# Patient Record
Sex: FEMALE | Race: WHITE | HISPANIC OR LATINO | Employment: FULL TIME | URBAN - METROPOLITAN AREA
[De-identification: names, ages, dates, MRNs, and addresses within clinical notes are randomized per-mention and may not be internally consistent; named-entity substitution may affect disease eponyms.]

---

## 2017-12-19 ENCOUNTER — ALLSCRIPTS OFFICE VISIT (OUTPATIENT)
Dept: OTHER | Facility: OTHER | Age: 42
End: 2017-12-19

## 2017-12-19 DIAGNOSIS — R71.8 OTHER ABNORMALITY OF RED BLOOD CELLS: ICD-10-CM

## 2017-12-19 DIAGNOSIS — H53.2 DIPLOPIA: ICD-10-CM

## 2017-12-19 DIAGNOSIS — E66.9 OBESITY: ICD-10-CM

## 2017-12-19 DIAGNOSIS — Z90.5 ACQUIRED ABSENCE OF KIDNEY: ICD-10-CM

## 2017-12-20 NOTE — PROGRESS NOTES
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) (H53 2)    Plan  Astigmatism of both eyes, Double vision, Myopia of both eyes    · Ophthalmology Referral Other Co-Management  *  Status: Hold For - Scheduling Requested for: [id number]  are Referring to a non- Preferred Provider : Provider not listed in Allscripts  Care Summary provided  : Yes  Double vision, Obesity (BMI 30-39  9)    · (1) TSH WITH FT4 REFLEX; Status:Active; Requested for:11Bzp0952;   Double vision, Single kidney    · (1) CBC/PLT/DIFF; Status:Active; Requested for:[id number]; Health Maintenance    · *1 - [facility] Co-Management  *  Status: Hold For - Scheduling Requested for: [id number]  Care Summary provided  : Yes  Obesity (BMI 30-39  9)    · (1) COMPREHENSIVE METABOLIC PANEL; Status:Active; Requested for:27Min5052;    · (1) LIPID PANEL FASTING W DIRECT LDL REFLEX; Status:Active; Requestedfor:80Jvp4990;   Screening for depression    · *VB - PHQ-9 Tool; Status:Complete;   Done: 88QBF4925 10:05AM    Discussion/Summary  Discussion Summary:   ObesityPatient advised to lose 2-4 lb monthly  To start low intensity exercise  To start drinking water at least 2 glasses a day and increase to 4 glasses a day minimum  Labs for serum glucose ordered via CMP to rule out diabetes given family history of diabetes  Decreased vision: Referred to ophthalmologistHM: She refused tdap and flu vaccine  Referred to gyn for annual Pap smear  Counseling Documentation With Imm: The patient was counseled regarding instructions for management,-- risk factor reductions,-- risks and benefits of treatment options,-- importance of compliance with treatment     Patient Education: Educational resources provided:   Medication SE Review and Pt Understands Tx: Possible side effects of new medications were reviewed with the patient/guardian today  The treatment plan was reviewed with the patient/guardian  The patient/guardian understands and agrees with the treatment plan      Chief Complaint  Chief Complaint Free Text Note Form: Presents to the clinic to establish care      History of Present Illness  HPI: as Flori Perdomo moved here [facility] about a month ago and denies any chronic medical conditions except for migraines  Chronic Hx of migraines, first diagnosed when she was about 24 y/o, after delivery of her first child  Starts on one side of the head and becomes generalized  Often frontal  Sensitivity to loud noise but not light  Often with Nausea and vomiting  Better with being in a dark room, and advil  Reports aura, consisting of discomfort at the sides of the head  Last about 1-3 days  Rates at 10/10 at worse, but 5/10 with advil  Family Hx of DM II with her father and mother  Not taking any meds currently  Has a Hx of poor vision consisting of astigmatism, myopia and double vision which seems to be getting worse  Last saw the ophthalmologist over a year ago  Has not seen the GYN in several years  Today she is referred to GYN and ophthalmologist  She has a history of kidney stones over the past several years  In 2014 was so bad that she lost 1 of her kidneys  Currently has and kidney on the right side  Verbal assessment sure she is not drinking water but lots of juice and some amount of coffee; at least 1 cup a day  Denies intake of soda  She reports she was told to check her kidney function every 6 months  The kidney was last checked more than 6 months ago but less than 1 year  We eventually referred her to the kidney specialist depending on what we find on her lab results  She is currently obese with a BMI of 33  Advised to lose 2-4 lb a month  To avoid losing too much weight too fast as this may affect her only kidney negatively     Hospital Based Practices Required Assessment:  Pain Assessment  the patient states they do not have pain  (on a scale of 0 to 10, the patient rates the pain at 0 )   Prefered Language is  [place]  Primary Language is  Citizen of [place]  Education Completed: disease/condition,-- medications-- and-- treatment/procedure  Teaching Method: verbal  Person Taught: patient  Evaluation Of Learning: verbalized/demonstrated understanding      Review of Systems  Complete-Female:  Constitutional: No fever, no chills, feels well, no tiredness, no recent weight gain or weight loss  Eyes: eyesight problems, but-- as noted in HPI,-- no eye pain,-- eyes not red-- and-- no purulent discharge from the eyes  ENT: no complaints of earache, no loss of hearing, no nose bleeds, no nasal discharge, no sore throat, no hoarseness  Cardiovascular: No complaints of slow heart rate, no fast heart rate, no chest pain, no palpitations, no leg claudication, no lower extremity edema  Respiratory: No complaints of shortness of breath, no wheezing, no cough, no SOB on exertion, no orthopnea, no PND  Gastrointestinal: No complaints of abdominal pain, no constipation, no nausea or vomiting, no diarrhea, no bloody stools  Musculoskeletal: No complaints of arthralgias, no myalgias, no joint swelling or stiffness, no limb pain or swelling  Neurological: No complaints of headache, no confusion, no convulsions, no numbness, no dizziness or fainting, no tingling, no limb weakness, no difficulty walking  Psychiatric: Not suicidal, no sleep disturbance, no anxiety or depression, no change in personality, no emotional problems  ROS Reviewed:   ROS reviewed  PHQ-9 Depression Scale: Over the past 2 weeks, how often have you been bothered by the following problems? 1 ) Little interest or pleasure in doing things? Not at all   2 ) Feeling down, depressed or hopeless? Not at all   3 ) Trouble falling asleep or sleeping too much? Not at all   4 ) Feeling tired or having little energy? Not at all   5 ) Poor appetite or overeating?  Not at all   6 ) Feeling bad about yourself, or that you are a failure, or have let yourself or your family down? Not at all   7 ) Trouble concentrating on things, such as reading a newspaper or watching television? Not at all   8 ) Moving or speaking so slowly that other people could have noticed, or the opposite, moving or speaking faster than usual? Not at all  How difficult have these problems made it for you to do your work, take care of things at home, or get along with people? 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 2) (H53 2)   3  Myopia of both eyes (367 1) (H52 13)   4  Screening for depression (V79 0) (Z13 89)    Past Medical History  1  History of renal calculi (V13 01) (Z87 442)   2  History of Toe fracture, left (826 0) (M56 326X)  Active Problems And Past Medical History Reviewed: The active problems and past medical history were reviewed and updated today  Surgical History  1  History of Kidney Surgery   2  History of Tubal Ligation  Surgical History Reviewed: The surgical history was reviewed and updated today  Family History  Mother    1  Family history of diabetes mellitus (V18 0) (Z83 3)   2  Family history of hyperlipidemia (V18 19) (Z83 49)   3  Family history of hypertension (V17 49) (Z82 49)   4  Family history of low back pain (V17 89) (Z82 69)  Father    5  Family history of diabetes mellitus (V18 0) (Z83 3)  Family History    6  Denied: Family history of drug abuse   7  No family history of mental disorder  Family History Reviewed: The family history was reviewed and updated today  Social History   · Never a smoker  Social History Reviewed: The social history was reviewed and updated today  The social history was reviewed and is unchanged  Allergies  1   No Known Drug Allergies    Vitals  Signs   Recorded: 66YAF5179 09:56AM   Temperature: 99 F  Heart Rate: 60  Systolic: 996  Diastolic: 70  Height: 5 ft 4 in  Weight: 197 lb 1 44 oz  BMI Calculated: 33 83  BSA Calculated: 1 94    Physical Exam   Constitutional  General appearance: No acute distress, well appearing and well nourished  well developed,-- normal body odor,-- does not smell of feces,-- does not smell of urine,-- well nourished,-- obese,-- clothing appropriate-- and-- well groomed  -- BMI 33  Eyes  Conjunctiva and lids: No swelling, erythema or discharge  Pupils and irises: Equal, round and reactive to light  -- wearing glasses  Ears, Nose, Mouth, and Throat  Otoscopic examination: Tympanic membranes translucent with normal light reflex  Canals patent without erythema  Oropharynx: Normal with no erythema, edema, exudate or lesions  Pulmonary  Respiratory effort: No increased work of breathing or signs of respiratory distress  Auscultation of lungs: Clear to auscultation  Cardiovascular  Auscultation of heart: Normal rate and rhythm, normal S1 and S2, without murmurs  Abdomen  Abdomen: Abnormal  -- Obese, round, no mass effect  Liver and spleen: No hepatomegaly or splenomegaly  -- Exam limited by body habitus  Musculoskeletal  Gait and station: Normal    Inspection/palpation of joints, bones, and muscles: Normal    Neurologic  Cranial nerves: Cranial nerves 2-12 intact  -- grossly intact  Psychiatric  Orientation to person, place, and time: Normal    Mood and affect: Normal          Results/Data  *VB - PHQ-9 Tool [id number] 10:05AM Shelley Jeffries     Test Name Result Flag Reference   PHQ-9 Adult Depression Score 0     PHQ-9 Adult Depression Screening Negative         Attending Note  Collaborating Physician Note: Collaborating Physician: I agree with the Advanced Practitioner note        Future Appointments    Date/Time Provider Specialty Site   01/04/2018 09:20 AM Delma Gongora 6 PCP     Signatures   Electronically signed by : PAYAM Carter; Dec 19 2017 10:49AM EST (Author)    Electronically signed by : Ryan Morgan; Dec 19 2017 12:08PM EST                       (Author)    Electronically signed by :  OSCAR Tang ; Dec 19 2017  1:13PM EST                       (Review)

## 2017-12-22 ENCOUNTER — APPOINTMENT (OUTPATIENT)
Dept: LAB | Facility: CLINIC | Age: 42
End: 2017-12-22
Payer: COMMERCIAL

## 2017-12-22 DIAGNOSIS — E66.9 OBESITY: ICD-10-CM

## 2017-12-22 DIAGNOSIS — H53.2 DIPLOPIA: ICD-10-CM

## 2017-12-22 DIAGNOSIS — Z90.5 ACQUIRED ABSENCE OF KIDNEY: ICD-10-CM

## 2017-12-22 LAB
ALBUMIN SERPL BCP-MCNC: 3.5 G/DL (ref 3.5–5)
ALP SERPL-CCNC: 73 U/L (ref 46–116)
ALT SERPL W P-5'-P-CCNC: 29 U/L (ref 12–78)
ANION GAP SERPL CALCULATED.3IONS-SCNC: 7 MMOL/L (ref 4–13)
AST SERPL W P-5'-P-CCNC: 16 U/L (ref 5–45)
BASOPHILS # BLD AUTO: 0.03 THOUSANDS/ΜL (ref 0–0.1)
BASOPHILS NFR BLD AUTO: 0 % (ref 0–1)
BILIRUB SERPL-MCNC: 0.35 MG/DL (ref 0.2–1)
BUN SERPL-MCNC: 11 MG/DL (ref 5–25)
CALCIUM SERPL-MCNC: 9.2 MG/DL (ref 8.3–10.1)
CHLORIDE SERPL-SCNC: 102 MMOL/L (ref 100–108)
CHOLEST SERPL-MCNC: 240 MG/DL (ref 50–200)
CO2 SERPL-SCNC: 26 MMOL/L (ref 21–32)
CREAT SERPL-MCNC: 0.97 MG/DL (ref 0.6–1.3)
EOSINOPHIL # BLD AUTO: 0.09 THOUSAND/ΜL (ref 0–0.61)
EOSINOPHIL NFR BLD AUTO: 1 % (ref 0–6)
ERYTHROCYTE [DISTWIDTH] IN BLOOD BY AUTOMATED COUNT: 16.5 % (ref 11.6–15.1)
GFR SERPL CREATININE-BSD FRML MDRD: 73 ML/MIN/1.73SQ M
GLUCOSE P FAST SERPL-MCNC: 90 MG/DL (ref 65–99)
HCT VFR BLD AUTO: 35.3 % (ref 34.8–46.1)
HDLC SERPL-MCNC: 51 MG/DL (ref 40–60)
HGB BLD-MCNC: 10.9 G/DL (ref 11.5–15.4)
LDLC SERPL CALC-MCNC: 158 MG/DL (ref 0–100)
LYMPHOCYTES # BLD AUTO: 2.03 THOUSANDS/ΜL (ref 0.6–4.47)
LYMPHOCYTES NFR BLD AUTO: 29 % (ref 14–44)
MCH RBC QN AUTO: 22.6 PG (ref 26.8–34.3)
MCHC RBC AUTO-ENTMCNC: 30.9 G/DL (ref 31.4–37.4)
MCV RBC AUTO: 73 FL (ref 82–98)
MONOCYTES # BLD AUTO: 0.57 THOUSAND/ΜL (ref 0.17–1.22)
MONOCYTES NFR BLD AUTO: 8 % (ref 4–12)
NEUTROPHILS # BLD AUTO: 4.37 THOUSANDS/ΜL (ref 1.85–7.62)
NEUTS SEG NFR BLD AUTO: 62 % (ref 43–75)
NRBC BLD AUTO-RTO: 0 /100 WBCS
PLATELET # BLD AUTO: 351 THOUSANDS/UL (ref 149–390)
PMV BLD AUTO: 10.7 FL (ref 8.9–12.7)
POTASSIUM SERPL-SCNC: 3.8 MMOL/L (ref 3.5–5.3)
PROT SERPL-MCNC: 7.9 G/DL (ref 6.4–8.2)
RBC # BLD AUTO: 4.82 MILLION/UL (ref 3.81–5.12)
SODIUM SERPL-SCNC: 135 MMOL/L (ref 136–145)
TRIGL SERPL-MCNC: 153 MG/DL
TSH SERPL DL<=0.05 MIU/L-ACNC: 0.98 UIU/ML (ref 0.36–3.74)
WBC # BLD AUTO: 7.1 THOUSAND/UL (ref 4.31–10.16)

## 2017-12-22 PROCEDURE — 36415 COLL VENOUS BLD VENIPUNCTURE: CPT

## 2017-12-22 PROCEDURE — 84443 ASSAY THYROID STIM HORMONE: CPT

## 2017-12-22 PROCEDURE — 80061 LIPID PANEL: CPT

## 2017-12-22 PROCEDURE — 85025 COMPLETE CBC W/AUTO DIFF WBC: CPT

## 2017-12-22 PROCEDURE — 80053 COMPREHEN METABOLIC PANEL: CPT

## 2017-12-25 ENCOUNTER — HOSPITAL ENCOUNTER (EMERGENCY)
Facility: HOSPITAL | Age: 42
Discharge: HOME | End: 2017-12-25
Attending: EMERGENCY MEDICINE | Admitting: EMERGENCY MEDICINE
Payer: COMMERCIAL

## 2017-12-25 VITALS
RESPIRATION RATE: 18 BRPM | OXYGEN SATURATION: 100 % | TEMPERATURE: 98.9 F | SYSTOLIC BLOOD PRESSURE: 104 MMHG | DIASTOLIC BLOOD PRESSURE: 56 MMHG | HEART RATE: 93 BPM

## 2017-12-25 DIAGNOSIS — K52.9 ACUTE GASTROENTERITIS: Primary | ICD-10-CM

## 2017-12-25 PROCEDURE — 99283 EMERGENCY DEPT VISIT LOW MDM: CPT

## 2017-12-25 RX ORDER — ONDANSETRON 4 MG/1
4 TABLET, ORALLY DISINTEGRATING ORAL EVERY 6 HOURS PRN
Qty: 10 TABLET | Refills: 0 | Status: SHIPPED | OUTPATIENT
Start: 2017-12-25 | End: 2019-08-15 | Stop reason: ALTCHOICE

## 2017-12-25 RX ORDER — ONDANSETRON 4 MG/1
4 TABLET, ORALLY DISINTEGRATING ORAL ONCE
Status: COMPLETED | OUTPATIENT
Start: 2017-12-25 | End: 2017-12-25

## 2017-12-25 RX ORDER — MAGNESIUM HYDROXIDE/ALUMINUM HYDROXICE/SIMETHICONE 120; 1200; 1200 MG/30ML; MG/30ML; MG/30ML
30 SUSPENSION ORAL ONCE
Status: COMPLETED | OUTPATIENT
Start: 2017-12-25 | End: 2017-12-25

## 2017-12-25 RX ADMIN — LIDOCAINE HYDROCHLORIDE 15 ML: 20 SOLUTION ORAL; TOPICAL at 22:10

## 2017-12-25 RX ADMIN — ONDANSETRON 4 MG: 4 TABLET, ORALLY DISINTEGRATING ORAL at 23:16

## 2017-12-25 RX ADMIN — ALUMINUM HYDROXIDE, MAGNESIUM HYDROXIDE, AND SIMETHICONE 30 ML: 200; 200; 20 SUSPENSION ORAL at 22:10

## 2017-12-25 RX ADMIN — ONDANSETRON 4 MG: 4 TABLET, ORALLY DISINTEGRATING ORAL at 22:09

## 2017-12-26 NOTE — ED PROVIDER NOTES
History  Chief Complaint   Patient presents with    Vomiting     Pt ambulatory on unit c/o vomiting, diarrhea, and weakness for 1 day  History provided by:  Patient   used: No     50-year-old female presented for evaluation of 1 day of nausea, vomiting and diarrhea associated with some periumbilical abdominal pain which had begun after the vomiting  Both vomiting and diarrhea have since stopped but she remains nauseous  LMP 3 weeks ago  History of nephrectomy due to renal stone  On exam here she has normal vitals  Oropharynx moist   Abdomen is soft with minimal periumbilical tenderness  No rebound or guarding  Most likely viral illness  Will give Zofran, GI cocktail, oral challenge and re-evaluate  None       Past Medical History:   Diagnosis Date    Renal disorder        Past Surgical History:   Procedure Laterality Date    KIDNEY SURGERY         History reviewed  No pertinent family history  I have reviewed and agree with the history as documented  Social History   Substance Use Topics    Smoking status: Never Smoker    Smokeless tobacco: Never Used    Alcohol use No        Review of Systems   Constitutional: Positive for appetite change  Negative for activity change and fever  Gastrointestinal: Positive for abdominal pain, diarrhea, nausea and vomiting  All other systems reviewed and are negative  Physical Exam  ED Triage Vitals [12/25/17 2130]   Temperature Pulse Respirations Blood Pressure SpO2   98 9 °F (37 2 °C) 104 18 145/67 96 %      Temp Source Heart Rate Source Patient Position - Orthostatic VS BP Location FiO2 (%)   Oral Monitor Sitting Left arm --      Pain Score       8           Orthostatic Vital Signs  Vitals:    12/25/17 2130 12/25/17 2200   BP: 145/67 129/60   Pulse: 104 94   Patient Position - Orthostatic VS: Sitting Lying       Physical Exam   Constitutional: She is oriented to person, place, and time   She appears well-developed and well-nourished  No distress  HENT:   Head: Normocephalic  Mouth/Throat: Oropharynx is clear and moist    Neck: Normal range of motion  Neck supple  Cardiovascular: Normal rate and regular rhythm  Abdominal: Soft  She exhibits no distension  Minimal periumbilical tenderness  No rebound or guarding  Musculoskeletal: Normal range of motion  She exhibits no edema  Neurological: She is alert and oriented to person, place, and time  Psychiatric: She has a normal mood and affect  Her behavior is normal    Nursing note and vitals reviewed  ED Medications  Medications   ondansetron (ZOFRAN-ODT) dispersible tablet 4 mg (not administered)   ondansetron (ZOFRAN-ODT) dispersible tablet 4 mg (4 mg Oral Given 12/25/17 2209)   aluminum-magnesium hydroxide-simethicone (MYLANTA) 200-200-20 mg/5 mL oral suspension 30 mL (30 mL Oral Given 12/25/17 2210)   lidocaine viscous (XYLOCAINE) 2 % mucosal solution 15 mL (15 mL Oral Given 12/25/17 2210)       Diagnostic Studies  Results Reviewed     None                 No orders to display              Procedures  Procedures       Phone Contacts  ED Phone Contact    ED Course  ED Course                                MDM  Number of Diagnoses or Management Options  Acute gastroenteritis:   Diagnosis management comments: 50-year-old female presented with 1 day of nausea, vomiting and diarrhea  Vomiting and diarrhea have ceased  Still somewhat nauseous with some mild periumbilical abdominal pain  Very mild tenderness on exam   Symptoms improved after GI cocktail, Zofran  She was able to tolerate some water in the emergency department  Most likely viral illness  Will plan discharge and symptomatic control for home  To return if symptoms worsen      Patient Progress  Patient progress: improved    CritCare Time    Disposition  Final diagnoses:   Acute gastroenteritis     Time reflects when diagnosis was documented in both MDM as applicable and the Disposition within this note     Time User Action Codes Description Comment    12/25/2017 11:06 PM Kasie LEYVA Add [K52 9] Acute gastroenteritis       ED Disposition     ED Disposition Condition Comment    Discharge  Reginaldo Nicolas discharge to home/self care  Condition at discharge: Stable        Follow-up Information     Follow up With Specialties Details Why Contact Info Additional Vanita [address], DO Internal Medicine   [address] Emergency Department Emergency Medicine  If symptoms worsen [address] [id number]  [phone]  ED,  [address], [place], [address]        Patient's Medications   Discharge Prescriptions    ONDANSETRON (ZOFRAN-ODT) 4 MG DISINTEGRATING TABLET    Take 1 tablet by mouth every 6 (six) hours as needed for nausea or vomiting       Start Date: 12/25/2017End Date: --       Order Dose: 4 mg       Quantity: 10 tablet    Refills: 0     No discharge procedures on file      ED Provider  Electronically Signed by           Ceci Castillo MD  12/25/17 9856

## 2017-12-26 NOTE — ED NOTES
Pt awake and alert, no distress noted, no vomiting since in ED, no other questions upon d/c     April OSCAR Laughlin, GUIDO  12/25/17 7154

## 2017-12-26 NOTE — DISCHARGE INSTRUCTIONS
Gastroenteritis   LO QUE NECESITA SABER:   La gastroenteritis, o gripe estomacal, es sudhir infección del estómago y los intestinos  INSTRUCCIONES SOBRE EL ILANA HOSPITALARIA:   Llame al 911 en puja de presentar lo siguiente:   · Usted tiene dificultad para respirar o pulso acelerado  Regrese a la betito de emergencias si:   · Usted ve paola en ramirez diarrea  · Usted no puede dejar de vomitar  · Usted no ha orinado en 12 horas  · Usted siente que se va a desmayar  Pregúntele a ramirez Ariana Fent vitaminas y minerales son adecuados para usted  · Usted tiene fiebre  · Usted continúa con vómitos o diarrea aún después del tratamiento  · Usted ve lombrices en ramirez diarrea  · Ramirez boca u ojos están secos  Usted no está orinando tanto o con la misma frecuencia  · Usted tiene preguntas o inquietudes acerca de ramirez condición o cuidado  Medicamentos:   · Medicamentos,  se pueden administrar para [employer] vómitos o la diarrea, disminuir los calambres abdominales o tratar sudhir infección  · Verplanck francisco medicamentos shana se le haya indicado  Consulte con ramirez médico si usted samantha que ramirez medicamento no le está ayudando o si presenta efectos secundarios  Infórmele si es alérgico a cualquier medicamento  Mantenga sudhir lista actualizada de los [place], las vitaminas y los productos herbales que reanna  Incluya los siguientes datos de los medicamentos: cantidad, frecuencia y motivo de administración  Traiga con usted la lista o los envases de la píldoras a francisco citas de seguimiento  Lleve la lista de los medicamentos con usted en puja de sudhir emergencia  [place] de ramirez síntomas:   · Verplanck líquidos shana se le haya indicado  Pregunte a ramirez médico qué cantidad de líquido debe beber a diario y qué líquidos le recomienda  Es posible que también necesite derrick sudhir solución de rehidratación oral (SRO)   Sudhir SRO contiene la cantidad [place] de azúcar, sal y minerales en agua para reponer los líquidos corporales  · Consuma alimentos blandos  Cuando usted sienta [place], empiece a comer alimentos suaves y blandos  Ejemplos son los plátanos, sopas claras, josee y puré de Corpus brian  No consuma productos lácteos, alcohol, bebidas azucaradas, o bebidas con cafeína hasta que se sienta mejor  · Descanse el mayor tiempo posible  Cuando se empiece a sentir mejor, comience poco a poco a hacer más cada día  Evite la propagación de la gastroenteritis:  La gastroenteritis se puede propagar fácilmente  Manténgase usted, ramirez dieudonne y francisco alrededores limpios para ayudar a evitar la propagación de la gastroenteritis:  · Lávese las pelon frecuentemente  Utilice agua y Dimitry  [employer] las pelon después de usar el baño, cambiarle el pañal a un regina o estornudar  Lávese las pelon antes de comer o preparar alimentos  · Limpie las superficies y lave la ropa con frecuencia  Lave ramirez ropa y francisco toallas por separado del mikaela de la ropa  Limpie las superficies de ramirez hogar con limpiador antibacterial o con blanqueador  · Lave y cocine juma los alimentos  Lave las verduras crudas antes de cocinar  [address] y [place]  No utilice los mismos platos para las boubacar crudas que para otros alimentos  Ponga en el refrigerador inmediatamente cualquier alimento que haya sobrado  · Esté alerta cuando usted vaya de campamento o cuando viaje  Solamente tome agua limpia  No tome agua de los lenka o mariam a menos que usted purifique o hierva el agua aliza  Cuando viaje, tome agua embotellada y no le ponga hielo  No coma fruta con la cáscara  No coma pescado crudo o boubacar que no están cocinadas completamente  Acuda a francisco consultas de control con ramirez médico según le indicaron  Anote francisco preguntas para que se acuerde de hacerlas kassie francisco visitas  © 2017 [address] Information is for End User's use only and may not be sold, redistributed or otherwise used for commercial purposes   All illustrations and images included in CareNotes® are the copyrighted property of A D A M , Inc  or Rakesh Swann  Esta información es sólo para uso en educación  Ramirez intención no es darle un consejo médico sobre enfermedades o tratamientos  Colsulte con ramirez Shakir Pier farmacéutico antes de seguir cualquier régimen médico para saber si es seguro y efectivo para usted

## 2017-12-28 ENCOUNTER — APPOINTMENT (OUTPATIENT)
Dept: LAB | Facility: CLINIC | Age: 42
End: 2017-12-28
Payer: COMMERCIAL

## 2017-12-28 DIAGNOSIS — R71.8 OTHER ABNORMALITY OF RED BLOOD CELLS: ICD-10-CM

## 2017-12-28 LAB
FERRITIN SERPL-MCNC: 9 NG/ML (ref 8–388)
IRON SATN MFR SERPL: 4 %
IRON SERPL-MCNC: 19 UG/DL (ref 50–170)
TIBC SERPL-MCNC: 430 UG/DL (ref 250–450)

## 2017-12-28 PROCEDURE — 82728 ASSAY OF FERRITIN: CPT

## 2017-12-28 PROCEDURE — 83540 ASSAY OF IRON: CPT

## 2017-12-28 PROCEDURE — 83550 IRON BINDING TEST: CPT

## 2017-12-28 PROCEDURE — 36415 COLL VENOUS BLD VENIPUNCTURE: CPT

## 2018-01-12 ENCOUNTER — GENERIC CONVERSION - ENCOUNTER (OUTPATIENT)
Dept: OTHER | Facility: OTHER | Age: 43
End: 2018-01-12

## 2018-01-23 VITALS
DIASTOLIC BLOOD PRESSURE: 70 MMHG | BODY MASS INDEX: 33.65 KG/M2 | SYSTOLIC BLOOD PRESSURE: 100 MMHG | HEART RATE: 60 BPM | WEIGHT: 197.09 LBS | TEMPERATURE: 99 F | HEIGHT: 64 IN

## 2018-01-24 VITALS
HEART RATE: 66 BPM | DIASTOLIC BLOOD PRESSURE: 70 MMHG | BODY MASS INDEX: 33.27 KG/M2 | TEMPERATURE: 98.4 F | WEIGHT: 194.89 LBS | SYSTOLIC BLOOD PRESSURE: 100 MMHG | HEIGHT: 64 IN

## 2018-04-26 RX ORDER — FERROUS SULFATE TAB EC 324 MG (65 MG FE EQUIVALENT) 324 (65 FE) MG
1 TABLET DELAYED RESPONSE ORAL DAILY
COMMUNITY
Start: 2018-01-12 | End: 2018-05-04 | Stop reason: SDUPTHER

## 2018-04-26 RX ORDER — RANITIDINE 150 MG/1
1 TABLET ORAL DAILY
COMMUNITY
Start: 2018-01-12 | End: 2020-04-23

## 2018-04-27 ENCOUNTER — TELEPHONE (OUTPATIENT)
Dept: INTERNAL MEDICINE CLINIC | Facility: CLINIC | Age: 43
End: 2018-04-27

## 2018-04-27 ENCOUNTER — OFFICE VISIT (OUTPATIENT)
Dept: INTERNAL MEDICINE CLINIC | Facility: CLINIC | Age: 43
End: 2018-04-27
Payer: COMMERCIAL

## 2018-04-27 VITALS
TEMPERATURE: 98.6 F | WEIGHT: 201.5 LBS | BODY MASS INDEX: 34.4 KG/M2 | HEART RATE: 88 BPM | SYSTOLIC BLOOD PRESSURE: 110 MMHG | DIASTOLIC BLOOD PRESSURE: 82 MMHG | HEIGHT: 64 IN

## 2018-04-27 DIAGNOSIS — G43.101 MIGRAINE WITH AURA AND WITH STATUS MIGRAINOSUS, NOT INTRACTABLE: Primary | ICD-10-CM

## 2018-04-27 DIAGNOSIS — R79.0 LOW IRON STORES: ICD-10-CM

## 2018-04-27 DIAGNOSIS — Z02.4 DRIVER'S PERMIT PE (PHYSICAL EXAMINATION): ICD-10-CM

## 2018-04-27 DIAGNOSIS — G43.109 MIGRAINE WITH AURA AND WITHOUT STATUS MIGRAINOSUS, NOT INTRACTABLE: Primary | ICD-10-CM

## 2018-04-27 DIAGNOSIS — R71.8 LOW MEAN CORPUSCULAR VOLUME (MCV): ICD-10-CM

## 2018-04-27 PROBLEM — H52.13 MYOPIA OF BOTH EYES: Status: ACTIVE | Noted: 2017-12-19

## 2018-04-27 PROBLEM — R10.13 DYSPEPSIA: Status: ACTIVE | Noted: 2018-01-12

## 2018-04-27 PROBLEM — H52.203 ASTIGMATISM OF BOTH EYES: Status: ACTIVE | Noted: 2017-12-19

## 2018-04-27 PROBLEM — E66.9 OBESITY (BMI 30-39.9): Status: ACTIVE | Noted: 2017-12-19

## 2018-04-27 PROBLEM — H53.2 DOUBLE VISION: Status: ACTIVE | Noted: 2017-12-19

## 2018-04-27 PROBLEM — E78.2 MIXED HYPERLIPIDEMIA: Status: ACTIVE | Noted: 2018-01-12

## 2018-04-27 PROCEDURE — 99213 OFFICE O/P EST LOW 20 MIN: CPT | Performed by: NURSE PRACTITIONER

## 2018-04-27 PROCEDURE — 3725F SCREEN DEPRESSION PERFORMED: CPT | Performed by: NURSE PRACTITIONER

## 2018-04-27 RX ORDER — BUTALBITAL, ASPIRIN, AND CAFFEINE 50; 325; 40 MG/1; MG/1; MG/1
1 CAPSULE ORAL EVERY 4 HOURS PRN
Qty: 30 CAPSULE | Refills: 1 | Status: SHIPPED | OUTPATIENT
Start: 2018-04-27 | End: 2019-08-24

## 2018-04-27 RX ORDER — ACETAMINOPHEN, ASPIRIN AND CAFFEINE 250; 250; 65 MG/1; MG/1; MG/1
1 TABLET, FILM COATED ORAL EVERY 6 HOURS PRN
Qty: 60 TABLET | Refills: 1 | Status: SHIPPED | OUTPATIENT
Start: 2018-04-27 | End: 2018-04-27

## 2018-04-27 NOTE — TELEPHONE ENCOUNTER
Please advise patient that I have prescribed a different medication for her (fiorinal)  Let her stop by the clinic and pick it up  It is a controlled substance and cannot be e-prescribed  Thanks   Jg Jesus

## 2018-04-27 NOTE — PROGRESS NOTES
Assessment/Plan:     Diagnoses and all orders for this visit:    Migraine with aura and without status migrainosus, not intractable  -     aspirin-acetaminophen-caffeine (EXCEDRIN MIGRAINE) 250-250-65 MG per tablet; Take 1 tablet by mouth every 6 (six) hours as needed for headaches    Low iron stores  -     Iron Panel; Future    Low mean corpuscular volume (MCV)  -     CBC; Future    's permit PE (physical examination)        -      Negative exam        -      Signed 's permit form given to patient        -      Copy to be scanned in her chart  Subjective: Presents to the clinic for 's license physical     Patient ID: Sherif Treviño is a 40 y o  female  HPI  Patient is requesting a 's license physical examination  She has an ongoing diagnosis of astigmatism and uses glasses for it  In care of ophthalmologist  Also has low iron stores with low MCV  Was given iron tabs for 3 months, starting Dec 2017  We will recheck her cbc and iron stores today  She reports chronic diagnosis of migraines, x 10 yrs or more  Says she gets an attack about every two weeks, mild intensity, has been using advil on the first sign of the headache but the efficacy of Advil is dropping  She has an aura of pain on the back of her head, which precedes the migraine  Worse with stress and decreased sleep  The HA is usually achy, mostly located on the top and front of the head  Last from a couple of hours to several hours  Sometimes has sensitivity to light and noise  No N/V  No facial muscle weakness  She has HA currently and is off mild intensity, generalized  Started yesterday evening  Last episode of HA was 2 weeks ago       The following portions of the patient's history were reviewed and updated as appropriate: allergies, current medications, past family history, past medical history, past social history, past surgical history and problem list     Review of Systems   Constitutional: Negative for appetite change, diaphoresis, fatigue and unexpected weight change  HENT: Negative for hearing loss, rhinorrhea, sinus pain, sore throat and trouble swallowing  Respiratory: Negative for cough, chest tightness, shortness of breath and wheezing  Cardiovascular: Negative for chest pain and palpitations  Gastrointestinal: Negative for abdominal pain, diarrhea, nausea and vomiting  Genitourinary: Negative for hematuria and menstrual problem  Skin: Negative for color change, pallor, rash and wound  Neurological: Positive for headaches (as above)  Negative for dizziness, tremors, syncope, facial asymmetry, speech difficulty, light-headedness and numbness  Objective:      /82 (BP Location: Right arm, Patient Position: Sitting, Cuff Size: Adult)   Pulse 88   Temp 98 6 °F (37 °C) (Oral)   Ht 5' 4" (1 626 m)   Wt 91 4 kg (201 lb 8 oz)   BMI 34 59 kg/m²          Physical Exam   Constitutional: She is oriented to person, place, and time  She appears well-developed and well-nourished  No distress  HENT:   Head: Normocephalic and atraumatic  Right Ear: External ear normal    Left Ear: External ear normal    Eyes: Conjunctivae and EOM are normal  Pupils are equal, round, and reactive to light  Right eye exhibits no discharge  Left eye exhibits no discharge  Neck: Normal range of motion  Neck supple  No thyromegaly present  Cardiovascular: Normal rate, regular rhythm and normal heart sounds  No murmur heard  Pulmonary/Chest: Effort normal and breath sounds normal  No respiratory distress  She has no wheezes  Abdominal: Soft  Bowel sounds are normal  She exhibits no distension and no mass  There is no tenderness  Obese abd, soft  Single pannus  Exam limited by body habitus   Musculoskeletal: Normal range of motion  She exhibits no edema or tenderness  Lymphadenopathy:     She has no cervical adenopathy  Neurological: She is alert and oriented to person, place, and time   She has normal reflexes  Skin: Skin is warm and dry  She is not diaphoretic  Psychiatric: She has a normal mood and affect   Her behavior is normal  Judgment and thought content normal

## 2018-04-27 NOTE — PATIENT INSTRUCTIONS
Dieta saludable para el corazón   LO QUE NECESITA SABER:   Delwin Lingo bettie para el corazón es un plan alimenticio bajo en grasas totales, grasas no saludables y sodio (patrick)  Delwin Lingo saludable para el corazón ayuda a disminuir ramirez riesgo de sufrir de enfermedades del Rojo Bering y un derrame cerebral  Limite la cantidad de grasa que consume entre un 25% a 35% del total de francisco calorías diarias  Limite el sodio por debajo de los 2,300 mg al día  [address]:   Grasas saludables:  Las grasas saludables ayudan a mejorar los niveles de [place]  El riesgo de sudhir enfermedad cardíaca se disminuye cuando los niveles de colesterol son normales  Escoja grasas saludables shana las siguientes:  · Las grasas no saturadas  se encuentran en alimentos shana el frijol de soja, aceites de can[place], de [place], de [place] y de [place]  Se encuentra también en la margarina suave hecha con aceite líquido vegetal      · Las grasas Omega 3  se encuentran en ciertos pescados, shana el salmón, el atún y la [place], en las nueces y en la semilla de rebecca  Grasas no saludables:  Las grasas "malas" pueden causar niveles perjudiciales de colesterol en ramirez paola y aumentar el riesgo de sudhir enfermedad cardíaca  Limite el consumo de grasas no saludables, shana los siguientes:  · El colesterol  se encuentra en alimentos de origen animal, shana los huevos y la langosta al igual que en productos lácteos hechos con leche entera  Limite el consumo de colesterol a menos de 300 milligramos (mg) al día  Es posible que necesite limitar el colesterol a 200 mg al día si sufre de sudhir enfermedad cardíaca  · Las grasas saturadas  se encuentran en boubacar shana el tocino y la hamburguesa  Estas se encuentran también en la piel del elias y del [place], [place] entera y [place]  Limite el consumo de grasas saturadas a menos del 7% del total de francisco calorías diarias   Limite las grasas saturadas a menos del 6% si usted tiene enfermedad cardíaca o tiene un mayor riesgo para esto  · La grasa trans  se encuentran en los alimentos envasados, shana las papitas de bolsa y las [place]  También se encuentra en la margarina dura, algunos alimentos fritos y la manteca  Evite lo más que [employer] trans  Alimentos y bebidas saludables para el corazón para incluir:  Solicite que ramirez nutricionista o el médico le indique cuántas porciones necesita consumir de los siguientes alimentos de cada sosa alimenticio:  · Granos:      ¨ Panes, cereales y pastas de harina integral y Alfonse Coffey integral    ¨ Patatas fritas y galletas saladas bajas en grasa, bajas en sodio    · Verduras:      ¨ Brócoli, judías verdes, guisantes y espinacas    ¨ [place], col y habas    ¨ Zanahorias, patatas dulces, tomates y pimientos    ¨ Vegetales enlatados sin patrick añadida    · Frutas:      ¨ Plátanos, melocotones, peras y mccartney    ¨ Uvas, pasas y dátiles    ¨ [place], [place], [facility], [facility] y [facility]    ¨ [place], [place], melón y papaya    ¨ [place] y fresas    ¨ Conservas de frutas sin azúcares añadidos    · Productos lácteos bajos en grasa:      ¨ Leche sin grasa (descremada), leche 1%, leche baja en grasa de [place], anacardo o leche de soja fortificada con calcio    ¨ Queso cottage, queso bajo en grasa y yogur regular o congelado    · Lisa y proteínas , ralph de carne [place] de res o cerdo (chuletas, pierna, estephanie), el elias y el pavo sin piel, legumbres, productos de soya, las claras del huevo y nueces o paddy secos    Alimentos y bebidas que debe limitar o evitar:  Pregúntele a ramirez médico o nutricionista sobre estos y otros alimentos que contienen altos niveles de too Benites y Hilario Jainism que no son saludables:  · [employer] , shana las comidas congeladas, [place], williamón con queso y cereales con más de 300 mg de sodio por porción    · Productos enlatados o mezclas secas  para pasteles, sopas o salsas    · Verduras con sodio agregada , [address] instantáneas, verduras con salsas agregadas o conservas regulares de verduras    · Otros alimentos altos en sodio , shana la salsa de [place], salsa de [facility], aderezo para [place], encurtidos de [place], UPPER STONE, salsa de soya y miso    · [employer] lácteos con alto contenido de grasa  shana leche entera o 2%, queso crema o crema agria y quesos     · Alimentos con proteínas altas en grasa  ralph de carne ([address], las chuletas con hueso), el elias o pavo sin piel y las vísceras de animal shana el hígado    · Lisa curadas o [place] , shana las salchichas, el tocino y salchichones    · Aceites y grasas poco saludables , shana la [place], margarina en Joel Dine y aceites para cocinar shana el aceite de mook o juan    · Alimentos y bebidas altas en azúcar , tales shana refrescos (gaseosas), bebidas deportivas, té azucarado, dulces, pasteles, galletas, tartas y donas  Otras pautas dietéticas que debe seguir:   · Consuma más alimentos que contengan grasas Omega-3  Consuma pescado con altas cantidades de Omega-3 por lo menos 2 veces a la semana  · Limite el consumo de alcohol  Demasiado alcohol puede dañar ramirez corazón y elevar ramirez presión arterial  Las mujeres deberían limitar el consumo de alcohol a 1 bebida por día  Los hombres deberían limitar el consumo de alcohol a 2 tragos al día  Un trago equivale a 12 onzas de cerveza, 5 onzas de vino o 1 onza y ½ de licor  · Escoja alimentos bajos en sodio  Alimentos altos de sodio pueden conducir a la hipertensión  Al preparar la comida añada muy poca sal o no use sal  Use hierbas y especias en vez de la sal     · Consuma más fibra  para ayudar a bajar los niveles de [place]  Consuma al menos 5 porciones de frutas y verduras todos los días  Consuma 3 onzas de alimentos integrales al día  Priscella Nett (fríjoles) son Dannielle Lint buena brianna de [place]  Pautas de estilo de pilar:   · No fume    La nicotina y otros químicos contenidos en los cigarrillos y cigarros pueden causar daño a francisco pulmones y el corazón  Pida información a lovett médico si usted actualmente fuma y necesita ayuda para dejar de fumar  Los cigarrillos electrónicos o tabaco sin humo todavía contienen nicotina  Consulte con lovett médico antes de [employer]  · Pradeep Eliecer regularmente  para que le ayude a mantener un peso saludable y mejorar lovett presión arterial y niveles de colesterol  Pregunte a lovett médico acerca del mejor plan de ejercicio para usted  No empiece un programa de ejercicios sin antes consultar con lovett Cee Muljoshua a francisco consultas de control con lovett médico según le indicaron  Anote francisco preguntas para que se acuerde de hacerlas kassie francisco visitas  © 2017 [address] Information is for End User's use only and may not be sold, redistributed or otherwise used for commercial purposes  All illustrations and images included in CareNotes® are the copyrighted property of A D A M , Inc  or Rakesh Swann  Esta información es sólo para uso en educación  Lovett intención no es darle un consejo médico sobre enfermedades o tratamientos  Colsulte con lovett Ozell Fabry farmacéutico antes de seguir cualquier régimen médico para saber si es seguro y efectivo para usted

## 2018-05-03 ENCOUNTER — TELEPHONE (OUTPATIENT)
Dept: INTERNAL MEDICINE CLINIC | Facility: CLINIC | Age: 43
End: 2018-05-03

## 2018-05-04 ENCOUNTER — TELEPHONE (OUTPATIENT)
Dept: INTERNAL MEDICINE CLINIC | Facility: CLINIC | Age: 43
End: 2018-05-04

## 2018-05-04 ENCOUNTER — APPOINTMENT (OUTPATIENT)
Dept: LAB | Facility: CLINIC | Age: 43
End: 2018-05-04
Payer: COMMERCIAL

## 2018-05-04 DIAGNOSIS — R79.0 LOW IRON STORES: Primary | ICD-10-CM

## 2018-05-04 DIAGNOSIS — R79.0 LOW IRON STORES: ICD-10-CM

## 2018-05-04 DIAGNOSIS — R71.8 LOW MEAN CORPUSCULAR VOLUME (MCV): ICD-10-CM

## 2018-05-04 LAB
ERYTHROCYTE [DISTWIDTH] IN BLOOD BY AUTOMATED COUNT: 17.2 % (ref 11.6–15.1)
FERRITIN SERPL-MCNC: 6 NG/ML (ref 8–388)
HCT VFR BLD AUTO: 36.5 % (ref 34.8–46.1)
HGB BLD-MCNC: 11 G/DL (ref 11.5–15.4)
IRON SATN MFR SERPL: 7 %
IRON SERPL-MCNC: 32 UG/DL (ref 50–170)
MCH RBC QN AUTO: 22.8 PG (ref 26.8–34.3)
MCHC RBC AUTO-ENTMCNC: 30.1 G/DL (ref 31.4–37.4)
MCV RBC AUTO: 76 FL (ref 82–98)
PLATELET # BLD AUTO: 320 THOUSANDS/UL (ref 149–390)
PMV BLD AUTO: 10.8 FL (ref 8.9–12.7)
RBC # BLD AUTO: 4.83 MILLION/UL (ref 3.81–5.12)
TIBC SERPL-MCNC: 439 UG/DL (ref 250–450)
WBC # BLD AUTO: 6.48 THOUSAND/UL (ref 4.31–10.16)

## 2018-05-04 PROCEDURE — 83540 ASSAY OF IRON: CPT

## 2018-05-04 PROCEDURE — 85027 COMPLETE CBC AUTOMATED: CPT

## 2018-05-04 PROCEDURE — 82728 ASSAY OF FERRITIN: CPT

## 2018-05-04 PROCEDURE — 36415 COLL VENOUS BLD VENIPUNCTURE: CPT

## 2018-05-04 PROCEDURE — 83550 IRON BINDING TEST: CPT

## 2018-05-04 RX ORDER — FERROUS SULFATE TAB EC 324 MG (65 MG FE EQUIVALENT) 324 (65 FE) MG
324 TABLET DELAYED RESPONSE ORAL DAILY
Qty: 60 TABLET | Refills: 2 | Status: SHIPPED | OUTPATIENT
Start: 2018-05-04 | End: 2018-12-10

## 2018-05-08 ENCOUNTER — TELEPHONE (OUTPATIENT)
Dept: INTERNAL MEDICINE CLINIC | Facility: CLINIC | Age: 43
End: 2018-05-08

## 2018-05-08 ENCOUNTER — OFFICE VISIT (OUTPATIENT)
Dept: INTERNAL MEDICINE CLINIC | Facility: CLINIC | Age: 43
End: 2018-05-08
Payer: COMMERCIAL

## 2018-05-08 VITALS
HEIGHT: 64 IN | SYSTOLIC BLOOD PRESSURE: 140 MMHG | HEART RATE: 108 BPM | WEIGHT: 200.18 LBS | DIASTOLIC BLOOD PRESSURE: 90 MMHG | TEMPERATURE: 99 F | BODY MASS INDEX: 34.18 KG/M2

## 2018-05-08 DIAGNOSIS — M79.601 PAIN OF RIGHT UPPER EXTREMITY: Primary | ICD-10-CM

## 2018-05-08 DIAGNOSIS — Z11.1 SCREENING EXAMINATION FOR PULMONARY TUBERCULOSIS: ICD-10-CM

## 2018-05-08 DIAGNOSIS — G43.101 MIGRAINE WITH AURA AND WITH STATUS MIGRAINOSUS, NOT INTRACTABLE: ICD-10-CM

## 2018-05-08 DIAGNOSIS — Z02.1 PRE-EMPLOYMENT EXAMINATION: ICD-10-CM

## 2018-05-08 PROCEDURE — 86580 TB INTRADERMAL TEST: CPT | Performed by: INTERNAL MEDICINE

## 2018-05-08 PROCEDURE — 3008F BODY MASS INDEX DOCD: CPT | Performed by: INTERNAL MEDICINE

## 2018-05-08 PROCEDURE — 99214 OFFICE O/P EST MOD 30 MIN: CPT | Performed by: INTERNAL MEDICINE

## 2018-05-08 RX ORDER — CYCLOBENZAPRINE HCL 5 MG
10 TABLET ORAL 2 TIMES DAILY PRN
Qty: 60 TABLET | Refills: 0 | Status: SHIPPED | OUTPATIENT
Start: 2018-05-08 | End: 2018-12-10

## 2018-05-08 RX ORDER — NAPROXEN 500 MG/1
500 TABLET ORAL 2 TIMES DAILY WITH MEALS
Qty: 60 TABLET | Refills: 0 | Status: SHIPPED | OUTPATIENT
Start: 2018-05-08 | End: 2019-08-15 | Stop reason: ALTCHOICE

## 2018-05-08 RX ORDER — AMITRIPTYLINE HYDROCHLORIDE 10 MG/1
10 TABLET, FILM COATED ORAL
Qty: 90 TABLET | Refills: 1 | Status: SHIPPED | OUTPATIENT
Start: 2018-05-08 | End: 2019-08-24

## 2018-05-08 NOTE — PATIENT INSTRUCTIONS
Dolor de brazo   LO QUE NECESITA SABER:   Ramirez dolor de brazo puede ser causado por ciertas condiciones  Unos ejemplos incluyen artritis, problemas del nervio o tomó sudhir posición pop mientras durmió  Sudhir radiografía no mostró un hueso roto en ramirez brazo ni fanny  El dolor de brazo puede ser un signo de sudhir condición grave que necesita atención médica inmediata, shana un ataque al corazón  INSTRUCCIONES SOBRE EL ILANA HOSPITALARIA:   Llame al 911 en puja de presentar lo siguiente:  Tiene alguno de los siguientes signos de un ataque cardíaco:   · Estornudos, presión, o dolor en ramirez pecho que dura mas de 5 minutos o regresa  · Malestar o dolor en ramirez espalda, milo, mandíbula, abdomen, o brazo     · Dificultad para respirar o latidos cardíacos rápidos y palpitantes    · Náuseas o vómito    · Siente un desvanecimiento o tiene sudores fríos especialmente en el pecho o dificultad para respirar  Regrese a la betito de emergencias si:   · Usted tiene un dolor intenso o dolor que se desplaza de ramirez brazo a otras áreas  · Usted tiene inflamación, cosquilleo o entumecimiento en ramirez mano o dedos, o ramirez piel se torna stephani  · Usted no puede  el brazo  Pregúntele a ramirez Ariana Fent vitaminas y minerales son adecuados para usted  · Usted tiene preguntas o inquietudes acerca de ramirez condición o cuidado  Medicamentos:  Es posible que usted necesite alguno de los siguientes:  · Un medicamento con receta para el dolor  podrían ser Lola Lacer  Pregunte cómo derrick estos medicamentos de sudhir forma shelby  · AINEs (Analgésicos antiinflamatorios no esteroides) shana el ibuprofeno, ayudan a disminuir la inflamación, el dolor y la [place]  Piper medicamento esta disponible con o sin sudhir receta médica  Los AINEs pueden causar sangrado estomacal o problemas renales en ciertas personas  Si usted reanna un medicamento anticoagulante, siempre pregúntele a ramirez médico si los ROC son seguros para usted   Siempre rafy la etiqueta de [place] medicamento y [address]  · [place] francisco medicamentos shana se le haya indicado  Consulte con ramirez médico si usted samantha que ramirez medicamento no le está ayudando o si presenta efectos secundarios  Infórmele si es alérgico a cualquier medicamento  Mantenga sudhir lista actualizada de los [employer], las vitaminas y los productos herbales que reanna  Incluya los siguientes datos de los medicamentos: cantidad, frecuencia y motivo de administración  Traiga con usted la lista o los envases de la píldoras a francisco citas de seguimiento  Lleve la lista de los medicamentos con usted en puja de sudhir emergencia  Cuidados personales:   · Repose ramirez brazo según le indicaron  Endy Tiffani o cabestrillo se puede usar para inmovilizar ramirez brazo mientras se recupera  · Aplique hielo según las indicaciones  El hielo ayuda a disminuir el dolor y la inflamación  El hielo también puede contribuir a evitar el daño de los tejidos  Use un paquete de hielo o ponga hielo molido dentro de [employer]  Cúbrala con sudhir toalla  Aplique el hielo por 20 minutos sobre ramirez Nirav Moots, con cierta frecuencia [address] indicaciones  Pregunte cuántas veces necesita aplicar el hielo al día y por cuántos días  · Eleve ramirez brazo por encima del nivel de ramirez corazón con la frecuencia que pueda  [place] va a disminuir inflamación y el dolor  Apoye el brazo sobre almohadas o [place] para mantener el área elevada de sudhir forma cómoda  · Ajuste ramirez posición si usted trabaja frente a sudhir computadora  Es posible que necesite sudhir silla con reposabrazos o apoyo para la fanny al igual que cambiar la altura de la silla  · Mantenga un registro del dolor  Anote cuando tiene dolor y cuál es la intensidad  Incluya cualquier otro síntoma que sienta además del dolor  Un registro sirve para ayudar a mantener un seguimiento de los ciclos del dolor  Priscila Barber registro con usted a francisco citas de seguimiento   También podría ayudarle a ramirez médico a encontrar la causa del dolor  Acuda a francisco consultas de control con lovett médico según le indicaron  Usted podría necesitar fisioterapia  Es posible que necesite shay a un especialista ortopédico  Anote francisco preguntas para que se acuerde de hacerlas kassie francisco visitas  © 2017 [address] Information is for End User's use only and may not be sold, redistributed or otherwise used for commercial purposes  All illustrations and images included in CareNotes® are the copyrighted property of A D A M , Inc  or Rakesh Swann  Esta información es sólo para uso en educación  Lovett intención no es darle un consejo médico sobre enfermedades o tratamientos  Colsulte con lovett Dorna Latrell farmacéutico antes de seguir cualquier régimen médico para saber si es seguro y efectivo para usted

## 2018-05-08 NOTE — PROGRESS NOTES
Assessment/Plan:     Diagnoses and all orders for this visit:    Pain of right upper extremity  -     cyclobenzaprine (FLEXERIL) 5 mg tablet; Take 2 tablets (10 mg total) by mouth 2 (two) times a day as needed for muscle spasms  -     naproxen (NAPROSYN) 500 mg tablet; Take 1 tablet (500 mg total) by mouth 2 (two) times a day with meals  -     Ambulatory referral to Physical Therapy; Future  -     We will consider imaging if P/T is not successful  Pre-employment examination        -      Negative for any impairing medical conditions        -      No communicable illnesses    Screening examination for pulmonary tuberculosis  -     TB Skin Test  -      Patient to RTC in 2 days to get it read    Migraine with aura and with status migrainosus, not intractable  -     amitriptyline (ELAVIL) 10 mg tablet; Take 1 tablet (10 mg total) by mouth daily at bedtime            Subjective: Presents to the clinic for pre-employment physical     Patient ID: Rory Etienne is a 40 y o  female  HPI  She was last seen 2 weeks ago for migraines with aura and was started on meds  Initially she was prescribed Excedrin but the pharmacy called and reported that her insurance did not pay for it and this was changed to fiorinal  She reports she is doing a little better but is still getting the migraines  We will continue to monitor but in the meantime, we are starting her on amitriptyline for prophylaxis of the migraine  If this does not help, we will send her to neurologist  Today she reports ongoing right upper arm pain, which gets worse when she tries to attach her bra on her back  Duration several weeks  Achy twisting pain, better with rest  Denies arm weakness   Requesting PPD screening test      The following portions of the patient's history were reviewed and updated as appropriate: allergies, current medications, past family history, past medical history, past social history, past surgical history and problem list     Review of Systems   Constitutional: Negative for appetite change, diaphoresis, fatigue and unexpected weight change  Respiratory: Negative for cough, chest tightness, shortness of breath and wheezing  Cardiovascular: Negative for chest pain and palpitations  Musculoskeletal: Positive for myalgias (R upper arm) and neck pain (a little)  Negative for joint swelling and neck stiffness  Neurological: Positive for headaches  Negative for dizziness, tremors, syncope, weakness and numbness  Objective:      /90 (BP Location: Right arm, Patient Position: Sitting, Cuff Size: Adult)   Pulse (!) 108   Temp 99 °F (37 2 °C) (Oral)   Ht 5' 4" (1 626 m)   Wt 90 8 kg (200 lb 2 8 oz)   BMI 34 36 kg/m²        Physical Exam   Constitutional: She is oriented to person, place, and time  She appears well-developed and well-nourished  No distress  Cardiovascular: Normal rate, regular rhythm and normal heart sounds  No murmur heard  Pulmonary/Chest: Effort normal and breath sounds normal  No respiratory distress  She has no wheezes  Musculoskeletal: She exhibits tenderness (TTP of the R trap muscles of the neck, and muscles of the R shoulder and R upper arm (deltoid and brachialis muscles)  Full ROM but pain is elicited with internal rotation  No decreased in strength  Pulses 2/2)  She exhibits no edema  Neurological: She is alert and oriented to person, place, and time  Skin: She is not diaphoretic  Psychiatric: She has a normal mood and affect   Her behavior is normal  Judgment and thought content normal

## 2018-05-10 LAB
INDURATION: 0 MM
TB SKIN TEST: NEGATIVE

## 2018-05-10 NOTE — TELEPHONE ENCOUNTER
PPD READ ON 5/10/18 AS NEGATIVE, 0MM - FORM GIVEN TO PT  AND COPY SENT TO [facility]   CONFIRMATION RCV'D

## 2018-12-10 ENCOUNTER — APPOINTMENT (EMERGENCY)
Dept: CT IMAGING | Facility: HOSPITAL | Age: 43
End: 2018-12-10
Payer: COMMERCIAL

## 2018-12-10 ENCOUNTER — ANESTHESIA EVENT (OUTPATIENT)
Dept: PERIOP | Facility: HOSPITAL | Age: 43
End: 2018-12-10
Payer: COMMERCIAL

## 2018-12-10 ENCOUNTER — ANESTHESIA (OUTPATIENT)
Dept: PERIOP | Facility: HOSPITAL | Age: 43
End: 2018-12-10
Payer: COMMERCIAL

## 2018-12-10 ENCOUNTER — APPOINTMENT (EMERGENCY)
Dept: ULTRASOUND IMAGING | Facility: HOSPITAL | Age: 43
End: 2018-12-10
Payer: COMMERCIAL

## 2018-12-10 ENCOUNTER — HOSPITAL ENCOUNTER (OUTPATIENT)
Facility: HOSPITAL | Age: 43
Discharge: HOME | End: 2018-12-11
Attending: EMERGENCY MEDICINE | Admitting: SURGERY
Payer: COMMERCIAL

## 2018-12-10 DIAGNOSIS — N39.0 ACUTE UTI: ICD-10-CM

## 2018-12-10 DIAGNOSIS — K80.20 SYMPTOMATIC CHOLELITHIASIS: Primary | ICD-10-CM

## 2018-12-10 LAB
ALBUMIN SERPL BCP-MCNC: 3.5 G/DL (ref 3.5–5)
ALP SERPL-CCNC: 68 U/L (ref 46–116)
ALT SERPL W P-5'-P-CCNC: 29 U/L (ref 12–78)
ANION GAP SERPL CALCULATED.3IONS-SCNC: 9 MMOL/L (ref 4–13)
AST SERPL W P-5'-P-CCNC: 24 U/L (ref 5–45)
BACTERIA UR QL AUTO: ABNORMAL /HPF
BASOPHILS # BLD AUTO: 0.03 THOUSANDS/ΜL (ref 0–0.1)
BASOPHILS NFR BLD AUTO: 1 % (ref 0–1)
BILIRUB DIRECT SERPL-MCNC: 0.08 MG/DL (ref 0–0.2)
BILIRUB SERPL-MCNC: 0.6 MG/DL (ref 0.2–1)
BILIRUB UR QL STRIP: ABNORMAL
BUN SERPL-MCNC: 14 MG/DL (ref 5–25)
CALCIUM SERPL-MCNC: 8.9 MG/DL (ref 8.3–10.1)
CHLORIDE SERPL-SCNC: 102 MMOL/L (ref 100–108)
CLARITY UR: CLEAR
CO2 SERPL-SCNC: 26 MMOL/L (ref 21–32)
COLOR UR: ABNORMAL
CREAT SERPL-MCNC: 0.97 MG/DL (ref 0.6–1.3)
EOSINOPHIL # BLD AUTO: 0.1 THOUSAND/ΜL (ref 0–0.61)
EOSINOPHIL NFR BLD AUTO: 2 % (ref 0–6)
ERYTHROCYTE [DISTWIDTH] IN BLOOD BY AUTOMATED COUNT: 15.8 % (ref 11.6–15.1)
EXT PREG TEST URINE: NEGATIVE
GFR SERPL CREATININE-BSD FRML MDRD: 72 ML/MIN/1.73SQ M
GLUCOSE SERPL-MCNC: 94 MG/DL (ref 65–140)
GLUCOSE UR STRIP-MCNC: NEGATIVE MG/DL
HCT VFR BLD AUTO: 39.7 % (ref 34.8–46.1)
HGB BLD-MCNC: 12.6 G/DL (ref 11.5–15.4)
HGB UR QL STRIP.AUTO: NEGATIVE
IMM GRANULOCYTES # BLD AUTO: 0.02 THOUSAND/UL (ref 0–0.2)
IMM GRANULOCYTES NFR BLD AUTO: 0 % (ref 0–2)
KETONES UR STRIP-MCNC: ABNORMAL MG/DL
LEUKOCYTE ESTERASE UR QL STRIP: ABNORMAL
LIPASE SERPL-CCNC: 104 U/L (ref 73–393)
LYMPHOCYTES # BLD AUTO: 1.13 THOUSANDS/ΜL (ref 0.6–4.47)
LYMPHOCYTES NFR BLD AUTO: 24 % (ref 14–44)
MCH RBC QN AUTO: 25.9 PG (ref 26.8–34.3)
MCHC RBC AUTO-ENTMCNC: 31.7 G/DL (ref 31.4–37.4)
MCV RBC AUTO: 82 FL (ref 82–98)
MONOCYTES # BLD AUTO: 0.71 THOUSAND/ΜL (ref 0.17–1.22)
MONOCYTES NFR BLD AUTO: 15 % (ref 4–12)
NEUTROPHILS # BLD AUTO: 2.69 THOUSANDS/ΜL (ref 1.85–7.62)
NEUTS SEG NFR BLD AUTO: 58 % (ref 43–75)
NITRITE UR QL STRIP: NEGATIVE
NON-SQ EPI CELLS URNS QL MICRO: ABNORMAL /HPF
NRBC BLD AUTO-RTO: 0 /100 WBCS
PH UR STRIP.AUTO: 5.5 [PH] (ref 4.5–8)
PLATELET # BLD AUTO: 267 THOUSANDS/UL (ref 149–390)
PMV BLD AUTO: 10.8 FL (ref 8.9–12.7)
POTASSIUM SERPL-SCNC: 4.1 MMOL/L (ref 3.5–5.3)
PROT SERPL-MCNC: 7.9 G/DL (ref 6.4–8.2)
PROT UR STRIP-MCNC: ABNORMAL MG/DL
RBC # BLD AUTO: 4.87 MILLION/UL (ref 3.81–5.12)
RBC #/AREA URNS AUTO: ABNORMAL /HPF
SODIUM SERPL-SCNC: 137 MMOL/L (ref 136–145)
SP GR UR STRIP.AUTO: >=1.03 (ref 1–1.03)
UROBILINOGEN UR QL STRIP.AUTO: 1 E.U./DL
WBC # BLD AUTO: 4.68 THOUSAND/UL (ref 4.31–10.16)
WBC #/AREA URNS AUTO: ABNORMAL /HPF

## 2018-12-10 PROCEDURE — 88304 TISSUE EXAM BY PATHOLOGIST: CPT | Performed by: PATHOLOGY

## 2018-12-10 PROCEDURE — 96361 HYDRATE IV INFUSION ADD-ON: CPT

## 2018-12-10 PROCEDURE — 80048 BASIC METABOLIC PNL TOTAL CA: CPT | Performed by: EMERGENCY MEDICINE

## 2018-12-10 PROCEDURE — 76705 ECHO EXAM OF ABDOMEN: CPT

## 2018-12-10 PROCEDURE — 81001 URINALYSIS AUTO W/SCOPE: CPT

## 2018-12-10 PROCEDURE — 80076 HEPATIC FUNCTION PANEL: CPT | Performed by: EMERGENCY MEDICINE

## 2018-12-10 PROCEDURE — 74177 CT ABD & PELVIS W/CONTRAST: CPT

## 2018-12-10 PROCEDURE — 81025 URINE PREGNANCY TEST: CPT | Performed by: EMERGENCY MEDICINE

## 2018-12-10 PROCEDURE — 96365 THER/PROPH/DIAG IV INF INIT: CPT

## 2018-12-10 PROCEDURE — 83690 ASSAY OF LIPASE: CPT | Performed by: EMERGENCY MEDICINE

## 2018-12-10 PROCEDURE — 85025 COMPLETE CBC W/AUTO DIFF WBC: CPT | Performed by: EMERGENCY MEDICINE

## 2018-12-10 PROCEDURE — 96367 TX/PROPH/DG ADDL SEQ IV INF: CPT

## 2018-12-10 PROCEDURE — 99285 EMERGENCY DEPT VISIT HI MDM: CPT

## 2018-12-10 PROCEDURE — 36415 COLL VENOUS BLD VENIPUNCTURE: CPT | Performed by: EMERGENCY MEDICINE

## 2018-12-10 PROCEDURE — 96375 TX/PRO/DX INJ NEW DRUG ADDON: CPT

## 2018-12-10 RX ORDER — ONDANSETRON 2 MG/ML
INJECTION INTRAMUSCULAR; INTRAVENOUS AS NEEDED
Status: DISCONTINUED | OUTPATIENT
Start: 2018-12-10 | End: 2018-12-10 | Stop reason: SURG

## 2018-12-10 RX ORDER — PROPOFOL 10 MG/ML
INJECTION, EMULSION INTRAVENOUS AS NEEDED
Status: DISCONTINUED | OUTPATIENT
Start: 2018-12-10 | End: 2018-12-10 | Stop reason: SURG

## 2018-12-10 RX ORDER — ONDANSETRON 4 MG/1
4 TABLET, ORALLY DISINTEGRATING ORAL EVERY 6 HOURS PRN
Status: DISCONTINUED | OUTPATIENT
Start: 2018-12-10 | End: 2018-12-11 | Stop reason: HOSPADM

## 2018-12-10 RX ORDER — ROCURONIUM BROMIDE 10 MG/ML
INJECTION, SOLUTION INTRAVENOUS AS NEEDED
Status: DISCONTINUED | OUTPATIENT
Start: 2018-12-10 | End: 2018-12-10 | Stop reason: SURG

## 2018-12-10 RX ORDER — ACETAMINOPHEN 325 MG/1
650 TABLET ORAL EVERY 6 HOURS PRN
Status: DISCONTINUED | OUTPATIENT
Start: 2018-12-10 | End: 2018-12-11 | Stop reason: HOSPADM

## 2018-12-10 RX ORDER — CEFAZOLIN SODIUM 1 G/3ML
INJECTION, POWDER, FOR SOLUTION INTRAMUSCULAR; INTRAVENOUS AS NEEDED
Status: DISCONTINUED | OUTPATIENT
Start: 2018-12-10 | End: 2018-12-10 | Stop reason: SURG

## 2018-12-10 RX ORDER — SODIUM CHLORIDE 9 MG/ML
50 INJECTION, SOLUTION INTRAVENOUS CONTINUOUS
Status: DISCONTINUED | OUTPATIENT
Start: 2018-12-10 | End: 2018-12-11 | Stop reason: HOSPADM

## 2018-12-10 RX ORDER — SODIUM CHLORIDE, SODIUM LACTATE, POTASSIUM CHLORIDE, CALCIUM CHLORIDE 600; 310; 30; 20 MG/100ML; MG/100ML; MG/100ML; MG/100ML
125 INJECTION, SOLUTION INTRAVENOUS CONTINUOUS
Status: DISCONTINUED | OUTPATIENT
Start: 2018-12-10 | End: 2018-12-10

## 2018-12-10 RX ORDER — CALCIUM CARBONATE 200(500)MG
500 TABLET,CHEWABLE ORAL DAILY PRN
Status: DISCONTINUED | OUTPATIENT
Start: 2018-12-10 | End: 2018-12-11 | Stop reason: HOSPADM

## 2018-12-10 RX ORDER — FAMOTIDINE 20 MG/1
20 TABLET, FILM COATED ORAL DAILY
Status: DISCONTINUED | OUTPATIENT
Start: 2018-12-11 | End: 2018-12-11 | Stop reason: HOSPADM

## 2018-12-10 RX ORDER — FENTANYL CITRATE/PF 50 MCG/ML
50 SYRINGE (ML) INJECTION
Status: DISCONTINUED | OUTPATIENT
Start: 2018-12-10 | End: 2018-12-10 | Stop reason: HOSPADM

## 2018-12-10 RX ORDER — OXYCODONE HYDROCHLORIDE AND ACETAMINOPHEN 5; 325 MG/1; MG/1
2 TABLET ORAL EVERY 4 HOURS PRN
Status: DISCONTINUED | OUTPATIENT
Start: 2018-12-10 | End: 2018-12-11 | Stop reason: HOSPADM

## 2018-12-10 RX ORDER — FENTANYL CITRATE 50 UG/ML
INJECTION, SOLUTION INTRAMUSCULAR; INTRAVENOUS AS NEEDED
Status: DISCONTINUED | OUTPATIENT
Start: 2018-12-10 | End: 2018-12-10 | Stop reason: SURG

## 2018-12-10 RX ORDER — MIDAZOLAM HYDROCHLORIDE 1 MG/ML
INJECTION INTRAMUSCULAR; INTRAVENOUS AS NEEDED
Status: DISCONTINUED | OUTPATIENT
Start: 2018-12-10 | End: 2018-12-10 | Stop reason: SURG

## 2018-12-10 RX ORDER — MORPHINE SULFATE 4 MG/ML
4 INJECTION, SOLUTION INTRAMUSCULAR; INTRAVENOUS ONCE
Status: DISCONTINUED | OUTPATIENT
Start: 2018-12-10 | End: 2018-12-10

## 2018-12-10 RX ORDER — HYDROMORPHONE HCL/PF 1 MG/ML
SYRINGE (ML) INJECTION AS NEEDED
Status: DISCONTINUED | OUTPATIENT
Start: 2018-12-10 | End: 2018-12-10 | Stop reason: SURG

## 2018-12-10 RX ORDER — ONDANSETRON 2 MG/ML
4 INJECTION INTRAMUSCULAR; INTRAVENOUS ONCE
Status: COMPLETED | OUTPATIENT
Start: 2018-12-10 | End: 2018-12-10

## 2018-12-10 RX ORDER — OXYCODONE HYDROCHLORIDE AND ACETAMINOPHEN 5; 325 MG/1; MG/1
1 TABLET ORAL EVERY 4 HOURS PRN
Status: DISCONTINUED | OUTPATIENT
Start: 2018-12-10 | End: 2018-12-11 | Stop reason: HOSPADM

## 2018-12-10 RX ORDER — ONDANSETRON 2 MG/ML
4 INJECTION INTRAMUSCULAR; INTRAVENOUS EVERY 4 HOURS PRN
Status: DISCONTINUED | OUTPATIENT
Start: 2018-12-10 | End: 2018-12-11 | Stop reason: HOSPADM

## 2018-12-10 RX ORDER — GLYCOPYRROLATE 0.2 MG/ML
INJECTION INTRAMUSCULAR; INTRAVENOUS AS NEEDED
Status: DISCONTINUED | OUTPATIENT
Start: 2018-12-10 | End: 2018-12-10 | Stop reason: SURG

## 2018-12-10 RX ORDER — CEFAZOLIN SODIUM 1 G/50ML
1000 SOLUTION INTRAVENOUS ONCE
Status: COMPLETED | OUTPATIENT
Start: 2018-12-10 | End: 2018-12-10

## 2018-12-10 RX ORDER — NEOSTIGMINE METHYLSULFATE 1 MG/ML
INJECTION INTRAVENOUS AS NEEDED
Status: DISCONTINUED | OUTPATIENT
Start: 2018-12-10 | End: 2018-12-10 | Stop reason: SURG

## 2018-12-10 RX ORDER — LIDOCAINE HYDROCHLORIDE 10 MG/ML
INJECTION, SOLUTION INFILTRATION; PERINEURAL AS NEEDED
Status: DISCONTINUED | OUTPATIENT
Start: 2018-12-10 | End: 2018-12-10 | Stop reason: SURG

## 2018-12-10 RX ORDER — DOCUSATE SODIUM 100 MG/1
100 CAPSULE, LIQUID FILLED ORAL 2 TIMES DAILY PRN
Status: DISCONTINUED | OUTPATIENT
Start: 2018-12-10 | End: 2018-12-11 | Stop reason: HOSPADM

## 2018-12-10 RX ORDER — DIPHENHYDRAMINE HYDROCHLORIDE 50 MG/ML
25 INJECTION INTRAMUSCULAR; INTRAVENOUS EVERY 6 HOURS PRN
Status: DISCONTINUED | OUTPATIENT
Start: 2018-12-10 | End: 2018-12-11 | Stop reason: HOSPADM

## 2018-12-10 RX ORDER — AMITRIPTYLINE HYDROCHLORIDE 10 MG/1
10 TABLET, FILM COATED ORAL
Status: DISCONTINUED | OUTPATIENT
Start: 2018-12-10 | End: 2018-12-11 | Stop reason: HOSPADM

## 2018-12-10 RX ORDER — HYDROMORPHONE HCL/PF 1 MG/ML
0.4 SYRINGE (ML) INJECTION
Status: COMPLETED | OUTPATIENT
Start: 2018-12-10 | End: 2018-12-10

## 2018-12-10 RX ORDER — SODIUM CHLORIDE 9 MG/ML
INJECTION, SOLUTION INTRAVENOUS AS NEEDED
Status: DISCONTINUED | OUTPATIENT
Start: 2018-12-10 | End: 2018-12-10 | Stop reason: HOSPADM

## 2018-12-10 RX ORDER — HYDROMORPHONE HCL/PF 1 MG/ML
0.5 SYRINGE (ML) INJECTION EVERY 4 HOURS PRN
Status: DISCONTINUED | OUTPATIENT
Start: 2018-12-10 | End: 2018-12-11 | Stop reason: HOSPADM

## 2018-12-10 RX ORDER — BUPIVACAINE HYDROCHLORIDE AND EPINEPHRINE 2.5; 5 MG/ML; UG/ML
INJECTION, SOLUTION EPIDURAL; INFILTRATION; INTRACAUDAL; PERINEURAL AS NEEDED
Status: DISCONTINUED | OUTPATIENT
Start: 2018-12-10 | End: 2018-12-10 | Stop reason: HOSPADM

## 2018-12-10 RX ORDER — ONDANSETRON 2 MG/ML
4 INJECTION INTRAMUSCULAR; INTRAVENOUS ONCE AS NEEDED
Status: DISCONTINUED | OUTPATIENT
Start: 2018-12-10 | End: 2018-12-10 | Stop reason: HOSPADM

## 2018-12-10 RX ORDER — METOCLOPRAMIDE HYDROCHLORIDE 5 MG/ML
5 INJECTION INTRAMUSCULAR; INTRAVENOUS ONCE
Status: DISCONTINUED | OUTPATIENT
Start: 2018-12-10 | End: 2018-12-10 | Stop reason: HOSPADM

## 2018-12-10 RX ADMIN — FENTANYL CITRATE 50 MCG: 50 INJECTION, SOLUTION INTRAMUSCULAR; INTRAVENOUS at 19:39

## 2018-12-10 RX ADMIN — MIDAZOLAM HYDROCHLORIDE 2 MG: 1 INJECTION, SOLUTION INTRAMUSCULAR; INTRAVENOUS at 18:01

## 2018-12-10 RX ADMIN — FENTANYL CITRATE 50 MCG: 50 INJECTION, SOLUTION INTRAMUSCULAR; INTRAVENOUS at 19:33

## 2018-12-10 RX ADMIN — ONDANSETRON 4 MG: 2 INJECTION INTRAMUSCULAR; INTRAVENOUS at 09:34

## 2018-12-10 RX ADMIN — CEFAZOLIN SODIUM 1000 MG: 1 SOLUTION INTRAVENOUS at 12:49

## 2018-12-10 RX ADMIN — SODIUM CHLORIDE, SODIUM LACTATE, POTASSIUM CHLORIDE, AND CALCIUM CHLORIDE 125 ML/HR: .6; .31; .03; .02 INJECTION, SOLUTION INTRAVENOUS at 13:03

## 2018-12-10 RX ADMIN — HYDROMORPHONE HYDROCHLORIDE 0.4 MG: 1 INJECTION, SOLUTION INTRAMUSCULAR; INTRAVENOUS; SUBCUTANEOUS at 19:51

## 2018-12-10 RX ADMIN — HYDROMORPHONE HYDROCHLORIDE 0.5 MG: 1 INJECTION, SOLUTION INTRAMUSCULAR; INTRAVENOUS; SUBCUTANEOUS at 18:32

## 2018-12-10 RX ADMIN — PROPOFOL 180 MG: 10 INJECTION, EMULSION INTRAVENOUS at 18:03

## 2018-12-10 RX ADMIN — IOHEXOL 100 ML: 350 INJECTION, SOLUTION INTRAVENOUS at 10:31

## 2018-12-10 RX ADMIN — HYDROMORPHONE HYDROCHLORIDE 0.4 MG: 1 INJECTION, SOLUTION INTRAMUSCULAR; INTRAVENOUS; SUBCUTANEOUS at 20:01

## 2018-12-10 RX ADMIN — SODIUM CHLORIDE 50 ML/HR: 0.9 INJECTION, SOLUTION INTRAVENOUS at 19:53

## 2018-12-10 RX ADMIN — METRONIDAZOLE 500 MG: 500 INJECTION, SOLUTION INTRAVENOUS at 13:51

## 2018-12-10 RX ADMIN — NEOSTIGMINE METHYLSULFATE 3 MG: 1 INJECTION INTRAVENOUS at 19:02

## 2018-12-10 RX ADMIN — GLYCOPYRROLATE 0.4 MG: 0.2 INJECTION, SOLUTION INTRAMUSCULAR; INTRAVENOUS at 19:02

## 2018-12-10 RX ADMIN — ONDANSETRON 4 MG: 2 INJECTION INTRAMUSCULAR; INTRAVENOUS at 18:10

## 2018-12-10 RX ADMIN — LIDOCAINE HYDROCHLORIDE ANHYDROUS 50 MG: 10 INJECTION, SOLUTION INFILTRATION at 18:02

## 2018-12-10 RX ADMIN — FENTANYL CITRATE 25 MCG: 50 INJECTION INTRAMUSCULAR; INTRAVENOUS at 18:12

## 2018-12-10 RX ADMIN — SODIUM CHLORIDE 1000 ML: 0.9 INJECTION, SOLUTION INTRAVENOUS at 09:30

## 2018-12-10 RX ADMIN — CEFAZOLIN SODIUM 2000 MG: 1 INJECTION, POWDER, FOR SOLUTION INTRAMUSCULAR; INTRAVENOUS at 18:01

## 2018-12-10 RX ADMIN — HYDROMORPHONE HYDROCHLORIDE 0.4 MG: 1 INJECTION, SOLUTION INTRAMUSCULAR; INTRAVENOUS; SUBCUTANEOUS at 20:11

## 2018-12-10 RX ADMIN — DEXAMETHASONE SODIUM PHOSPHATE 4 MG: 10 INJECTION INTRAMUSCULAR; INTRAVENOUS at 18:10

## 2018-12-10 RX ADMIN — AMITRIPTYLINE HYDROCHLORIDE 10 MG: 10 TABLET, FILM COATED ORAL at 22:08

## 2018-12-10 RX ADMIN — DIPHENHYDRAMINE HYDROCHLORIDE 25 MG: 50 INJECTION, SOLUTION INTRAMUSCULAR; INTRAVENOUS at 21:09

## 2018-12-10 RX ADMIN — ROCURONIUM BROMIDE 30 MG: 10 INJECTION INTRAVENOUS at 18:04

## 2018-12-10 RX ADMIN — FENTANYL CITRATE 75 MCG: 50 INJECTION INTRAMUSCULAR; INTRAVENOUS at 18:02

## 2018-12-10 NOTE — H&P
History and Physical -General Surgery  Triny Hammond 44 y o  female MRN: [id number]  Unit/Bed#: ED 29 Encounter: [id number]        Reason for Consult / Principal Problem:     HPI: Triny Hammond is a 44y o  year old female who presents with intermittent epigastric abdominal pain, 10/10 since 2 am Associated symptoms are chills, nausea and vomiting since onset of pain  Denies fever, recent travel,  symptoms  LMP 11/26/18  She had an RUQ US 5 years ago which was normal per patient  Since that time she has been having about 1 "attack" a month, mostly after eating red meat  Today's US shows an 11 mm non mobile stone in the cystic duct  Review of Systems   Constitutional: Positive for activity change, appetite change and chills  Negative for diaphoresis, fever and unexpected weight change  Gastrointestinal: Positive for abdominal pain, nausea and vomiting  Negative for abdominal distention, blood in stool, constipation and diarrhea  Skin: Negative for color change  Historical Information   Past Medical History:   Diagnosis Date    Renal calculi     Renal disorder     Toe fracture, left      Past Surgical History:   Procedure Laterality Date    KIDNEY SURGERY  2013    removed L kidney from damage from kidney stone -PERFORMED IN ID    TUBAL LIGATION      AGE 32     Social History   History   Alcohol Use No     History   Drug Use No     History   Smoking Status    Never Smoker   Smokeless Tobacco    Never Used     Family History   Problem Relation Age of Onset    Diabetes Mother     Hyperlipidemia Mother     Hypertension Mother     Other Mother         low back pain    Heart disease Mother     Diabetes Father     No Known Problems Sister     No Known Problems Brother        Meds/Allergies     to Admission Medications   Prescriptions Last Dose Informant Patient Reported?  Taking?   amitriptyline (ELAVIL) 10 mg tablet     No Yes   Sig: Take 1 tablet (10 mg total) by mouth daily at bedtime butalbital-aspirin-caffeine (FIORINAL) -40 mg capsule     No Yes   Sig: Take 1 capsule by mouth every 4 (four) hours as needed for headaches   naproxen (NAPROSYN) 500 mg tablet     No Yes   Sig: Take 1 tablet (500 mg total) by mouth 2 (two) times a day with meals   ondansetron (ZOFRAN-ODT) 4 mg disintegrating tablet     No Yes   Sig: Take 1 tablet by mouth every 6 (six) hours as needed for nausea or vomiting   ranitidine (ZANTAC) 150 mg tablet     Yes Yes   Sig: Take 1 tablet by mouth daily      Facility-Administered Medications: None       Current Facility-Administered Medications   Medication Dose Route Frequency    lactated ringers infusion  125 mL/hr Intravenous Continuous    metroNIDAZOLE (FLAGYL) IVPB (premix) 500 mg  500 mg Intravenous Once       No Known Allergies    Objective     Blood pressure 111/55, pulse 76, temperature 98 1 °F (36 7 °C), temperature source Oral, resp  rate 16, height 5' 4" (1 626 m), weight 98 kg (216 lb 0 8 oz), last menstrual period 12/03/2018, SpO2 99 %  Intake/Output Summary (Last 24 hours) at 12/10/18 1330  Last data filed at 12/10/18 1236   Gross per 24 hour   Intake             1000 ml   Output                0 ml   Net             1000 ml       PHYSICAL EXAM  General appearance: alert and oriented, in no acute distress  Skin: Skin color, texture, turgor normal  No rashes or lesions  Head: Normocephalic, without obvious abnormality  Heart: regular rate and rhythm, S1, S2 normal, no murmur, click, rub or gallop  Lungs: clear to auscultation bilaterally  Abdomen: round and soft, min BS, tender RUQ and epigastric region   + [place] sign  No rebound or guarding     Neurological: normal without focal findings    Lab Results:   Admission on 12/10/2018   Component Date Value    WBC 12/10/2018 4 68     RBC 12/10/2018 4 87     Hemoglobin 12/10/2018 12 6     Hematocrit 12/10/2018 39 7     MCV 12/10/2018 82     MCH 12/10/2018 25 9*    MCHC 12/10/2018 31 7     RDW 12/10/2018 15 8*    MPV 12/10/2018 10 8     Platelets 71/25/1560 267     nRBC 12/10/2018 0     Neutrophils Relative 12/10/2018 58     Immat GRANS % 12/10/2018 0     Lymphocytes Relative 12/10/2018 24     Monocytes Relative 12/10/2018 15*    Eosinophils Relative 12/10/2018 2     Basophils Relative 12/10/2018 1     Neutrophils Absolute 12/10/2018 2 69     Immature Grans Absolute 12/10/2018 0 02     Lymphocytes Absolute 12/10/2018 1 13     Monocytes Absolute 12/10/2018 0 71     Eosinophils Absolute 12/10/2018 0 10     Basophils Absolute 12/10/2018 0 03     Sodium 12/10/2018 137     Potassium 12/10/2018 4 1     Chloride 12/10/2018 102     CO2 12/10/2018 26     ANION GAP 12/10/2018 9     BUN 12/10/2018 14     Creatinine 12/10/2018 0 97     Glucose 12/10/2018 94     Calcium 12/10/2018 8 9     eGFR 12/10/2018 72     Total Bilirubin 12/10/2018 0 60     Bilirubin, Direct 12/10/2018 0 08     Alkaline Phosphatase 12/10/2018 68     AST 12/10/2018 24     ALT 12/10/2018 29     Total Protein 12/10/2018 7 9     Albumin 12/10/2018 3 5     Lipase 12/10/2018 104     EXT PREG TEST UR (Ref: N* 12/10/2018 negative     Color, UA 12/10/2018 Orange     Clarity, UA 12/10/2018 Clear     pH, UA 12/10/2018 5 5     Leukocytes, UA 12/10/2018 Small*    Nitrite, UA 12/10/2018 Negative     Protein, UA 12/10/2018 100 (2+)*    Glucose, UA 12/10/2018 Negative     Ketones, UA 12/10/2018 Trace*    Urobilinogen, UA 12/10/2018 1 0     Bilirubin, UA 12/10/2018 Interference- unable to analyze*    Blood, UA 12/10/2018 Negative     Specific Gravity, UA 12/10/2018 >=1 030     RBC, UA 12/10/2018 0-1*    WBC, UA 12/10/2018 4-10*    Epithelial Cells 12/10/2018 Innumerable*    Bacteria, UA 12/10/2018 Innumerable*     Imaging Studies: I have personally reviewed pertinent reports  ASSESSMENT/PLAN:  Problem List    Symptomatic cholelithiasis  45 yo F with RUQ pain since last night    RUQ US shows "11 mm nonmobile calculus at the gallbladder neck  Mildly distended gallbladder "  Patient to be taken to the OR today  · NPO  · IVF   · IV abx   · Pain control     This patient will require  <2 night hospital stay  Counseling / Coordination of Care  Total time spent today  30 minutes  Greater than 50% of total time was spent with the patient and / or family counseling and / or coordination of care

## 2018-12-10 NOTE — DISCHARGE INSTRUCTIONS
Please call the office when you leave to schedule an appointment to be seen in 2 weeks  Dr Paddy Maya [address], [address] [facility] [place] [address]   [address] between [facility] and [facility]       Activity:    Do not lift more than 10 pounds (a gallon of milk) for 1-2 weeks post-operatively                 Walking is encouraged  Normal daily activities including climbing steps are okay  Do not engage in strenuous activity or contact sports for 4-6 weeks post-operatively    Return to Work:   Okay to return to work in one week    Diet:   You may return to your normal heart healthy diet  Wound Care: Your wound is closed with dissolvable stitches and glue  It is okay to shower  Wash incision gently with soap and water and pat dry  Do not soak incisions in bath water or swim for two weeks  Do not apply any creams or ointments  Pain Medication:   Please take as directed  No driving while taking narcotic pain medications    Other:  If you have questions after discharge please call the office      If you have increased pain, fever >101 5, increased drainage, redness or a bad smell at your surgery site, please call us immediately or come directly to the Emergency Room

## 2018-12-10 NOTE — ANESTHESIA PREPROCEDURE EVALUATION
Review of Systems/Medical History          Cardiovascular  Hyperlipidemia,    Pulmonary  Not a smoker ,        GI/Hepatic      Comment: Vomiting this am , none since     Kidney stones,        Endo/Other    Obesity (BMI-37)    GYN      Comment: Tubal     Hematology   Musculoskeletal       Neurology    Headaches,    Psychology           Physical Exam    Airway    Mallampati score: I  TM Distance: >3 FB  Neck ROM: full     Dental   Comment: Multiple teeth extractions 3 days ago,     Cardiovascular      Pulmonary      Other Findings        Anesthesia Plan  ASA Score- 2     Anesthesia Type- general with ASA Monitors  Additional Monitors:   Airway Plan: ETT  Plan Factors-Patient not instructed to abstain from smoking on day of procedure  Patient did not smoke on day of surgery  Induction- intravenous  Postoperative Plan-     Informed Consent- Anesthetic plan and risks discussed with patient, mother and daughter  I personally reviewed this patient with the CRNA  Discussed and agreed on the Anesthesia Plan with the CRNA               Lab Results   Component Value Date    GLUC 94 12/10/2018    ALT 29 12/10/2018    AST 24 12/10/2018    BUN 14 12/10/2018    CALCIUM 8 9 12/10/2018     12/10/2018    CO2 26 12/10/2018    CREATININE 0 97 12/10/2018    HDL 51 12/22/2017    HCT 39 7 12/10/2018    HGB 12 6 12/10/2018     12/10/2018    K 4 1 12/10/2018    TRIG 153 (H) 12/22/2017    WBC 4 68 12/10/2018

## 2018-12-10 NOTE — ED PROVIDER NOTES
History  Chief Complaint   Patient presents with    Abdominal Pain     with nausea and vomiting onset yesterday       History provided by:  Patient and relative  Abdominal Pain   Pain location:  Epigastric  Pain quality: aching    Pain radiates to:  Does not radiate  Pain severity:  Moderate  Onset quality:  Gradual  Duration:  1 day  Timing:  Constant  Progression:  Waxing and waning  Chronicity:  New  Context: not previous surgeries, not recent illness, not recent travel, not sick contacts, not suspicious food intake and not trauma    Relieved by:  Not moving  Worsened by: Movement, palpation and position changes  Ineffective treatments:  None tried  Associated symptoms: diarrhea and nausea    Associated symptoms: no anorexia, no chest pain, no chills, no constipation, no cough, no dysuria, no fever, no hematemesis, no shortness of breath, no sore throat and no vomiting    Diarrhea:     Quality:  Semi-solid and watery    Number of occurrences:  4    Severity:  Moderate    Duration:  1 day    Timing:  Intermittent      Prior to Admission Medications   Prescriptions Last Dose Informant Patient Reported?  Taking?   amitriptyline (ELAVIL) 10 mg tablet   No Yes   Sig: Take 1 tablet (10 mg total) by mouth daily at bedtime   butalbital-aspirin-caffeine (FIORINAL) -40 mg capsule   No Yes   Sig: Take 1 capsule by mouth every 4 (four) hours as needed for headaches   naproxen (NAPROSYN) 500 mg tablet   No Yes   Sig: Take 1 tablet (500 mg total) by mouth 2 (two) times a day with meals   ondansetron (ZOFRAN-ODT) 4 mg disintegrating tablet   No Yes   Sig: Take 1 tablet by mouth every 6 (six) hours as needed for nausea or vomiting   ranitidine (ZANTAC) 150 mg tablet   Yes Yes   Sig: Take 1 tablet by mouth daily      Facility-Administered Medications: None       Past Medical History:   Diagnosis Date    Renal calculi     Renal disorder     Toe fracture, left        Past Surgical History:   Procedure Laterality Date [address]  2013    removed L kidney from damage from kidney stone -PERFORMED IN ID    TUBAL LIGATION      AGE 33       Family History   Problem Relation Age of Onset    Diabetes Mother     Hyperlipidemia Mother     Hypertension Mother     Other Mother         low back pain    Heart disease Mother     Diabetes Father     No Known Problems Sister     No Known Problems Brother      I have reviewed and agree with the history as documented  Social History   Substance Use Topics    Smoking status: Never Smoker    Smokeless tobacco: Never Used    Alcohol use No        Review of Systems   Constitutional: Negative for activity change, chills, diaphoresis and fever  HENT: Negative for congestion, sinus pressure and sore throat  Eyes: Negative for pain and visual disturbance  Respiratory: Negative for cough, chest tightness, shortness of breath, wheezing and stridor  Cardiovascular: Negative for chest pain and palpitations  Gastrointestinal: Positive for abdominal pain, diarrhea and nausea  Negative for abdominal distention, anorexia, constipation, hematemesis and vomiting  Genitourinary: Negative for dysuria and frequency  Musculoskeletal: Negative for neck pain and neck stiffness  Skin: Negative for rash  Neurological: Negative for dizziness, speech difficulty, light-headedness, numbness and headaches  Physical Exam  Physical Exam   Constitutional: She is oriented to person, place, and time  She appears well-developed  She appears distressed ( Patient appears moderately uncomfortable)  HENT:   Head: Normocephalic and atraumatic  Eyes: Pupils are equal, round, and reactive to light  Neck: Normal range of motion  Neck supple  No tracheal deviation present  Cardiovascular: Normal rate, regular rhythm, normal heart sounds and intact distal pulses  No murmur heard  Pulmonary/Chest: Effort normal and breath sounds normal  No stridor  No respiratory distress  Abdominal: Soft  She exhibits no distension  There is tenderness ( Tenderness in the epigastric region)  There is no rebound and no guarding  Musculoskeletal: Normal range of motion  Neurological: She is alert and oriented to person, place, and time  Skin: Skin is warm and dry  She is not diaphoretic  No erythema  No pallor  Psychiatric: She has a normal mood and affect  Vitals reviewed        Vital Signs  ED Triage Vitals [12/10/18 0909]   Temperature Pulse Respirations Blood Pressure SpO2   98 1 °F (36 7 °C) 92 16 134/74 99 %      Temp Source Heart Rate Source Patient Position - Orthostatic VS BP Location FiO2 (%)   Oral Monitor Sitting Right arm --      Pain Score       8           Vitals:    12/10/18 0909 12/10/18 0915 12/10/18 1200   BP: 134/74 134/74 111/55   Pulse: 92  76   Patient Position - Orthostatic VS: Sitting         Visual Acuity      ED Medications  Medications   ceFAZolin (ANCEF) IVPB (premix) 1,000 mg (1,000 mg Intravenous New Bag 12/10/18 1249)   lactated ringers infusion (not administered)   metroNIDAZOLE (FLAGYL) IVPB (premix) 500 mg (not administered)   sodium chloride 0 9 % bolus 1,000 mL (0 mL Intravenous Stopped 12/10/18 1236)   ondansetron (ZOFRAN) injection 4 mg (4 mg Intravenous Given 12/10/18 0934)   iohexol (OMNIPAQUE) 350 MG/ML injection (SINGLE-DOSE) 100 mL (100 mL Intravenous Given 12/10/18 1031)       Diagnostic Studies  Results Reviewed     Procedure Component Value Units Date/Time    Urine Microscopic [[id number]]  (Abnormal) Collected:  12/10/18 0926    Lab Status:  Final result Specimen:  Urine from Urine, Clean Catch Updated:  12/10/18 0956     RBC, UA 0-1 (A) /hpf      WBC, UA 4-10 (A) /hpf      Epithelial Cells Innumerable (A) /hpf      Bacteria, UA Innumerable (A) /hpf     Basic metabolic panel [[id number]] Collected:  12/10/18 0930    Lab Status:  Final result Specimen:  Blood from Arm, Right Updated:  12/10/18 0953     Sodium 137 mmol/L      Potassium 4 1 mmol/L Chloride 102 mmol/L      CO2 26 mmol/L      ANION GAP 9 mmol/L      BUN 14 mg/dL      Creatinine 0 97 mg/dL      Glucose 94 mg/dL      Calcium 8 9 mg/dL      eGFR 72 ml/min/1 73sq m     Narrative:         National Kidney Disease Education Program recommendations are as follows:  GFR calculation is accurate only with a steady state creatinine  Chronic Kidney disease less than 60 ml/min/1 73 sq  meters  Kidney failure less than 15 ml/min/1 73 sq  meters      Hepatic function panel [[id number]]  (Normal) Collected:  12/10/18 0930    Lab Status:  Final result Specimen:  Blood from Arm, Right Updated:  12/10/18 0953     Total Bilirubin 0 60 mg/dL      Bilirubin, Direct 0 08 mg/dL      Alkaline Phosphatase 68 U/L      AST 24 U/L      ALT 29 U/L      Total Protein 7 9 g/dL      Albumin 3 5 g/dL     Lipase [[id number]]  (Normal) Collected:  12/10/18 0930    Lab Status:  Final result Specimen:  Blood from Arm, Right Updated:  12/10/18 0953     Lipase 104 u/L     CBC and differential [[id number]]  (Abnormal) Collected:  12/10/18 0930    Lab Status:  Final result Specimen:  Blood from Arm, Right Updated:  12/10/18 0940     WBC 4 68 Thousand/uL      RBC 4 87 Million/uL      Hemoglobin 12 6 g/dL      Hematocrit 39 7 %      MCV 82 fL      MCH 25 9 (L) pg      MCHC 31 7 g/dL      RDW 15 8 (H) %      MPV 10 8 fL      Platelets 712 Thousands/uL      nRBC 0 /100 WBCs      Neutrophils Relative 58 %      Immat GRANS % 0 %      Lymphocytes Relative 24 %      Monocytes Relative 15 (H) %      Eosinophils Relative 2 %      Basophils Relative 1 %      Neutrophils Absolute 2 69 Thousands/µL      Immature Grans Absolute 0 02 Thousand/uL      Lymphocytes Absolute 1 13 Thousands/µL      Monocytes Absolute 0 71 Thousand/µL      Eosinophils Absolute 0 10 Thousand/µL      Basophils Absolute 0 03 Thousands/µL     POCT pregnancy, urine [[id number]]  (Normal) Resulted:  12/10/18 0935    Lab Status:  Final result Updated:  12/10/18 0936     EXT PREG TEST UR (Ref: Negative) negative    ED Urine Macroscopic [[id number]]  (Abnormal) Collected:  12/10/18 0926    Lab Status:  Final result Specimen:  Urine Updated:  12/10/18 0929     Color, UA Orange     Clarity, UA Clear     pH, UA 5 5     Leukocytes, UA Small (A)     Nitrite, UA Negative     Protein,  (2+) (A) mg/dl      Glucose, UA Negative mg/dl      Ketones, UA Trace (A) mg/dl      Urobilinogen, UA 1 0 E U /dl      Bilirubin, UA Interference- unable to analyze (A)     Blood, UA Negative     Specific Gravity, UA >=1 030    Narrative:       CLINITEK RESULT                 US gallbladder   Final Result by Olivia Hooper MD (12/10 8661)      11 mm nonmobile calculus at the gallbladder neck  Mildly distended gallbladder  However, no gallbladder wall thickening or pericholecystic fluid  No sonographic Fernandez's sign elicited during the exam   Correlation with laboratory studies is    recommended  If symptoms persist, a hepatobiliary scan could be performed for further evaluation  Workstation performed: [id number]         CT abdomen pelvis with contrast   Final Result by Rachel Graves MD (12/10 7089)      Gallbladder is distended  No pericholecystic inflammatory changes or stones identified  Left adnexal cyst likely ovarian measuring 4 5 x 3 2 cm  Workstation performed: [id number]                    Procedures  Procedures       Phone Contacts  ED Phone Contact    ED Course  ED Course as of Dec 10 1250   Mon Dec 10, 2018   1105 CT scan showing distended gallbladder, will ultrasound to better clarify    Although patient does have a  innumerable bacteria and 4-10 white cells will treat for cystitis    1205 Patient with a large nonmobile gallstone in the gallbladder neck, will consult surgery                                MDM  Number of Diagnoses or Management Options  Acute UTI: new and requires workup  Symptomatic cholelithiasis: new and requires workup  Diagnosis management comments: Initial ED assessment:  52-year-old female presents with epigastric abdominal pain nausea vomiting and diarrhea    Initial DDx includes but is not limited to:   Viral illness, pancreatitis, biliary pathology,  less likely bowel obstruction    Initial ED plan:   Blood work CT imaging, antiemetics IV fluids        Final ED summary/disposition:   After evaluation and workup in the emergency department, found to have an 11 mm nonmobile gallstone in the neck of the gallbladder  , upon further history taking has had intermittent pain on off for months , this is likely the early start of acute cholecystitis  , discussed with surgery patient will be taken to the operating room for cholecystectomy       Amount and/or Complexity of Data Reviewed  Clinical lab tests: ordered and reviewed  Tests in the radiology section of CPT®: ordered and reviewed  Decide to obtain previous medical records or to obtain history from someone other than the patient: yes  Obtain history from someone other than the patient: yes  Review and summarize past medical records: yes  Independent visualization of images, tracings, or specimens: yes      The patient presented with a condition in which there was a high probability of imminent or life-threatening deterioration, and critical care services (excluding separately billable procedures) totalled 30-74 minutes          Disposition  Final diagnoses:   Symptomatic cholelithiasis   Acute UTI     Time reflects when diagnosis was documented in both MDM as applicable and the Disposition within this note     Time User Action Codes Description Comment    12/10/2018 12:05 PM Saundra Pointer Add [K80 20] Symptomatic cholelithiasis     12/10/2018 12:05 PM Saundra Pointer Add [N39 0] Acute UTI       ED Disposition     ED Disposition Condition Comment    Admit  Case was discussed with surgical SHARYN Cheng and the patient's admission status was agreed to be Admission Status:  Outpatient no charge status to the service of Dr David Love    None         Patient's Medications   Discharge Prescriptions    No medications on file     No discharge procedures on file      ED Provider  Electronically Signed by           Rolo Busby DO  12/10/18 3250

## 2018-12-11 VITALS
DIASTOLIC BLOOD PRESSURE: 56 MMHG | SYSTOLIC BLOOD PRESSURE: 119 MMHG | TEMPERATURE: 98.3 F | WEIGHT: 216 LBS | HEART RATE: 69 BPM | HEIGHT: 64 IN | OXYGEN SATURATION: 99 % | BODY MASS INDEX: 36.88 KG/M2 | RESPIRATION RATE: 18 BRPM

## 2018-12-11 PROBLEM — K80.20 SYMPTOMATIC CHOLELITHIASIS: Status: RESOLVED | Noted: 2018-12-10 | Resolved: 2018-12-11

## 2018-12-11 RX ORDER — OXYCODONE HYDROCHLORIDE AND ACETAMINOPHEN 5; 325 MG/1; MG/1
1 TABLET ORAL EVERY 4 HOURS PRN
Qty: 20 TABLET | Refills: 0 | Status: SHIPPED | OUTPATIENT
Start: 2018-12-11 | End: 2018-12-21

## 2018-12-11 RX ADMIN — OXYCODONE HYDROCHLORIDE AND ACETAMINOPHEN 1 TABLET: 5; 325 TABLET ORAL at 08:37

## 2018-12-11 RX ADMIN — OXYCODONE HYDROCHLORIDE AND ACETAMINOPHEN 2 TABLET: 5; 325 TABLET ORAL at 03:36

## 2018-12-11 RX ADMIN — ACETAMINOPHEN 650 MG: 325 TABLET, FILM COATED ORAL at 00:03

## 2018-12-11 RX ADMIN — FAMOTIDINE 20 MG: 20 TABLET ORAL at 08:33

## 2018-12-11 RX ADMIN — ENOXAPARIN SODIUM 40 MG: 40 INJECTION SUBCUTANEOUS at 08:32

## 2018-12-11 NOTE — DISCHARGE SUMMARY
Discharge Summary - Chele Villegas 44 y o  female MRN: [id number]    Unit/Bed#: -01 Encounter: [id number]    Admission Date: 12/10/2018   Discharge Date: 12/11/18    Admitting Diagnosis:   Abdominal pain [R10 9]  Acute UTI [N39 0]  Symptomatic cholelithiasis [K80 20]    Discharge Diagnoses: Principal Problem:    Symptomatic cholelithiasis      Consultations: None    Procedures Performed:   SURGERY DATE: 12/10/2018     Surgeon(s) and Role:     Anita Knight MD - Primary    Preop Diagnosis:  Symptomatic cholelithiasis [K80 20]     Post-Op Diagnosis Codes: * Symptomatic cholelithiasis [K80 20]     Procedure(s) (LRB):  CHOLECYSTECTOMY LAPAROSCOPIC; INCISIONAL HERNIA REPAIR (N/A) without mesh    Hospital Course: Chele Villegas is a 44 y o  female admitted for acute intermittent epigastric abdominal pain associated with nausea and vomiting  Ultrasound showed an 11 mm non mobile stone in the cystic duct  She was taken to the operating room and a laparoscopic cholecystectomy was performed  An incidental incisional hernia was also found and repaired  Today she is doing well and is tolerating a diet  She is cleared for discharge with outpatient follow up  Condition at Discharge: good     Discharge instructions/Information to patient and family:   See after visit summary for information provided to patient and family  Provisions for Follow-Up Care:  See after visit summary for information related to follow-up care and any pertinent home health orders  Disposition: Home    Planned Readmission: No    Discharge Statement   I spent 20 minutes discharging the patient  This time was spent on the day of discharge  I had direct contact with the patient on the day of discharge  Additional documentation is required if more than 30 minutes were spent on discharge  Discharge Medications:  See after visit summary for reconciled discharge medications provided to patient and family

## 2018-12-11 NOTE — ANESTHESIA POSTPROCEDURE EVALUATION
Post-Op Assessment Note      CV Status:  Stable    Mental Status:  Alert and awake    Hydration Status:  Stable    PONV Controlled:  None    Airway Patency:  Patent and adequate  Airway: intubated    Post Op Vitals Reviewed: Yes          Staff: Anesthesiologist           /67 (12/10/18 1924)    Temp 99 °F (37 2 °C) (12/10/18 1924)    Pulse 77 (12/10/18 1924)   Resp 16 (12/10/18 1924)    SpO2 97 % (12/10/18 1924)

## 2018-12-11 NOTE — UTILIZATION REVIEW
Initial Clinical Review    Admission: Date/Time/Statement: 12/10/2018  1249 Outpatient no charge bed     Start   Ordered   12/10/18 1249  Outpatient No Charge Bed (ED Bridging Orders Panel) Once     Transfer Service: Surgery-General       Question: Admitting Physician Answer: Shirleyperfecto Ivy    12/10/18 1249           ED: Date/Time/Mode of Arrival:   ED Arrival Information     Expected Arrival Acuity Means of Arrival Escorted By Service Admission Type    - 12/10/2018 09:04 Urgent Walk-In Self General Medicine Urgent    Arrival Complaint    Abd Pain/Vomiting          Chief Complaint:   Chief Complaint   Patient presents with    Abdominal Pain     with nausea and vomiting onset yesterday       History of Illness: 44y o  year old female who presents with intermittent epigastric abdominal pain, 10/10 since 2 am Associated symptoms are chills, nausea and vomiting since onset of pain  LMP 11/26/18  She had an RUQ US 5 years ago which was normal per patient  Since that time she has been having about 1 "attack" a month, mostly after eating red meat    Today's US shows an 11 mm non mobile stone in the cystic duct         ED Vital Signs:   ED Triage Vitals [12/10/18 0909]   Temperature Pulse Respirations Blood Pressure SpO2   98 1 °F (36 7 °C) 92 16 134/74 99 %      Temp Source Heart Rate Source Patient Position - Orthostatic VS BP Location FiO2 (%)   Oral Monitor Sitting Right arm --      Pain Score       8        Wt Readings from Last 1 Encounters:   12/10/18 98 kg (216 lb)       Vital Signs (abnormal): none    Abnormal Labs/Diagnostic Test Results:   US gall badder - 11 mm nonmobile calculus at the gallbladder neck   Mildly distended gallbladder   However, no gallbladder wall thickening or pericholecystic fluid   No sonographic Fernandez's sign elicited during the exam   Correlation with laboratory studies is recommended   If symptoms persist, a hepatobiliary scan could be performed for further evaluation    Procedure - CHOLECYSTECTOMY LAPAROSCOPIC; INCISIONAL HERNIA REPAIR (N/A) without mesh  Finding - Acute calculous cholecystitis was noted    ED Treatment:   Medication Administration from 12/10/2018 0904 to 12/10/2018 1540       Date/Time Order Dose Route Action Comments     12/10/2018 1236 sodium chloride 0 9 % bolus 1,000 mL 0 mL Intravenous Stopped      12/10/2018 0930 sodium chloride 0 9 % bolus 1,000 mL 1,000 mL Intravenous New Bag      12/10/2018 0934 ondansetron (ZOFRAN) injection 4 mg 4 mg Intravenous Given      12/10/2018 1031 iohexol (OMNIPAQUE) 350 MG/ML injection (SINGLE-DOSE) 100 mL 100 mL Intravenous Given      12/10/2018 1147 morphine (PF) 4 mg/mL injection 4 mg 4 mg Intravenous Not Given      12/10/2018 1349 ceFAZolin (ANCEF) IVPB (premix) 1,000 mg 0 mg Intravenous Stopped      12/10/2018 1249 ceFAZolin (ANCEF) IVPB (premix) 1,000 mg 1,000 mg Intravenous New Bag      12/10/2018 1303 lactated ringers infusion 125 mL/hr Intravenous New Bag      12/10/2018 1435 metroNIDAZOLE (FLAGYL) IVPB (premix) 500 mg 0 mg Intravenous Stopped      12/10/2018 1351 metroNIDAZOLE (FLAGYL) IVPB (premix) 500 mg 500 mg Intravenous New Bag           Past Medical/Surgical History:   Past Medical History:   Diagnosis Date    Renal calculi     Renal disorder     Toe fracture, left        Admitting Diagnosis: Abdominal pain [R10 9]  Acute UTI [N39 0]  Symptomatic cholelithiasis [K80 20]    Age/Sex: 44 y o  female    Assessment/Plan: This is a 44year old female  From home with past medical history of renal calculi who presented to ED with abdominal pain with nausea, vomiting and chills  US showed 11 mm non mobile calculus at the gallbladder neck and mildly distended gallbladder  Patient taked not OR and had lap kathia which showed acute cholecystitis  Patient admitted to outpatient no charge bed, continue IVF and pain control  Admission Orders:   12/10/2018  1249 Outpatient no charge bed     Scheduled Meds:   Current Facility-Administered Medications:  acetaminophen 650 mg Oral Q6H PRN    amitriptyline 10 mg Oral HS    calcium carbonate 500 mg Oral Daily PRN    diphenhydrAMINE 25 mg Intravenous Q6H PRN    docusate sodium 100 mg Oral BID PRN    enoxaparin 40 mg Subcutaneous Daily    famotidine 20 mg Oral Daily    HYDROmorphone 0 5 mg Intravenous Q4H PRN    ondansetron 4 mg Intravenous Q4H PRN    ondansetron 4 mg Oral Q6H PRN    oxyCODONE-acetaminophen 1 tablet Oral Q4H PRN    oxyCODONE-acetaminophen 2 tablet Oral Q4H PRN    sodium chloride 50 mL/hr Intravenous Continuous Last Rate: 50 mL/hr (12/10/18 1953)     Continuous Infusions:   sodium chloride 50 mL/hr Last Rate: 50 mL/hr (12/10/18 1953)     PRN Meds:   Acetaminophen - used x 1      diphenhydrAMINE  15 mg iv - used x 1      oxyCODONE-acetaminophen 2 tabs - used x 1       OTHER ORDERS: up as tolerated  Diet surgical       [address] Utilization Review Department  Phone: [phone]; Fax [phone]  [email or web address]  ATTENTION: Please call with any questions or concerns to [phone]  and carefully listen to the prompts so that you are directed to the right person  Send all requests for admission clinical reviews, approved or denied determinations and any other requests to fax [phone]   All voicemails are confidential

## 2018-12-11 NOTE — OP NOTE
OPERATIVE REPORT  PATIENT NAME: Jericho Dorado    :  1975  MRN: [id number]  Pt Location: AN OR ROOM 03    SURGERY DATE: 12/10/2018    Surgeon(s) and Role:     * Elisa Gannon MD - Primary     Sridevi Rios - Assisting    Preop Diagnosis:  Symptomatic cholelithiasis [K80 20]    Post-Op Diagnosis Codes: * Symptomatic cholelithiasis [K80 20]    Procedure(s) (LRB):  CHOLECYSTECTOMY LAPAROSCOPIC; INCISIONAL HERNIA REPAIR (N/A) without mesh    Specimen(s):  ID Type Source Tests Collected by Time Destination   1 : PAYAM Polk- CC PCP Tissue Gallbladder TISSUE Karen Sawyer MD 12/10/2018 1834        Estimated Blood Loss:   Minimal    Drains:       Anesthesia Type:   General    Operative Indications:  Symptomatic cholelithiasis [K80 20]      Operative Findings:  Independent, nonsmoker, ASA 2 emergency           wound class 2, BMI 37, weight 216, height 64    []Hover for attribution information     Review of Systems/Medical History      Cardiovascular  Hyperlipidemia,  Pulmonary  Not a smoker ,       GI/Hepatic     Comment: Vomiting this am , none since      Kidney stones,       Endo/Other  Obesity (BMI-37)     GYN     Comment: Tubal      Hematology Musculoskeletal      Neurology  Headaches,  Psychology         Complications:   None    Procedure and Technique:  Patient was identified visually and via armband  Placed in the supine position  After general anesthesia the abdomen was prepped and draped in a sterile fashion  Time-out was completed  Incision was made at the umbilicus  This carried down through skin subcutaneous tissue  Incisional hernia was located at this region  The hernia sac was dissected free  This was then introduced into the abdomen  A 10 mm trocar was then inserted under direct vision  This followed by CO2 insufflation  Three additional trocars were then placed in the upper aspect of the abdomen under direct vision  Acute calculous cholecystitis was noted    The gallbladder was decompressed noticing hydrops gallbladder  A dome down technique using electrocautery was utilized  This carried down level of the cystic duct  The duct was normal in caliber  Was clipped x3 and divided  Cystic artery was clipped x2 and divided  Gallbladder was then removed from liver bed using electrocautery  It was placed in Endo-Catch bag removed through the umbilical hernia defect  Hernia was closed interrupted figure-of-eight 0 Vicryl suture followed by 4 Monocryl suture and Dermabond  The patient tolerated this procedure well  Sponge instrument count correct     I was present for the entire procedure    Patient Disposition:  PACU     SIGNATURE: Marjan Benites MD  DATE: December 10, 2018  TIME: 7:28 PM

## 2018-12-11 NOTE — PROGRESS NOTES
Progress Note -Surgery PA  Jewels Navarro 44 y o  female MRN: [id number]  Unit/Bed#: -01 Encounter: [id number]    ASSESSMENT/PLAN:  Problem List     * (Principal)Symptomatic cholelithiasis    Mixed hyperlipidemia    Obesity (BMI 30-39 9)   43 yo F s/p lap kathia, POD#1  · PO pain meds  · DC home today     VTE Pharmacologic Prophylaxis: Sequential compression device (Venodyne)  and Enoxaparin (Lovenox)    Subjective/Objective     Subjective:   Doing well   Pain controlle    Objective/Physical Exam: Blood pressure 110/68, pulse 68, temperature 98 °F (36 7 °C), temperature source Oral, resp  rate 18, height 5' 4" (1 626 m), weight 98 kg (216 lb), last menstrual period 12/03/2018, SpO2 96 %  ,Body mass index is 37 08 kg/m²      General appearance: alert and oriented, in no acute distress  Heart: regular rate and rhythm, S1, S2 normal, no murmur, click, rub or gallop  Lungs: clear to auscultation bilaterally  Abdomen: round and soft, +BS, all incisions C/D/I    Current Facility-Administered Medications:     acetaminophen (TYLENOL) tablet 650 mg, 650 mg, Oral, Q6H PRN, Kayla Canseco, 650 mg at 12/11/18 0003    amitriptyline (ELAVIL) tablet 10 mg, 10 mg, Oral, HS, Sherir Tatum, 10 mg at 12/10/18 2208    calcium carbonate (TUMS) chewable tablet 500 mg, 500 mg, Oral, Daily PRN, Kayla Canseco    diphenhydrAMINE (BENADRYL) injection 25 mg, 25 mg, Intravenous, Q6H PRN, Kayla Canseco, 25 mg at 12/10/18 2109    docusate sodium (COLACE) capsule 100 mg, 100 mg, Oral, BID PRN, Sherri Tatum    enoxaparin (LOVENOX) subcutaneous injection 40 mg, 40 mg, Subcutaneous, Daily, Sherri Tatum    famotidine (PEPCID) tablet 20 mg, 20 mg, Oral, Daily, Sherri Tatum    HYDROmorphone (DILAUDID) injection 0 5 mg, 0 5 mg, Intravenous, Q4H PRN, Sherri Tatum    ondansetron (ZOFRAN) injection 4 mg, 4 mg, Intravenous, Q4H PRN, Sherri Tatum    ondansetron (ZOFRAN-ODT) dispersible tablet 4 mg, 4 mg, Oral, Q6H PRN, Kayla Canseco    oxyCODONE-acetaminophen (PERCOCET) 5-325 mg per tablet 1 tablet, 1 tablet, Oral, Q4H PRN, Sam Prost    oxyCODONE-acetaminophen (PERCOCET) 5-325 mg per tablet 2 tablet, 2 tablet, Oral, Q4H PRN, Sam Prost, 2 tablet at 12/11/18 0336    sodium chloride 0 9 % infusion, 50 mL/hr, Intravenous, Continuous, Isi Sen CRNA, Last Rate: 50 mL/hr at 12/10/18 1953, 50 mL/hr at 12/10/18 1953

## 2018-12-11 NOTE — ANESTHESIA POSTPROCEDURE EVALUATION
Post-Op Assessment Note      CV Status:  Stable    Mental Status:  Alert and awake    Hydration Status:  Euvolemic    PONV Controlled:  Controlled    Airway Patency:  Patent    Post Op Vitals Reviewed: Yes          Staff: AnesthesiologistTRINH           /58 (12/10/18 1930)    Temp 99 °F (37 2 °C) (12/10/18 1924)    Pulse 78 (12/10/18 1930)   Resp 16 (12/10/18 1930)    SpO2 98 % (12/10/18 1930)

## 2019-01-01 ENCOUNTER — HOSPITAL ENCOUNTER (EMERGENCY)
Facility: HOSPITAL | Age: 44
Discharge: HOME | End: 2019-01-02
Attending: EMERGENCY MEDICINE | Admitting: EMERGENCY MEDICINE
Payer: COMMERCIAL

## 2019-01-01 VITALS
OXYGEN SATURATION: 100 % | BODY MASS INDEX: 33.61 KG/M2 | SYSTOLIC BLOOD PRESSURE: 159 MMHG | DIASTOLIC BLOOD PRESSURE: 79 MMHG | HEART RATE: 87 BPM | TEMPERATURE: 98.4 F | HEIGHT: 64 IN | WEIGHT: 196.87 LBS | RESPIRATION RATE: 20 BRPM

## 2019-01-01 DIAGNOSIS — T81.89XA DELAYED SURGICAL WOUND HEALING, INITIAL ENCOUNTER: Primary | ICD-10-CM

## 2019-01-01 PROCEDURE — 99283 EMERGENCY DEPT VISIT LOW MDM: CPT

## 2019-01-02 RX ORDER — IBUPROFEN 400 MG/1
400 TABLET ORAL ONCE
Status: COMPLETED | OUTPATIENT
Start: 2019-01-02 | End: 2019-01-02

## 2019-01-02 RX ADMIN — IBUPROFEN 400 MG: 400 TABLET ORAL at 00:42

## 2019-01-02 NOTE — ED NOTES
Discharge instruction given to patient  No questions or concerns at this time  Patient able to ambulate out without difficulty        Nain Nagy RN  01/02/19 4151

## 2019-01-02 NOTE — DISCHARGE INSTRUCTIONS
Cicatrización de la herida y ramirez dieta   LO QUE NECESITA SABER:   Ramirez cuerpo [place] nutrientes de los alimentos saludables para sanar las heridas causadas por sudhir lesión, [place] o [place] de presión  No existe sudhir dieta especial que sanará sudhir herida, sin embargo, un plan alimenticio saludable puede ayudar a que ramirez herida sane más rápido  Los nutrientes que facilitan el proceso de sanación son la proteína, el zinc, y la vitamina C  Los líquidos también son importantes para facilitar la sanación de heridas  INSTRUCCIONES SOBRE EL ILANA HOSPITALARIA:   Siga un plan alimenticio saludable:   · Consuma sudhir variedad de alimentos de cada sosa alimenticio a diario  Coma comidas y bocados a intervalos regulares para ayudarle a obtener suficientes calorías y nutrientes  Si se le dificulta comer 3 comidas cada día, coma 5 a 6 comidas pequeñas  Incluya nazario de proteína, zinc y vitamina C en ramirez dieta cada día  · [address] se le haya indicado  Ирина suficiente líquido kassie y [place] comidas, a menos que ramirez médico le sugiera que limite el consumo de líquidos  Pregúntele a ramirez médico o especialista en nutrición cuánto líquido debe beber a diario y qué líquidos son los adecuados para usted  · Limite los alimentos poco saludables,  shana los que son altos en grasas, azúcar y sal  Por ejemplo, rosquillas, galletas, alimentos fritos, dulces y soda regular  Estos alimentos tienen bajo contenido de nutrientes que son importantes para la sanación  Buenas nazario de proteína:  Ramirez dietista le indicará qué cantidad de proteínas y de calorías necesita consumir a diario  La cantidad promedia de proteína en los alimentos aparece a continuación en gramos (g)  Shruthi las etiquetas de los envases de los productos para averiguar la cantidad exacta de proteína que contiene un determinado alimento    · Productos lácteos:      ¨ 1 taza de cualquier tipo de [place] (8g)    ¨ ½ taza de leche evaporada en daria (9g)    ¨ ¼ de taza de leche en polvo con bajo contenido de grasa (57L)    ¨ 1 onza de queso addis o semiduro (7 g)    ¨ ¼ de taza de queso parmesano (8 g)    ¨ ½ taza de requesón (14g)    ¨ ½ taza de budín (4g)    ¨ 1 taza de yogur natural o con fruta (8g)    · Lisa y sustitutos de la carne:      ¨ 3 onzas de pescado de agua wan cocido (21 g)     ¨ 3 onzas de mariscos cocidos (19g)    ¨ ½ taza de atún enlatado (14g)    ¨ 3 onzas de carne cocida de elias, pavo u otras aves de smith (24 g)    ¨ 3 onzas de carne cocida de res, puerco, yañez u otras lisa steven (21 g)    ¨ 1 huevo hayde (6g)    ¨ ¼ de taza de sustituto de huevo sin grasa (5 g)    ¨ ½ taza de tofu o tempeh (10 g)    ¨ 1 taza de frijoles secos cocidos, shana frijoles pintos, rojos o blancos (15 g)    · [place] y semillas:      ¨ 2 cucharadas de almendras, anacardos, nueces o semillas de girasol (5g)    ¨ 2 cucharadas de maní (7g)    ¨ 2 cucharadas de mantequilla de maní (8g)  Cómo se agrega proteína adicional:   · Agregue [employer] a la [place], cereales, huevos revueltos, sopas, y guisos  · Agregue queso a las salsas, sopas o verduras  · Agregue huevos al atún, las [place], las salsas o los guisos  · Agregue suplementos nutritivos y bebidas en polvo para el desayuno a la [employer] batidos  · Agregue nueces a los alimentos o cómalas shana refrigerio  · Agregue carne (de res, elias o puerco) a las sopas, guisados, pastas o verduras  · Agregue frijoles, guisantes u otros legumbres a las ensaladas  · Consuma requesón o yogur con Iver Gentle  Buenas nazario de vitamina C:  La vitamina C se encuentra en frutas y verduras  Las frutas shana [place], fresas, Evangelina Real, melón y [place] son Danny Sicks nazario de vitamina C  Los pimientos rojos y verdes, bróculi, josee, tomates y repollo también tienen alto contenido de vitamina C    Buenas nazario de zinc:  La carne de res, el hígado y cangrejo son buenas nazario de zinc  Menores cantidades de zinc se encuentran en las semillas de [place], [facility], [facility], [facility] y [place]  Otros alimentos que contienen zinc incluyen el grano de christal, frijoles Bobbio, y productos de grano entero  Cómo agregar calorías adicionales a los alimentos:  Lovett dietista podría recomendarle añadir calorías adicionales a francisco comidas si no está comiendo lo suficiente  Para ello, agregue mantequilla, margarina, azúcar o mermelada a los alimentos  Si usted tiene [employer] y necesita seguir sudhir dieta especial, elabore sudhir en colaboración con lovett dietista  Qué más debe saber acerca de los nutrientes y la sanación de heridas:  Lovett médico o dietista podría recomendarle vitaminas y suplementos nutricionales si el nivel de ciertos nutrientes en lovett cuerpo es bajo  Si lovett dietista le recomienda un suplemento nutricional líquido, tómelo [employer]  Consulte con lovett médico cuáles suplementos son adecuados para usted  © 2017 [address] Information is for End User's use only and may not be sold, redistributed or otherwise used for commercial purposes  All illustrations and images included in CareNotes® are the copyrighted property of [employer]  or Rakesh Swann  Esta información es sólo para uso en educación  Lovett intención no es darle un consejo médico sobre enfermedades o tratamientos  Colsulte con lovett Christopher Patch farmacéutico antes de seguir cualquier régimen médico para saber si es seguro y efectivo para usted  Cuidado de los puntos de sutura absorbibles   LO QUE NECESITA SABER:   Los puntos de sutura o suturas absorbibles se usan para cerrar incisiones o heridas  Estos puntos de sutura son absorbidos por el cuerpo, o se caen por sí solos en varios días o semanas  No necesitan ser removidos  INSTRUCCIONES SOBRE EL ILANA HOSPITALARIA:   Regrese a la betito de emergencias si:   · Lovett herida se abre  · Usted tiene líneas steven alrededor de lovett herida      · Usted tiene inflamación o dolor en los ganglios linfáticos  · La paola empapa el vendaje  Pregúntele a lovett Marlene Pel vitaminas y minerales son adecuados para usted  · Usted tiene signos de Birda He infección, shana más enrojecimiento, dolor, inflamación o pus  · Usted tiene fiebre  · Francisco puntos de sutura no se absorben cuando lovett médico le indicó que deberían [place]  · Usted tiene preguntas o inquietudes acerca de lovett condición o cuidado  Cuide de lovett herida y de los puntos de sutura absorbibles shana se le indique:   · Proteja lovett herida contra sudhir lesión adicional   Use ropa protectora sobre la herida y protéjala amanda solar  No ponga presión sobre lovett herida  Pisinemo podría [place] y aumentar el riesgo de sudhir infección  · Eleve el área de la herida  Mantenga la herida por encima del nivel del corazón tan a menudo shana pueda  Pisinemo va a disminuir inflamación y el dolor  Si es posible, apoye el área de lovett herida sobre almohadas o mantas para mantenerla elevada cómodamente  · Zakiya Askew y [place] lovett herida con un vendaje  Lave cuidadosamente la herida con agua y [place]  Seque con cuidado el área y coloque vendas nuevas y limpias shana se le indique  Cambie francisco vendajes cuando se mojen o ensucien  · Halls sudhir ducha en vez de bañarse  Espere de 12 a 24 horas después de recibir francisco puntos de sutura para bañarse  No tome west de rickey ni nade  Acuda a francisco consultas de control con lovett médico según le indicaron  Anote francisco preguntas para que se acuerde de hacerlas kassie francisco visitas  © 2017 [address] Information is for End User's use only and may not be sold, redistributed or otherwise used for commercial purposes  All illustrations and images included in CareNotes® are the copyrighted property of A D A M , Inc  or Rakesh Swann  Esta información es sólo para uso en educación  Lovett intención no es darle un consejo médico sobre enfermedades o tratamientos   Colsulte con lovett Verlon Ej farmacéutico antes de seguir cualquier régimen médico para saber si es seguro y efectivo para usted

## 2019-01-02 NOTE — ED PROVIDER NOTES
History  Chief Complaint   Patient presents with    Wound Check     Reports drainage and pain at a surgical site from the 8th of December  43 y/o F presents due to Wound Check since emergent Laparoscopic Cholecystectomy on 12/10/2018 due to Cholecystitis performed by Dr Janelle Acevedo Surgery here at [place] [address]  Pt reports concern due to clear drainage and pain at a surgical site near umbilicus  Pt admits to picking at site, states the area annoys her  Denies f/c/s, n/v/d, bloating, constipation, and no other complaints at this time  History provided by:  Patient   used: Yes    Wound Check    Treated in ED: 12/10/2018 with OR Lap Jessa  Prior ED Treatment: Referral to Gen Surgery  Treatments since wound repair include antibiotic ointment use and regular soap and water washings  There has been clear discharge from the wound  The redness has improved  The swelling has improved  The pain has improved  Prior to Admission Medications   Prescriptions Last Dose Informant Patient Reported?  Taking?   amitriptyline (ELAVIL) 10 mg tablet   No No   Sig: Take 1 tablet (10 mg total) by mouth daily at bedtime   butalbital-aspirin-caffeine (FIORINAL) -40 mg capsule   No No   Sig: Take 1 capsule by mouth every 4 (four) hours as needed for headaches   naproxen (NAPROSYN) 500 mg tablet   No No   Sig: Take 1 tablet (500 mg total) by mouth 2 (two) times a day with meals   Patient not taking: Reported on 12/10/2018    ondansetron (ZOFRAN-ODT) 4 mg disintegrating tablet   No No   Sig: Take 1 tablet by mouth every 6 (six) hours as needed for nausea or vomiting   Patient not taking: Reported on 12/10/2018    ranitidine (ZANTAC) 150 mg tablet   Yes No   Sig: Take 1 tablet by mouth daily      Facility-Administered Medications: None       Past Medical History:   Diagnosis Date    Renal calculi     Renal disorder     Toe fracture, left        Past Surgical History:   Procedure Laterality Date  CHOLECYSTECTOMY LAPAROSCOPIC N/A 12/10/2018    Procedure: CHOLECYSTECTOMY LAPAROSCOPIC; INCISIONAL HERNIA REPAIR;  Surgeon: Shell Teran MD;  [facility];  Service: General    KIDNEY SURGERY  2013    removed L kidney from damage from kidney stone -PERFORMED IN [place]    TUBAL LIGATION      AGE 33       Family History   Problem Relation Age of Onset    Diabetes Mother     Hyperlipidemia Mother     Hypertension Mother     Other Mother         low back pain    Heart disease Mother     Diabetes Father     No Known Problems Sister     No Known Problems Brother      I have reviewed and agree with the history as documented  Social History   Substance Use Topics    Smoking status: Never Smoker    Smokeless tobacco: Never Used    Alcohol use No        Review of Systems   Constitutional: Negative for activity change, appetite change, chills, diaphoresis, fatigue and fever  Respiratory: Negative for cough and shortness of breath  Cardiovascular: Negative for chest pain  Gastrointestinal: Negative for abdominal pain, diarrhea, nausea and vomiting  Genitourinary: Negative for dysuria, menstrual problem and pelvic pain  Musculoskeletal: Negative for arthralgias, joint swelling and myalgias  Skin: Positive for wound  Negative for rash  Neurological: Negative for dizziness, light-headedness, numbness and headaches  Physical Exam  Physical Exam   Constitutional: She is oriented to person, place, and time  She appears well-developed and well-nourished  No distress  HENT:   Head: Normocephalic and atraumatic  Eyes: Pupils are equal, round, and reactive to light  Neck: Normal range of motion  Neck supple  Cardiovascular: Normal rate and regular rhythm  Pulmonary/Chest: Effort normal and breath sounds normal  No respiratory distress  Abdominal: Soft  Bowel sounds are normal  She exhibits no distension and no mass  There is tenderness (over wound edge)   There is no rebound and no guarding  No hernia  Neurological: She is alert and oriented to person, place, and time  Skin: Skin is warm  Capillary refill takes less than 2 seconds  Rash (localized erythema with flaking skin over wound of umbilicus,, no fluctuance or induration, no drainage  Other wounds well healing ) noted  Nursing note and vitals reviewed  Vital Signs  ED Triage Vitals   Temperature Pulse Respirations Blood Pressure SpO2   01/01/19 2358 01/01/19 2352 01/01/19 2352 01/01/19 2352 01/01/19 2352   98 4 °F (36 9 °C) 87 20 159/79 100 %      Temp Source Heart Rate Source Patient Position - Orthostatic VS BP Location FiO2 (%)   01/01/19 2358 01/01/19 2352 01/01/19 2352 01/01/19 2352 --   Oral Monitor Sitting Right arm       Pain Score       01/01/19 2352       3           Vitals:    01/01/19 2352   BP: 159/79   Pulse: 87   Patient Position - Orthostatic VS: Sitting       Visual Acuity      ED Medications  Medications   ibuprofen (MOTRIN) tablet 400 mg (400 mg Oral Given 1/2/19 0042)       Diagnostic Studies  Results Reviewed     None                 No orders to display              Procedures  Procedures       Phone Contacts  ED Phone Contact    ED Course                               MDM  Number of Diagnoses or Management Options  Delayed surgical wound healing, initial encounter: new and does not require workup  Diagnosis management comments: 45 y/o F presents due to Wound Check since emergent Laparoscopic Cholecystectomy on 12/10/2018 due to Cholecystitis performed by Dr Gilberto Isaac Surgery here at [facility]  Pt reports concern due to clear drainage and pain at a surgical site near umbilicus  Pt admits to picking at site, states the area annoys her  Denies f/c/s, n/v/d, bloating, constipation, and no other complaints at this time  VS reviewed and pt stable, afebrile  Exam reveals localized erythema with flaking skin over wound of umbilicus,, no fluctuance or induration, no drainage  Other wounds well healing  Pictures uploaded into pt chart  Reassurance given regarding healing wound, recommend f/u with General Surgery for wound healing, post-op reevaluation  Amount and/or Complexity of Data Reviewed  Review and summarize past medical records: yes    Risk of Complications, Morbidity, and/or Mortality  Presenting problems: low  Diagnostic procedures: low  Management options: low    Patient Progress  Patient progress: stable    CritCare Time    Disposition  Final diagnoses:   Delayed surgical wound healing, initial encounter     Time reflects when diagnosis was documented in both MDM as applicable and the Disposition within this note     Time User Action Codes Description Comment    1/2/2019 12:35 AM Gali Crabtree Add [[id number]] Delayed surgical wound healing, initial encounter       ED Disposition     ED Disposition Condition Comment    Discharge  WESLEYchristine Sondra discharge to home/self care  Condition at discharge: Good        Follow-up Information     Follow up With Specialties Details Why Contact Info Additional [address] [facility], 10 [facility] Internal Medicine, Nurse Practitioner  As needed  E   [address] Emergency Department Emergency Medicine  If symptoms worsen [address] [place] [address]  [phone] [facility] ED,  [address] [place] [address], [address],  General Surgery Call for appointment [address] [facility]  [phone]             Discharge Medication List as of 1/2/2019 12:40 AM      CONTINUE these medications which have NOT CHANGED    Details   amitriptyline (ELAVIL) 10 mg tablet Take 1 tablet (10 mg total) by mouth daily at bedtime, Starting Tue 5/8/2018, Normal      butalbital-aspirin-caffeine (FIORINAL) -40 mg capsule Take 1 capsule by mouth every 4 (four) hours as needed for headaches, Starting Fri 4/27/2018, Print naproxen (NAPROSYN) 500 mg tablet Take 1 tablet (500 mg total) by mouth 2 (two) times a day with meals, Starting Tue 5/8/2018, Normal      ondansetron (ZOFRAN-ODT) 4 mg disintegrating tablet Take 1 tablet by mouth every 6 (six) hours as needed for nausea or vomiting, Starting Mon 12/25/2017, Print      ranitidine (ZANTAC) 150 mg tablet Take 1 tablet by mouth daily, Starting Fri 1/12/2018, Historical Med           No discharge procedures on file      ED Provider  Electronically Signed by           Humza Paez PA-C  01/03/19 3673

## 2019-01-25 RX ORDER — AMOXICILLIN 500 MG/1
TABLET, FILM COATED ORAL
Refills: 0 | COMMUNITY
Start: 2018-11-17 | End: 2019-08-15 | Stop reason: ALTCHOICE

## 2019-01-28 ENCOUNTER — APPOINTMENT (OUTPATIENT)
Dept: LAB | Facility: CLINIC | Age: 44
End: 2019-01-28
Payer: COMMERCIAL

## 2019-01-28 ENCOUNTER — HOSPITAL ENCOUNTER (OUTPATIENT)
Dept: RADIOLOGY | Facility: HOSPITAL | Age: 44
Discharge: HOME | End: 2019-01-28
Payer: COMMERCIAL

## 2019-01-28 ENCOUNTER — OFFICE VISIT (OUTPATIENT)
Dept: INTERNAL MEDICINE CLINIC | Facility: CLINIC | Age: 44
End: 2019-01-28

## 2019-01-28 VITALS
HEART RATE: 84 BPM | DIASTOLIC BLOOD PRESSURE: 76 MMHG | BODY MASS INDEX: 33.2 KG/M2 | TEMPERATURE: 97.9 F | SYSTOLIC BLOOD PRESSURE: 130 MMHG | HEIGHT: 64 IN | WEIGHT: 194.45 LBS

## 2019-01-28 DIAGNOSIS — Z90.5 STATUS POST NEPHRECTOMY: ICD-10-CM

## 2019-01-28 DIAGNOSIS — R10.9 RIGHT FLANK PAIN: ICD-10-CM

## 2019-01-28 DIAGNOSIS — R10.9 RIGHT FLANK PAIN: Primary | ICD-10-CM

## 2019-01-28 DIAGNOSIS — G43.101 MIGRAINE WITH AURA AND WITH STATUS MIGRAINOSUS, NOT INTRACTABLE: ICD-10-CM

## 2019-01-28 LAB
BACTERIA UR QL AUTO: ABNORMAL /HPF
BILIRUB UR QL STRIP: NEGATIVE
CLARITY UR: ABNORMAL
COLOR UR: YELLOW
GLUCOSE UR STRIP-MCNC: NEGATIVE MG/DL
HGB UR QL STRIP.AUTO: NEGATIVE
HYALINE CASTS #/AREA URNS LPF: ABNORMAL /LPF
KETONES UR STRIP-MCNC: NEGATIVE MG/DL
LEUKOCYTE ESTERASE UR QL STRIP: ABNORMAL
NITRITE UR QL STRIP: NEGATIVE
NON-SQ EPI CELLS URNS QL MICRO: ABNORMAL /HPF
PH UR STRIP.AUTO: 6 [PH] (ref 4.5–8)
PROT UR STRIP-MCNC: NEGATIVE MG/DL
RBC #/AREA URNS AUTO: ABNORMAL /HPF
SP GR UR STRIP.AUTO: 1.03 (ref 1–1.03)
UROBILINOGEN UR QL STRIP.AUTO: 0.2 E.U./DL
WBC #/AREA URNS AUTO: ABNORMAL /HPF

## 2019-01-28 PROCEDURE — 99213 OFFICE O/P EST LOW 20 MIN: CPT | Performed by: INTERNAL MEDICINE

## 2019-01-28 PROCEDURE — 76770 US EXAM ABDO BACK WALL COMP: CPT

## 2019-01-28 PROCEDURE — 81001 URINALYSIS AUTO W/SCOPE: CPT | Performed by: STUDENT IN AN ORGANIZED HEALTH CARE EDUCATION/TRAINING PROGRAM

## 2019-01-28 NOTE — PATIENT INSTRUCTIONS
Dolor de costado   CUIDADO AMBULATORIO:   El dolor de costado  es un dolor que se siente en el área debajo de ramirez caja torácica y por encima de los huesos de la cadera, nando siempre se sitúa en la parte inferior de la espalda  El dolor podría ser suave o tan severo que usted no puede sentirse cómodo  El dolor podría permanecer en un área o propagarse a [place]  Shai Charlie y mejorarse en momentos  El dolor de costado nando siempre es sudhir señal de problemas con ramirez tracto urinario, shana de cálculos en el riñón o infección  Busque atención médica de inmediato si:   · Usted tiene fiebre  · Ramirez corazón está revoloteando o saltando  · Usted orina con paola  · Ramirez dolor se propaga a la parte inferior de ramirez abdomen y del área genital      · Usted tiene dolor intenso en la parte inferior de ramirez espalda junto a ramirez columna vertebral      · Usted está mucho más cansado de lo normal y no tiene deseos de comer  · Usted tiene dolor de [place] y distensión muscular  Pregúntele a ramirez Rogue Beady vitaminas y minerales son adecuados para usted  · Usted tiene [employer] y está vomitando  · Usted tiene que orinar con más frecuencia y con [place]  · Ramirez dolor empeora o no mejora y usted no puede sentirse cómodo  · Usted pasa un cálculo cuando orina  · Usted tiene preguntas o inquietudes acerca de ramirez condición o cuidado  El tratamiento para el dolor de costado  podría incluir medicamento para disminuir el dolor o tratar sudhir infección bacteriana  Acuda a francisco consultas de control con ramirez médico según le indicaron  Anote francisco preguntas para que se acuerde de hacerlas kassie francisco visitas  © 2017 [address] [employer] Information is for End User's use only and may not be sold, redistributed or otherwise used for commercial purposes  All illustrations and images included in CareNotes® are the copyrighted property of A D A M , Inc  or Rakesh Swann    Esta información es sólo para uso en educación  Ramirez intención no es darle un consejo médico sobre enfermedades o tratamientos  Colsulte con ramirez Valentine Seals farmacéutico antes de seguir cualquier régimen médico para saber si es seguro y efectivo para usted

## 2019-01-28 NOTE — PROGRESS NOTES
INTERNAL MEDICINE RESIDENCY  CLINIC VISIT NOTE     PATIENT INFORMATION     Lesley Bullard   44 y o  female   MRN: [id number]    ASSESSMENT/PLAN     Diagnoses and all orders for this visit:    1  Right flank pain  · Unclear etiology but does not appear infectious given lack of urinary complaints  · Concerning for recurrent stones given her history  · Will check urinalysis and ultrasound  · Will start prophylactically on calcium supplementation  · Further workup to follow  · Advised increasing water intake  · UA (URINE) with reflex to Microscopic  · US retroperitoneal complete; Future  · Calcium-vitamin D (OSCAL) 250-125 MG-UNIT per tablet; Take 1 tablet by mouth 2 (two) times a day    2  Status post left nephrectomy  · Secondary to complications from stones  · Occurred 5 years ago in [facility]    3  Migraine with aura and with status migrainosus, not intractable  · Stable  · Continue Elavil    Schedule a follow-up appointment in 4 weeks or sooner if needed  HEALTH MAINTENANCE     Immunization History   Administered Date(s) Administered    Influenza 01/12/2018    Influenza Quadrivalent, 6-35 Months IM 01/12/2018    Tdap 01/12/2018    Tuberculin Skin Test-PPD Intradermal 05/08/2018     Immunizations:  · Up to date  Screening:  · Up to date    HISTORY OF PRESENT ILLNESS     Chief complaint: Right flank pain    44year old female presents to office for follow-up  She has been doing well with her migraines  She does have an acute complaint of right flank pain  She reports that the pain is constant and is primarily localized to the right flank  She denies any dysuria or change in urinary appearance  However, she does have a history of left nephrectomy as a result of complications from kidney stones  She states this occurred in [facility] 5 years ago and no records are available  She states that this pain is similar in quality to the pain from previous stones   She has been taking some Tylenol and ibuprofen with some relief  She does admit to not drinking much water  She recently had a cholecystectomy  REVIEW OF SYSTEMS     Review of Systems   Genitourinary:        Right flank pain   All other systems reviewed and are negative  OBJECTIVE     Vitals:    01/28/19 0806   BP: 130/76   Pulse: 84   Temp: 97 9 °F (36 6 °C)   Weight: 88 2 kg (194 lb 7 1 oz)   Height: 5' 4" (1 626 m)     Physical Exam   Constitutional: She is oriented to person, place, and time  She appears well-developed and well-nourished  No distress  HENT:   Head: Normocephalic and atraumatic  Nose: Nose normal    Mouth/Throat: Oropharynx is clear and moist  No oropharyngeal exudate  Eyes: Conjunctivae and EOM are normal  No scleral icterus  Neck: Neck supple  No JVD present  No tracheal deviation present  Cardiovascular: Normal rate, regular rhythm, normal heart sounds and intact distal pulses  Exam reveals no gallop and no friction rub  No murmur heard  Pulmonary/Chest: Effort normal and breath sounds normal  No respiratory distress  She has no wheezes  She has no rales  She exhibits no tenderness  Abdominal: Soft  Bowel sounds are normal  She exhibits no distension  There is no tenderness  There is no rebound and no guarding  Musculoskeletal: She exhibits no edema or deformity  Neurological: She is alert and oriented to person, place, and time  No cranial nerve deficit  Skin: Skin is warm and dry  No rash noted  She is not diaphoretic  No erythema  No pallor  Psychiatric: She has a normal mood and affect  Her behavior is normal  Judgment and thought content normal    Nursing note and vitals reviewed      CURRENT MEDICATIONS     Current Outpatient Prescriptions:     naproxen (NAPROSYN) 500 mg tablet, Take 1 tablet (500 mg total) by mouth 2 (two) times a day with meals, Disp: 60 tablet, Rfl: 0    ondansetron (ZOFRAN-ODT) 4 mg disintegrating tablet, Take 1 tablet by mouth every 6 (six) hours as needed for nausea or vomiting, Disp: 10 tablet, Rfl: 0    ranitidine (ZANTAC) 150 mg tablet, Take 1 tablet by mouth daily, Disp: , Rfl:     amitriptyline (ELAVIL) 10 mg tablet, Take 1 tablet (10 mg total) by mouth daily at bedtime, Disp: 90 tablet, Rfl: 1    amoxicillin (AMOXIL) 500 MG tablet, take 1 tablet by mouth three times a day for 1 week, Disp: , Rfl: 0    butalbital-aspirin-caffeine (FIORINAL) -40 mg capsule, Take 1 capsule by mouth every 4 (four) hours as needed for headaches (Patient not taking: Reported on 1/28/2019 ), Disp: 30 capsule, Rfl: 1    calcium-vitamin D (OSCAL) 250-125 MG-UNIT per tablet, Take 1 tablet by mouth 2 (two) times a day, Disp: 60 tablet, Rfl: 2    HISTORICAL INFORMATION     Past Medical History:   Diagnosis Date    Renal calculi     Renal disorder     Toe fracture, left      Past Surgical History:   Procedure Laterality Date    CHOLECYSTECTOMY LAPAROSCOPIC N/A 12/10/2018    Procedure: CHOLECYSTECTOMY LAPAROSCOPIC; [address];  Surgeon: Rickie Paige MD;  [facility];  Service: General    KIDNEY SURGERY  2013    removed L kidney from damage from kidney stone [place]PERFORMED [place]    TUBAL LIGATION      AGE 32     Social History     Social History    Marital status: Single     Spouse name: N/A    Number of children: 2    Years of education: N/A     Occupational History          unemployed, not looking for work     Social History Main Topics    Smoking status: Never Smoker    Smokeless tobacco: Never Used    Alcohol use No    Drug use: No    Sexual activity: No     Other Topics Concern    Not on file     Social History Narrative    Caffeine use-1 cup of coffee daily    Does not exercise         Family History   Problem Relation Age of Onset    Diabetes Mother     Hyperlipidemia Mother     Hypertension Mother     Other Mother         low back pain    Heart disease Mother     Diabetes Father     No Known Problems Sister     No Known Problems Brother      ==  Nikos Christiansonirenes Grady Jacobson [facility] Internal Medicine Residency, PGY-2    [address] [facility] , [address] [address] [facility] [address] [facility]  Office: [phone]  Fax: [phone]

## 2019-01-31 ENCOUNTER — TELEPHONE (OUTPATIENT)
Dept: INTERNAL MEDICINE CLINIC | Facility: CLINIC | Age: 44
End: 2019-01-31

## 2019-01-31 NOTE — TELEPHONE ENCOUNTER
----- Message from Perla Cordova DO sent at 1/31/2019 12:33 PM EST -----  Regarding: Listed phone number not working  I'm trying to reach patient to discuss her recent urine test and ultrasound results but the number does not seem to be working  Could you try reaching out to patient or possibly finding another working number for her and let me know? Thank you

## 2019-01-31 NOTE — TELEPHONE ENCOUNTER
Phone numbers that have a [phone] area code are [place] phone numbers which means you would have to press [phone] for  and ask them to dial the phone number for you , with that being said what would you like for me to tell patient ? I believe she is Croatian speaking

## 2019-02-01 DIAGNOSIS — R10.9 FLANK PAIN: Primary | ICD-10-CM

## 2019-02-01 NOTE — TELEPHONE ENCOUNTER
I will place order for repeat urine study  She can get this done on Monday  Depending on what it shows, we will consider further workup  Thank you

## 2019-02-01 NOTE — TELEPHONE ENCOUNTER
Nurys gutierrez  Please inform patient that her ultrasound was unremarkable  Her urine shows a small amount of blood but it appeared to be a contaminated sample  Ask her if she is still having the pain  If she is, we may repeat the urine study  Thank you!

## 2019-02-01 NOTE — TELEPHONE ENCOUNTER
Attempted to contact patient but there was no response , left voicemail  Will attempt again at a later time

## 2019-02-13 NOTE — TELEPHONE ENCOUNTER
PATIENT CALL BACK AND HAS BEEN INFORMED  PATIENT SAID SHE'S COMING BETWEEN TODAY AND TOMORROW TO  SCRIPT  PATIENT SAID THAT SHE WAS THINKING ON GOING TO ED BECAUSE SHE'S STILL HAVING PAIN AND SHE SAID THAT IF SHE DON'T GO TO ED SHE WOULD BE HERE TO  SCRIPT FOR URINE TEST

## 2019-02-16 ENCOUNTER — APPOINTMENT (OUTPATIENT)
Dept: LAB | Facility: HOSPITAL | Age: 44
End: 2019-02-16
Payer: COMMERCIAL

## 2019-02-16 DIAGNOSIS — R10.9 FLANK PAIN: ICD-10-CM

## 2019-02-16 LAB
BACTERIA UR QL AUTO: ABNORMAL /HPF
BILIRUB UR QL STRIP: NEGATIVE
CLARITY UR: CLEAR
COLOR UR: YELLOW
GLUCOSE UR STRIP-MCNC: NEGATIVE MG/DL
HGB UR QL STRIP.AUTO: NEGATIVE
HYALINE CASTS #/AREA URNS LPF: ABNORMAL /LPF
KETONES UR STRIP-MCNC: NEGATIVE MG/DL
LEUKOCYTE ESTERASE UR QL STRIP: ABNORMAL
NITRITE UR QL STRIP: NEGATIVE
NON-SQ EPI CELLS URNS QL MICRO: ABNORMAL /HPF
PH UR STRIP.AUTO: 6.5 [PH] (ref 4.5–8)
PROT UR STRIP-MCNC: NEGATIVE MG/DL
RBC #/AREA URNS AUTO: ABNORMAL /HPF
SP GR UR STRIP.AUTO: 1.02 (ref 1–1.03)
UROBILINOGEN UR QL STRIP.AUTO: 1 E.U./DL
WBC #/AREA URNS AUTO: ABNORMAL /HPF

## 2019-02-16 PROCEDURE — 81001 URINALYSIS AUTO W/SCOPE: CPT

## 2019-02-22 ENCOUNTER — TELEPHONE (OUTPATIENT)
Dept: INTERNAL MEDICINE CLINIC | Facility: CLINIC | Age: 44
End: 2019-02-22

## 2019-02-22 NOTE — TELEPHONE ENCOUNTER
----- Message from Mikhail Remy LPN sent at 3/47/2563  4:08 PM EST -----  CAN SOMEONE SCHEDULE SAME DAY

## 2019-03-07 ENCOUNTER — TELEPHONE (OUTPATIENT)
Dept: MULTI SPECIALTY CLINIC | Facility: CLINIC | Age: 44
End: 2019-03-07

## 2019-05-05 ENCOUNTER — HOSPITAL ENCOUNTER (EMERGENCY)
Facility: HOSPITAL | Age: 44
Discharge: HOME | End: 2019-05-05
Attending: EMERGENCY MEDICINE | Admitting: EMERGENCY MEDICINE
Payer: COMMERCIAL

## 2019-05-05 VITALS
RESPIRATION RATE: 18 BRPM | SYSTOLIC BLOOD PRESSURE: 117 MMHG | BODY MASS INDEX: 32.01 KG/M2 | DIASTOLIC BLOOD PRESSURE: 58 MMHG | OXYGEN SATURATION: 99 % | TEMPERATURE: 98.6 F | WEIGHT: 186.51 LBS | HEART RATE: 94 BPM

## 2019-05-05 DIAGNOSIS — R11.2 NON-INTRACTABLE VOMITING WITH NAUSEA, UNSPECIFIED VOMITING TYPE: ICD-10-CM

## 2019-05-05 DIAGNOSIS — J02.0 STREP PHARYNGITIS: Primary | ICD-10-CM

## 2019-05-05 DIAGNOSIS — D72.829 LEUKOCYTOSIS, UNSPECIFIED TYPE: ICD-10-CM

## 2019-05-05 LAB
ALBUMIN SERPL BCP-MCNC: 3.6 G/DL (ref 3.5–5)
ALP SERPL-CCNC: 78 U/L (ref 46–116)
ALT SERPL W P-5'-P-CCNC: 34 U/L (ref 12–78)
ANION GAP SERPL CALCULATED.3IONS-SCNC: 9 MMOL/L (ref 4–13)
AST SERPL W P-5'-P-CCNC: 25 U/L (ref 5–45)
BACTERIA UR QL AUTO: ABNORMAL /HPF
BASOPHILS # BLD AUTO: 0.08 THOUSANDS/ΜL (ref 0–0.1)
BASOPHILS NFR BLD AUTO: 0 % (ref 0–1)
BILIRUB SERPL-MCNC: 0.5 MG/DL (ref 0.2–1)
BILIRUB UR QL STRIP: NEGATIVE
BUN SERPL-MCNC: 10 MG/DL (ref 5–25)
CALCIUM SERPL-MCNC: 9.2 MG/DL (ref 8.3–10.1)
CHLORIDE SERPL-SCNC: 100 MMOL/L (ref 100–108)
CLARITY UR: ABNORMAL
CO2 SERPL-SCNC: 27 MMOL/L (ref 21–32)
COLOR UR: ABNORMAL
CREAT SERPL-MCNC: 1.08 MG/DL (ref 0.6–1.3)
EOSINOPHIL # BLD AUTO: 0 THOUSAND/ΜL (ref 0–0.61)
EOSINOPHIL NFR BLD AUTO: 0 % (ref 0–6)
ERYTHROCYTE [DISTWIDTH] IN BLOOD BY AUTOMATED COUNT: 15.2 % (ref 11.6–15.1)
EXT PREG TEST URINE: NEGATIVE
GFR SERPL CREATININE-BSD FRML MDRD: 63 ML/MIN/1.73SQ M
GLUCOSE SERPL-MCNC: 112 MG/DL (ref 65–140)
GLUCOSE UR STRIP-MCNC: NEGATIVE MG/DL
HCT VFR BLD AUTO: 37 % (ref 34.8–46.1)
HGB BLD-MCNC: 11.7 G/DL (ref 11.5–15.4)
HGB UR QL STRIP.AUTO: ABNORMAL
IMM GRANULOCYTES # BLD AUTO: 0.16 THOUSAND/UL (ref 0–0.2)
IMM GRANULOCYTES NFR BLD AUTO: 1 % (ref 0–2)
KETONES UR STRIP-MCNC: NEGATIVE MG/DL
LEUKOCYTE ESTERASE UR QL STRIP: ABNORMAL
LYMPHOCYTES # BLD AUTO: 1.14 THOUSANDS/ΜL (ref 0.6–4.47)
LYMPHOCYTES NFR BLD AUTO: 5 % (ref 14–44)
MCH RBC QN AUTO: 25.3 PG (ref 26.8–34.3)
MCHC RBC AUTO-ENTMCNC: 31.6 G/DL (ref 31.4–37.4)
MCV RBC AUTO: 80 FL (ref 82–98)
MONOCYTES # BLD AUTO: 1.33 THOUSAND/ΜL (ref 0.17–1.22)
MONOCYTES NFR BLD AUTO: 6 % (ref 4–12)
MUCOUS THREADS UR QL AUTO: ABNORMAL
NEUTROPHILS # BLD AUTO: 19.42 THOUSANDS/ΜL (ref 1.85–7.62)
NEUTS SEG NFR BLD AUTO: 88 % (ref 43–75)
NITRITE UR QL STRIP: NEGATIVE
NON-SQ EPI CELLS URNS QL MICRO: ABNORMAL /HPF
NRBC BLD AUTO-RTO: 0 /100 WBCS
PH UR STRIP.AUTO: 7.5 [PH]
PLATELET # BLD AUTO: 324 THOUSANDS/UL (ref 149–390)
PMV BLD AUTO: 10.6 FL (ref 8.9–12.7)
POTASSIUM SERPL-SCNC: 3.8 MMOL/L (ref 3.5–5.3)
PROT SERPL-MCNC: 7.9 G/DL (ref 6.4–8.2)
PROT UR STRIP-MCNC: ABNORMAL MG/DL
RBC # BLD AUTO: 4.63 MILLION/UL (ref 3.81–5.12)
RBC #/AREA URNS AUTO: ABNORMAL /HPF
S PYO AG THROAT QL: POSITIVE
SODIUM SERPL-SCNC: 136 MMOL/L (ref 136–145)
SP GR UR STRIP.AUTO: 1.01 (ref 1–1.03)
UROBILINOGEN UR QL STRIP.AUTO: 4 E.U./DL
WBC # BLD AUTO: 22.13 THOUSAND/UL (ref 4.31–10.16)
WBC #/AREA URNS AUTO: ABNORMAL /HPF

## 2019-05-05 PROCEDURE — 99283 EMERGENCY DEPT VISIT LOW MDM: CPT

## 2019-05-05 PROCEDURE — 96374 THER/PROPH/DIAG INJ IV PUSH: CPT

## 2019-05-05 PROCEDURE — 81001 URINALYSIS AUTO W/SCOPE: CPT | Performed by: EMERGENCY MEDICINE

## 2019-05-05 PROCEDURE — 99283 EMERGENCY DEPT VISIT LOW MDM: CPT | Performed by: EMERGENCY MEDICINE

## 2019-05-05 PROCEDURE — 36415 COLL VENOUS BLD VENIPUNCTURE: CPT | Performed by: EMERGENCY MEDICINE

## 2019-05-05 PROCEDURE — 96361 HYDRATE IV INFUSION ADD-ON: CPT

## 2019-05-05 PROCEDURE — 85025 COMPLETE CBC W/AUTO DIFF WBC: CPT | Performed by: EMERGENCY MEDICINE

## 2019-05-05 PROCEDURE — 81025 URINE PREGNANCY TEST: CPT | Performed by: EMERGENCY MEDICINE

## 2019-05-05 PROCEDURE — 80053 COMPREHEN METABOLIC PANEL: CPT | Performed by: EMERGENCY MEDICINE

## 2019-05-05 PROCEDURE — 87430 STREP A AG IA: CPT | Performed by: EMERGENCY MEDICINE

## 2019-05-05 PROCEDURE — 96375 TX/PRO/DX INJ NEW DRUG ADDON: CPT

## 2019-05-05 PROCEDURE — 96372 THER/PROPH/DIAG INJ SC/IM: CPT

## 2019-05-05 RX ORDER — ONDANSETRON 2 MG/ML
4 INJECTION INTRAMUSCULAR; INTRAVENOUS ONCE
Status: COMPLETED | OUTPATIENT
Start: 2019-05-05 | End: 2019-05-05

## 2019-05-05 RX ORDER — ONDANSETRON 4 MG/1
4 TABLET, ORALLY DISINTEGRATING ORAL EVERY 8 HOURS PRN
Qty: 20 TABLET | Refills: 0 | Status: SHIPPED | OUTPATIENT
Start: 2019-05-05 | End: 2019-08-15 | Stop reason: ALTCHOICE

## 2019-05-05 RX ORDER — KETOROLAC TROMETHAMINE 30 MG/ML
15 INJECTION, SOLUTION INTRAMUSCULAR; INTRAVENOUS ONCE
Status: COMPLETED | OUTPATIENT
Start: 2019-05-05 | End: 2019-05-05

## 2019-05-05 RX ADMIN — KETOROLAC TROMETHAMINE 15 MG: 30 INJECTION, SOLUTION INTRAMUSCULAR; INTRAVENOUS at 17:24

## 2019-05-05 RX ADMIN — ONDANSETRON 4 MG: 2 INJECTION INTRAMUSCULAR; INTRAVENOUS at 17:24

## 2019-05-05 RX ADMIN — LIDOCAINE HYDROCHLORIDE 15 ML: 20 SOLUTION ORAL; TOPICAL at 17:41

## 2019-05-05 RX ADMIN — PENICILLIN G BENZATHINE 1.2 MILLION UNITS: 1200000 INJECTION, SUSPENSION INTRAMUSCULAR at 19:28

## 2019-05-05 RX ADMIN — SODIUM CHLORIDE 1000 ML: 0.9 INJECTION, SOLUTION INTRAVENOUS at 17:22

## 2019-07-29 ENCOUNTER — APPOINTMENT (EMERGENCY)
Dept: CT IMAGING | Facility: HOSPITAL | Age: 44
End: 2019-07-29
Payer: COMMERCIAL

## 2019-07-29 ENCOUNTER — HOSPITAL ENCOUNTER (EMERGENCY)
Facility: HOSPITAL | Age: 44
Discharge: HOME | End: 2019-07-30
Attending: EMERGENCY MEDICINE
Payer: COMMERCIAL

## 2019-07-29 DIAGNOSIS — N83.202 LEFT OVARIAN CYST: ICD-10-CM

## 2019-07-29 DIAGNOSIS — R10.9 RIGHT FLANK PAIN: Primary | ICD-10-CM

## 2019-07-29 LAB
ALBUMIN SERPL BCP-MCNC: 3.3 G/DL (ref 3.5–5)
ALP SERPL-CCNC: 71 U/L (ref 46–116)
ALT SERPL W P-5'-P-CCNC: 28 U/L (ref 12–78)
ANION GAP SERPL CALCULATED.3IONS-SCNC: 9 MMOL/L (ref 4–13)
AST SERPL W P-5'-P-CCNC: 17 U/L (ref 5–45)
BASOPHILS # BLD AUTO: 0.05 THOUSANDS/ΜL (ref 0–0.1)
BASOPHILS NFR BLD AUTO: 1 % (ref 0–1)
BILIRUB SERPL-MCNC: 0.2 MG/DL (ref 0.2–1)
BILIRUB UR QL STRIP: NEGATIVE
BUN SERPL-MCNC: 15 MG/DL (ref 5–25)
CALCIUM SERPL-MCNC: 9.3 MG/DL (ref 8.3–10.1)
CHLORIDE SERPL-SCNC: 105 MMOL/L (ref 100–108)
CK SERPL-CCNC: 112 U/L (ref 26–192)
CLARITY UR: CLEAR
CO2 SERPL-SCNC: 27 MMOL/L (ref 21–32)
COLOR UR: YELLOW
CREAT SERPL-MCNC: 0.97 MG/DL (ref 0.6–1.3)
EOSINOPHIL # BLD AUTO: 0.19 THOUSAND/ΜL (ref 0–0.61)
EOSINOPHIL NFR BLD AUTO: 2 % (ref 0–6)
ERYTHROCYTE [DISTWIDTH] IN BLOOD BY AUTOMATED COUNT: 15.6 % (ref 11.6–15.1)
EXT PREG TEST URINE: NEGATIVE
EXT. CONTROL ED NAV: NORMAL
GFR SERPL CREATININE-BSD FRML MDRD: 72 ML/MIN/1.73SQ M
GLUCOSE SERPL-MCNC: 100 MG/DL (ref 65–140)
GLUCOSE UR STRIP-MCNC: NEGATIVE MG/DL
HCT VFR BLD AUTO: 34.2 % (ref 34.8–46.1)
HGB BLD-MCNC: 10.6 G/DL (ref 11.5–15.4)
HGB UR QL STRIP.AUTO: NEGATIVE
IMM GRANULOCYTES # BLD AUTO: 0.02 THOUSAND/UL (ref 0–0.2)
IMM GRANULOCYTES NFR BLD AUTO: 0 % (ref 0–2)
KETONES UR STRIP-MCNC: NEGATIVE MG/DL
LEUKOCYTE ESTERASE UR QL STRIP: NEGATIVE
LIPASE SERPL-CCNC: 194 U/L (ref 73–393)
LYMPHOCYTES # BLD AUTO: 2.53 THOUSANDS/ΜL (ref 0.6–4.47)
LYMPHOCYTES NFR BLD AUTO: 30 % (ref 14–44)
MCH RBC QN AUTO: 24.5 PG (ref 26.8–34.3)
MCHC RBC AUTO-ENTMCNC: 31 G/DL (ref 31.4–37.4)
MCV RBC AUTO: 79 FL (ref 82–98)
MONOCYTES # BLD AUTO: 0.93 THOUSAND/ΜL (ref 0.17–1.22)
MONOCYTES NFR BLD AUTO: 11 % (ref 4–12)
NEUTROPHILS # BLD AUTO: 4.64 THOUSANDS/ΜL (ref 1.85–7.62)
NEUTS SEG NFR BLD AUTO: 56 % (ref 43–75)
NITRITE UR QL STRIP: NEGATIVE
NRBC BLD AUTO-RTO: 0 /100 WBCS
PH UR STRIP.AUTO: 6.5 [PH] (ref 4.5–8)
PLATELET # BLD AUTO: 269 THOUSANDS/UL (ref 149–390)
PMV BLD AUTO: 10.6 FL (ref 8.9–12.7)
POTASSIUM SERPL-SCNC: 3.8 MMOL/L (ref 3.5–5.3)
PROT SERPL-MCNC: 7.4 G/DL (ref 6.4–8.2)
PROT UR STRIP-MCNC: NEGATIVE MG/DL
RBC # BLD AUTO: 4.33 MILLION/UL (ref 3.81–5.12)
SODIUM SERPL-SCNC: 141 MMOL/L (ref 136–145)
SP GR UR STRIP.AUTO: 1.02 (ref 1–1.03)
UROBILINOGEN UR QL STRIP.AUTO: 0.2 E.U./DL
WBC # BLD AUTO: 8.36 THOUSAND/UL (ref 4.31–10.16)

## 2019-07-29 PROCEDURE — 80053 COMPREHEN METABOLIC PANEL: CPT | Performed by: EMERGENCY MEDICINE

## 2019-07-29 PROCEDURE — 81003 URINALYSIS AUTO W/O SCOPE: CPT

## 2019-07-29 PROCEDURE — 99284 EMERGENCY DEPT VISIT MOD MDM: CPT

## 2019-07-29 PROCEDURE — 96360 HYDRATION IV INFUSION INIT: CPT

## 2019-07-29 PROCEDURE — 83690 ASSAY OF LIPASE: CPT | Performed by: EMERGENCY MEDICINE

## 2019-07-29 PROCEDURE — 96361 HYDRATE IV INFUSION ADD-ON: CPT

## 2019-07-29 PROCEDURE — 74176 CT ABD & PELVIS W/O CONTRAST: CPT

## 2019-07-29 PROCEDURE — 85025 COMPLETE CBC W/AUTO DIFF WBC: CPT | Performed by: EMERGENCY MEDICINE

## 2019-07-29 PROCEDURE — 36415 COLL VENOUS BLD VENIPUNCTURE: CPT | Performed by: EMERGENCY MEDICINE

## 2019-07-29 PROCEDURE — 99284 EMERGENCY DEPT VISIT MOD MDM: CPT | Performed by: EMERGENCY MEDICINE

## 2019-07-29 PROCEDURE — 81025 URINE PREGNANCY TEST: CPT | Performed by: EMERGENCY MEDICINE

## 2019-07-29 PROCEDURE — 82550 ASSAY OF CK (CPK): CPT | Performed by: EMERGENCY MEDICINE

## 2019-07-29 RX ADMIN — SODIUM CHLORIDE 1000 ML: 0.9 INJECTION, SOLUTION INTRAVENOUS at 22:20

## 2019-07-30 VITALS
RESPIRATION RATE: 20 BRPM | DIASTOLIC BLOOD PRESSURE: 73 MMHG | WEIGHT: 199.8 LBS | BODY MASS INDEX: 34.3 KG/M2 | HEART RATE: 83 BPM | OXYGEN SATURATION: 94 % | TEMPERATURE: 98.6 F | SYSTOLIC BLOOD PRESSURE: 130 MMHG

## 2019-07-30 NOTE — ED PROVIDER NOTES
History  Chief Complaint   Patient presents with    Back Pain     per pt " she has been having some right lower back pain which ahs been going on for a while now but has gotten progressively worse, pt states her back pain is worse when she breathes in "     Patient is a 44year old female with intermittent worsening right flank/back pain without radiation today  No N/v/D  No urinary sx  No fever  Has had prior TL and left nephrectomy  Has a h/o kidney stones and this feels similar  LMP - 2 weeks ago  States she did not drive here  Declines pain medication at this time  Was last seen in this ED on 5/5/19 for strep pharyngitis  [facility] [place] [place] [facility] website checked on this patient and last Rx filled was on 12/11/18 for percocet for 4 day supply  History provided by:  Patient   used: No    Back Pain   Associated symptoms: no fever        Prior to Admission Medications   Prescriptions Last Dose Informant Patient Reported?  Taking?   amitriptyline (ELAVIL) 10 mg tablet Not Taking at Unknown time  No No   Sig: Take 1 tablet (10 mg total) by mouth daily at bedtime   Patient not taking: Reported on 5/5/2019   amoxicillin (AMOXIL) 500 MG tablet Not Taking at Unknown time  Yes No   Sig: take 1 tablet by mouth three times a day for 1 week   butalbital-aspirin-caffeine (FIORINAL) -40 mg capsule Not Taking at Unknown time  No No   Sig: Take 1 capsule by mouth every 4 (four) hours as needed for headaches   Patient not taking: Reported on 1/28/2019    calcium-vitamin D (OSCAL) 250-125 MG-UNIT per tablet Not Taking at Unknown time  No No   Sig: Take 1 tablet by mouth 2 (two) times a day   Patient not taking: Reported on 7/29/2019   naproxen (NAPROSYN) 500 mg tablet   No No   Sig: Take 1 tablet (500 mg total) by mouth 2 (two) times a day with meals   ondansetron (ZOFRAN-ODT) 4 mg disintegrating tablet Not Taking at Unknown time  No No   Sig: Take 1 tablet by mouth every 6 (six) hours as needed for nausea or vomiting   Patient not taking: Reported on 5/5/2019   ondansetron (ZOFRAN-ODT) 4 mg disintegrating tablet Not Taking at Unknown time  No No   Sig: Take 1 tablet (4 mg total) by mouth every 8 (eight) hours as needed for nausea or vomiting   Patient not taking: Reported on 7/29/2019   ranitidine (ZANTAC) 150 mg tablet 7/29/2019 at Unknown time  Yes Yes   Sig: Take 1 tablet by mouth daily      Facility-Administered Medications: None       Past Medical History:   Diagnosis Date    Renal calculi     Renal disorder     Toe fracture, left        Past Surgical History:   Procedure Laterality Date    CHOLECYSTECTOMY LAPAROSCOPIC N/A 12/10/2018    Procedure: CHOLECYSTECTOMY LAPAROSCOPIC; INCISIONAL HERNIA REPAIR;  Surgeon: Beth Boas, MD;  [facility];  Service: General    KIDNEY SURGERY  2013    removed L kidney from damage from kidney stone -PERFORMED IN [place]    TUBAL LIGATION      AGE 33       Family History   Problem Relation Age of Onset    Diabetes Mother     Hyperlipidemia Mother     Hypertension Mother     Other Mother         low back pain    Heart disease Mother     Diabetes Father     No Known Problems Sister     No Known Problems Brother      I have reviewed and agree with the history as documented  Social History     Tobacco Use    Smoking status: Never Smoker    Smokeless tobacco: Never Used   Substance Use Topics    Alcohol use: No    Drug use: No        Review of Systems   Constitutional: Negative for fever  Gastrointestinal: Negative for diarrhea, nausea and vomiting  Genitourinary: Positive for flank pain  Negative for difficulty urinating  Musculoskeletal: Positive for back pain  All other systems reviewed and are negative  Physical Exam  Physical Exam   Constitutional: She is oriented to person, place, and time  She appears well-developed and well-nourished  She appears distressed (moderate)  HENT:   Head: Normocephalic and atraumatic     Mucous membranes moist  Eyes: No scleral icterus  Neck: No JVD present  No tracheal deviation present  Cardiovascular: Normal rate, regular rhythm and normal heart sounds  No murmur heard  Pulmonary/Chest: Effort normal and breath sounds normal  No respiratory distress  Abdominal: Soft  Bowel sounds are normal  She exhibits no distension  There is no tenderness  R CVAT  Musculoskeletal: She exhibits no edema, tenderness (No midline back tenderness) or deformity  Neurological: She is alert and oriented to person, place, and time  Skin: Skin is warm and dry  No rash noted  Psychiatric: She has a normal mood and affect  Nursing note and vitals reviewed        Vital Signs  ED Triage Vitals [07/29/19 2023]   Temperature Pulse Respirations Blood Pressure SpO2   98 6 °F (37 °C) 86 20 141/59 100 %      Temp Source Heart Rate Source Patient Position - Orthostatic VS BP Location FiO2 (%)   Oral Monitor Sitting Left arm --      Pain Score       9           Vitals:    07/29/19 2023 07/29/19 2153   BP: 141/59 131/58   Pulse: 86 73   Patient Position - Orthostatic VS: Sitting Lying         Visual Acuity  Visual Acuity      Most Recent Value   L Pupil Size (mm)  4   R Pupil Size (mm)  4          ED Medications  Medications   sodium chloride 0 9 % bolus 1,000 mL (1,000 mL Intravenous New Bag 7/29/19 2220)       Diagnostic Studies  Results Reviewed     Procedure Component Value Units Date/Time    Comprehensive metabolic panel [[id number]]  (Abnormal) Collected:  07/29/19 2220    Lab Status:  Final result Specimen:  Blood from Arm, Right Updated:  07/29/19 2250     Sodium 141 mmol/L      Potassium 3 8 mmol/L      Chloride 105 mmol/L      CO2 27 mmol/L      ANION GAP 9 mmol/L      BUN 15 mg/dL      Creatinine 0 97 mg/dL      Glucose 100 mg/dL      Calcium 9 3 mg/dL      AST 17 U/L      ALT 28 U/L      Alkaline Phosphatase 71 U/L      Total Protein 7 4 g/dL      Albumin 3 3 g/dL      Total Bilirubin 0 20 mg/dL      eGFR 72 ml/min/1 73sq m     Narrative:       National Kidney Disease Foundation guidelines for Chronic Kidney Disease (CKD):     Stage 1 with normal or high GFR (GFR > 90 mL/min/1 73 square meters)    Stage 2 Mild CKD (GFR = 60-89 mL/min/1 73 square meters)    Stage 3A Moderate CKD (GFR = 45-59 mL/min/1 73 square meters)    Stage 3B Moderate CKD (GFR = 30-44 mL/min/1 73 square meters)    Stage 4 Severe CKD (GFR = 15-29 mL/min/1 73 square meters)    Stage 5 End Stage CKD (GFR <15 mL/min/1 73 square meters)  Note: GFR calculation is accurate only with a steady state creatinine    Lipase [[id number]]  (Normal) Collected:  07/29/19 2220    Lab Status:  Final result Specimen:  Blood from Arm, Right Updated:  07/29/19 2250     Lipase 194 u/L     CK Total with Reflex CKMB [[id number]]  (Normal) Collected:  07/29/19 2220    Lab Status:  Final result Specimen:  Blood from Arm, Right Updated:  07/29/19 2250     Total  U/L     POCT pregnancy, urine [[id number]]  (Normal) Resulted:  07/29/19 2230    Lab Status:  Final result Updated:  07/29/19 2230     EXT PREG TEST UR (Ref: Negative) negative     Control valid    ED Urine Macroscopic [[id number]] Collected:  07/29/19 2240    Lab Status:  Final result Specimen:  Urine Updated:  07/29/19 2230     Color, UA Yellow     Clarity, UA Clear     pH, UA 6 5     Leukocytes, UA Negative     Nitrite, UA Negative     Protein, UA Negative mg/dl      Glucose, UA Negative mg/dl      Ketones, UA Negative mg/dl      Urobilinogen, UA 0 2 E U /dl      Bilirubin, UA Negative     Blood, UA Negative     Specific Gravity, UA 1 025    Narrative:       CLINITEK RESULT    CBC and differential [[id number]]  (Abnormal) Collected:  07/29/19 2220    Lab Status:  Final result Specimen:  Blood from Arm, Right Updated:  07/29/19 2226     WBC 8 36 Thousand/uL      RBC 4 33 Million/uL      Hemoglobin 10 6 g/dL      Hematocrit 34 2 %      MCV 79 fL      MCH 24 5 pg      MCHC 31 0 g/dL      RDW 15 6 %      MPV 10 6 fL Platelets 240 Thousands/uL      nRBC 0 /100 WBCs      Neutrophils Relative 56 %      Immat GRANS % 0 %      Lymphocytes Relative 30 %      Monocytes Relative 11 %      Eosinophils Relative 2 %      Basophils Relative 1 %      Neutrophils Absolute 4 64 Thousands/µL      Immature Grans Absolute 0 02 Thousand/uL      Lymphocytes Absolute 2 53 Thousands/µL      Monocytes Absolute 0 93 Thousand/µL      Eosinophils Absolute 0 19 Thousand/µL      Basophils Absolute 0 05 Thousands/µL                  CT renal stone study abdomen pelvis without contrast   ED Interpretation by Nirali Howell MD (07/30 0022)   FINDINGS:      RIGHT KIDNEY AND URETER:   Tiny nonobstructing stone in the midportion of the right kidney   Otherwise unremarkable; no hydronephrosis  LEFT KIDNEY AND URETER:   Status post left nephrectomy   No discrete abnormality in the left nephrectomy bed is seen  URINARY BLADDER:    Unremarkable  No significant abnormality in the visualized lung bases  Limited low radiation dose noncontrast CT evaluation demonstrates no clinically significant abnormality of the visualized liver, spleen, pancreas, or adrenal glands  The gallbladder is surgically absent  No ascites or bulky lymphadenopathy on this limited noncontrast study   No intraperitoneal free air  Bowel loops appear grossly unremarkable  Limited evaluation demonstrates no evidence to suggest acute appendicitis   Mild atherosclerosis; no aortic aneurysm   3 2 cm left adnexal cyst is suspected  No acute fracture or destructive osseous lesion is identified   Midline incisional scarring with several small ventral fat-containing hernias  Impression:        Right-sided nephrolithiasis but no hydronephrosis is seen   Status post left nephrectomy   No discrete abnormality in the left nephrectomy bed  3 2 cm left adnexal cyst is suspected  Other findings as above              Workstation performed: [id number]         Final Result by Marie Choe DO (07/30 0019)      Right-sided nephrolithiasis but no hydronephrosis is seen  Status post left nephrectomy  No discrete abnormality in the left nephrectomy bed  3 2 cm left adnexal cyst is suspected  Other findings as above  Workstation performed: [id number]                    Procedures  Procedures       ED Course  ED Course as of Jul 30 0026   Mon Jul 29, 2019   2317 Labs d/w patient and father with patient's permission  Tue Jul 30, 2019   0024 CT d/w patient and father  Nontender abdomen prior to discharge and patient resting comfortably on stretcher  MDM  Number of Diagnoses or Management Options  Diagnosis management comments: DDx including but not limited to: renal colic, pyelonephritis, UTI, GI etiology, appendicitis, diverticulitis, pancreatitis, cholecystitis, biliary colic, doubt AAA, rhabdomyolysis, tumor, zoster  Amount and/or Complexity of Data Reviewed  Clinical lab tests: ordered and reviewed  Tests in the radiology section of CPT®: reviewed and ordered  Decide to obtain previous medical records or to obtain history from someone other than the patient: yes  Review and summarize past medical records: yes  Independent visualization of images, tracings, or specimens: yes        Disposition  Final diagnoses:   Right flank pain   Left ovarian cyst     Time reflects when diagnosis was documented in both MDM as applicable and the Disposition within this note     Time User Action Codes Description Comment    7/30/2019 12:24 AM Shaila Ortega Add [R10 9] Right flank pain     7/30/2019 12:25 AM Shaila Radish Add [L90 465] Left ovarian cyst       ED Disposition     ED Disposition Condition Date/Time Comment    Discharge Stable Tue Jul 30, 2019 12:24 AM Marily De Jesus discharge to home/self care              Follow-up Information     Follow up With Specialties Details Why Contact Info    own doctor  Call in 1 day motrin/tylenol for pain  Return sooner if increased pain, fever, vomiting, rash, difficulty breathing or urinating  Patient's Medications   Discharge Prescriptions    No medications on file     No discharge procedures on file      ED Provider  Electronically Signed by           Joanne Gooden MD  07/30/19 0445

## 2019-07-30 NOTE — ED NOTES
Pt in negative distress at this time  Ambulated off unit with steady gait  No other questions upon discharge       Stiven Patel RN  07/30/19 1550

## 2019-08-15 ENCOUNTER — OFFICE VISIT (OUTPATIENT)
Dept: INTERNAL MEDICINE CLINIC | Facility: CLINIC | Age: 44
End: 2019-08-15

## 2019-08-15 VITALS
HEIGHT: 64 IN | DIASTOLIC BLOOD PRESSURE: 70 MMHG | HEART RATE: 82 BPM | TEMPERATURE: 98.1 F | WEIGHT: 197.09 LBS | SYSTOLIC BLOOD PRESSURE: 118 MMHG | BODY MASS INDEX: 33.65 KG/M2

## 2019-08-15 DIAGNOSIS — R10.9 RIGHT FLANK PAIN: ICD-10-CM

## 2019-08-15 DIAGNOSIS — E66.9 OBESITY (BMI 30-39.9): ICD-10-CM

## 2019-08-15 DIAGNOSIS — Z12.4 CERVICAL CANCER SCREENING: ICD-10-CM

## 2019-08-15 DIAGNOSIS — R10.13 EPIGASTRIC PAIN: ICD-10-CM

## 2019-08-15 DIAGNOSIS — Z12.39 SCREENING FOR BREAST CANCER: Primary | ICD-10-CM

## 2019-08-15 PROCEDURE — 99214 OFFICE O/P EST MOD 30 MIN: CPT | Performed by: INTERNAL MEDICINE

## 2019-08-15 RX ORDER — PANTOPRAZOLE SODIUM 20 MG/1
20 TABLET, DELAYED RELEASE ORAL
Qty: 30 TABLET | Refills: 0 | Status: SHIPPED | OUTPATIENT
Start: 2019-08-15 | End: 2019-12-13 | Stop reason: SDUPTHER

## 2019-08-15 NOTE — PROGRESS NOTES
INTERNAL MEDICINE FOLLOW-UP OFFICE VISIT  [facility]  [address] [place] [address]    NAME: Claudia Dakins  AGE: 44 y o  SEX: female    DATE OF ENCOUNTER: 8/15/2019    Assessment and Plan     1  Right flank pain  The patient complains of intermittent right flank pain, was seen in the ED 07/29/2019  She has a history of nephrolithiasis status post left nephrectomy  CT showed right-sided nephrolithiasis but no hydronephrosis  Today she denies any pain, she is concerned she might lose her right kidney as well  Plan:  - counseling about the increased water intake, avoidance of oxalate containing foods, and increase calcium intake  2  Epigastric pain   The patient complained of pressure-like epigastric pain, following meals  No nausea vomiting or change in bowel movements  Patient tried Zantac without improvement  Plan:  - H pylori antigen  - trial of PPI for 1 month    3  Screening for breast cancer   The patient appreciated unilateral green nipple discharge 1 month ago  She is average risk for breast cancer  Breast exam did elicit any mass or axillary lymphadenopathy  Plan:  Screening mammogram    4  Obesity BMI 33 8   - patient had a lipid panel 12/2017, cholesterol 240,  and triglyceride of 53  She is reluctant to statins  She was counseled about healthy eating and exercise    5  Cervical cancer screening  Patient had a Pap smear [facility], no records available  Referral for Pap smear  Orders Placed This Encounter   Procedures    H  pylori antigen, stool    Mammo screening bilateral w cad    HEMOGLOBIN A1C W/ EAG ESTIMATION    Ambulatory referral to Obstetrics / Gynecology       - Counseling Documentation: patient was counseled regarding: instructions for management    Chief Complaint     Chief Complaint   Patient presents with    Follow-up     lower back pain          History of Present Illness     This is a 44years old female with past medical history of migraines, kidney stones status post left nephrectomy who is here today for follow-up after an ED visit for right flank pain  The patient stated that she she has intermittent right flank pain that radiates to the right lower limb, similar to the pain she used to have when she had her kidney stones  No hematuria, dysuria or suprapubic pain and the patient did not appreciate stone passage in the urine  The patient is concerned she might lose her right kidney as well  She also complains of epigastric pain, following meals, no heartburn, nausea or vomiting  The patient also mentioned she had a unilateral green nipple discharge from the right breast 1 month ago  Happened once  She did not appreciate any breast mass or pain  The following portions of the patient's history were reviewed and updated as appropriate: allergies, current medications, past family history, past medical history, past social history, past surgical history and problem list     Review of Systems     Review of Systems   Constitutional: Negative for appetite change, fatigue and fever  Respiratory: Negative for chest tightness, shortness of breath and wheezing  Cardiovascular: Negative for chest pain, palpitations and leg swelling  Gastrointestinal: Positive for abdominal pain  Negative for constipation, diarrhea and nausea  Genitourinary: Positive for flank pain  Negative for frequency and hematuria  Musculoskeletal: Negative for back pain and joint swelling  Neurological: Negative for dizziness, tremors, weakness, numbness and headaches  Active Problem List     Patient Active Problem List   Diagnosis    Astigmatism of both eyes    Double vision    Dyspepsia    Low iron stores    Low mean corpuscular volume (MCV)    Mixed hyperlipidemia    Myopia of both eyes    Obesity (BMI 30-39  9)    Migraine with aura and with status migrainosus, not intractable    Pain of right upper extremity    Right flank pain    Status post nephrectomy       Objective     /70   Pulse 82   Temp 98 1 °F (36 7 °C)   Ht 5' 4" (1 626 m)   Wt 89 4 kg (197 lb 1 5 oz)   BMI 33 83 kg/m²     Physical Exam   Constitutional: She is oriented to person, place, and time  She appears well-developed and well-nourished  Obese   Eyes: Pupils are equal, round, and reactive to light  EOM are normal    Cardiovascular: Normal rate, regular rhythm and normal heart sounds  Exam reveals no gallop and no friction rub  No murmur heard  Pulmonary/Chest: Effort normal and breath sounds normal  No respiratory distress  She has no wheezes  Abdominal: Soft  She exhibits distension  There is tenderness  Mild epigastric tenderness   Musculoskeletal: She exhibits no edema  Lymphadenopathy:     She has no cervical adenopathy  Neurological: She is alert and oriented to person, place, and time      Breast exam:  No mass or axillary lymphadenopathy bilaterally    Pertinent Laboratory/Diagnostic Studies:  CBC:   Lab Results   Component Value Date/Time    WBC 8 36 07/29/2019 10:20 PM    RBC 4 33 07/29/2019 10:20 PM    HGB 10 6 (L) 07/29/2019 10:20 PM    HCT 34 2 (L) 07/29/2019 10:20 PM    MCV 79 (L) 07/29/2019 10:20 PM    MCH 24 5 (L) 07/29/2019 10:20 PM    MCHC 31 0 (L) 07/29/2019 10:20 PM    RDW 15 6 (H) 07/29/2019 10:20 PM    MPV 10 6 07/29/2019 10:20 PM     07/29/2019 10:20 PM    NRBC 0 07/29/2019 10:20 PM    NEUTOPHILPCT 56 07/29/2019 10:20 PM    LYMPHOPCT 30 07/29/2019 10:20 PM    MONOPCT 11 07/29/2019 10:20 PM    EOSPCT 2 07/29/2019 10:20 PM    BASOPCT 1 07/29/2019 10:20 PM    NEUTROABS 4 64 07/29/2019 10:20 PM    LYMPHSABS 2 53 07/29/2019 10:20 PM    MONOSABS 0 93 07/29/2019 10:20 PM    EOSABS 0 19 07/29/2019 10:20 PM     Chemistry Profile:   Lab Results   Component Value Date/Time    K 3 8 07/29/2019 10:20 PM     07/29/2019 10:20 PM    CO2 27 07/29/2019 10:20 PM    BUN 15 07/29/2019 10:20 PM    CREATININE 0 97 07/29/2019 10:20 PM    GLUC 100 07/29/2019 10:20 PM    GLUF 90 12/22/2017 09:27 AM    CALCIUM 9 3 07/29/2019 10:20 PM    AST 17 07/29/2019 10:20 PM    ALT 28 07/29/2019 10:20 PM    ALKPHOS 71 07/29/2019 10:20 PM    EGFR 72 07/29/2019 10:20 PM     CBC:   Results from last 6 Months   Lab Units 07/29/19  2220   WBC Thousand/uL 8 36   RBC Million/uL 4 33   HEMOGLOBIN g/dL 10 6*   HEMATOCRIT % 34 2*   MCV fL 79*   MCH pg 24 5*   MCHC g/dL 31 0*   RDW % 15 6*   MPV fL 10 6   PLATELETS Thousands/uL 269   NRBC AUTO /100 WBCs 0   NEUTROS PCT % 56   LYMPHS PCT % 30   MONOS PCT % 11   EOS PCT % 2   BASOS PCT % 1   NEUTROS ABS Thousands/µL 4 64   LYMPHS ABS Thousands/µL 2 53   MONOS ABS Thousand/µL 0 93   EOS ABS Thousand/µL 0 19     Chemistry Profile:   Results from last 6 Months   Lab Units 07/29/19  2220   POTASSIUM mmol/L 3 8   CHLORIDE mmol/L 105   CO2 mmol/L 27   BUN mg/dL 15   CREATININE mg/dL 0 97   GLUCOSE RANDOM mg/dL 100   CALCIUM mg/dL 9 3   AST U/L 17   ALT U/L 28   ALK PHOS U/L 71   EGFR ml/min/1 73sq m 72       Current Medications     Current Outpatient Medications:     amitriptyline (ELAVIL) 10 mg tablet, Take 1 tablet (10 mg total) by mouth daily at bedtime, Disp: 90 tablet, Rfl: 1    calcium-vitamin D (OSCAL) 250-125 MG-UNIT per tablet, Take 1 tablet by mouth 2 (two) times a day, Disp: 60 tablet, Rfl: 2    ranitidine (ZANTAC) 150 mg tablet, Take 1 tablet by mouth daily, Disp: , Rfl:     butalbital-aspirin-caffeine (FIORINAL) -40 mg capsule, Take 1 capsule by mouth every 4 (four) hours as needed for headaches (Patient not taking: Reported on 1/28/2019 ), Disp: 30 capsule, Rfl: 1    pantoprazole (PROTONIX) 20 mg tablet, Take 1 tablet (20 mg total) by mouth daily before breakfast for 30 days, Disp: 30 tablet, Rfl: 0    Health Maintenance     Health Maintenance   Topic Date Due    MAMMOGRAM  1976    BMI: Followup Plan  10/03/1994    Depression Screening PHQ  04/27/2019    INFLUENZA VACCINE  10/15/2019 (Originally 7/1/2019)    BMI: Adult  08/15/2020    DTaP,Tdap,and Td Vaccines (2 - Td) 01/12/2028    Pneumococcal Vaccine: 65+ Years (1 of 2 - PCV13) 10/03/2041    Pneumococcal Vaccine: Pediatrics (0 to 5 Years) and At-Risk Patients (6 to 59 Years)  Aged Out    HEPATITIS B VACCINES  Aged [employer] History   Administered Date(s) Administered    INFLUENZA 01/12/2018    Influenza Quadrivalent, 6-35 Months IM 01/12/2018    Tdap 01/12/2018    Tuberculin Skin Test-PPD Intradermal 05/08/2018       Izabella Valiente MD  PGY I  8/15/2019 11:36 AM  Portions of the record may have been created with voice recognition software  Occasional wrong word or "sound a like" substitutions may have occurred due to the inherent limitations of voice recognition software  Read the chart carefully and recognize, using context, where substitutions have occurred

## 2019-08-24 ENCOUNTER — HOSPITAL ENCOUNTER (EMERGENCY)
Facility: HOSPITAL | Age: 44
Discharge: HOME | End: 2019-08-24
Attending: EMERGENCY MEDICINE | Admitting: EMERGENCY MEDICINE
Payer: COMMERCIAL

## 2019-08-24 ENCOUNTER — APPOINTMENT (OUTPATIENT)
Dept: LAB | Facility: HOSPITAL | Age: 44
End: 2019-08-24
Payer: COMMERCIAL

## 2019-08-24 ENCOUNTER — APPOINTMENT (EMERGENCY)
Dept: RADIOLOGY | Facility: HOSPITAL | Age: 44
End: 2019-08-24
Payer: COMMERCIAL

## 2019-08-24 VITALS
HEART RATE: 79 BPM | RESPIRATION RATE: 16 BRPM | BODY MASS INDEX: 34.36 KG/M2 | TEMPERATURE: 98.3 F | WEIGHT: 200.18 LBS | DIASTOLIC BLOOD PRESSURE: 57 MMHG | OXYGEN SATURATION: 99 % | SYSTOLIC BLOOD PRESSURE: 129 MMHG

## 2019-08-24 DIAGNOSIS — E66.9 OBESITY (BMI 30-39.9): ICD-10-CM

## 2019-08-24 DIAGNOSIS — R07.9 CHEST PAIN, UNSPECIFIED TYPE: Primary | ICD-10-CM

## 2019-08-24 LAB
BACTERIA UR QL AUTO: ABNORMAL /HPF
BILIRUB UR QL STRIP: NEGATIVE
BILIRUB UR QL STRIP: NEGATIVE
CLARITY UR: ABNORMAL
CLARITY UR: ABNORMAL
COLOR UR: YELLOW
COLOR UR: YELLOW
EST. AVERAGE GLUCOSE BLD GHB EST-MCNC: 117 MG/DL
GLUCOSE UR STRIP-MCNC: NEGATIVE MG/DL
GLUCOSE UR STRIP-MCNC: NEGATIVE MG/DL
HBA1C MFR BLD: 5.7 % (ref 4.2–6.3)
HGB UR QL STRIP.AUTO: NEGATIVE
HGB UR QL STRIP.AUTO: NEGATIVE
KETONES UR STRIP-MCNC: NEGATIVE MG/DL
KETONES UR STRIP-MCNC: NEGATIVE MG/DL
LEUKOCYTE ESTERASE UR QL STRIP: ABNORMAL
LEUKOCYTE ESTERASE UR QL STRIP: ABNORMAL
NITRITE UR QL STRIP: NEGATIVE
NITRITE UR QL STRIP: NEGATIVE
NON-SQ EPI CELLS URNS QL MICRO: ABNORMAL /HPF
OTHER STN SPEC: ABNORMAL
PH UR STRIP.AUTO: 6 [PH] (ref 4.5–8)
PH UR STRIP.AUTO: 6 [PH] (ref 4.5–8)
PROT UR STRIP-MCNC: NEGATIVE MG/DL
PROT UR STRIP-MCNC: NEGATIVE MG/DL
RBC #/AREA URNS AUTO: ABNORMAL /HPF
SP GR UR STRIP.AUTO: >=1.03 (ref 1–1.03)
SP GR UR STRIP.AUTO: >=1.03 (ref 1–1.03)
TROPONIN I SERPL-MCNC: <0.02 NG/ML
UROBILINOGEN UR QL STRIP.AUTO: 0.2 E.U./DL
UROBILINOGEN UR QL STRIP.AUTO: 0.2 E.U./DL
WBC #/AREA URNS AUTO: ABNORMAL /HPF

## 2019-08-24 PROCEDURE — 36415 COLL VENOUS BLD VENIPUNCTURE: CPT

## 2019-08-24 PROCEDURE — 83036 HEMOGLOBIN GLYCOSYLATED A1C: CPT

## 2019-08-24 PROCEDURE — 99285 EMERGENCY DEPT VISIT HI MDM: CPT

## 2019-08-24 PROCEDURE — 84484 ASSAY OF TROPONIN QUANT: CPT | Performed by: EMERGENCY MEDICINE

## 2019-08-24 PROCEDURE — 71045 X-RAY EXAM CHEST 1 VIEW: CPT

## 2019-08-24 PROCEDURE — 87086 URINE CULTURE/COLONY COUNT: CPT | Performed by: EMERGENCY MEDICINE

## 2019-08-24 PROCEDURE — 99284 EMERGENCY DEPT VISIT MOD MDM: CPT | Performed by: EMERGENCY MEDICINE

## 2019-08-24 PROCEDURE — 93005 ELECTROCARDIOGRAM TRACING: CPT

## 2019-08-24 PROCEDURE — 81001 URINALYSIS AUTO W/SCOPE: CPT

## 2019-08-24 NOTE — ED PROCEDURE NOTE
PROCEDURE  ECG 12 Lead Documentation Only  Date/Time: 8/24/2019 6:17 PM  Performed by: Vignesh Cruz MD  Authorized by: Vignesh Cruz MD     Indications / Diagnosis:  CP - WITH ACTIVE CP  ECG reviewed by me, the ED Provider: yes    Patient location:  ED and bedside  Previous ECG:     Previous ECG:  Unavailable    Comparison to cardiac monitor: Yes    Interpretation:     Interpretation: non-specific    Rate:     ECG rate:  86    ECG rate assessment: normal    Rhythm:     Rhythm: sinus rhythm    Ectopy:     Ectopy: none    QRS:     QRS axis:  Normal    QRS intervals:  Normal  Conduction:     Conduction: abnormal      Abnormal conduction: incomplete RBBB    ST segments:     ST segments:  Normal  T waves:     T waves: flattening      Flattening:  II, III, aVF, V1, V2, V3, V4, V5 and V6  Q waves:     Q waves:  III and V1  Other findings:     Other findings: U wave    Comments:      NO ECG SIGNS OF ISCHEMIA/ INJURY / Jagdish Ricci MD  08/24/19 3551

## 2019-08-24 NOTE — ED PROVIDER NOTES
History  Chief Complaint   Patient presents with    Chest Pain     Patient c/o sternal chest pain that started Monday  Pain does not radiate  Denies n/v/d       43 YR FEMALE- FOR LAST 5 DAYS WITH DAILY  ANTERIOR CHEST PAIN- FEELS LIKE SOMETHING PULLS APART FROM INSIDE- LASTING SECONDS TO MINUTES  HAPPENING APPROX 5 TIMeS DAILY ALWAys at rest -- no radiation-- at times worse with deep breath- but no sob- no n/v- no diaphoresis - no hx of vte- no abrupt onset of ripping/tearing /migratory type pain - no abd pain /gerd/dyspepsia/ melena-  No abd/ flank pain- c/o midl dysuria today - but no gyn comps- no change in stools - no recent illness - no other comps      History provided by:  Patient and relative   used: No    Chest Pain   Pain location:  Substernal area  Associated symptoms: no palpitations        Prior to Admission Medications   Prescriptions Last Dose Informant Patient Reported?  Taking?   calcium-vitamin D (OSCAL) 250-125 MG-UNIT per tablet   No Yes   Sig: Take 1 tablet by mouth 2 (two) times a day   pantoprazole (PROTONIX) 20 mg tablet   No Yes   Sig: Take 1 tablet (20 mg total) by mouth daily before breakfast for 30 days   ranitidine (ZANTAC) 150 mg tablet   Yes Yes   Sig: Take 1 tablet by mouth daily      Facility-Administered Medications: None       Past Medical History:   Diagnosis Date    Renal calculi     Renal disorder     Toe fracture, left        Past Surgical History:   Procedure Laterality Date    CHOLECYSTECTOMY LAPAROSCOPIC N/A 12/10/2018    Procedure: CHOLECYSTECTOMY LAPAROSCOPIC; INCISIONAL HERNIA REPAIR;  Surgeon: Madeline Leblanc MD;  [facility]: [facility];  Service: General    KIDNEY SURGERY  2013    removed L kidney from damage from kidney stone -PERFORMED IN [facility]    TUBAL LIGATION      AGE 33       Family History   Problem Relation Age of Onset    Diabetes Mother     Hyperlipidemia Mother     Hypertension Mother    Joanne Sicks Other Mother         low back pain    Heart disease Mother     Diabetes Father     No Known Problems Sister     No Known Problems Brother      I have reviewed and agree with the history as documented  Social History     Tobacco Use    Smoking status: Never Smoker    Smokeless tobacco: Never Used   Substance Use Topics    Alcohol use: No    Drug use: No        Review of Systems   Constitutional: Negative  HENT: Negative  Eyes: Negative  Respiratory: Negative  Cardiovascular: Positive for chest pain  Negative for palpitations and leg swelling  Gastrointestinal: Negative  Endocrine: Negative  Genitourinary: Positive for dysuria  Negative for decreased urine volume, difficulty urinating, dyspareunia, enuresis, flank pain, frequency, genital sores, hematuria, menstrual problem, pelvic pain, urgency, vaginal bleeding, vaginal discharge and vaginal pain  Musculoskeletal: Negative  Skin: Negative  Allergic/Immunologic: Negative  Neurological: Negative  Hematological: Negative  Psychiatric/Behavioral: Negative  Physical Exam  Physical Exam   Constitutional: She is oriented to person, place, and time  She appears well-developed and well-nourished  Non-toxic appearance  She does not appear ill  No distress  avss- very well appearing- in nad -- pulse ox 100 % on ra- interpretation is normal- no intervention     - non marfanoid body habitus   HENT:   Head: Normocephalic and atraumatic  Eyes: Pupils are equal, round, and reactive to light  EOM are normal    Mm pink   Neck: Normal range of motion  Neck supple  No hepatojugular reflux and no JVD present  No tracheal deviation present  No thyromegaly present  Cardiovascular: Normal rate and regular rhythm  No extrasystoles are present  PMI is not displaced  Exam reveals no gallop, no S3, no S4, no distant heart sounds and no friction rub  No murmur heard  No systolic murmur is present  No diastolic murmur is present    Pulses:       Radial pulses are 3+ on the right side, and 3+ on the left side  Dorsalis pedis pulses are 3+ on the right side, and 3+ on the left side  Pulmonary/Chest: Effort normal and breath sounds normal  No accessory muscle usage or stridor  No tachypnea  No respiratory distress  She has no decreased breath sounds  She has no wheezes  She has no rhonchi  She has no rales  Abdominal: Soft  Bowel sounds are normal  She exhibits no distension, no ascites and no mass  There is no splenomegaly or hepatomegaly  There is no tenderness  There is no rebound and no guarding  Soft nt/nd- no peritoneal signs- no cva tendenress   Musculoskeletal: Normal range of motion  Right lower leg: Normal  She exhibits no tenderness and no edema  Left lower leg: Normal  She exhibits no tenderness and no edema  Equal bilateral radial/dp pulses- no ble edema/calf tenderness/asym/ erythema   Lymphadenopathy:     She has no cervical adenopathy  Neurological: She is alert and oriented to person, place, and time  She is not disoriented  No cranial nerve deficit  Skin: Skin is warm  Capillary refill takes less than 2 seconds  No abrasion, no ecchymosis and no rash noted  She is not diaphoretic  No cyanosis or erythema  No pallor  Nails show no clubbing  Psychiatric: She has a normal mood and affect  Her behavior is normal  Her mood appears not anxious  Nursing note and vitals reviewed        Vital Signs  ED Triage Vitals   Temperature Pulse Respirations Blood Pressure SpO2   08/24/19 1741 08/24/19 1740 08/24/19 1740 08/24/19 1740 08/24/19 1740   98 3 °F (36 8 °C) 95 20 139/65 100 %      Temp Source Heart Rate Source Patient Position - Orthostatic VS BP Location FiO2 (%)   08/24/19 1741 08/24/19 1740 08/24/19 1740 08/24/19 1740 --   Oral Monitor Lying Right arm       Pain Score       --                  Vitals:    08/24/19 1740 08/24/19 1800   BP: 139/65 132/60   Pulse: 95 96   Patient Position - Orthostatic VS: Lying          Visual Acuity      ED Medications  Medications - No data to display    Diagnostic Studies  Results Reviewed     Procedure Component Value Units Date/Time    Troponin I [[id number]] Collected:  08/24/19 1810    Lab Status: In process Specimen:  Blood from Arm, Right Updated:  08/24/19 1813                 XR chest 1 view portable    (Results Pending)              Procedures  Procedures       ED Course  ED Course as of Aug 24 1934   Sat Aug 24, 2019   1808 - ER MD NOTE- 5/ AND 7/19 ER CHARTS / URINE/ LABS/ IMAGING REVIEWED BY ER MD      1809 ER MD MEDICAL DECISION MAKING NOTE-  PT IS LOW RISK FOR PE BY WELL'S SCORE- SCORE OF - 0- PERC-NEG      1815 CXR PORTABLE- NO OLD CXR FOR COMPARISON -- NORMAL MEDIASTINUM/CARDIAC SILHOUETTE- NO FREE/SQ AIR- NO INFILTRATE/ PTX/ PULM EDEMA/ PLEURAL EFFUSIONS      1928 Er md note- urine dip- will not cross over- trace lueks only- no microsocpe- will send cult                                  MDM    Disposition  Final diagnoses:   None     ED Disposition     None      Follow-up Information    None         Patient's Medications   Discharge Prescriptions    No medications on file     No discharge procedures on file      ED Provider  Electronically Signed by           Stalin Rodriguez MD  08/24/19 6699

## 2019-08-24 NOTE — DISCHARGE INSTRUCTIONS
Diagnosis; chest pain     - activity as tolerated     - please call your primary doctor on Monday to schedule an appointment for a recheck within 1 week     - if your urine culture grows out any infection we will contact you     - based on our negative er chest pain workup  you are a low risk chest pain patient- approximately 1 out of 100 er chest pain patients like you will go on to have a heart attack in 1 month     - there is no such thing as a no risk chest pain patient- please return to  the er for any new/ worsening/concerning symptoms to you

## 2019-08-24 NOTE — ED PROCEDURE NOTE
PROCEDURE  ECG 12 Lead Documentation Only  Date/Time: 8/24/2019 7:24 PM  Performed by: Vignesh Cruz MD  Authorized by: Vingesh Cruz MD     Indications / Diagnosis:  Er  repeat ecg - # 2   ECG reviewed by me, the ED Provider: yes    Patient location:  ED  Previous ECG:     Previous ECG:  Compared to current    Comparison ECG info:  Compred to initial er ecg-     Similarity:  No change    Comparison to cardiac monitor: Yes    Interpretation:     Interpretation: non-specific    Rate:     ECG rate:  77    ECG rate assessment: normal    Rhythm:     Rhythm: sinus rhythm    Ectopy:     Ectopy: none    QRS:     QRS axis:  Normal    QRS intervals:  Normal  Conduction:     Conduction: abnormal      Abnormal conduction: incomplete RBBB    ST segments:     ST segments:  Normal  T waves:     T waves: flattening      Flattening:  II, III, aVF, V1, V2, V3, V4, V5 and V6  Q waves:     Q waves:  V1  Comments:      Low voLtage- - no ecg signs of ischemia/ injury         Vignesh Cruz MD  08/24/19 Trevor Campbell

## 2019-08-25 LAB
ATRIAL RATE: 86 BPM
P AXIS: 33 DEGREES
PR INTERVAL: 154 MS
QRS AXIS: 32 DEGREES
QRSD INTERVAL: 82 MS
QT INTERVAL: 334 MS
QTC INTERVAL: 399 MS
T WAVE AXIS: -9 DEGREES
VENTRICULAR RATE: 86 BPM

## 2019-08-25 PROCEDURE — 93010 ELECTROCARDIOGRAM REPORT: CPT | Performed by: INTERNAL MEDICINE

## 2019-08-26 ENCOUNTER — TELEPHONE (OUTPATIENT)
Dept: MULTI SPECIALTY CLINIC | Facility: CLINIC | Age: 44
End: 2019-08-26

## 2019-08-26 ENCOUNTER — APPOINTMENT (OUTPATIENT)
Dept: LAB | Facility: HOSPITAL | Age: 44
End: 2019-08-26
Payer: COMMERCIAL

## 2019-08-26 DIAGNOSIS — R10.13 EPIGASTRIC PAIN: ICD-10-CM

## 2019-08-26 PROCEDURE — 87338 HPYLORI STOOL AG IA: CPT

## 2019-08-26 NOTE — TELEPHONE ENCOUNTER
Patient called in stating that she wanted a follow up appt for chest pain, pt was in the ED on 8/24  I offered for her to come in tomorrow 8/27/19 at 9am  Patient refused this appt  She only wanted Friday  No appts available for Friday  I advised that I can make her a follow up appt for the next available on 9/11/19 at 130pm and advised that if her chest pain gets worse she must go back to the ED for evaluation  Patient agreed, scheduled 9/11 at 130pm with a resident, patient is aware that she must go to the ED if symptoms persist or worsen

## 2019-08-27 LAB
ATRIAL RATE: 77 BPM
BACTERIA UR CULT: NORMAL
H PYLORI AG STL QL IA: NEGATIVE
P AXIS: 44 DEGREES
PR INTERVAL: 164 MS
QRS AXIS: 43 DEGREES
QRSD INTERVAL: 82 MS
QT INTERVAL: 354 MS
QTC INTERVAL: 400 MS
T WAVE AXIS: -3 DEGREES
VENTRICULAR RATE: 77 BPM

## 2019-08-27 PROCEDURE — 93010 ELECTROCARDIOGRAM REPORT: CPT | Performed by: INTERNAL MEDICINE

## 2019-10-10 ENCOUNTER — OFFICE VISIT (OUTPATIENT)
Dept: OBGYN CLINIC | Facility: CLINIC | Age: 44
End: 2019-10-10

## 2019-10-10 VITALS
SYSTOLIC BLOOD PRESSURE: 140 MMHG | HEART RATE: 89 BPM | HEIGHT: 64 IN | BODY MASS INDEX: 33.9 KG/M2 | WEIGHT: 198.6 LBS | DIASTOLIC BLOOD PRESSURE: 84 MMHG

## 2019-10-10 DIAGNOSIS — Z12.4 CERVICAL CANCER SCREENING: ICD-10-CM

## 2019-10-10 DIAGNOSIS — Z01.419 WOMEN'S ANNUAL ROUTINE GYNECOLOGICAL EXAMINATION: Primary | ICD-10-CM

## 2019-10-10 PROCEDURE — 87624 HPV HI-RISK TYP POOLED RSLT: CPT | Performed by: NURSE PRACTITIONER

## 2019-10-10 PROCEDURE — 99386 PREV VISIT NEW AGE 40-64: CPT | Performed by: NURSE PRACTITIONER

## 2019-10-10 PROCEDURE — G0124 SCREEN C/V THIN LAYER BY MD: HCPCS | Performed by: PATHOLOGY

## 2019-10-10 PROCEDURE — G0145 SCR C/V CYTO,THINLAYER,RESCR: HCPCS | Performed by: PATHOLOGY

## 2019-10-10 PROCEDURE — 3725F SCREEN DEPRESSION PERFORMED: CPT | Performed by: NURSE PRACTITIONER

## 2019-10-10 NOTE — PATIENT INSTRUCTIONS
Mamografía   LO QUE NECESITA SABER:   ¿Qué necesito saber sobre Tyler Pee? Sudhir mamografía es sudhir radiografía de francisco senos que revisa para cáncer de seno  Los expertos recomiendan [address] cada 2 años empezando a los 48 años de Jose  Es probable que usted necesite Tyler Pee a los 52 años o antes si tiene riesgo alto de cáncer de seno  Hable con ramirez médico sobre cuándo debe empezar con francisco mamografías y con cuánta frecuencia las necesita  ¿Cómo me preparo para Tyler Pee? · No se aplique desodorante, talco, crema o perfume  Estos productos podrían provocar que aparezcan partículas en la mamografía  · Vístase con ropa de 2 piezas  · Si francisco senos se encuentran sensibles antes de ramirez período mensual, no realice sudhir [employer]  Programe ramirez shakeel para la mamografía para 1 semana después de terminar ramirez período mensual     · Es necesario que se exprima la leche materna antes de realizar la mamografía si usted está amamantando  · Llevar sudhir lista de las fechas y los lugares donde se hizo las mamografías anteriores y otros exámenes de mama o tratamientos  ¿Qué pasará Ivanhoe Graves? Usualmente se reanna sudhir radiografía de vista superior y Evern Naas de vista lateral para cada seno  Infórmele a los médicos si usted tiene implantes de senos o problemas en francisco senos antes de realizar la mamografía  Es posible que necesite radiografías adicionales de cada seno  · Se le dará Oris Bullion de hospital  Remueva ramirez ropa de la cintura para [facility]  Usar la bata de hospital que se abre en la parte delantera  · Estará sentada o gonzalez junto a un equipo de david X pequeño  El médico le ayudará a colocar marco a de francisco senos en la placa de david X  Poornima Lang y el seno hasta que holly esté en la posición correcta  · Ramirez seno se presiona suavemente entre [employer] [place] de plástico por unos segundos mientras se reanna la radiografía  [place] podría sentir incómodo      · Se le pedirá que contenga ramirez respiración mientras se reanan la radiografía  Se efectuará otra radiografía en el mismo seno, luego de modificar la posición de la máquina de david X     · Examinarán el otro seno de la [place]  ¿Qué sucederá después de la mamografía? Usted podría presentar sensibilidad en francisco senos por sudhir duración corta de tiempo después de la mamografía  Usted puede retomar francisco actividades habituales  Pregúntele al médico cuándo se supone que usted recibirá francisco Pickett  ¿Cuáles son los riesgos de la mamografía? Se le expondrá a sudhir pequeña cantidad de radiación  Es posible que no se detecte algunos cánceres de seno con la mamografía  ¿Cuándo sol comunicarme con mi médico?   · Usted no puede llegar a tiempo a rodriguez shakeel  · Usted no recibe los resultados cuando esperaba  · Usted tiene preguntas o inquietudes acerca de la mamografía  ACUERDOS SOBRE RODRIGUEZ CUIDADO:   Usted tiene el derecho de ayudar a planear rodriguez cuidado  Aprenda todo lo que pueda sobre rodriguez condición y shana darle tratamiento  Discuta francisco opciones de tratamiento con francisco médicos para decidir el cuidado que usted desea recibir  Usted siempre tiene el derecho de rechazar el tratamiento  Esta información es sólo para uso en educación  Rodriguez intención no es darle un consejo médico sobre enfermedades o tratamientos  Colsulte con rodriguez Alice Midget farmacéutico antes de seguir cualquier régimen médico para saber si es seguro y efectivo para usted  © 2017 [address] Information is for End User's use only and may not be sold, redistributed or otherwise used for commercial purposes  All illustrations and images included in CareNotes® are the copyrighted property of A D A M  Inc  or Rakesh Swann  Autoexamen del seno para las mujeres   LO QUE NECESITA SABER:   ¿Qué es el autoexamen de seno? Un autoexamen de seno es Carolyn de revisar si francisco senos tienen protuberancias u otros cambios   Los autoexámenes de seno regulares pueden ayudarla a conocer cómo se doug y se sienten francisco senos normalmente  La mayoría de las protuberancias o cambios en el seno  no son cáncer, jarred usted siempre debería ir con ramirez médico para que la revise  Ramirez médico también puede observarla e indicarle si usted está realizando ramirez autoexamen correctamente  ¿Por qué debería realizarme un autoexamen de seno? El cáncer de seno es el tipo de cáncer más común en las mujeres  Aún si a usted le [employer], todavía puede seguir revisándose regularmente  Si usted sabe cómo se sienten y se doug francisco senos normalmente, eso podría ayudarle a determinar cuándo comunicarse con ramirez médico  Es posible que sudhir mamografía no detecte algún cáncer  Usted podría encontrar sudhir protuberancia kassie un autoexamen de seno que no se detectó con ramirez mamografía  ¿Cuándo debería realizarme un autoexamen de seno? Darinel ramirez calendario para que recuerde hacerse un autoexamen del seno en sudhir fecha regular  Sudhir forma fácil de recordar es realizar el autoexamen de seno en el mismo día cada mes  Si usted tiene [place], es posible que lo mejor sea que se melanie un autoexamen 1 semana después de que terminó ramirez periodo  [place] es cuando francisco senos podrían estar menos inflamados, abultados o sensibles  Usted puede realizarse autoexámenes del seno regulares incluso si está dando de lactar o si tiene implantes en el seno  ¿Cómo debería realizarme un autoexamen de seno? · Observe francisco senos en un roselia  Observe el tamaño y la forma de cada seno y del pezón  Revise si hay inflamación, protuberancias, hoyuelos, piel descamada u otros cambios en la piel  Vigile los cambios en el pezón, shana cuando tiene dolor o que comienza a hundirse  Apriete cuidadosamente ambos pezones y revise si sale líquido (que no sea [employer]) de ellos   Si usted detecta cualquiera de estos u otros cambios en francisco senos, comuníquese con ramirez médico  Revise francisco senos mientras está sentada o gonzalez en las 3 siguientes posiciones:    ¨ Cuelgue francisco brazos en francisco costados  ¨ Levante francisco pelon y [place] detrás de ramirez alexander  ¨ Ejerza presión firme con francisco pelon sobre francisco caderas  Inclínese un poco hacia adelante mientras observa francisco senos en el roselia  · Acuéstese y palpe francisco senos  Cuando usted se [place], el tejido de francisco senos se extiende uniformemente sobre ramirez pecho  [place] facilita que usted sienta protuberancias o cualquier cosa que podría no ser normal para francisco senos  Realice un autoexamen con un seno a la vez  ¨ Coloque sudhir almohada pequeña o sudhir toalla debajo de ramirez hombro dinesh  Coloque ramirez brazo dinesh detrás de ramirez alexander  ¨ Use los 3 dedos medios de ramirez mano derecha  Use las yemas de los dedos, en la parte superior  La yema del dedo es la parte más sensible de ramirez dedo  ¨ Rossy círculos pequeños para sentir el tejido del seno  Use las yemas de francisco dedos para hacer círculos pequeños empalmados sobre francisco senos y [place]  Use presión ligera, media y firme  Mandy, presione ligeramente  Después, presione con sudhir presión media para sentir un poco más profundo en ramirez seno  Por último, use presión firme para sentir ramirez seno en lo más profundo  ¨ Examine el área completa de ramirez seno  Examine el área del seno desde arriba hasta abajo donde usted siente las [place]  Ane Light pequeños con las yemas de los dedos, comenzando en la parte media de ramirez axila  Ane Light subiendo y [place] el área del seno  Continúe hacia ramirez seno, hasta llegar al [employer]  Examine toda el área desde la axila hasta el centro de ramirez pecho (Ryne Octave)  Deténgase en el centro de ramirez pecho  ¨ Mueva la almohada o toalla hacia ramirez hombro derecho y coloque ramirez brazo derecho detrás de ramirez alexander  Use las yemas de los 3 dedos medios de ramirez mano izquierda y repita los pasos anteriores para realizar un autoexamen en ramirez seno derecho         ¿Qué más puedo hacer para la revisión de problemas de seno o del cáncer de seno?  Algunos expertos sugieren que las mujeres de 36 años de edad y mayores deberían realizarse sudhir mamografía cada año  Otros sugieren [employer] 49 y 77 años de edad se venus sudhir mamografía cada 2 años  Consulte con rodriguez médico sobre cuándo usted debería [employer]  ¿Cuándo sol comunicarme con mi médico?   · Usted encuentra algún tipo de bulto o cambio en francisco senos  · Usted tiene [facility], o le sale líquido de francisco pezones  · Usted tiene preguntas o inquietudes acerca de rodriguez condición o cuidado  ACUERDOS SOBRE RODRIGUEZ CUIDADO:   Usted tiene el derecho de ayudar a planear rodriguez cuidado  Aprenda todo lo que pueda sobre rodriguez condición y shana darle tratamiento  Discuta francisco opciones de tratamiento con francisco médicos para decidir el cuidado que usted desea recibir  Usted siempre tiene el derecho de rechazar el tratamiento  Esta información es sólo para uso en educación  Rodriguez intención no es darle un consejo médico sobre enfermedades o tratamientos  Colsulte con rodriguez Jin Severance farmacéutico antes de seguir cualquier régimen médico para saber si es seguro y efectivo para usted  © 2017 [facility] Information is for End User's use only and may not be sold, redistributed or otherwise used for commercial purposes  All illustrations and images included in CareNotes® are the copyrighted property of A D A M , Inc  or Rakesh Swann  Prueba de extensión de Pap   INFORMACIÓN GENERAL:   ¿Qué es sudhri extensión de Pap? The[facility] de Pap, o prueba de Pap, es un procedimiento para buscar células anormales en rodriguez milo uterino o cérvix  El milo uterino es la abertura estrecha en la parte inferior de [place]  El milo uterino se une a la parte superior de la vagina  ¿Cómo me preparo para sudhir extensión de Pap? El mejor momento para programar sudhir prueba es inmediatamente después que termine rodriguez menstruación  No se realice sudhir extensión de Pap kassie rodriguez menstruación   No tenga relaciones sexuales o inserte nada dentro de rodriguez vagina kassie 24 horas antes de rodriguez examen de extensión de Pap  ¿Qué ocurrirá kassie sudhir extensión de Pap? · Usted se acostará sobre rodriguez espalda y colocará francisco pies en unos posapiés conocidos shana estribos  Rodriguez médico cuidadosamente insertará un aparato conocido shana espéculo dentro de rodriguez vagina  El espéculo se [place] para expandir las wise de rodriguez vagina para que él pueda shay rodriguez cérvix  Él utilizará sudhir brocha delgada o un hisopo de algodón para obtener células de la parte interior de rodriguez cérvix  · Rodriguez médico también obtendrá células de la superficie de rodriguez cérvix con sudhir herramienta de plástico o man conocida shana espátula  Es posible que él también raspe cuidadosamente la parte superior de rodriguez vagina para obtener [place]  Lexii Portertt son colocadas en un envase con líquido o en sudhir lámina de divina  Estos se envían a un laboratorio para ser analizados en busca de células anormales  ¿Con qué frecuencia necesito sudhir extensión de Pap? Las extensiones de Pap generalmente son realizadas cada 1 a 3 años  Usted puede que necesite sudhir extensión de Pap más frecuentemente si usted tiene cualquiera de lo siguiente:  · Un resultado positivo de la prueba del virus del papiloma humano (VPH)    · Intraepitelial neoplasma cervical o cáncer cervical    · VIH    · Un sistema inmune débil    · Exposición al [place] dietilstilbestrol (THOMAS) cuando rodriguez madre estuvo embarazada de usted  ACUERDOS SOBRE RODRIGUEZ CUIDADO:   Usted tiene el derecho de participar en la planificación de rodriguez cuidado  Aprenda todo lo que pueda sobre rodriguez condición y shana darle tratamiento  Discuta con francisco médicos francisco opciones de tratamiento para juntos decidir el cuidado que usted quiere recibir  Usted siempre tiene el derecho a rechazar rodriguez tratamiento  Esta información es sólo para uso en educación  Rodriguez intención no es darle un consejo médico sobre enfermedades o tratamientos   Colsulte con Madonna Hsieh (R), enfermera o farmacéutico antes de seguir cualquier régimen médico para saber si es seguro y efectivo para usted  © 2014 [address]to is for End User's use only and may not be sold, redistributed or otherwise used for commercial purposes  All illustrations and images included in CareNotes® are the copyrighted property of A JAMIE A M , Inc  or Rakesh Swann

## 2019-10-10 NOTE — PROGRESS NOTES
Lauryn Capone is a 38 y o  female who presents for annual well woman exam   Last Pap smear approximately 11 years ago  Patient denies history of abnormal Pap smears  Will collect Pap smear today  Has never had mammogram   Mammogram is ordered by PCP  Patient encouraged to have mammogram performed  Periods are regular every 28-30 days, lasting 5-6 days  No intermenstrual bleeding, spotting, or discharge  Current contraception: tubal ligation  History of abnormal Pap smear: no  Family history of uterine or ovarian cancer: no  Regular self breast exam: yes  History of abnormal mammogram:  Has not had mammogram  Family history of breast cancer: no  History of abnormal lipids: no  Gardasil:  No    Menstrual History:  OB History        2    Para   2    Term   2            AB        Living   2       SAB        TAB        Ectopic        Multiple        Live Births                    Menarche age: 9  Patient's last menstrual period was 09/15/2019 (exact date)  Period Cycle (Days): (monthly)  Period Duration (Days): 5-6  Period Pattern: Regular  Menstrual Flow: Light, Moderate, Heavy  Dysmenorrhea: (!) Mild(IBU or Tylenol with relief )  Dysmenorrhea Symptoms: Headache    The following portions of the patient's history were reviewed and updated as appropriate: allergies, current medications, past family history, past medical history, past social history, past surgical history and problem list     Review of Systems  Pertinent items are noted in HPI  Objective      /84 (BP Location: Right arm, Patient Position: Sitting, Cuff Size: Standard)   Pulse 89   Ht 5' 4" (1 626 m)   Wt 90 1 kg (198 lb 9 6 oz)   LMP 09/15/2019 (Exact Date)   Breastfeeding?  No   BMI 34 09 kg/m²     General:   alert and oriented, in no acute distress, alert, appears stated age and cooperative   Heart: regular rate and rhythm, S1, S2 normal, no murmur, click, rub or gallop   Lungs: clear to auscultation bilaterally   Abdomen: soft, non-tender, without masses or organomegaly, nondistended and normal bowel sounds   Vulva: normal, Bartholin's, Urethra, [place]'s normal   Vagina: normal mucosa, normal discharge, no palpable nodules   Cervix: no bleeding following Pap, no cervical motion tenderness and no lesions   Uterus: normal size, non-tender, normal shape and consistency   Adnexa: normal adnexa and no mass, fullness, tenderness   Breast:  Nontender, no palpable masses, no nipple discharge, no skin changes bilaterally          Assessment      @well woman@   Plan      All questions answered  Await pap smear results  Breast self exam technique reviewed and patient encouraged to perform self-exam monthly  Contraception: tubal ligation  Diagnosis explained in detail, including differential   Dietary diary  Discussed healthy lifestyle modifications  Educational material distributed  Follow up in 1 Year for annual exam   Follow up as needed  Mammogram   Thin prep Pap smear  Breast awareness reviewed    Gardasil vaccine recommendations up to age 39 reviewed  Written information provided  Patient declines at this time    Will call with results

## 2019-10-11 LAB
HPV HR 12 DNA CVX QL NAA+PROBE: POSITIVE
HPV16 DNA CVX QL NAA+PROBE: NEGATIVE
HPV18 DNA CVX QL NAA+PROBE: NEGATIVE

## 2019-10-17 LAB
LAB AP GYN PRIMARY INTERPRETATION: ABNORMAL
Lab: ABNORMAL
PATH INTERP SPEC-IMP: ABNORMAL

## 2019-10-22 ENCOUNTER — TELEPHONE (OUTPATIENT)
Dept: OBGYN CLINIC | Facility: CLINIC | Age: 44
End: 2019-10-22

## 2019-10-22 NOTE — TELEPHONE ENCOUNTER
----- Message from Janessa Dupree RN sent at 10/18/2019 10:49 AM EDT -----  Regarding: NEEDS TO DISCUSS  VM 10/17/19LETTER 10/17/19

## 2019-10-23 NOTE — TELEPHONE ENCOUNTER
Pt returned our phone call  Discussed pap smear results  Explained a colposcopy is normally done in your healthcare's office  It should be performed, when you are not having your period  You will be asked to sign a consent form before the procedure  A day or two before the scheduled procedure, your provider asks you refrain from sexual intercourse, stop using tampons, avoid creams or other vaginal medications, and avoid douching  A colposcopy gives your healthcare provider a magnified view of the cervix  It is done using a lighted microscope  A sample of cervical cells may be taken during your procedure, called biopsy  The procedure usually takes about 30 minutes, you can often go back to your normal routine right away  You make take tylenol and/or motrin an hour before your scheduled appointment, for relief of slight pinching or cramping  If samples are obtained, they will be studied in a lab setting, results may take up to 2-3 weeks to be finalized  The reason for this procedure is usually done to follow up on an abnormal pap smear  Pap smears are usually due to a number of things  Letter was mailed in [facility] [place] explaining colposcopy  Pt scheduled for Nov 5th at 4 pm with Dr Bryan Olsen due to work schedule

## 2019-11-05 ENCOUNTER — PROCEDURE VISIT (OUTPATIENT)
Dept: OBGYN CLINIC | Facility: CLINIC | Age: 44
End: 2019-11-05

## 2019-11-05 VITALS
HEART RATE: 92 BPM | HEIGHT: 64 IN | DIASTOLIC BLOOD PRESSURE: 82 MMHG | SYSTOLIC BLOOD PRESSURE: 124 MMHG | WEIGHT: 199 LBS | BODY MASS INDEX: 33.97 KG/M2

## 2019-11-05 DIAGNOSIS — R87.610 ASCUS WITH POSITIVE HIGH RISK HPV CERVICAL: ICD-10-CM

## 2019-11-05 DIAGNOSIS — R87.810 ASCUS WITH POSITIVE HIGH RISK HPV CERVICAL: ICD-10-CM

## 2019-11-05 DIAGNOSIS — R87.619 ABNORMAL CERVICAL PAPANICOLAOU SMEAR, UNSPECIFIED ABNORMAL PAP FINDING: Primary | ICD-10-CM

## 2019-11-05 DIAGNOSIS — Z78.9 HEALTH MAINTENANCE ALTERATION: ICD-10-CM

## 2019-11-05 LAB — SL AMB POCT URINE HCG: NORMAL

## 2019-11-05 PROCEDURE — 88305 TISSUE EXAM BY PATHOLOGIST: CPT | Performed by: PATHOLOGY

## 2019-11-05 PROCEDURE — 57454 BX/CURETT OF CERVIX W/SCOPE: CPT | Performed by: OBSTETRICS & GYNECOLOGY

## 2019-11-05 NOTE — PATIENT INSTRUCTIONS
Colposcopia   LO QUE NECESITA SABER:   Sudhir colposcopia es un procedimiento para buscar células anormales en ramirez cerviz y vagina  Ramirez médico utilizará un colposcopio, que es un pequeño endoscopio con sudhir kimo en ramirez extremo  INSTRUCCIONES SOBRE EL ILANA HOSPITALARIA:   Medicamentos:   · Analgésicos:  Usted podría recibir medicamento para quitarle o reducir el dolor  No espere a que el dolor sea muy intenso para derrick el medicamento  · [place] francisco medicamentos shana se le haya indicado  Llame a ramirez médico si usted piensa que el medicamento no está ayudando o si tiene efectos secundarios  Infórmele si es alérgico a cualquier medicamento  Mantenga sudhir lista actualizada de los [place], las vitaminas y los productos herbales que reanna  Incluya los siguientes datos de los medicamentos: cantidad, frecuencia y motivo de administración  Traiga con usted la lista o los envases de la píldoras a francisco citas de seguimiento  Lleve la lista de los medicamentos con usted en puja de sudhir emergencia  Programe sudhir shakeel con ramirez médico o ginecólogo shana se le indique:  Es posible que usted necesite regresar para obtener más pruebas o para que se le extraigan células anormales  Anote francisco preguntas para que se acuerde de hacerlas kassie francisco visitas  Cuidados personales:  Use sudhir toalla sanitaria para cualquier sangrado leve  Pregunte si puede usar tampones, duchas vaginales o tener [employer]  Si usted [employer], no coloque nada en ramirez vagina hasta que ramirez médico se lo autorice  Evite levantar objetos pesados en las próximas 24 horas después de ramirez procedimiento, ya que esto disminuirá ramirez riesgo de hemorragia  Consulte con ramirez médico o ginecólogo sí:   · Usted tiene dolor que no se le baron, aun después de derrick medicamentos para el dolor  · Usted tiene fiebre  · Usted tiene preguntas o inquietudes acerca de ramirez condición o cuidado    Busque atención médica de inmediato o llame al 911 si:   · Usted presenta sangrado vaginal y [address] es más abundante que lovett menstruación  © 2016 [address]rna [address]to is for End User's use only and may not be sold, redistributed or otherwise used for commercial purposes  All illustrations and images included in CareNotes® are the copyrighted property of A D A M , Inc  or Rakesh Swann  Esta información es sólo para uso en educación  Lovett intención no es darle un consejo médico sobre enfermedades o tratamientos  Colsulte con lovett Rickford Oceana farmacéutico antes de seguir cualquier régimen médico para saber si es seguro y efectivo para usted

## 2019-11-05 NOTE — PROGRESS NOTES
Assessment/Plan:     ASCUS with positive high risk HPV cervical  ASCUS + HR HPV  Colposcopy performed today, impression low grade, status post biopsy at 3, 10, 12 & ECC  Discharge precautions reviewed  Follow up in 2 weeks for results, sooner for any questions or concerns    Dr Krystal Wild present        Subjective:     Patient ID: Tomasa Calderon is a 38 y o  female  HPI   Tomasa Calderon is a 38year old female who presents for colposcopy today  Patient's last menstrual period was 10/10/2019 (exact date)  UPT negative today  Pap 10/10/2019 ASCUS, HR HPV positive (16/18 neg)    Review of Systems   Genitourinary: Negative for pelvic pain, vaginal bleeding, vaginal discharge and vaginal pain  Objective:     Physical Exam   Constitutional: She appears well-developed and well-nourished     Genitourinary: Vagina normal                Colposcopy  Date/Time: 11/5/2019 5:02 PM  Performed by: Eleuterio Macias MD  Authorized by: Eleuterio Macias MD     Consent:     Consent obtained:  Verbal and written    Consent given by:  Patient    Procedural risks discussed:  Bleeding, failure rate, infection and repeat procedure    Patient questions answered: yes      Patient agrees, verbalizes understanding, and wants to proceed: yes      Educational handouts given: yes      Instructions and paperwork completed: yes    Pre-procedure:     Pre-procedure timeout performed: yes      Prepped with: acetic acid    Indication:     Indication:  ASC-US  Procedure:     Procedure: Colposcopy w/ cervical biopsy and ECC      Under satisfactory analgesia the patient was prepped and draped in the dorsal lithotomy position: yes      [place] speculum was placed in the vagina: yes      Under colposcopic examination the transition zone was seen in entirety: yes      Endocervix was curetted using a Kevorkian curette: yes      Cervical biopsy performed with a cervical biopsy punch: yes      Monsel's solution was applied: yes      Biopsy(s): yes Location:  3, 10, 12, ECC  Post-procedure:     Findings: White epithelium      Impression: Low grade cervical dysplasia      Patient tolerance of procedure:   Tolerated well, no immediate complications

## 2019-11-05 NOTE — ASSESSMENT & PLAN NOTE
ASCUS + HR HPV  Colposcopy performed today, impression low grade, status post biopsy at 3, 10, 12 & ECC  Discharge precautions reviewed  Follow up in 2 weeks for results, sooner for any questions or concerns    Dr Kehinde Pabon present

## 2019-12-04 ENCOUNTER — TELEPHONE (OUTPATIENT)
Dept: OBGYN CLINIC | Facility: CLINIC | Age: 44
End: 2019-12-04

## 2019-12-04 NOTE — TELEPHONE ENCOUNTER
Left voicemail in German with tissue exam results per Dr Perez Cousin note  Pt no showed to results appt on 11/26/19

## 2019-12-13 DIAGNOSIS — R10.13 EPIGASTRIC PAIN: ICD-10-CM

## 2019-12-13 NOTE — TELEPHONE ENCOUNTER
Patient called in requesting a refill for this medication  It looks like it was ordered by Dr Clement Barfield for a 30 day supply  Please Advise

## 2019-12-16 RX ORDER — PANTOPRAZOLE SODIUM 20 MG/1
20 TABLET, DELAYED RELEASE ORAL
Qty: 30 TABLET | Refills: 0 | Status: SHIPPED | OUTPATIENT
Start: 2019-12-16 | End: 2020-04-23 | Stop reason: SDUPTHER

## 2019-12-16 NOTE — TELEPHONE ENCOUNTER
Refill for protonix sent to pharmacy  Discuss needing to continue PPI or other acid suppressant at next appt

## 2020-04-23 ENCOUNTER — TELEMEDICINE (OUTPATIENT)
Dept: INTERNAL MEDICINE CLINIC | Facility: CLINIC | Age: 45
End: 2020-04-23

## 2020-04-23 DIAGNOSIS — K21.9 GASTROESOPHAGEAL REFLUX DISEASE WITHOUT ESOPHAGITIS: Primary | ICD-10-CM

## 2020-04-23 DIAGNOSIS — R10.13 EPIGASTRIC PAIN: ICD-10-CM

## 2020-04-23 PROCEDURE — 99213 OFFICE O/P EST LOW 20 MIN: CPT | Performed by: INTERNAL MEDICINE

## 2020-04-23 RX ORDER — PANTOPRAZOLE SODIUM 20 MG/1
20 TABLET, DELAYED RELEASE ORAL
Qty: 30 TABLET | Refills: 0 | Status: SHIPPED | OUTPATIENT
Start: 2020-04-23 | End: 2020-09-17 | Stop reason: ALTCHOICE

## 2020-07-27 ENCOUNTER — TELEMEDICINE (OUTPATIENT)
Dept: INTERNAL MEDICINE CLINIC | Facility: CLINIC | Age: 45
End: 2020-07-27

## 2020-07-27 DIAGNOSIS — R05.9 COUGH: Primary | ICD-10-CM

## 2020-07-27 DIAGNOSIS — Z20.828 EXPOSURE TO SARS-ASSOCIATED CORONAVIRUS: ICD-10-CM

## 2020-07-27 PROCEDURE — 99214 OFFICE O/P EST MOD 30 MIN: CPT | Performed by: PHYSICIAN ASSISTANT

## 2020-07-27 PROCEDURE — U0003 INFECTIOUS AGENT DETECTION BY NUCLEIC ACID (DNA OR RNA); SEVERE ACUTE RESPIRATORY SYNDROME CORONAVIRUS 2 (SARS-COV-2) (CORONAVIRUS DISEASE [COVID-19]), AMPLIFIED PROBE TECHNIQUE, MAKING USE OF HIGH THROUGHPUT TECHNOLOGIES AS DESCRIBED BY CMS-2020-01-R: HCPCS

## 2020-07-27 NOTE — LETTER
To Whom It May Concern:    Lisseth Ras was seen today for a virtual visit at our office  We advise she remain out of work during this time, starting Monday 7/27/2020, until further notice  Thank you for your attention with this matter        Sincerely,      Parker Rubin PA-C

## 2020-07-27 NOTE — PROGRESS NOTES
COVID-19 Virtual Visit     Assessment/Plan:    Problem List Items Addressed This Visit     None      Visit Diagnoses     Cough    -  Primary    Exposure to SARS-associated coronavirus        Relevant Orders    MISC COVID-19 TEST- Collected at [facility] or [facility]        This virtual check-in was done via [employer] and patient was informed that this is a secure, HIPAA-compliant platform  She agrees to proceed         Disposition:      Patient is a 52-year-old female presenting for virtual visit today for concern of 1 day of cough, described is dry without any other concerning symptoms at this time and reports feeling normal otherwise  She has no other symptoms consistent with COVID and has no other symptoms concerning for a severe virus or bacterial infection  She has no other symptoms consistent with allergies  She does not smoke tobacco and has no known history of asthma  I explained to patient that unfortunately I cannot confirm today exactly what is causing her 1 day of cough symptoms, certainly could be inflammatory from allergies versus infectious such as coronavirus or other viral cause  Being that she works in a  I would not feel comfortable clearing patient at this time to return back to work safely without testing 1st   Patient expresses understanding  Instructions given with explanation of the process discussed verbally over the phone with D[place]   help  Patient aware she needs to go to the white tent in the parking lot of [employer] before 5:00 p m  today to have testing  She is aware we will call her with results which may take at least 3-5 days to get back  She understands the need to remain home in quarantine and we consider her positive until proven otherwise negative by COVID-19 test results  Therefore she is to remain home from work as well and we will e-mail her a letter informing her job of this      I did explain to patient it is still important to treat symptoms, cough medicine OTC for cough suppression, rest, fluids for hydration, saltwater gargles and warm tea with honey to help soothe throat and help the cough  We will contact patient with test results and determine next step at that time, she certainly should call sooner with any new or worsening symptoms that may require more urgent assessment/treatment  I referred Geno to one of our centralized sites for a COVID-19 swab      I spent 15 minutes directly with the patient during this visit    Encounter provider Leonel Gaines PA-C    Provider located at [facility] [address]  [address] [facility] [address] [phone]    Recent Visits  No visits were found meeting these conditions  Showing recent visits within past 7 days and meeting all other requirements     Today's Visits  Date Type Provider Dept   07/27/20 207 [facility], [address] [place] [address] today's visits and meeting all other requirements     Future Appointments  No visits were found meeting these conditions  Showing future appointments within next 150 days and meeting all other requirements        Patient agrees to participate in a virtual check in via telephone or video visit instead of presenting to the office to address urgent/immediate medical needs  Patient is aware this is a billable service  After connecting through [employer], the patient was identified by name and date of birth  Marvin Adhikari was informed that this was a telemedicine visit and that the exam was being conducted confidentially over secure lines  My office door was closed  No one else was in the room, except for use of [employer] telephone [address] system  Marvin Adhikari acknowledged consent and understanding of privacy and security of the telemedicine visit    I informed the patient that I have reviewed her record in Epic and presented the opportunity for her to ask any questions regarding the visit today  The patient agreed to participate  Chief Complaint   Patient presents with    COVID-19       23 Doris Roberts is a 38 y o  female who is concerned about COVID-19  She reports cough x 1 day, dry  She has not experienced fever, chills, repeated shaking with chills, shortness of breath/chest pain, myalgias/arthralgias, fatigue, loss of taste or smell, sore throat, abdominal pain, nausea, vomiting or diarrhea  She has not had contact with a person who is under investigation for or who is positive for COVID-19 within the last 14 days  She has not been hospitalized recently for fever and/or lower respiratory symptoms  She lives at home with her parents and daughter whom she reports are all asymptomatic  She denies any known cases of covid 23 in the  where she works  She denies being anywhere over past 2 weeks that would put her at risk for covid 19 and reports use of PPE where applicable  Past Medical History:   Diagnosis Date    Renal calculi     Renal disorder     Toe fracture, left        Past Surgical History:   Procedure Laterality Date    CHOLECYSTECTOMY LAPAROSCOPIC N/A 12/10/2018    Procedure: CHOLECYSTECTOMY LAPAROSCOPIC; INCISIONAL HERNIA REPAIR;  Surgeon: Derick Barbosa MD;  [facility];  Service: General    KIDNEY SURGERY  2013    [facility] L [facility] [place]    TUBAL LIGATION      AGE 32       Current Outpatient Medications   Medication Sig Dispense Refill    calcium-vitamin D (OSCAL) 250-125 MG-UNIT per tablet Take 1 tablet by mouth 2 (two) times a day 60 tablet 2    pantoprazole (PROTONIX) 20 mg tablet Take 1 tablet (20 mg total) by mouth daily before breakfast 30 tablet 0     No current facility-administered medications for this visit  No Known Allergies    Review of Systems   Constitutional: Negative  HENT: Negative  Eyes: Negative      Respiratory: Positive for cough  Negative for chest tightness, shortness of breath and wheezing  Cardiovascular: Negative  Gastrointestinal: Negative  Genitourinary: Negative  Musculoskeletal: Negative  Skin: Negative for rash  Neurological: Negative  Video Exam    There were no vitals filed for this visit  Geno appears healthy, alert, no distress, cooperative  Physical Exam   Constitutional: She is oriented to person, place, and time  She appears well-developed  Pt talking comfortably without obvious pain or limitation  Rest of exam limited secondary to being virtual visit   HENT:   Head: Normocephalic and atraumatic  Right Ear: External ear normal    Left Ear: External ear normal    Neck: Normal range of motion  No neck rigidity  Pulmonary/Chest: Effort normal  No accessory muscle usage  No tachypnea and no bradypnea  No respiratory distress  Intermittent dry cough appreciated throughout encounter   Neurological: She is alert and oriented to person, place, and time  Psychiatric: She has a normal mood and affect  VIRTUAL VISIT DISCLAIMER    Geno Trejo acknowledges that she has consented to an online visit or consultation  She understands that the online visit is based solely on information provided by her, and that, in the absence of a face-to-face physical evaluation by the physician, the diagnosis she receives is both limited and provisional in terms of accuracy and completeness  This is not intended to replace a full medical face-to-face evaluation by the physician  Jericho Dorado understands and accepts these terms

## 2020-07-27 NOTE — LETTER
August 1, 2020     Patient: Yari Garcia   YOB: 1975   Date of Visit: 7/27/2020       To Whom it May Concern:    Kaycee Triana is under my professional care  She was seen in our office on 7/27/2020  She tested negative for COVID-19  She may return to work without limitations  If you have any questions or concerns, please don't hesitate to call           Sincerely,          Kaushik Wheeler MD      CC: No Recipients

## 2020-07-28 LAB — SARS-COV-2 RNA SPEC QL NAA+PROBE: NOT DETECTED

## 2020-07-31 ENCOUNTER — TELEPHONE (OUTPATIENT)
Dept: INTERNAL MEDICINE CLINIC | Facility: CLINIC | Age: 45
End: 2020-07-31

## 2020-07-31 NOTE — TELEPHONE ENCOUNTER
The patient needs a letter stating that she may return to work  Since she tested negative for the covid-19  Patient is asking for us to email her the letter once it is done  email address on file  Thank you

## 2020-08-24 ENCOUNTER — APPOINTMENT (EMERGENCY)
Dept: CT IMAGING | Facility: HOSPITAL | Age: 45
End: 2020-08-24
Payer: COMMERCIAL

## 2020-08-24 ENCOUNTER — OFFICE VISIT (OUTPATIENT)
Dept: INTERNAL MEDICINE CLINIC | Facility: CLINIC | Age: 45
End: 2020-08-24

## 2020-08-24 ENCOUNTER — HOSPITAL ENCOUNTER (EMERGENCY)
Facility: HOSPITAL | Age: 45
Discharge: HOME | End: 2020-08-24
Attending: EMERGENCY MEDICINE | Admitting: EMERGENCY MEDICINE
Payer: COMMERCIAL

## 2020-08-24 VITALS
SYSTOLIC BLOOD PRESSURE: 118 MMHG | WEIGHT: 211.2 LBS | TEMPERATURE: 98.3 F | DIASTOLIC BLOOD PRESSURE: 78 MMHG | BODY MASS INDEX: 36.25 KG/M2 | HEART RATE: 92 BPM | OXYGEN SATURATION: 98 %

## 2020-08-24 VITALS
DIASTOLIC BLOOD PRESSURE: 69 MMHG | SYSTOLIC BLOOD PRESSURE: 125 MMHG | TEMPERATURE: 98.5 F | WEIGHT: 211.21 LBS | RESPIRATION RATE: 20 BRPM | OXYGEN SATURATION: 100 % | HEART RATE: 86 BPM | BODY MASS INDEX: 36.25 KG/M2

## 2020-08-24 DIAGNOSIS — R10.9 RIGHT FLANK PAIN: Primary | ICD-10-CM

## 2020-08-24 DIAGNOSIS — R10.9 FLANK PAIN: ICD-10-CM

## 2020-08-24 LAB
ALBUMIN SERPL BCP-MCNC: 3.9 G/DL (ref 3.5–5)
ALP SERPL-CCNC: 67 U/L (ref 46–116)
ALT SERPL W P-5'-P-CCNC: 33 U/L (ref 12–78)
ANION GAP SERPL CALCULATED.3IONS-SCNC: 7 MMOL/L (ref 4–13)
AST SERPL W P-5'-P-CCNC: 19 U/L (ref 5–45)
BACTERIA UR QL AUTO: ABNORMAL /HPF
BASOPHILS # BLD AUTO: 0.08 THOUSANDS/ΜL (ref 0–0.1)
BASOPHILS NFR BLD AUTO: 1 % (ref 0–1)
BILIRUB SERPL-MCNC: 0.16 MG/DL (ref 0.2–1)
BILIRUB UR QL STRIP: NEGATIVE
BUN SERPL-MCNC: 12 MG/DL (ref 5–25)
CALCIUM SERPL-MCNC: 9.4 MG/DL (ref 8.3–10.1)
CHLORIDE SERPL-SCNC: 101 MMOL/L (ref 100–108)
CLARITY UR: ABNORMAL
CO2 SERPL-SCNC: 28 MMOL/L (ref 21–32)
COLOR UR: YELLOW
CREAT SERPL-MCNC: 1.16 MG/DL (ref 0.6–1.3)
EOSINOPHIL # BLD AUTO: 0.14 THOUSAND/ΜL (ref 0–0.61)
EOSINOPHIL NFR BLD AUTO: 2 % (ref 0–6)
ERYTHROCYTE [DISTWIDTH] IN BLOOD BY AUTOMATED COUNT: 16.2 % (ref 11.6–15.1)
GFR SERPL CREATININE-BSD FRML MDRD: 58 ML/MIN/1.73SQ M
GLUCOSE SERPL-MCNC: 102 MG/DL (ref 65–140)
GLUCOSE UR STRIP-MCNC: NEGATIVE MG/DL
HCT VFR BLD AUTO: 38 % (ref 34.8–46.1)
HGB BLD-MCNC: 11.6 G/DL (ref 11.5–15.4)
HGB UR QL STRIP.AUTO: NEGATIVE
IMM GRANULOCYTES # BLD AUTO: 0.03 THOUSAND/UL (ref 0–0.2)
IMM GRANULOCYTES NFR BLD AUTO: 0 % (ref 0–2)
KETONES UR STRIP-MCNC: NEGATIVE MG/DL
LEUKOCYTE ESTERASE UR QL STRIP: ABNORMAL
LYMPHOCYTES # BLD AUTO: 2.62 THOUSANDS/ΜL (ref 0.6–4.47)
LYMPHOCYTES NFR BLD AUTO: 30 % (ref 14–44)
MCH RBC QN AUTO: 23.6 PG (ref 26.8–34.3)
MCHC RBC AUTO-ENTMCNC: 30.5 G/DL (ref 31.4–37.4)
MCV RBC AUTO: 77 FL (ref 82–98)
MONOCYTES # BLD AUTO: 1 THOUSAND/ΜL (ref 0.17–1.22)
MONOCYTES NFR BLD AUTO: 11 % (ref 4–12)
NEUTROPHILS # BLD AUTO: 5.02 THOUSANDS/ΜL (ref 1.85–7.62)
NEUTS SEG NFR BLD AUTO: 56 % (ref 43–75)
NITRITE UR QL STRIP: NEGATIVE
NON-SQ EPI CELLS URNS QL MICRO: ABNORMAL /HPF
NRBC BLD AUTO-RTO: 0 /100 WBCS
PH UR STRIP.AUTO: 7 [PH] (ref 4.5–8)
PLATELET # BLD AUTO: 314 THOUSANDS/UL (ref 149–390)
PMV BLD AUTO: 10.4 FL (ref 8.9–12.7)
POTASSIUM SERPL-SCNC: 3.8 MMOL/L (ref 3.5–5.3)
PROT SERPL-MCNC: 8.3 G/DL (ref 6.4–8.2)
PROT UR STRIP-MCNC: NEGATIVE MG/DL
RBC # BLD AUTO: 4.92 MILLION/UL (ref 3.81–5.12)
RBC #/AREA URNS AUTO: ABNORMAL /HPF
SL AMB  POCT GLUCOSE, UA: NORMAL
SL AMB LEUKOCYTE ESTERASE,UA: NORMAL
SL AMB POCT BILIRUBIN,UA: NORMAL
SL AMB POCT BLOOD,UA: NORMAL
SL AMB POCT CLARITY,UA: NORMAL
SL AMB POCT COLOR,UA: YELLOW
SL AMB POCT KETONES,UA: NORMAL
SL AMB POCT NITRITE,UA: NORMAL
SL AMB POCT PH,UA: 6
SL AMB POCT SPECIFIC GRAVITY,UA: 1
SL AMB POCT URINE PROTEIN: NORMAL
SL AMB POCT UROBILINOGEN: NORMAL
SODIUM SERPL-SCNC: 136 MMOL/L (ref 136–145)
SP GR UR STRIP.AUTO: 1.01 (ref 1–1.03)
UROBILINOGEN UR QL STRIP.AUTO: 0.2 E.U./DL
WBC # BLD AUTO: 8.89 THOUSAND/UL (ref 4.31–10.16)
WBC #/AREA URNS AUTO: ABNORMAL /HPF

## 2020-08-24 PROCEDURE — 74176 CT ABD & PELVIS W/O CONTRAST: CPT

## 2020-08-24 PROCEDURE — 87086 URINE CULTURE/COLONY COUNT: CPT | Performed by: PHYSICIAN ASSISTANT

## 2020-08-24 PROCEDURE — 99284 EMERGENCY DEPT VISIT MOD MDM: CPT

## 2020-08-24 PROCEDURE — 85025 COMPLETE CBC W/AUTO DIFF WBC: CPT | Performed by: PHYSICIAN ASSISTANT

## 2020-08-24 PROCEDURE — 81002 URINALYSIS NONAUTO W/O SCOPE: CPT | Performed by: INTERNAL MEDICINE

## 2020-08-24 PROCEDURE — 80053 COMPREHEN METABOLIC PANEL: CPT | Performed by: PHYSICIAN ASSISTANT

## 2020-08-24 PROCEDURE — 96374 THER/PROPH/DIAG INJ IV PUSH: CPT

## 2020-08-24 PROCEDURE — 1036F TOBACCO NON-USER: CPT | Performed by: INTERNAL MEDICINE

## 2020-08-24 PROCEDURE — 81001 URINALYSIS AUTO W/SCOPE: CPT

## 2020-08-24 PROCEDURE — 96361 HYDRATE IV INFUSION ADD-ON: CPT

## 2020-08-24 PROCEDURE — 99285 EMERGENCY DEPT VISIT HI MDM: CPT | Performed by: PHYSICIAN ASSISTANT

## 2020-08-24 PROCEDURE — 99213 OFFICE O/P EST LOW 20 MIN: CPT | Performed by: INTERNAL MEDICINE

## 2020-08-24 PROCEDURE — G1004 CDSM NDSC: HCPCS

## 2020-08-24 PROCEDURE — 36415 COLL VENOUS BLD VENIPUNCTURE: CPT | Performed by: PHYSICIAN ASSISTANT

## 2020-08-24 RX ORDER — CYCLOBENZAPRINE HCL 5 MG
TABLET ORAL
Qty: 12 TABLET | Refills: 0 | Status: SHIPPED | OUTPATIENT
Start: 2020-08-24 | End: 2021-03-16 | Stop reason: ALTCHOICE

## 2020-08-24 RX ORDER — FENTANYL CITRATE 50 UG/ML
50 INJECTION, SOLUTION INTRAMUSCULAR; INTRAVENOUS ONCE
Status: COMPLETED | OUTPATIENT
Start: 2020-08-24 | End: 2020-08-24

## 2020-08-24 RX ORDER — OMEPRAZOLE 20 MG/1
20 CAPSULE, DELAYED RELEASE ORAL DAILY
COMMUNITY
End: 2020-09-17 | Stop reason: ALTCHOICE

## 2020-08-24 RX ADMIN — SODIUM CHLORIDE 1000 ML: 0.9 INJECTION, SOLUTION INTRAVENOUS at 18:30

## 2020-08-24 RX ADMIN — FENTANYL CITRATE 50 MCG: 50 INJECTION, SOLUTION INTRAMUSCULAR; INTRAVENOUS at 18:31

## 2020-08-24 NOTE — PROGRESS NOTES
INTERNAL MEDICINE FOLLOW-UP OFFICE VISIT  [facility]  [address] [place] [address]    NAME: Triny Hammond  AGE: 38 y o  SEX: female    DATE OF ENCOUNTER: 8/24/2020    Assessment and Plan     1  Right flank pain  Patient source 2 week history of right-sided flank pain, denies any dysuria, hematuria, fever, chills  Point of care urine dipstick showed RBCs  Patient will need to be evaluated in the ED with CT scan the abdomen given her history of left-sided nephrectomy due to kidney stones  Can evaluate renal function, if stable, recommend anti-inflammatory medication including Toradol  She will also need urology evaluation follow-up  Follow-up 2 weeks after being evaluated  - POCT urine dip  - Transfer to other facility    Orders Placed This Encounter   Procedures    POCT urine dip    Transfer to other facility       Chief Complaint     Chief Complaint   Patient presents with    Back Pain     right side kidney pain       History of Present Illness     71-year-old female with past medical history of migraines, hyperlipidemia, nephrolithiasis status post nephrectomy on the left who is presenting to clinic with right-sided flank pain  Stated that pain started approximately 2 weeks ago, as sharp radiating to her lower abdomen  She denies any gross hematuria, dysuria, fever, chills  She states that the pains feel similar to what she felt prior to her nephrectomy  She denies taking any pain medication as she states that in the past it has not helped  Pain is currently 8/10  Denies any chest pain, shortness of breath  The following portions of the patient's history were reviewed and updated as appropriate: allergies, current medications, past family history, past medical history, past social history, past surgical history and problem list     Review of Systems       ROS  CONSTITUTIONAL: Denies any fever, chills, rigors, and weight loss    HEENT: No earache or tinnitus  Denies hearing loss  CARDIOVASCULAR: No chest pain or palpitations  RESPIRATORY: Denies any cough, hemoptysis, shortness of breath or dyspnea on exertion  GASTROINTESTINAL: Denies abdominal pain, nausea, vomitng   NEUROLOGIC: No dizziness or vertigo, denies headaches  MUSCULOSKELETAL: Denies any muscle or joint pain  Genitourinary:   Positive for flank pain  SKIN: Denies skin rashes or itching  All other systems reviewed and were negative      Medical History     Past Medical History:   Diagnosis Date    Renal calculi     Renal disorder     Toe fracture, left      Past Surgical History:   Procedure Laterality Date    CHOLECYSTECTOMY LAPAROSCOPIC N/A 12/10/2018    Procedure: CHOLECYSTECTOMY LAPAROSCOPIC; INCISIONAL HERNIA REPAIR;  Surgeon: Kady Sheridan MD;  [facility];  Service: General    KIDNEY SURGERY  2013    removed L kidney from damage from kidney stone [place]FOR[place]    TUBAL LIGATION      AGE 32       Current Outpatient Medications:     calcium-vitamin D (OSCAL) 250-125 MG-UNIT per tablet, Take 1 tablet by mouth 2 (two) times a day, Disp: 60 tablet, Rfl: 2    omeprazole (PriLOSEC) 20 mg delayed release capsule, Take 20 mg by mouth daily, Disp: , Rfl:     pantoprazole (PROTONIX) 20 mg tablet, Take 1 tablet (20 mg total) by mouth daily before breakfast, Disp: 30 tablet, Rfl: 0   Family History   Problem Relation Age of Onset    Diabetes Mother     Hyperlipidemia Mother     Hypertension Mother     Other Mother         low back pain    Heart disease Mother     Diabetes Father     No Known Problems Sister     No Known Problems Brother     No Known Problems Daughter     No Known Problems Son     Kidney failure Maternal Grandmother         on dialysis      Social History     Socioeconomic History    Marital status: Single     Spouse name: None    Number of children: 2    Years of education: None    Highest education level: None   Occupational History Comment: unemployed, not looking for work   Social Needs    Financial resource strain: None    Food insecurity     Worry: None     Inability: None    Transportation needs     Medical: None     Non-medical: None   Tobacco Use    Smoking status: Never Smoker    Smokeless tobacco: Never Used   Substance and Sexual Activity    Alcohol use: No    Drug use: No    Sexual activity: Yes     Partners: Male     Birth control/protection: Female Sterilization   Lifestyle    Physical activity     Days per week: None     Minutes per session: None    Stress: None   Relationships    Social connections     Talks on phone: None     Gets together: None     Attends Orthodoxy service: None     Active member of club or organization: None     Attends meetings of clubs or organizations: None     Relationship status: None    Intimate partner violence     Fear of current or ex partner: None     Emotionally abused: None     Physically abused: None     Forced sexual activity: None   Other Topics Concern    None   Social History Narrative    Caffeine use-1 cup of coffee daily    Does not exercise      No Known Allergies     Active Problem List     Patient Active Problem List   Diagnosis    Astigmatism of both eyes    Double vision    Dyspepsia    Low iron stores    Low mean corpuscular volume (MCV)    Mixed hyperlipidemia    Myopia of both eyes    Obesity (BMI 30-39  9)    Migraine with aura and with status migrainosus, not intractable    Pain of right upper extremity    Right flank pain    Status post nephrectomy    ASCUS with positive high risk HPV cervical       Objective     /78 (BP Location: Right arm, Patient Position: Sitting, Cuff Size: Large)   Pulse 92   Temp 98 3 °F (36 8 °C) (Temporal)   Wt 95 8 kg (211 lb 3 2 oz)   SpO2 98%   BMI 36 25 kg/m²     Physical Exam  Constitutional:       General: She is not in acute distress  Appearance: Normal appearance  She is not ill-appearing     HENT: Head: Normocephalic and atraumatic  Mouth/Throat:      Mouth: Mucous membranes are moist    Eyes:      Extraocular Movements: Extraocular movements intact  Conjunctiva/sclera: Conjunctivae normal       Pupils: Pupils are equal, round, and reactive to light  Cardiovascular:      Rate and Rhythm: Normal rate and regular rhythm  Pulses: Normal pulses  Heart sounds: Normal heart sounds  No murmur  No friction rub  No gallop  Pulmonary:      Effort: Pulmonary effort is normal  No respiratory distress  Breath sounds: Normal breath sounds  No wheezing or rales  Abdominal:      General: Abdomen is flat  Bowel sounds are normal  There is no distension  Palpations: Abdomen is soft  Tenderness: There is no abdominal tenderness  There is no guarding  Musculoskeletal:      Right lower leg: No edema  Left lower leg: No edema  Comments: Positive CVA tenderness on the right  Skin:     General: Skin is warm and dry  Neurological:      General: No focal deficit present  Mental Status: She is alert and oriented to person, place, and time     Psychiatric:         Mood and Affect: Mood normal          Behavior: Behavior normal           Pertinent Laboratory/Diagnostic Studies:  CBC:   Lab Results   Component Value Date/Time    WBC 8 36 07/29/2019 10:20 PM    RBC 4 33 07/29/2019 10:20 PM    HGB 10 6 (L) 07/29/2019 10:20 PM    HCT 34 2 (L) 07/29/2019 10:20 PM    MCV 79 (L) 07/29/2019 10:20 PM    MCH 24 5 (L) 07/29/2019 10:20 PM    MCHC 31 0 (L) 07/29/2019 10:20 PM    RDW 15 6 (H) 07/29/2019 10:20 PM    MPV 10 6 07/29/2019 10:20 PM     07/29/2019 10:20 PM    NRBC 0 07/29/2019 10:20 PM    NEUTOPHILPCT 56 07/29/2019 10:20 PM    LYMPHOPCT 30 07/29/2019 10:20 PM    MONOPCT 11 07/29/2019 10:20 PM    EOSPCT 2 07/29/2019 10:20 PM    BASOPCT 1 07/29/2019 10:20 PM    NEUTROABS 4 64 07/29/2019 10:20 PM    LYMPHSABS 2 53 07/29/2019 10:20 PM    MONOSABS 0 93 07/29/2019 10:20 PM EOSABS 0 19 07/29/2019 10:20 PM     Chemistry Profile:   Lab Results   Component Value Date/Time    K 3 8 07/29/2019 10:20 PM     07/29/2019 10:20 PM    CO2 27 07/29/2019 10:20 PM    BUN 15 07/29/2019 10:20 PM    CREATININE 0 97 07/29/2019 10:20 PM    GLUC 100 07/29/2019 10:20 PM    GLUF 90 12/22/2017 09:27 AM    CALCIUM 9 3 07/29/2019 10:20 PM    AST 17 07/29/2019 10:20 PM    ALT 28 07/29/2019 10:20 PM    ALKPHOS 71 07/29/2019 10:20 PM    EGFR 72 07/29/2019 10:20 PM     Endocrine Studies:   Lab Results   Component Value Date/Time    HGBA1C 5 7 08/24/2019 08:44 AM    CGE4FRXENLAH 0 977 12/22/2017 09:27 AM    TRIG 153 (H) 12/22/2017 09:27 AM    CHOLESTEROL 240 (H) 12/22/2017 09:27 AM    HDL 51 12/22/2017 09:27 AM    LDLCALC 158 (H) 12/22/2017 09:27 AM       Current Medications     Current Outpatient Medications:     calcium-vitamin D (OSCAL) 250-125 MG-UNIT per tablet, Take 1 tablet by mouth 2 (two) times a day, Disp: 60 tablet, Rfl: 2    omeprazole (PriLOSEC) 20 mg delayed release capsule, Take 20 mg by mouth daily, Disp: , Rfl:     pantoprazole (PROTONIX) 20 mg tablet, Take 1 tablet (20 mg total) by mouth daily before breakfast, Disp: 30 tablet, Rfl: 0    Health Maintenance     Health Maintenance   Topic Date Due    MAMMOGRAM  1975    HIV Screening  10/03/1991    Influenza Vaccine  07/01/2020    Depression Screening PHQ  10/10/2020    Annual Physical  10/10/2020    BMI: Followup Plan  04/23/2021    BMI: Adult  08/24/2021    Cervical Cancer Screening  10/10/2024    DTaP,Tdap,and Td Vaccines (2 - Td) 01/12/2028    Pneumococcal Vaccine: Pediatrics (0 to 5 Years) and At-Risk Patients (6 to 59 Years)  Aged Out    HIB Vaccine  Aged Out    Hepatitis B Vaccine  Aged Out    IPV Vaccine  Aged Out    Hepatitis A Vaccine  Aged Out    Meningococcal ACWY Vaccine  Aged Out    HPV Vaccine  Aged [employer] History   Administered Date(s) Administered    INFLUENZA 01/12/2018    Influenza Quadrivalent, 6-35 Months IM 01/12/2018    Tdap 01/12/2018    Tuberculin Skin Test-PPD Intradermal 05/08/2018       Is this patient a diabetic? - No  If not contraindicated, are they prescribed metformin?-  No    Efe Nilda DIGGS    Internal Medicine PGY-1  8/24/2020 4:56 PM

## 2020-08-24 NOTE — PATIENT INSTRUCTIONS
Please be evaluated in ED for nephrolithiasis  Flank Pain   WHAT YOU NEED TO KNOW:   Flank pain is felt in the area below your ribcage and above your hip bones, often in the lower back  Your pain may be dull or so severe that you cannot get comfortable  The pain may stay in one area or radiate to another area  It may worsen and lighten in waves  Flank pain is often a sign of problems with your urinary tract, such as a kidney stone or infection  DISCHARGE INSTRUCTIONS:   Return to the emergency department if:   · You have a fever  · Your heart is fluttering or jumping  · You see blood in your urine  · Your pain radiates into your lower abdomen and genital area  · You have intense pain in your low back next to your spine  · You are much more tired than usual and have no desire to eat  · You have a headache and your muscles jerk  Contact your healthcare provider if:   · You have an upset stomach and are vomiting  · You have to urinate more often, and with urgency  · Your pain worsens or does not improve, and you cannot get comfortable  · You pass a stone when you urinate  · You have questions or concerns about your condition or care  Medicines: The following medicines may be ordered for you:  · Pain medicine  may help decrease or relieve your pain  Do not wait until the pain is severe before you take your medicine  · Antibiotics  may help treat a urinary tract infection caused by bacteria  · Take your medicine as directed  Contact your healthcare provider if you think your medicine is not helping or if you have side effects  Tell him of her if you are allergic to any medicine  Keep a list of the medicines, vitamins, and herbs you take  Include the amounts, and when and why you take them  Bring the list or the pill bottles to follow-up visits  Carry your medicine list with you in case of an emergency    Follow up with your healthcare provider in 1 to 2 days or as directed:  Write down your questions so you remember to ask them during your visits  © 2017 [address] [employer] Information is for End User's use only and may not be sold, redistributed or otherwise used for commercial purposes  All illustrations and images included in CareNotes® are the copyrighted property of A D [employer] M [employer] Inc  or Rakesh Swann  The above information is an  only  It is not intended as medical advice for individual conditions or treatments  Talk to your doctor, nurse or pharmacist before following any medical regimen to see if it is safe and effective for you

## 2020-08-25 NOTE — ED NOTES
PT awake and alert, no distress noted  No other questions upon d/c       April Karli Gold RN  08/24/20 2022

## 2020-08-26 LAB — BACTERIA UR CULT: ABNORMAL

## 2020-08-26 NOTE — ED PROVIDER NOTES
History  Chief Complaint   Patient presents with    Flank Pain     Patient states "[facility] and told have stone in right kidney" presents today for increased pain  Patient reports haveing left kidney removed 6 years ago  56-year-old male presents the emergency department with complaints of right-sided flank pain  States she has been having intermittent strong an aching pain in the right flank  States that she has history of a left-sided staghorn calculi and [place]  Reports that the pain she is experiencing on the right side is similar to the pain she experienced prior to having her left kidney removed  She denies any urinary symptoms in his time  Has not noticed any blood in her urine  States that symptoms are not exacerbated with movement  History provided by:  Patient   used: No    Flank Pain   Pain location:  R flank  Pain quality: aching    Pain radiates to:  Does not radiate  Pain severity:  Moderate  Onset quality:  Gradual  Timing:  Intermittent  Progression:  Waxing and waning  Chronicity:  New  Context: not alcohol use, not awakening from sleep, not diet changes, not eating, not laxative use, not medication withdrawal, not previous surgeries, not recent illness, not recent sexual activity, not recent travel, not retching, not sick contacts, not suspicious food intake and not trauma    Relieved by:  Nothing  Ineffective treatments:  None tried  Associated symptoms: no anorexia, no belching, no chest pain, no chills, no constipation, no cough, no diarrhea, no dysuria, no fatigue, no fever, no flatus, no hematemesis, no hematochezia, no hematuria, no melena, no nausea, no shortness of breath, no sore throat, no vaginal bleeding, no vaginal discharge and no vomiting        Prior to Admission Medications   Prescriptions Last Dose Informant Patient Reported?  Taking?   calcium-vitamin D (OSCAL) 250-125 MG-UNIT per tablet   No No   Sig: Take 1 tablet by mouth 2 (two) times a day   omeprazole (PriLOSEC) 20 mg delayed release capsule  Self Yes No   Sig: Take 20 mg by mouth daily   pantoprazole (PROTONIX) 20 mg tablet   No No   Sig: Take 1 tablet (20 mg total) by mouth daily before breakfast      Facility-Administered Medications: None       Past Medical History:   Diagnosis Date    Renal calculi     Renal disorder     Toe fracture, left        Past Surgical History:   Procedure Laterality Date    CHOLECYSTECTOMY LAPAROSCOPIC N/A 12/10/2018    Procedure: CHOLECYSTECTOMY LAPAROSCOPIC; [address];  Surgeon: Napoleon Mas MD;  [facility];  Service: General    KIDNEY SURGERY  2013    [place] L kidney [place]    TUBAL LIGATION      AGE 33       Family History   Problem Relation Age of Onset    Diabetes Mother     Hyperlipidemia Mother     Hypertension Mother     Other Mother         low back pain    Heart disease Mother     Diabetes Father     No Known Problems Sister     No Known Problems Brother     No Known Problems Daughter     No Known Problems Son     Kidney failure Maternal Grandmother         on dialysis     I have reviewed and agree with the history as documented  E-Cigarette/Vaping    E-Cigarette Use Never User      E-Cigarette/Vaping Substances     Social History     Tobacco Use    Smoking status: Never Smoker    Smokeless tobacco: Never Used   Substance Use Topics    Alcohol use: No    Drug use: No       Review of Systems   Constitutional: Negative for activity change, appetite change, chills, fatigue and fever  HENT: Negative for congestion, dental problem, drooling, ear discharge, ear pain, mouth sores, nosebleeds, rhinorrhea, sore throat and trouble swallowing  Eyes: Negative for pain, discharge and itching  Respiratory: Negative for cough, chest tightness, shortness of breath and wheezing      Cardiovascular: Negative for chest pain and palpitations  Gastrointestinal: Negative for abdominal pain, anorexia, blood in stool, constipation, diarrhea, flatus, hematemesis, hematochezia, melena, nausea and vomiting  Endocrine: Negative for cold intolerance and heat intolerance  Genitourinary: Positive for flank pain  Negative for difficulty urinating, dysuria, frequency, hematuria, urgency, vaginal bleeding and vaginal discharge  Skin: Negative for rash and wound  Allergic/Immunologic: Negative for food allergies and immunocompromised state  Neurological: Negative for dizziness, seizures, syncope, weakness, numbness and headaches  Psychiatric/Behavioral: Negative for agitation, behavioral problems and confusion  Physical Exam  Physical Exam  Vitals signs and nursing note reviewed  Constitutional:       General: She is not in acute distress  Appearance: She is not diaphoretic  HENT:      Head: Normocephalic and atraumatic  Right Ear: External ear normal       Left Ear: External ear normal       Mouth/Throat:      Pharynx: No oropharyngeal exudate  Eyes:      Conjunctiva/sclera: Conjunctivae normal    Neck:      Vascular: No JVD  Trachea: No tracheal deviation  Cardiovascular:      Rate and Rhythm: Normal rate and regular rhythm  Heart sounds: Normal heart sounds  No murmur  No friction rub  No gallop  Pulmonary:      Effort: Pulmonary effort is normal  No respiratory distress  Breath sounds: Normal breath sounds  No wheezing or rales  Chest:      Chest wall: No tenderness  Abdominal:      General: Bowel sounds are normal  There is no distension  Palpations: Abdomen is soft  Tenderness: There is no abdominal tenderness  There is right CVA tenderness  There is no guarding  Musculoskeletal: Normal range of motion  General: No tenderness or deformity  Lymphadenopathy:      Cervical: No cervical adenopathy  Skin:     General: Skin is warm and dry  Findings: No erythema or rash  Neurological:      Mental Status: She is alert and oriented to person, place, and time     Psychiatric:         Mood and Affect: Mood normal          Behavior: Behavior normal          Vital Signs  ED Triage Vitals   Temperature Pulse Respirations Blood Pressure SpO2   08/24/20 1746 08/24/20 1746 08/24/20 1746 08/24/20 1746 08/24/20 1746   98 5 °F (36 9 °C) 100 20 135/79 99 %      Temp Source Heart Rate Source Patient Position - Orthostatic VS BP Location FiO2 (%)   08/24/20 1746 08/24/20 1746 08/24/20 1746 08/24/20 1746 --   Oral Monitor Sitting Right arm       Pain Score       08/24/20 1831       7           Vitals:    08/24/20 1746 08/24/20 2016   BP: 135/79 125/69   Pulse: 100 86   Patient Position - Orthostatic VS: Sitting Lying         Visual Acuity      ED Medications  Medications   sodium chloride 0 9 % bolus 1,000 mL (0 mL Intravenous Stopped 8/24/20 2014)   fentanyl citrate (PF) 100 MCG/2ML 50 mcg (50 mcg Intravenous Given 8/24/20 1831)       Diagnostic Studies  Results Reviewed     Procedure Component Value Units Date/Time    Urine culture [[id number]]  (Abnormal) Collected:  08/24/20 2014    Lab Status:  Final result Specimen:  Urine, Clean Catch Updated:  08/26/20 0824     Urine Culture 50,000-59,000 cfu/ml Lactobacillus species    Urine Microscopic [[id number]]  (Abnormal) Collected:  08/24/20 1838    Lab Status:  Final result Specimen:  Urine, Clean Catch Updated:  08/24/20 1941     RBC, UA 0-1 /hpf      WBC, UA 0-1 /hpf      Epithelial Cells Occasional /hpf      Bacteria, UA Occasional /hpf     Comprehensive metabolic panel [[id number]]  (Abnormal) Collected:  08/24/20 1824    Lab Status:  Final result Specimen:  Blood from Arm, Right Updated:  08/24/20 1900     Sodium 136 mmol/L      Potassium 3 8 mmol/L      Chloride 101 mmol/L      CO2 28 mmol/L      ANION GAP 7 mmol/L      BUN 12 mg/dL      Creatinine 1 16 mg/dL      Glucose 102 mg/dL      Calcium 9 4 mg/dL      AST 19 U/L      ALT 33 U/L      Alkaline Phosphatase 67 U/L      Total Protein 8 3 g/dL      Albumin 3 9 g/dL      Total Bilirubin 0 16 mg/dL      eGFR 58 ml/min/1 73sq m     Narrative:       [place] guidelines for Chronic Kidney Disease (CKD):     Stage 1 with normal or high GFR (GFR > 90 mL/min/1 73 square meters)    Stage 2 Mild CKD (GFR = 60-89 mL/min/1 73 square meters)    Stage 3A Moderate CKD (GFR = 45-59 mL/min/1 73 square meters)    Stage 3B Moderate CKD (GFR = 30-44 mL/min/1 73 square meters)    Stage 4 Severe CKD (GFR = 15-29 mL/min/1 73 square meters)    Stage 5 End Stage CKD (GFR <15 mL/min/1 73 square meters)  Note: GFR calculation is accurate only with a steady state creatinine    Urine Macroscopic, POC [[id number]]  (Abnormal) Collected:  08/24/20 1838    Lab Status:  Final result Specimen:  Urine Updated:  08/24/20 1840     Color, UA Yellow     Clarity, UA Slightly Cloudy     pH, UA 7 0     Leukocytes, UA Small     Nitrite, UA Negative     Protein, UA Negative mg/dl      Glucose, UA Negative mg/dl      Ketones, UA Negative mg/dl      Urobilinogen, UA 0 2 E U /dl      Bilirubin, UA Negative     Blood, UA Negative     Specific Gravity, UA 1 015    Narrative:       CLINITEK RESULT    CBC and differential [[id number]]  (Abnormal) Collected:  08/24/20 1824    Lab Status:  Final result Specimen:  Blood from Arm, Right Updated:  08/24/20 1834     WBC 8 89 Thousand/uL      RBC 4 92 Million/uL      Hemoglobin 11 6 g/dL      Hematocrit 38 0 %      MCV 77 fL      MCH 23 6 pg      MCHC 30 5 g/dL      RDW 16 2 %      MPV 10 4 fL      Platelets 936 Thousands/uL      nRBC 0 /100 WBCs      Neutrophils Relative 56 %      Immat GRANS % 0 %      Lymphocytes Relative 30 %      Monocytes Relative 11 %      Eosinophils Relative 2 %      Basophils Relative 1 %      Neutrophils Absolute 5 02 Thousands/µL      Immature Grans Absolute 0 03 Thousand/uL      Lymphocytes Absolute 2 62 Thousands/µL Monocytes Absolute 1 00 Thousand/µL      Eosinophils Absolute 0 14 Thousand/µL      Basophils Absolute 0 08 Thousands/µL                  CT renal stone study abdomen pelvis without contrast   Final Result by Ez Khalil MD (08/24 2001)      1  No evidence of hydronephrosis or obstructing calculus  2   Post left nephrectomy with stable postoperative appearance  3   Cholecystectomy  4   Left adnexal cystic lesion, inadequately evaluated  Workstation performed: [id number]                    Procedures  Procedures         ED Course       US AUDIT      Most Recent Value   Initial Alcohol Screen: US AUDIT-C    1  How often do you have a drink containing alcohol?  0 Filed at: 08/24/2020 1759   2  How many drinks containing alcohol do you have on a typical day you are drinking? 0 Filed at: 08/24/2020 1759   3b  FEMALE Any Age, or MALE 65+: How often do you have 4 or more drinks on one occassion? 0 Filed at: 08/24/2020 1759   Audit-C Score  0 Filed at: 08/24/2020 1759                  TEE/DAST-10      Most Recent Value   How many times in the past year have you    Used an illegal drug or used a prescription medication for non-medical reasons?   Never Filed at: 08/24/2020 1759                                MDM  Number of Diagnoses or Management Options  Flank pain:   Right flank pain:   Diagnosis management comments: Differential diagnosis includes but not limited to:  Kidney stone, pyelonephritis, hydronephrosis, musculoskeletal pain, renal cyst          Amount and/or Complexity of Data Reviewed  Clinical lab tests: ordered and reviewed  Tests in the radiology section of CPT®: ordered and reviewed  Independent visualization of images, tracings, or specimens: yes          Disposition  Final diagnoses:   Right flank pain   Flank pain     Time reflects when diagnosis was documented in both MDM as applicable and the Disposition within this note     Time User Action Codes Description Comment    8/24/2020 8:05 PM Nehemiah Ashby Add [R10 9] Right flank pain     8/24/2020  8:07 PM Nehemiah Ashby Add [R10 9] Flank pain       ED Disposition     ED Disposition Condition Date/Time Comment    Discharge Stable Mon Aug 24, 2020  8:05 PM Rashid Sood discharge to home/self care  Follow-up Information     Follow up With Specialties Details Why Contact Info Additional [address] [facility] Schedule an appointment as soon as possible for a visit   [address] [phone]  [phone]  [facility] [place], [address], [place], [address] [facility]          Discharge Medication List as of 8/24/2020  8:13 PM      START taking these medications    Details   cyclobenzaprine (FLEXERIL) 5 mg tablet 1-2 PO TID PRN, Normal         CONTINUE these medications which have NOT CHANGED    Details   calcium-vitamin D (OSCAL) 250-125 MG-UNIT per tablet Take 1 tablet by mouth 2 (two) times a day, Starting Mon 1/28/2019, Normal      omeprazole (PriLOSEC) 20 mg delayed release capsule Take 20 mg by mouth daily, Historical Med      pantoprazole (PROTONIX) 20 mg tablet Take 1 tablet (20 mg total) by mouth daily before breakfast, Starting Thu 4/23/2020, Until Sat 5/23/2020, Normal           No discharge procedures on file      PDMP Review     None          ED Provider  Electronically Signed by           Aruna Mcfarland PA-C  08/26/20 3269

## 2020-09-17 ENCOUNTER — OFFICE VISIT (OUTPATIENT)
Dept: INTERNAL MEDICINE CLINIC | Facility: CLINIC | Age: 45
End: 2020-09-17

## 2020-09-17 ENCOUNTER — TELEPHONE (OUTPATIENT)
Dept: INTERNAL MEDICINE CLINIC | Facility: CLINIC | Age: 45
End: 2020-09-17

## 2020-09-17 VITALS
OXYGEN SATURATION: 97 % | WEIGHT: 212.08 LBS | TEMPERATURE: 97.5 F | DIASTOLIC BLOOD PRESSURE: 82 MMHG | SYSTOLIC BLOOD PRESSURE: 140 MMHG | BODY MASS INDEX: 36.4 KG/M2 | HEART RATE: 90 BPM

## 2020-09-17 DIAGNOSIS — R10.13 DYSPEPSIA: Primary | ICD-10-CM

## 2020-09-17 DIAGNOSIS — R10.9 RIGHT FLANK PAIN: ICD-10-CM

## 2020-09-17 DIAGNOSIS — Z90.5 STATUS POST NEPHRECTOMY: ICD-10-CM

## 2020-09-17 PROCEDURE — 99213 OFFICE O/P EST LOW 20 MIN: CPT | Performed by: INTERNAL MEDICINE

## 2020-09-17 RX ORDER — FAMOTIDINE 20 MG/1
20 TABLET, FILM COATED ORAL DAILY PRN
Qty: 90 TABLET | Refills: 2 | Status: SHIPPED | OUTPATIENT
Start: 2020-09-17 | End: 2022-04-01 | Stop reason: SDUPTHER

## 2020-09-17 NOTE — PROGRESS NOTES
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  Form completed, signed and given to patient  She has no contraindications to continuing to perform her current job  Dyspepsia  -     famotidine (PEPCID) 20 mg tablet; Take 1 tablet (20 mg total) by mouth daily as needed for heartburn  Patient had been intermittently been taking PPI for heartburn symptoms  She states that her symptoms have overall improved  Discussed with patient that PPI is most effective when taken daily for 4 weeks, and that it is not meant to be a long term medication  Since she has been on it for a few months already, and also has recently implemented diet changes in a weight loss effort, will deprescribe PPI and start PRN H2 blocker  Status post nephrectomy  -     calcium-vitamin D (OSCAL) 250-125 MG-UNIT per tablet; Take 1 tablet by mouth 2 (two) times a day - refilled    Initial BP elevated on vital signs, but during encounter BP was rechecked with pt's sweater removed and was normal (124/76)  Will need close BP monitoring since patient has only one kidney  BMI Counseling: Body mass index is 36 4 kg/m²  The BMI is above normal  Nutrition recommendations include 3-5 servings of fruits/vegetables daily, moderation in carbohydrate intake and increasing intake of lean protein  Exercise recommendations include moderate aerobic physical activity for 150 minutes/week  Follow up in about 6 months with PCP for recheck dyspepsia/heartburn    Subjective:      Patient ID: Jericho Dorado is a 38 y o  female  Patient is a 38year old female with PMH of left nephrectomy, done in [place] due to staghorn calculus   Had recently had some issues with flank pain, lower abdominal pain later on, evaluated in ED and no stone was identified, symptoms have now resolved and she is very relieved  Here todayfor employment physical  Works as a  and has done this for 22 years  School has resumed, going well so far, smaller classes and kids are good about wearing masks  Reports no issues with physical activity or physical demands of her job  States she is trying to lose weight  For the past 3 weeks, she has been eating healthy, only salad and water  Has cut out bread and rice, eats oatmeal with fruits  Walking more, waking up earlier walks 30 minutes to work and then back  Never smoker, no alcohol, no other drugs  Lives with parents and daughter  States she sleeps well at night  Takes prilosec, has been taking for a few months for heartburn  Triggers include tomatoes and garlic  Recheck BP = 124/76    Declines flu vaccine today      The following portions of the patient's history were reviewed and updated as appropriate: allergies, current medications, past family history, past medical history, past social history, past surgical history and problem list     Review of Systems   Constitutional: Negative for chills and fever  Respiratory: Negative for shortness of breath  Cardiovascular: Negative for chest pain, palpitations and leg swelling  Gastrointestinal: Negative for abdominal pain, constipation, diarrhea, nausea and vomiting  Musculoskeletal: Negative for back pain and gait problem  Psychiatric/Behavioral: Negative for dysphoric mood and sleep disturbance  The patient is not nervous/anxious  All other systems reviewed and are negative  Objective:      /82 (BP Location: Left arm, Patient Position: Sitting, Cuff Size: Standard)   Pulse 90   Temp 97 5 °F (36 4 °C) (Temporal)   Wt 96 2 kg (212 lb 1 3 oz)   SpO2 97%   BMI 36 40 kg/m²          Physical Exam  Vitals signs reviewed  Constitutional:       General: She is not in acute distress  Appearance: She is well-developed     HENT:      Head: Normocephalic and atraumatic  Mouth/Throat:      Mouth: Mucous membranes are moist       Pharynx: Oropharynx is clear  No oropharyngeal exudate or posterior oropharyngeal erythema  Eyes:      General: No scleral icterus  Extraocular Movements: Extraocular movements intact  Conjunctiva/sclera: Conjunctivae normal    Neck:      Musculoskeletal: Neck supple  Thyroid: No thyroid mass or thyromegaly  Cardiovascular:      Rate and Rhythm: Normal rate and regular rhythm  Heart sounds: No murmur  Pulmonary:      Effort: Pulmonary effort is normal       Breath sounds: Normal breath sounds  No wheezing or rales  Abdominal:      General: Bowel sounds are normal  There is no distension  Palpations: Abdomen is soft  Tenderness: There is no abdominal tenderness  Musculoskeletal: Normal range of motion  General: No swelling or tenderness  Right lower leg: No edema  Left lower leg: No edema  Lymphadenopathy:      Cervical: No cervical adenopathy  Skin:     General: Skin is warm and dry  Coloration: Skin is not pale  Findings: No rash  Neurological:      General: No focal deficit present  Mental Status: She is alert and oriented to person, place, and time  Motor: No weakness     Psychiatric:         Mood and Affect: Mood normal          Behavior: Behavior normal

## 2020-09-17 NOTE — TELEPHONE ENCOUNTER
Folder Color- BLUE    Name of Chadd Kenny ASSESSMENT FORM    Form to be filled out by- DR Ladon Duverney    Form to be Faxed to [phone]    Patient made aware of 10 business day policy

## 2020-11-20 ENCOUNTER — OFFICE VISIT (OUTPATIENT)
Dept: INTERNAL MEDICINE CLINIC | Facility: CLINIC | Age: 45
End: 2020-11-20

## 2020-11-20 VITALS
HEART RATE: 87 BPM | SYSTOLIC BLOOD PRESSURE: 122 MMHG | WEIGHT: 216.4 LBS | DIASTOLIC BLOOD PRESSURE: 80 MMHG | TEMPERATURE: 97.6 F | BODY MASS INDEX: 37.14 KG/M2 | OXYGEN SATURATION: 97 %

## 2020-11-20 DIAGNOSIS — H00.011 HORDEOLUM EXTERNUM OF RIGHT UPPER EYELID: Primary | ICD-10-CM

## 2020-11-20 DIAGNOSIS — Z23 NEED FOR INFLUENZA VACCINATION: ICD-10-CM

## 2020-11-20 PROCEDURE — 90471 IMMUNIZATION ADMIN: CPT | Performed by: HOSPITALIST

## 2020-11-20 PROCEDURE — 90686 IIV4 VACC NO PRSV 0.5 ML IM: CPT | Performed by: HOSPITALIST

## 2020-11-20 PROCEDURE — 99213 OFFICE O/P EST LOW 20 MIN: CPT | Performed by: HOSPITALIST

## 2020-12-07 ENCOUNTER — TELEMEDICINE (OUTPATIENT)
Dept: INTERNAL MEDICINE CLINIC | Facility: CLINIC | Age: 45
End: 2020-12-07

## 2020-12-07 DIAGNOSIS — U07.1 COVID-19: Primary | ICD-10-CM

## 2020-12-07 PROCEDURE — 99214 OFFICE O/P EST MOD 30 MIN: CPT | Performed by: INTERNAL MEDICINE

## 2020-12-09 DIAGNOSIS — U07.1 COVID-19: ICD-10-CM

## 2020-12-09 PROCEDURE — U0003 INFECTIOUS AGENT DETECTION BY NUCLEIC ACID (DNA OR RNA); SEVERE ACUTE RESPIRATORY SYNDROME CORONAVIRUS 2 (SARS-COV-2) (CORONAVIRUS DISEASE [COVID-19]), AMPLIFIED PROBE TECHNIQUE, MAKING USE OF HIGH THROUGHPUT TECHNOLOGIES AS DESCRIBED BY CMS-2020-01-R: HCPCS | Performed by: STUDENT IN AN ORGANIZED HEALTH CARE EDUCATION/TRAINING PROGRAM

## 2020-12-10 LAB — SARS-COV-2 RNA SPEC QL NAA+PROBE: DETECTED

## 2020-12-14 ENCOUNTER — TELEMEDICINE (OUTPATIENT)
Dept: INTERNAL MEDICINE CLINIC | Facility: CLINIC | Age: 45
End: 2020-12-14

## 2020-12-14 DIAGNOSIS — U07.1 COVID-19: Primary | ICD-10-CM

## 2020-12-14 PROCEDURE — 99213 OFFICE O/P EST LOW 20 MIN: CPT | Performed by: INTERNAL MEDICINE

## 2020-12-14 RX ORDER — BENZONATATE 200 MG/1
200 CAPSULE ORAL 3 TIMES DAILY PRN
Qty: 30 CAPSULE | Refills: 0 | Status: SHIPPED | OUTPATIENT
Start: 2020-12-14 | End: 2021-03-16 | Stop reason: ALTCHOICE

## 2021-03-16 ENCOUNTER — OFFICE VISIT (OUTPATIENT)
Dept: INTERNAL MEDICINE CLINIC | Facility: CLINIC | Age: 46
End: 2021-03-16

## 2021-03-16 VITALS
BODY MASS INDEX: 36.12 KG/M2 | WEIGHT: 210.4 LBS | SYSTOLIC BLOOD PRESSURE: 123 MMHG | TEMPERATURE: 97.2 F | HEART RATE: 88 BPM | DIASTOLIC BLOOD PRESSURE: 83 MMHG

## 2021-03-16 DIAGNOSIS — Z00.00 ANNUAL PHYSICAL EXAM: Primary | ICD-10-CM

## 2021-03-16 DIAGNOSIS — Z11.4 ENCOUNTER FOR SCREENING FOR HIV: ICD-10-CM

## 2021-03-16 DIAGNOSIS — Z90.5 STATUS POST NEPHRECTOMY: ICD-10-CM

## 2021-03-16 DIAGNOSIS — M79.601 RIGHT ARM PAIN: ICD-10-CM

## 2021-03-16 DIAGNOSIS — E78.5 HYPERLIPIDEMIA, UNSPECIFIED HYPERLIPIDEMIA TYPE: ICD-10-CM

## 2021-03-16 PROCEDURE — 1036F TOBACCO NON-USER: CPT | Performed by: INTERNAL MEDICINE

## 2021-03-16 PROCEDURE — 99396 PREV VISIT EST AGE 40-64: CPT | Performed by: INTERNAL MEDICINE

## 2021-03-16 RX ORDER — ARM BRACE
EACH MISCELLANEOUS DAILY
Qty: 1 EACH | Refills: 0 | Status: SHIPPED | OUTPATIENT
Start: 2021-03-16 | End: 2021-03-16

## 2021-03-16 RX ORDER — ARM BRACE
EACH MISCELLANEOUS DAILY
Qty: 1 EACH | Refills: 0 | Status: SHIPPED | OUTPATIENT
Start: 2021-03-16 | End: 2022-05-03

## 2021-03-16 NOTE — PROGRESS NOTES
[facility] Visit Note  Oswald Michaels 41 y o  female   MRN: [id number]    Assessment and Plan      Diagnoses and all orders for this visit:    Annual physical exam  -Patient felling well today, other than her R arm pain  -HIV ordered for HM  -Follows with OBGYN for cervical and breast cancer screening  -FHx of HTN, DM- patient with A1c of 5/7% in 2019 and BP well-controlled    Encounter for screening for HIV  -HIV 1/2 Antigen/Antibody (4th Generation) w Reflex [facility]; Future    Status post nephrectomy  -Basic metabolic panel; Future  -Most recent creatinine 1 16 and EGFR 59 in August 2020  -Patient requests check of her renal function    Hyperlipidemia, unspecified hyperlipidemia type  -Lipid Panel with Direct LDL reflex; Future  -Most recent lipid panel in 2017 revealed total cholesterol 240/triglycerides 153/HDL 51/  -ASCVD less than 2%, no statin advised at this time    Right arm pain  -Diclofenac Sodium (VOLTAREN) 1 %; Apply 2 g topically 4 (four) times a day  -Ambulatory referral to Physical Therapy; Future  -Neer's, Gonzales, drop can test all negative  -Exquisite tenderness at the lateral epicondyle and with extension  -R elbow brace  -Rest and ice also advised  -Likely lateral epicondylitis  -OMT performed with muscle energy and soft tissue technique          Health Maintenance:  PHQ-9 Depression Screening    PHQ-9:   Frequency of the following problems over the past two weeks:           The ASCVD Risk score (Christopher Zaman et al , 2013) failed to calculate for the following reasons:    Cannot find a previous HDL lab    Cannot find a previous total cholesterol lab  Lab Results   Component Value Date    HGBA1C 5 7 08/24/2019    No results found for: HEPCAB   Most Recent Immunizations   Administered Date(s) Administered    INFLUENZA 01/12/2018    Influenza Quadrivalent, 6-35 Months IM 01/12/2018    Influenza, injectable, quadrivalent, preservative free 0 5 mL 11/20/2020    Tdap 01/12/2018    Tuberculin Skin Test-PPD Intradermal 05/08/2018    Not on file 12/22/2017 No components found for: HIV1X2   HIV ordered    Schedule a follow-up appointment in 10 weeks  Chief Complaint: Annual Physical and R arm pain  Subjective     History of Present Illness:  Patient is a 43-year-old female with past medical history of GERD, hyperlipidemia, and left nephrectomy in 2012 secondary to staghorn calculus that presents today for annual physical and right arm pain  Of note patient recently recovered from [place] 19 infection in December of 2020  Family, social, and surgical history were reviewed  Patient reports right arm pain that started about a month ago  She states that it can range from her clavicle to rest but most intensely at her elbow joint  She describes as throbbing  She states that Tylenol helps  She also states that 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  On examination, Neer's, Gonzales, and drop can test were all negative  She had exquisite tenderness at the lateral epicondyle  She also had decreased range of motion with extension of the wrist   This is likely lateral epicondylitis  She was advised to try rest, ice, physical therapy, wear brace, and use diclofenac gel  She will be seen back in 10 weeks for re-evaluation  Review of Systems   Constitutional: Negative for chills, fatigue and fever  HENT: Negative for postnasal drip, sinus pain, sore throat and tinnitus  Eyes: Negative for discharge and itching  Respiratory: Negative for cough and shortness of breath  Cardiovascular: Negative for chest pain and palpitations  Gastrointestinal: Negative for abdominal distention, abdominal pain, constipation, diarrhea, nausea and vomiting  Genitourinary: Negative for difficulty urinating and dysuria  Musculoskeletal: Positive for myalgias (R elbow)  Skin: Negative for rash     Neurological: Negative for dizziness and headaches  Psychiatric/Behavioral: Negative for agitation and confusion           Current Outpatient Medications:     calcium-vitamin D (OSCAL) 250-125 MG-UNIT per tablet, Take 1 tablet by mouth 2 (two) times a day, Disp: 60 tablet, Rfl: 2    famotidine (PEPCID) 20 mg tablet, Take 1 tablet (20 mg total) by mouth daily as needed for heartburn, Disp: 90 tablet, Rfl: 2    benzonatate (TESSALON) 200 MG capsule, Take 1 capsule (200 mg total) by mouth 3 (three) times a day as needed for cough (Patient not taking: Reported on 3/16/2021), Disp: 30 capsule, Rfl: 0    cyclobenzaprine (FLEXERIL) 5 mg tablet, 1-2 PO TID PRN (Patient not taking: Reported on 3/16/2021), Disp: 12 tablet, Rfl: 0    gentamicin (GENTAK) 0 3 % ophthalmic ointment, Administer 0 5 inches to the right eye 3 (three) times a day (Patient not taking: Reported on 3/16/2021), Disp: 3 5 g, Rfl: 0  Past Medical History:   Diagnosis Date    Renal calculi     Renal disorder     Toe fracture, left      Past Surgical History:   Procedure Laterality Date    CHOLECYSTECTOMY      CHOLECYSTECTOMY LAPAROSCOPIC N/A 12/10/2018    Procedure: CHOLECYSTECTOMY LAPAROSCOPIC; [address];  Surgeon: Shira Ramesh MD;  [facility];  Service: General    KIDNEY SURGERY  2013    removed L kidney from damage from kidney stone -PERFORMED IN [place]    TUBAL LIGATION      AGE 32     Social History     Socioeconomic History    Marital status: Single     Spouse name: Not on file    Number of children: 2    Years of education: Not on file    Highest education level: Not on file   Occupational History     Comment: unemployed, not looking for work   Social Needs    Financial resource strain: Not on file    Food insecurity     Worry: Not on file     Inability: Not on file   [employer] needs     Medical: Not on file     Non-medical: Not on file   Tobacco Use    Smoking status: Never Smoker    Smokeless tobacco: Never Used   Substance and Sexual Activity    Alcohol use: No    Drug use: No    Sexual activity: Yes     Partners: Male     Birth control/protection: Female Sterilization   Lifestyle    Physical activity     Days per week: Not on file     Minutes per session: Not on file    Stress: Not on file   Relationships    Social connections     Talks on phone: Not on file     Gets together: Not on file     Attends Mosque service: Not on file     Active member of club or organization: Not on file     Attends meetings of clubs or organizations: Not on file     Relationship status: Not on file    Intimate partner violence     Fear of current or ex partner: Not on file     Emotionally abused: Not on file     Physically abused: Not on file     Forced sexual activity: Not on file   Other Topics Concern    Not on file   Social History Narrative    Caffeine use-1 cup of coffee daily    Does not exercise     Family History   Problem Relation Age of Onset    Diabetes Mother     Hyperlipidemia Mother     Hypertension Mother    Rustam Miles Other Mother         low back pain    Heart disease Mother     Diabetes Father     No Known Problems Sister     No Known Problems Brother     No Known Problems Daughter     No Known Problems Son     Kidney failure Maternal Grandmother         on dialysis     No Known Allergies    Objective     Vitals:    03/16/21 1524   BP: 123/83   BP Location: Right arm   Patient Position: Sitting   Cuff Size: Standard   Pulse: 88   Temp: (!) 97 2 °F (36 2 °C)   TempSrc: Temporal   Weight: 95 4 kg (210 lb 6 4 oz)       Physical exam:     GENERAL: NAD  HEENT:  NC/AT, PERRL, EOMI, no scleral icterus  CARDIAC:  RRR, +S1/S2, no S3/S4 heard, no m/g/r  PULMONARY:  CTA B/L, no wheezing/rales/rhonci, non-labored breathing  ABDOMEN:  Soft, NT/ND,no rebound/guarding/rigidity  Extremities:  No edema, cyanosis, or clubbing; negative Neer's, drop can test, Hawkin's test; tenderness at lateral epicondyle  NEUROLOGIC: Grossly intact     SKIN:  No rashes or erythema noted on exposed skin  Psych: Normal affect        ==  PLEASE NOTE:  This encounter was completed utilizing the [employer] Direct Speech Voice Recognition Software  Grammatical errors, random word insertions, pronoun errors and incomplete sentences are occasional consequences of the system due to software limitations, ambient noise and hardware issues  These may be missed by proof reading prior to affixing electronic signature  Any questions or concerns about the content, text or information contained within the body of this dictation should be directly addressed to the physician for clarification  Please do not hesitate to call me directly if you have any any questions or concerns      Vivian Collado DO, [address]  PGY-1, Internal Medicine  Josselin Davidson

## 2021-03-16 NOTE — PATIENT INSTRUCTIONS
Tennis Elbow   AMBULATORY CARE:   Tennis elbow  is inflammation of the tendons in your elbow  Tendons are strong tissues that connect muscle to bone  Common symptoms include the following:   · Pain on the side of your elbow that travels to your upper arm, forearm, or fingers    · Weakness in your wrist or hand    · Trouble holding, lifting, or grabbing an object, such as a coffee cup    · Red, swollen, warm skin on the outside of your elbow    Seek care immediately if:   · You suddenly have no feeling in your arm, hand, or fingers  · You suddenly cannot move your arm, wrist, hand, or fingers  Contact your healthcare provider if:   · Your symptoms do not get better within 2 weeks, even with treatment  · You have more pain or weakness in your arm, wrist, hand, or fingers  · You have new numbness or tingling in your arm, hand, or fingers  · You have questions or concerns about your condition or care  Treatment:   · Support devices  may be needed to limit your arm movement  Examples include an arm strap, brace, or splint  These devices also help decrease pain and prevent more damage to your tendon  · Acetaminophen  decreases pain and fever  It is available without a doctor's order  Ask how much to take and how often to take it  Follow directions  Read the labels of all other medicines you are using to see if they also contain acetaminophen, or ask your doctor or pharmacist  Acetaminophen can cause liver damage if not taken correctly  Do not use more than 4 grams (4,000 milligrams) total of acetaminophen in one day  · NSAIDs , such as ibuprofen, help decrease swelling, pain, and fever  This medicine is available with or without a doctor's order  NSAIDs can cause stomach bleeding or kidney problems in certain people  If you take blood thinner medicine, always ask your healthcare provider if NSAIDs are safe for you  Always read the medicine label and follow directions      · A steroid injection will help decrease pain and swelling  · Physical therapy  may be recommended  A physical therapist teaches you exercises to help improve movement and strength, and to decrease pain  · Surgery  may be needed if your symptoms do not improve with other treatments  During surgery, your healthcare provider will remove any damaged tissue  He may also cut your tendon and reattach it  Self-care:   · Rest  your injured arm and avoid activities that cause pain  This will help your tendons heal     · Apply ice  on your elbow for 15 to 20 minutes every hour or as directed  Use an ice pack, or put crushed ice in a plastic bag  Cover it with a towel before you apply it to your skin  Ice helps prevent tissue damage and decreases swelling and pain  · Elevate  your elbow above the level of your heart as often as you can  This will help decrease swelling and pain  Prop your elbow on pillows or blankets to keep it elevated comfortably  Follow up with your healthcare provider as directed:  Write down your questions so you remember to ask them during your visits  © Copyright [address] Information is for End User's use only and may not be sold, redistributed or otherwise used for commercial purposes  All illustrations and images included in CareNotes® are the copyrighted property of [employer]  or [facility] Zach Calderon   The above information is an  only  It is not intended as medical advice for individual conditions or treatments  Talk to your doctor, nurse or pharmacist before following any medical regimen to see if it is safe and effective for you

## 2021-03-30 DIAGNOSIS — Z23 ENCOUNTER FOR IMMUNIZATION: ICD-10-CM

## 2021-04-15 ENCOUNTER — OFFICE VISIT (OUTPATIENT)
Dept: INTERNAL MEDICINE CLINIC | Facility: CLINIC | Age: 46
End: 2021-04-15

## 2021-04-15 VITALS
TEMPERATURE: 97.7 F | HEART RATE: 89 BPM | DIASTOLIC BLOOD PRESSURE: 78 MMHG | SYSTOLIC BLOOD PRESSURE: 119 MMHG | BODY MASS INDEX: 35.81 KG/M2 | WEIGHT: 208.6 LBS

## 2021-04-15 DIAGNOSIS — Z90.5 STATUS POST NEPHRECTOMY: ICD-10-CM

## 2021-04-15 DIAGNOSIS — M25.562 ACUTE PAIN OF LEFT KNEE: Primary | ICD-10-CM

## 2021-04-15 DIAGNOSIS — R21 RASH: ICD-10-CM

## 2021-04-15 PROCEDURE — 1036F TOBACCO NON-USER: CPT | Performed by: INTERNAL MEDICINE

## 2021-04-15 PROCEDURE — 99213 OFFICE O/P EST LOW 20 MIN: CPT | Performed by: INTERNAL MEDICINE

## 2021-04-15 RX ORDER — CLOTRIMAZOLE AND BETAMETHASONE DIPROPIONATE 10; .64 MG/G; MG/G
CREAM TOPICAL 2 TIMES DAILY
Qty: 30 G | Refills: 0 | Status: SHIPPED | OUTPATIENT
Start: 2021-04-15 | End: 2022-03-17 | Stop reason: SDDI

## 2021-04-15 NOTE — PROGRESS NOTES
[facility]  INTERNAL MEDICINE ACUTE VISIT     PATIENT INFORMATION     Janna Severs   41 y o  female   MRN: [id number]    ASSESSMENT/PLAN     Diagnoses and all orders for this visit:    Acute pain of left knee  Four-day history of acute left knee pain, medial patellar  Denies trauma  Worse with ambulation  Anterior posterior drawer test negative  TTP medial aspect, worse with knee extension  Likely patellar tendinitis versus patellofemoral pain syndrome  · Continue with rest, elevation, ice and p r n  symptomatic relief with Tylenol  · Recommend physical therapy once symptoms have improved  · Advised patient to call clinic back if symptoms do not improve within the next 3-5 days  Patient verbalized understanding  · Consider imaging if symptoms worsen     Rash  Two day history of worsening left-sided posterior auricular erythematous rash  Refer to media for for further detail  Denies any new topical agents or new earrings  Never happened before  Concern for fungal vs atopic dermatitis  · Trial clotrimazole-betamethasone (LOTRISONE) 1-0 05 % cream; Apply topically 2 (two) times a day  · Advised to call clinic back if sx to not improve/continue to worsening  Pt verbalized understanding  Status post nephrectomy  In 2012 2/2 to staghorn calculi, pt is requesting referral to nephrology for further follow-up  Referral placed  -     Ambulatory referral to Nephrology; Future    CHIEF COMPLAINT     Chief Complaint   Patient presents with    Knee Pain     left knee pain    Rash     rash behind left ear     HISTORY OF PRESENT ILLNESS     81-year-old female past medical history of hyperlipidemia migraine, status post nephrectomy in 2012 secondary to staghorn calculus who presents to clinic today for an acute visit  Patient states she has 2 major complaints, acute left knee pain which started 4 days ago and rash behind her left ear which she noticed 2 who days ago    For acute left knee pain, this is never happened before  She noticed it Saturday morning when she woke up, worse with ambulation  Resolve with rest, when she starts to walk again notice worsens  It radiates to her thigh, never lived in knee  Has tenderness to palpation around knee  Difficulty ambulating secondary to this  Has tried heat, this did not help  Has not tried anything over-the-counter for analgesia  Initially pain was bearable, but is currently 8 out 10, seems to be worsening over the past few days  Her rash behind left ear, started 2 days ago  She noticed it when she was washing her hair  Has not tried any new hair products, or skin care products or introduction of any new agents  Has not worn any new earrings or change in hearing  This is never occurred before  Has not tried anything over-the-counter topically to improved some rash  It is itchy  Overall seems to be slightly larger than before  Also requesting referral for Nephrology given her history of nephrectomy  Patient has not seen a nephrologist in many years and would like to establish care  REVIEW OF SYSTEMS     Review of Systems   Constitutional: Negative for activity change, appetite change, chills, fatigue and fever  HENT: Negative for congestion, facial swelling and trouble swallowing  Eyes: Negative for photophobia, pain, discharge and visual disturbance  Respiratory: Negative for apnea, cough, chest tightness, shortness of breath and wheezing  Cardiovascular: Negative for chest pain and leg swelling  Gastrointestinal: Negative for abdominal distention, abdominal pain, blood in stool, constipation, diarrhea, nausea and vomiting  Endocrine: Negative for polyuria  Genitourinary: Negative for difficulty urinating, dysuria, flank pain and hematuria  Musculoskeletal: Positive for arthralgias and gait problem  Negative for back pain  Skin: Positive for rash  Negative for pallor     Neurological: Negative for dizziness, tremors and weakness  Psychiatric/Behavioral: Negative for agitation, behavioral problems and suicidal ideas  The patient is not nervous/anxious  OBJECTIVE     Vitals:    04/15/21 1331   BP: 119/78   BP Location: Right arm   Patient Position: Sitting   Cuff Size: Standard   Pulse: 89   Temp: 97 7 °F (36 5 °C)   TempSrc: Temporal   Weight: 94 6 kg (208 lb 9 6 oz)     Physical Exam  Vitals signs reviewed  Constitutional:       General: She is not in acute distress  Appearance: She is well-developed  She is not diaphoretic  HENT:      Head: Normocephalic and atraumatic  Nose: Nose normal       Mouth/Throat:      Pharynx: No oropharyngeal exudate  Eyes:      General: No scleral icterus  Right eye: No discharge  Left eye: No discharge  Conjunctiva/sclera: Conjunctivae normal    Neck:      Musculoskeletal: Normal range of motion and neck supple  Cardiovascular:      Rate and Rhythm: Normal rate and regular rhythm  Heart sounds: Normal heart sounds  No murmur  No friction rub  No gallop  Pulmonary:      Effort: Pulmonary effort is normal  No respiratory distress  Breath sounds: Normal breath sounds  No wheezing or rales  Abdominal:      General: Bowel sounds are normal  There is no distension  Palpations: Abdomen is soft  Tenderness: There is no abdominal tenderness  There is no guarding  Musculoskeletal:         General: Tenderness present  No swelling  Right shoulder: She exhibits decreased range of motion, bony tenderness and crepitus  She exhibits no swelling  Right lower leg: No edema  Left lower leg: No edema  Comments: L knee extension limited 2/2 to pain  Anterior and posterior drawer test negative  TTP medial patellar region  Other ROM WNL  Skin:     General: Skin is warm and dry  Findings: Rash present  No erythema  Rash is macular and papular     Neurological:      Mental Status: She is alert and oriented to person, place, and time     Psychiatric:         Behavior: Behavior normal        CURRENT MEDICATIONS     Current Outpatient Medications:     calcium-vitamin D (OSCAL) 250-125 MG-UNIT per tablet, Take 1 tablet by mouth 2 (two) times a day, Disp: 60 tablet, Rfl: 2    Diclofenac Sodium (VOLTAREN) 1 %, Apply 2 g topically 4 (four) times a day, Disp: 1 Tube, Rfl: 1    Elastic Bandages & Supports (Futuro Elbow Brace) MISC, Use daily, Disp: 1 each, Rfl: 0    famotidine (PEPCID) 20 mg tablet, Take 1 tablet (20 mg total) by mouth daily as needed for heartburn, Disp: 90 tablet, Rfl: 2    clotrimazole-betamethasone (LOTRISONE) 1-0 05 % cream, Apply topically 2 (two) times a day, Disp: 30 g, Rfl: 0    PAST MEDICAL & SURGICAL HISTORY     Past Medical History:   Diagnosis Date    Renal calculi     Renal disorder     Toe fracture, left      Past Surgical History:   Procedure Laterality Date    CHOLECYSTECTOMY      CHOLECYSTECTOMY LAPAROSCOPIC N/A 12/10/2018    Procedure: CHOLECYSTECTOMY LAPAROSCOPIC; INCISIONAL HERNIA REPAIR;  Surgeon: Paddy Maya MD;  [facility];  Service: General    KIDNEY SURGERY  2013    removed L kidney from damage from kidney stone -PERFORMED IN [place]    TUBAL LIGATION      AGE 32     SOCIAL & FAMILY HISTORY     Social History     Socioeconomic History    Marital status: Single     Spouse name: Not on file    Number of children: 2    Years of education: Not on file    Highest education level: Not on file   Occupational History     Comment: unemployed, not looking for work   Social Needs    Financial resource strain: Not hard at all   Tim-Anna insecurity     Worry: Never true     Inability: Never true   [employer] needs     Medical: No     Non-medical: No   Tobacco Use    Smoking status: Never Smoker    Smokeless tobacco: Never Used   Substance and Sexual Activity    Alcohol use: No    Drug use: No    Sexual activity: Yes     Partners: Male     Birth control/protection: Female Sterilization   Lifestyle    Physical activity     Days per week: 2 days     Minutes per session: 30 min    Stress: Not on file   Relationships    Social connections     Talks on phone: Not on file     Gets together: Not on file     Attends Alev[employer] service: Not on file     Active member of club or organization: Not on file     Attends meetings of clubs or organizations: Not on file     Relationship status: Not on file    Intimate partner violence     Fear of current or ex partner: Not on file     Emotionally abused: Not on file     Physically abused: Not on file     Forced sexual activity: Not on file   Other Topics Concern    Not on file   Social History Narrative    Caffeine use-1 cup of coffee daily    Does not exercise     Social History     Substance and Sexual Activity   Alcohol Use No     Social History     Substance and Sexual Activity   Drug Use No     Social History     Tobacco Use   Smoking Status Never Smoker   Smokeless Tobacco Never Used     Family History   Problem Relation Age of Onset    Diabetes Mother     Hyperlipidemia Mother     Hypertension Mother     Other Mother         low back pain    Heart disease Mother     Diabetes Father     No Known Problems Sister     No Known Problems Brother     No Known Problems Daughter     No Known Problems Son     Kidney failure Maternal Grandmother         on dialysis     ==  Marrion Phlegm, DO  Internal Medicine- PGY2  [address]  [address]   [facility] , [address] [address] [facility] [facility]  Office: [phone]  Fax: [phone]

## 2021-07-10 ENCOUNTER — APPOINTMENT (OUTPATIENT)
Dept: LAB | Facility: HOSPITAL | Age: 46
End: 2021-07-10
Payer: COMMERCIAL

## 2021-07-10 DIAGNOSIS — Z11.4 ENCOUNTER FOR SCREENING FOR HIV: ICD-10-CM

## 2021-07-10 DIAGNOSIS — E78.5 HYPERLIPIDEMIA, UNSPECIFIED HYPERLIPIDEMIA TYPE: ICD-10-CM

## 2021-07-10 DIAGNOSIS — Z90.5 STATUS POST NEPHRECTOMY: ICD-10-CM

## 2021-07-10 LAB
ANION GAP SERPL CALCULATED.3IONS-SCNC: 6 MMOL/L (ref 4–13)
BUN SERPL-MCNC: 12 MG/DL (ref 5–25)
CALCIUM SERPL-MCNC: 9.1 MG/DL (ref 8.3–10.1)
CHLORIDE SERPL-SCNC: 106 MMOL/L (ref 100–108)
CHOLEST SERPL-MCNC: 238 MG/DL (ref 50–200)
CO2 SERPL-SCNC: 24 MMOL/L (ref 21–32)
CREAT SERPL-MCNC: 0.83 MG/DL (ref 0.6–1.3)
GFR SERPL CREATININE-BSD FRML MDRD: 86 ML/MIN/1.73SQ M
GLUCOSE P FAST SERPL-MCNC: 90 MG/DL (ref 65–99)
HDLC SERPL-MCNC: 44 MG/DL
LDLC SERPL CALC-MCNC: 162 MG/DL (ref 0–100)
POTASSIUM SERPL-SCNC: 4.2 MMOL/L (ref 3.5–5.3)
SODIUM SERPL-SCNC: 136 MMOL/L (ref 136–145)
TRIGL SERPL-MCNC: 161 MG/DL

## 2021-07-10 PROCEDURE — 36415 COLL VENOUS BLD VENIPUNCTURE: CPT

## 2021-07-10 PROCEDURE — 87389 HIV-1 AG W/HIV-1&-2 AB AG IA: CPT

## 2021-07-10 PROCEDURE — 80061 LIPID PANEL: CPT

## 2021-07-10 PROCEDURE — 80048 BASIC METABOLIC PNL TOTAL CA: CPT

## 2021-07-12 ENCOUNTER — OFFICE VISIT (OUTPATIENT)
Dept: INTERNAL MEDICINE CLINIC | Facility: CLINIC | Age: 46
End: 2021-07-12

## 2021-07-12 VITALS
TEMPERATURE: 97.2 F | OXYGEN SATURATION: 97 % | DIASTOLIC BLOOD PRESSURE: 69 MMHG | BODY MASS INDEX: 36.49 KG/M2 | SYSTOLIC BLOOD PRESSURE: 113 MMHG | HEART RATE: 81 BPM | WEIGHT: 212.6 LBS

## 2021-07-12 DIAGNOSIS — M17.0 PRIMARY OSTEOARTHRITIS OF BOTH KNEES: ICD-10-CM

## 2021-07-12 DIAGNOSIS — M22.2X2 PATELLOFEMORAL ARTHRALGIA OF BOTH KNEES: ICD-10-CM

## 2021-07-12 DIAGNOSIS — M79.601 RIGHT ARM PAIN: ICD-10-CM

## 2021-07-12 DIAGNOSIS — E66.01 CLASS 3 SEVERE OBESITY DUE TO EXCESS CALORIES WITHOUT SERIOUS COMORBIDITY IN ADULT, UNSPECIFIED BMI (HCC): ICD-10-CM

## 2021-07-12 DIAGNOSIS — M22.2X1 PATELLOFEMORAL PAIN SYNDROME OF BOTH KNEES: Primary | ICD-10-CM

## 2021-07-12 DIAGNOSIS — E78.2 MIXED HYPERLIPIDEMIA: ICD-10-CM

## 2021-07-12 DIAGNOSIS — M22.2X2 PATELLOFEMORAL PAIN SYNDROME OF BOTH KNEES: Primary | ICD-10-CM

## 2021-07-12 DIAGNOSIS — M22.2X1 PATELLOFEMORAL ARTHRALGIA OF BOTH KNEES: ICD-10-CM

## 2021-07-12 LAB — HIV 1+2 AB+HIV1 P24 AG SERPL QL IA: NORMAL

## 2021-07-12 PROCEDURE — 99213 OFFICE O/P EST LOW 20 MIN: CPT | Performed by: INTERNAL MEDICINE

## 2021-07-12 RX ORDER — SENNOSIDES 8.6 MG
650 CAPSULE ORAL EVERY 8 HOURS PRN
Qty: 30 TABLET | Refills: 0 | Status: SHIPPED | OUTPATIENT
Start: 2021-07-12 | End: 2022-03-17 | Stop reason: SDUPTHER

## 2021-07-12 NOTE — PROGRESS NOTES
[facility]  INTERNAL MEDICINE ACUTE VISIT     PATIENT INFORMATION     Earl Vela   41 y o  female   MRN: [id number]    ASSESSMENT/PLAN     Diagnoses and all orders for this visit:    Patellofemoral pain syndrome of both knees  -     Diclofenac Sodium (VOLTAREN) 1 %; Apply 2 g topically 4 (four) times a day  -     Ambulatory referral to Physical Therapy; Future    Right arm pain    Patellofemoral arthralgia of both knees  -     acetaminophen (TYLENOL) 650 mg CR tablet; Take 1 tablet (650 mg total) by mouth every 8 (eight) hours as needed for mild pain    Class 3 severe obesity due to excess calories without serious comorbidity in adult, unspecified BMI ([facility] [address] )  -     Ambulatory referral to Weight Management; Future  -     Ambulatory referral to Nutrition Services; Future    Primary osteoarthritis of both knees  -     Ambulatory referral to Physical Therapy; Future    Mixed hyperlipidemia  -     Lipid Panel with Direct LDL reflex; Future      Follow up in 3 months  For patellofemoral syndrome/osteoarthritis  Consider obtaining  Radiographs of bilateral knees  If without improvement  CHIEF COMPLAINT     Chief Complaint   Patient presents with    Follow-up     HISTORY OF PRESENT ILLNESS       Patient is a 80-year-old female with significant past medical history of  Migraine headaches,  Obesity, hyperlipidemia,  Status post nephrectomy who presents to the clinic for an acute complaint of right knee pain  Patient states that she has had pain like this before in her left knee as well as in her elbows  Patient does have a history of lateral epicondylitis  Patient points to the medial aspect of  Her right knee  Tenderness is reproducible with palpation  There is no erythema,  Swelling, joint effusion,  Evidence of trauma or injury  Patient states that she has been using Tylenol with intermediate  Relief   Although has been helpful for her previous left knee pain    Patient states pain is worse at the end of the day and when 1st getting up from a seated position  States that she is on her feet walking around all day  Also walks 30 minutes every morning to help with weight loss  Patient states she has changed her shoes multiple times to find better comfortable shoes    Cracking is present on exam with movement of bilateral knees  Tenderness is reproducible with palpation of the medial aspect of the right knee  I advised patient to continue with Tylenol as needed will avoid NSAIDs given her history of  Status post nephrectomy  Will apply Voltaren gel also advised patient to continue with lifestyle modifications for weight loss  Provided weight management and nutrition referrals as well as physical therapy which patient is amendable at this time  Also advised patient to apply a  Wedge shoe insert on the lateral aspect of her right foot which she can obtain over-the-counter  Will hold off on knee radiographs  Should pain worsen,  Can consider knee x-rays  patient understands and agrees with plan going forward  REVIEW OF SYSTEMS     Review of Systems   Constitutional: Negative  HENT: Negative  Eyes: Negative  Respiratory: Negative  Cardiovascular: Negative  Gastrointestinal: Negative  Genitourinary: Negative  Musculoskeletal: Positive for arthralgias  Skin: Negative  Allergic/Immunologic: Negative  Neurological: Negative  Hematological: Negative  Psychiatric/Behavioral: Negative  OBJECTIVE     Vitals:    07/12/21 1010   BP: 113/69   BP Location: Left arm   Patient Position: Sitting   Cuff Size: Large   Pulse: 81   Temp: (!) 97 2 °F (36 2 °C)   TempSrc: Temporal   SpO2: 97%   Weight: 96 4 kg (212 lb 9 6 oz)     Physical Exam  Vitals and nursing note reviewed  Constitutional:       General: She is not in acute distress  Appearance: Normal appearance  She is obese  She is not ill-appearing or toxic-appearing     HENT:      Head: Normocephalic and atraumatic  Nose: Nose normal       Mouth/Throat:      Mouth: Mucous membranes are moist    Eyes:      Extraocular Movements: Extraocular movements intact  Conjunctiva/sclera: Conjunctivae normal       Pupils: Pupils are equal, round, and reactive to light  Cardiovascular:      Rate and Rhythm: Normal rate and regular rhythm  Pulses: Normal pulses  Heart sounds: No murmur heard  Pulmonary:      Effort: Pulmonary effort is normal  No respiratory distress  Breath sounds: Normal breath sounds  No wheezing  Abdominal:      General: Abdomen is flat  Bowel sounds are normal  There is no distension  Palpations: Abdomen is soft  Tenderness: There is no abdominal tenderness  Musculoskeletal:         General: Normal range of motion  Cervical back: Normal range of motion and neck supple  Skin:     General: Skin is warm and dry  Capillary Refill: Capillary refill takes less than 2 seconds  Comments: Cracking elicited with movement of bilateral knees  Reproducible tenderness with palpation of the medial aspect of the right knee  No obvious joint swelling effusion, injury or deformity erythema or swelling   Neurological:      General: No focal deficit present  Mental Status: She is alert and oriented to person, place, and time  Mental status is at baseline  Psychiatric:         Mood and Affect: Mood normal          Behavior: Behavior normal          Thought Content:  Thought content normal          Judgment: Judgment normal        CURRENT MEDICATIONS     Current Outpatient Medications:     calcium-vitamin D (OSCAL) 250-125 MG-UNIT per tablet, Take 1 tablet by mouth 2 (two) times a day, Disp: 60 tablet, Rfl: 2    famotidine (PEPCID) 20 mg tablet, Take 1 tablet (20 mg total) by mouth daily as needed for heartburn, Disp: 90 tablet, Rfl: 2    acetaminophen (TYLENOL) 650 mg CR tablet, Take 1 tablet (650 mg total) by mouth every 8 (eight) hours as needed for mild pain, Disp: 30 tablet, Rfl: 0    clotrimazole-betamethasone (LOTRISONE) 1-0 05 % cream, Apply topically 2 (two) times a day (Patient not taking: Reported on 7/12/2021), Disp: 30 g, Rfl: 0    Diclofenac Sodium (VOLTAREN) 1 %, Apply 2 g topically 4 (four) times a day, Disp: 240 g, Rfl: 2    Elastic Bandages & Supports (Futuro Elbow Brace) MISC, Use daily (Patient not taking: Reported on 7/12/2021), Disp: 1 each, Rfl: 0    PAST MEDICAL & SURGICAL HISTORY     Past Medical History:   Diagnosis Date    Renal calculi     Renal disorder     Toe fracture, left      Past Surgical History:   Procedure Laterality Date    CHOLECYSTECTOMY      CHOLECYSTECTOMY LAPAROSCOPIC N/A 12/10/2018    Procedure: CHOLECYSTECTOMY LAPAROSCOPIC; INCISIONAL HERNIA REPAIR;  Surgeon: Pati Arechiga MD;  [facility];  Service: General    KIDNEY SURGERY  2013    removed L kidney from damage from kidney stone -PERFORMED IN [place]    TUBAL LIGATION      AGE 32     SOCIAL & FAMILY HISTORY     Social History     Socioeconomic History    Marital status: Single     Spouse name: Not on file    Number of children: 2    Years of education: Not on file    Highest education level: Not on file   Occupational History     Comment: unemployed, not looking for work   Tobacco Use    Smoking status: Never Smoker    Smokeless tobacco: Never Used   Vaping Use    Vaping Use: Never used   Substance and Sexual Activity    Alcohol use: No    Drug use: No    Sexual activity: Yes     Partners: Male     Birth control/protection: Female Sterilization   Other Topics Concern    Not on file   Social History Narrative    Caffeine use-1 cup of coffee daily    Does not exercise     Social Determinants of Health     Financial Resource Strain: Low Risk     Difficulty of Paying Living Expenses: Not hard at all   Food Insecurity: No Food Insecurity    Worried About Running Out of Food in the Last Year: Never true    Makenzie of Food in the Last Year: Never true   Transportation Needs: No Transportation Needs    Lack of Transportation (Medical): No    Lack of Transportation (Non-Medical): No   Physical Activity: Insufficiently Active    Days of Exercise per Week: 2 days    Minutes of Exercise per Session: 30 min   Stress:     Feeling of Stress :    Social Connections:     Frequency of Communication with Friends and Family:     Frequency of Social Gatherings with Friends and Family:     Attends Buddhism Services:     Active Member of Clubs or Organizations:     Attends Club or Organization Meetings:     Marital Status:    Intimate Partner Violence:     Fear of Current or Ex-Partner:     Emotionally Abused:     Physically Abused:     Sexually Abused:      Social History     Substance and Sexual Activity   Alcohol Use No     Social History     Substance and Sexual Activity   Drug Use No     Social History     Tobacco Use   Smoking Status Never Smoker   Smokeless Tobacco Never Used     Family History   Problem Relation Age of Onset    Diabetes Mother     Hyperlipidemia Mother     Hypertension Mother     Other Mother         low back pain    Heart disease Mother     Diabetes Father     No Known Problems Sister     No Known Problems Brother     No Known Problems Daughter     No Known Problems Son     Kidney failure Maternal Grandmother         on dialysis     ==  Joana Villanueva MD  Chief Resident, PGY-3  [facility] [place] [address]  [address] [facility]  Office: [phone]  Fax: [phone]

## 2021-07-19 ENCOUNTER — CONSULT (OUTPATIENT)
Dept: NEPHROLOGY | Facility: CLINIC | Age: 46
End: 2021-07-19
Payer: COMMERCIAL

## 2021-07-19 VITALS
DIASTOLIC BLOOD PRESSURE: 84 MMHG | SYSTOLIC BLOOD PRESSURE: 132 MMHG | RESPIRATION RATE: 16 BRPM | BODY MASS INDEX: 35.85 KG/M2 | HEART RATE: 85 BPM | HEIGHT: 64 IN | WEIGHT: 210 LBS

## 2021-07-19 DIAGNOSIS — Z90.5 STATUS POST NEPHRECTOMY: ICD-10-CM

## 2021-07-19 DIAGNOSIS — N18.2 CKD (CHRONIC KIDNEY DISEASE), STAGE II: Primary | ICD-10-CM

## 2021-07-19 DIAGNOSIS — N20.0 NEPHROLITHIASIS: ICD-10-CM

## 2021-07-19 DIAGNOSIS — R80.1 PERSISTENT PROTEINURIA: ICD-10-CM

## 2021-07-19 LAB
CLARITY, POC: CLEAR
COLOR, POC: YELLOW
EXT BILIRUBIN, UA: NORMAL
EXT BLOOD URINE: NORMAL
EXT GLUCOSE, UA: NORMAL
EXT KETONES: NORMAL
EXT NITRITE, UA: NORMAL
EXT PH, UA: 5
EXT PROTEIN, UA: NORMAL
EXT SPECIFIC GRAVITY, UA: 1.01
EXT UROBILINOGEN: NORMAL
WBC # BLD EST: NORMAL 10*3/UL

## 2021-07-19 PROCEDURE — 1036F TOBACCO NON-USER: CPT | Performed by: INTERNAL MEDICINE

## 2021-07-19 PROCEDURE — 3008F BODY MASS INDEX DOCD: CPT | Performed by: INTERNAL MEDICINE

## 2021-07-19 PROCEDURE — 81002 URINALYSIS NONAUTO W/O SCOPE: CPT | Performed by: INTERNAL MEDICINE

## 2021-07-19 PROCEDURE — 99244 OFF/OP CNSLTJ NEW/EST MOD 40: CPT | Performed by: INTERNAL MEDICINE

## 2021-07-19 NOTE — PROGRESS NOTES
NEPHROLOGY OUTPATIENT CONSULTATION   Clement Bauman 41 y o  female MRN: [id number]  Date: 7/19/2021  Reason for consultation:   Chief Complaint   Patient presents with    Consult    Chronic Kidney Disease     Solitary kidney     ASSESSMENT AND PLAN:  Mild CKD stage 2, baseline creatinine 0 8 to 1 0  -last creatinine 0 8 in July 2021  -avoid nephrotoxins or NSAIDs   -advised to drink adequate liquid to stay hydrated   -check UA with microscopy, BMP, UPC ratio before next visit  -urinalysis today shows 1+ proteinuria, no hematuria  -?  CKD in the setting of solitary kidney, hyper filtration/obesity related? Secondary FSGS    Status post left nephrectomy 2012 in the setting of recurrent nephrolithiasis, recurrent UTI as per patient in [facility]  -since nephrectomy, she has not had any stone or UTI flare-up issues  -CT scan in 2020 shows no evidence of stones on the right side  -renal ultrasound in 2019 shows right kidney 13 5 cm  History of nephrolithiasis requiring nephrectomy in the past   Patient has not had any kidney stone issues since then  CT scan as above   -advised to drink plenty of free water to stay hydrated and goal urine output greater than 2 L per day    Blood pressure acceptable in the office today but slightly above goal on repeat check  She denies any obvious history of hypertension will continue to monitor  Salt restricted diet recommended  Proteinuria on urinalysis in the office today  Check UA with microscopy, UPC ratio before next visit    HISTORY OF PRESENT ILLNESS:  Clement Bauman is a 41y o  year old female with medical issues of hyperlipidemia, migraine, status post left nephrectomy due to recurrent nephrolithiasis/UTI in 2012, arthritis who presents for initial consultation for renal failure, solitary kidney management  Old medical records including prior creatinine values from [facility] records were reviewed    Patient had nephrectomy on left side in 2012 which she believes secondary to recurrent nephrolithiasis and recurrent UTI issues  This was done in [facility]   Since then she denies having any UTI or nephrolithiasis episodes on her right kidney  She denies any current urinary complaint  Denies any chest pain, shortness of breath, nausea vomiting  No recent NSAID exposure  Denies any obvious history of hypertension or diabetes  Family history includes parents having diabetes, grandmother was on dialysis, patient does not know more details  REVIEW OF SYSTEMS:    More than 10 point review of systems were obtained and discussed in length with the patient  Complete review of systems were negative / unremarkable except mentioned in the HPI section  PHYSICAL EXAM:  Vitals:    07/19/21 1131 07/19/21 1218   BP: 122/76 132/84   BP Location: Left arm    Patient Position: Sitting    Cuff Size: Large    Pulse: 85    Resp: 16    Weight: 95 3 kg (210 lb)    Height: 5' 4" (1 626 m)      Body mass index is 36 05 kg/m²  Physical Exam  Vitals reviewed  Constitutional:       Appearance: She is well-developed  HENT:      Head: Normocephalic and atraumatic  Right Ear: External ear normal       Left Ear: External ear normal    Eyes:      General: No scleral icterus  Conjunctiva/sclera: Conjunctivae normal    Cardiovascular:      Comments: S1, S2 present  Pulmonary:      Effort: Pulmonary effort is normal       Breath sounds: Normal breath sounds  No wheezing or rales  Abdominal:      General: Bowel sounds are normal  There is no distension  Palpations: Abdomen is soft  Tenderness: There is no abdominal tenderness  Musculoskeletal:         General: No tenderness  Right lower leg: No edema  Left lower leg: No edema  Lymphadenopathy:      Cervical: No cervical adenopathy  Skin:     Findings: No rash  Neurological:      Mental Status: She is alert and oriented to person, place, and time     Psychiatric:         Behavior: Behavior normal          PAST MEDICAL HISTORY:  Past Medical History:   Diagnosis Date    Renal calculi     Renal disorder     Toe fracture, left        PAST SURGICAL HISTORY:  Past Surgical History:   Procedure Laterality Date    CHOLECYSTECTOMY      CHOLECYSTECTOMY LAPAROSCOPIC N/A 12/10/2018    Procedure: CHOLECYSTECTOMY LAPAROSCOPIC; INCISIONAL HERNIA REPAIR;  Surgeon: Lucero Langley MD;  [facility];  Service: General    KIDNEY SURGERY  2013    removed L kidney from damage from kidney stone -PERFORMED IN [place]    NEPHRECTOMY      TUBAL LIGATION      AGE 32       ALLERGIES:  No Known Allergies    SOCIAL HISTORY:  Social History     Substance and Sexual Activity   Alcohol Use No     Social History     Substance and Sexual Activity   Drug Use No     Social History     Tobacco Use   Smoking Status Never Smoker   Smokeless Tobacco Never Used       FAMILY HISTORY:  Family History   Problem Relation Age of Onset    Diabetes Mother     Hyperlipidemia Mother     Hypertension Mother     Other Mother         low back pain    Heart disease Mother     Diabetes Father     No Known Problems Sister     No Known Problems Brother     No Known Problems Daughter     No Known Problems Son     Kidney failure Maternal Grandmother         on dialysis    Kidney disease Maternal Grandmother     Heart disease Maternal Grandmother        MEDICATIONS:    Current Outpatient Medications:     acetaminophen (TYLENOL) 650 mg CR tablet, Take 1 tablet (650 mg total) by mouth every 8 (eight) hours as needed for mild pain, Disp: 30 tablet, Rfl: 0    calcium-vitamin D (OSCAL) 250-125 MG-UNIT per tablet, Take 1 tablet by mouth 2 (two) times a day, Disp: 60 tablet, Rfl: 2    Diclofenac Sodium (VOLTAREN) 1 %, Apply 2 g topically 4 (four) times a day, Disp: 240 g, Rfl: 2    famotidine (PEPCID) 20 mg tablet, Take 1 tablet (20 mg total) by mouth daily as needed for heartburn, Disp: 90 tablet, Rfl: 2    clotrimazole-betamethasone (LOTRISONE) 1-0 05 % cream, Apply topically 2 (two) times a day (Patient not taking: Reported on 7/12/2021), Disp: 30 g, Rfl: 0    Elastic Bandages & Supports (Futuro Elbow Brace) MISC, Use daily (Patient not taking: Reported on 7/12/2021), Disp: 1 each, Rfl: 0    Lab Results:   Results for orders placed or performed in visit on 07/19/21   POCT urinalysis dipstick   Result Value Ref Range    Color, UA Yellow     Clarity, UA Clear     Glucose, UA (Ref: Negative) neg     Bilirubin, UA (Ref: Negative) neg     Ketones, UA (Ref: Negative) neg     Spec Grav, UA (Ref:1 003-1 030) 1 015     pH, UA (Ref: 4 5-8 0) 5 0     Protein, UA (Ref: Negative) 30+     Urobilinogen, UA (Ref: 0 2- 1 0) neg     Nitrite, UA (Ref: Negative) neg      Leukocytes, UA (Ref: Negative) neg     Blood, UA (Ref: Negative) neg    Creatinine 0 8 in July 2021    Portions of the record may have been created with voice recognition software  Occasional wrong word or "sound a like" substitutions may have occurred due to the inherent limitations of voice recognition software  Read the chart carefully and recognize, using context, where substitutions have occurred

## 2021-08-10 ENCOUNTER — EVALUATION (OUTPATIENT)
Dept: PHYSICAL THERAPY | Facility: CLINIC | Age: 46
End: 2021-08-10
Payer: COMMERCIAL

## 2021-08-10 DIAGNOSIS — M22.2X2 PATELLOFEMORAL ARTHRALGIA OF BOTH KNEES: Primary | ICD-10-CM

## 2021-08-10 DIAGNOSIS — M22.2X1 PATELLOFEMORAL ARTHRALGIA OF BOTH KNEES: Primary | ICD-10-CM

## 2021-08-10 PROCEDURE — 97161 PT EVAL LOW COMPLEX 20 MIN: CPT | Performed by: PHYSICAL THERAPIST

## 2021-08-10 PROCEDURE — 97110 THERAPEUTIC EXERCISES: CPT | Performed by: PHYSICAL THERAPIST

## 2021-08-10 PROCEDURE — 97535 SELF CARE MNGMENT TRAINING: CPT | Performed by: PHYSICAL THERAPIST

## 2021-08-10 NOTE — PROGRESS NOTES
PT Evaluation     Today's date: 8/10/2021  Patient name: Maribeth Rhoades  : 1975  MRN: [id number]  Referring provider: Denisse Alvarez MD  Dx:   Encounter Diagnosis     ICD-10-CM    1  Patellofemoral arthralgia of both knees  M22 2X1     M22 2X2                   Assessment  Assessment details: The patient presents with s/s consistent with referring diagnosis  The patient was educated on prognosis and rehab and instructed in an HEP for home  All questions were answered to the patient's satisfaction  The patient would benefit from skilled PT to address her deficits and meet her goals  Impairments: abnormal gait, abnormal muscle firing, abnormal muscle tone, abnormal or restricted ROM, impaired balance, impaired physical strength, pain with function, weight-bearing intolerance and poor body mechanics  Understanding of Dx/Px/POC: good   Prognosis: good    Goals  STG: To be completed in 4 weeks  1  The patient is compliant with her HEP  2  The patient will increase LE MMT to 4/5 MMT when ascending/descending stairs  3  The patient will report no more than 6/10 pain during all adls  LTG: To be completed in 8 weeks    1  The patient will report no more than 2/10 pain during all adls  2  The patient will increase knee flexion to 120 degrees when squatting to pick an object off the floor  3  The patient will increase LE strength to 4/5 MMT when ascending/descending stairs         Plan  Patient would benefit from: skilled physical therapy  Planned modality interventions: low level laser therapy  Planned therapy interventions: joint mobilization, manual therapy, neuromuscular re-education, patient education, self care, strengthening, stretching, therapeutic activities, therapeutic exercise, therapeutic training and home exercise program  Frequency: 2x week  Duration in visits: 8  Plan of Care beginning date: 8/10/2021  Treatment plan discussed with: patient        Subjective Evaluation    History of Present Illness  Date of onset: 2/10/2021  Mechanism of injury: Marisa Lucas is a 41 y o  female who presents with b/L knee OA and pain R>L  The patient notes pain and tenderness along the medial aspect of the knee  Her pain is worse at the end of the day  The patient denies parasthesias or trouble sleeping at night  The patient currently struggles with STS, walking, and going down the stairs  PMH includes migraine, CKD2, HLD, obesity      Pain  Current pain ratin  At best pain ratin  At worst pain rating: 10  Quality: pulling  Relieving factors: medications and rest  Progression: worsening    Social Support  Steps to enter house: yes  Stairs in house: yes     Employment status: working (teacher)  Patient Goals  Patient goals for therapy: independence with ADLs/IADLs, decreased pain and increased strength          Objective     Active Range of Motion   Left Knee   Flexion: 121 degrees   Extension: 0 degrees     Right Knee   Flexion: 120 degrees   Extension: 0 degrees     Strength/Myotome Testing     Left Hip   Planes of Motion   Flexion: 4  Extension: 4-  Abduction: 4-  Adduction: 4-    Right Hip   Planes of Motion   Flexion: 4  Extension: 4-  Abduction: 4-  Adduction: 4-    Left Knee   Flexion: 4-  Extension: 4  Quadriceps contraction: fair    Right Knee   Flexion: 4-  Extension: 4  Quadriceps contraction: good    Tests     Right Knee   Negative anterior drawer, posterior Lachman, Thessaly's test at 20 degrees, valgus stress test at 30 degrees and varus stress test at 30 degrees  Functional Assessment      Squat    Left valgus and right valgus  Forward Step Up 8"   Left Leg  Valgus  Right Leg  Valgus       Single Leg Stance   Left: 30 seconds  Right: 8 seconds             Precautions: migraine, CKD2, HLD, obesity      Manuals 8/10            Laser: B Knees                                                    Neuro Re-Ed             Supine SLR 2x10 2x12           Biodex: SLS Ther Ex             Bike             S/L Clamshells 2x10 RTB            Bridges 3x10            Ball Squeeze 5"x30/ 2 2#            S/L Hip Abd                                                    Ther Activity             STS             Step-Up/Down  4"           Gait Training                                       Modalities

## 2021-08-16 ENCOUNTER — APPOINTMENT (OUTPATIENT)
Dept: PHYSICAL THERAPY | Facility: CLINIC | Age: 46
End: 2021-08-16
Payer: COMMERCIAL

## 2021-08-18 ENCOUNTER — OFFICE VISIT (OUTPATIENT)
Dept: PHYSICAL THERAPY | Facility: CLINIC | Age: 46
End: 2021-08-18
Payer: COMMERCIAL

## 2021-08-18 DIAGNOSIS — M22.2X2 PATELLOFEMORAL ARTHRALGIA OF BOTH KNEES: Primary | ICD-10-CM

## 2021-08-18 DIAGNOSIS — M22.2X1 PATELLOFEMORAL ARTHRALGIA OF BOTH KNEES: Primary | ICD-10-CM

## 2021-08-18 PROCEDURE — 97140 MANUAL THERAPY 1/> REGIONS: CPT

## 2021-08-18 PROCEDURE — 97112 NEUROMUSCULAR REEDUCATION: CPT

## 2021-08-18 NOTE — PROGRESS NOTES
Daily Note     Today's date: 2021  Patient name: Anirudh Ayoub  : 1975  MRN: [id number]  Referring provider: Maria De Jesus Elise MD  Dx:   Encounter Diagnosis     ICD-10-CM    1  Patellofemoral arthralgia of both knees  M22 2X1     M22 2X2                   Subjective: Pt reports no change in symptoms of both knees since her IE  States that she has been compliant with HEP, but has been unable to perform bridges, because this causes significant increase in pain in low-mid back  Objective: See treatment diary below      Assessment: Tolerated treatment well  Pt likely has hyper lordosis in upper lumbar spine while performing bridges at home, causing the increased pain she had experienced with this exercise  Will continue to monitor form and technique with this exercise  Was able to perform all other exercise with no significant increase in symptoms, though she was tentative in fear of pain  Most challenged with supine SLR and clamshell today  Patient would benefit from continued PT      Plan: Continue per plan of care  Monitor response to initial treatment NV         Precautions: migraine, CKD2, HLD, obesity      Manuals 8/10 8/18           Laser: B Knees  3600J  ea                                                  Neuro Re-Ed             Supine SLR 2x10 2x12           Biodex: SLS  nv                                                                            Ther Ex             Bike  nv           S/L Clamshells 2x10 RTB 2x10 RTB           Bridges 3x10 1x10 2x10          Ball Squeeze 5"x30/ 2 2# 5"x30/ 2 2#           S/L Hip Abd  nv                                                  Ther Activity             STS  foam  2x10           Step-Up/Down  4"  1x10 b/l 4"  2x10 b/l          Gait Training                                       Modalities

## 2021-08-19 ENCOUNTER — APPOINTMENT (OUTPATIENT)
Dept: PHYSICAL THERAPY | Facility: CLINIC | Age: 46
End: 2021-08-19
Payer: COMMERCIAL

## 2021-08-23 ENCOUNTER — APPOINTMENT (OUTPATIENT)
Dept: PHYSICAL THERAPY | Facility: CLINIC | Age: 46
End: 2021-08-23
Payer: COMMERCIAL

## 2021-08-23 NOTE — PROGRESS NOTES
Daily Note     Today's date: 2021  Patient name: Briseyda Abdul  : 1975  MRN: [id number]  Referring provider: Lise Collins MD  Dx:   Encounter Diagnosis     ICD-10-CM    1  Patellofemoral arthralgia of both knees  M22 2X1     M22 2X2                   Subjective: ***      Objective: See treatment diary below      Assessment: Tolerated treatment {Tolerated treatment :}   Patient {assessment:}      Plan: {PLAN:[id number]}     Precautions: migraine, CKD2, HLD, obesity      Manuals 8/10 8/18           Laser: B Knees  3600J  ea                                                  Neuro Re-Ed             Supine SLR 2x10 2x12           Biodex: SLS  nv                                                                            Ther Ex             Bike  nv           S/L Clamshells 2x10 RTB 2x10 RTB           Bridges 3x10 1x10 2x10          Ball Squeeze 5"x30/ 2 2# 5"x30/ 2 2#           S/L Hip Abd  nv                                                  Ther Activity             STS  foam  2x10           Step-Up/Down  4"  1x10 b/l 4"  2x10 b/l          Gait Training                                       Modalities

## 2021-08-26 ENCOUNTER — OFFICE VISIT (OUTPATIENT)
Dept: PHYSICAL THERAPY | Facility: CLINIC | Age: 46
End: 2021-08-26
Payer: COMMERCIAL

## 2021-08-26 DIAGNOSIS — M22.2X1 PATELLOFEMORAL ARTHRALGIA OF BOTH KNEES: Primary | ICD-10-CM

## 2021-08-26 DIAGNOSIS — M22.2X2 PATELLOFEMORAL ARTHRALGIA OF BOTH KNEES: Primary | ICD-10-CM

## 2021-08-26 PROCEDURE — 97110 THERAPEUTIC EXERCISES: CPT | Performed by: PHYSICAL THERAPIST

## 2021-08-26 PROCEDURE — 97140 MANUAL THERAPY 1/> REGIONS: CPT | Performed by: PHYSICAL THERAPIST

## 2021-08-26 PROCEDURE — 97112 NEUROMUSCULAR REEDUCATION: CPT | Performed by: PHYSICAL THERAPIST

## 2021-08-26 NOTE — PROGRESS NOTES
Daily Note     Today's date: 2021  Patient name: Valentino Jewels  : 1975  MRN: [id number]  Referring provider: Sarah Villa MD  Dx:   Encounter Diagnosis     ICD-10-CM    1  Patellofemoral arthralgia of both knees  M22 2X1     M22 2X2                   Subjective: Pt reports improved pain levels and pain frequency since last session  She notes little to no pain when getting out of chair and decreased pain with stair navigation  Objective: See treatment diary below      Assessment: The patient was fatigued with strengthening progressions today but good tolerance to laser and good mechanics with STS and stair navigation on the 4" step  Plan: Continue per plan of care  Assess response to treatment  Increase stair height as tolerated        Precautions: migraine, CKD2, HLD, obesity      Manuals 8/10 8/18 8/26          Laser: B Knees  3600J  ea 3600J ea                                                 Neuro Re-Ed             Supine SLR 2x10 2x12 3x10 ea          Biodex: SLS  nv                                                                            Ther Ex             Bike  nv S8 5 min S7         S/L Clamshells 2x10 RTB 2x10 RTB 2x12 RTB          Bridges 3x10 1x10 2x10          Ball Squeeze 5"x30/ 2 2# 5"x30/ 2 2# 5"x30 2 2#          S/L Hip Abd  nv 2x10                                                 Ther Activity             STS  foam  2x10 Foam 2x10          Step-Up/Down  4"  1x10 b/l 4"  2x10 b/l 6"         Gait Training                                       Modalities

## 2021-09-13 NOTE — PROGRESS NOTES
9/13/21: The patient no-showed/cancelled her last 3 appointments  Per attendance policy, the patient is discharged

## 2021-10-05 ENCOUNTER — TELEPHONE (OUTPATIENT)
Dept: INTERNAL MEDICINE CLINIC | Facility: CLINIC | Age: 46
End: 2021-10-05

## 2021-10-26 ENCOUNTER — OFFICE VISIT (OUTPATIENT)
Dept: INTERNAL MEDICINE CLINIC | Facility: CLINIC | Age: 46
End: 2021-10-26

## 2021-10-26 VITALS
WEIGHT: 217 LBS | DIASTOLIC BLOOD PRESSURE: 67 MMHG | SYSTOLIC BLOOD PRESSURE: 106 MMHG | OXYGEN SATURATION: 96 % | BODY MASS INDEX: 37.25 KG/M2 | HEART RATE: 92 BPM | TEMPERATURE: 98.1 F

## 2021-10-26 DIAGNOSIS — M22.2X2 PATELLOFEMORAL PAIN SYNDROME OF BOTH KNEES: ICD-10-CM

## 2021-10-26 DIAGNOSIS — Z23 NEED FOR INFLUENZA VACCINATION: ICD-10-CM

## 2021-10-26 DIAGNOSIS — M22.2X1 PATELLOFEMORAL PAIN SYNDROME OF BOTH KNEES: ICD-10-CM

## 2021-10-26 DIAGNOSIS — M22.2X2 PATELLOFEMORAL PAIN SYNDROME OF LEFT KNEE: Primary | ICD-10-CM

## 2021-10-26 PROCEDURE — 90471 IMMUNIZATION ADMIN: CPT

## 2021-10-26 PROCEDURE — 99214 OFFICE O/P EST MOD 30 MIN: CPT | Performed by: INTERNAL MEDICINE

## 2021-10-26 PROCEDURE — 90686 IIV4 VACC NO PRSV 0.5 ML IM: CPT

## 2021-10-30 ENCOUNTER — HOSPITAL ENCOUNTER (OUTPATIENT)
Dept: RADIOLOGY | Facility: HOSPITAL | Age: 46
Discharge: HOME | End: 2021-10-30
Payer: COMMERCIAL

## 2021-10-30 DIAGNOSIS — M22.2X2 PATELLOFEMORAL PAIN SYNDROME OF LEFT KNEE: ICD-10-CM

## 2021-10-30 PROCEDURE — 73562 X-RAY EXAM OF KNEE 3: CPT

## 2021-11-01 ENCOUNTER — OFFICE VISIT (OUTPATIENT)
Dept: OBGYN CLINIC | Facility: HOSPITAL | Age: 46
End: 2021-11-01
Payer: COMMERCIAL

## 2021-11-01 VITALS
HEART RATE: 84 BPM | HEIGHT: 64 IN | SYSTOLIC BLOOD PRESSURE: 118 MMHG | BODY MASS INDEX: 36.77 KG/M2 | WEIGHT: 215.4 LBS | DIASTOLIC BLOOD PRESSURE: 75 MMHG

## 2021-11-01 DIAGNOSIS — R29.898 KNEE CLICKING: Primary | ICD-10-CM

## 2021-11-01 DIAGNOSIS — M72.2 PLANTAR FASCIAL FIBROMATOSIS: ICD-10-CM

## 2021-11-01 DIAGNOSIS — M22.2X2 PATELLOFEMORAL PAIN SYNDROME OF LEFT KNEE: ICD-10-CM

## 2021-11-01 PROCEDURE — 1036F TOBACCO NON-USER: CPT | Performed by: PHYSICIAN ASSISTANT

## 2021-11-01 PROCEDURE — 99203 OFFICE O/P NEW LOW 30 MIN: CPT | Performed by: PHYSICIAN ASSISTANT

## 2021-11-01 PROCEDURE — 3008F BODY MASS INDEX DOCD: CPT | Performed by: PHYSICIAN ASSISTANT

## 2021-11-29 ENCOUNTER — HOSPITAL ENCOUNTER (OUTPATIENT)
Dept: RADIOLOGY | Age: 46
Discharge: HOME | End: 2021-11-29
Payer: COMMERCIAL

## 2021-11-29 DIAGNOSIS — M22.2X2 PATELLOFEMORAL PAIN SYNDROME OF LEFT KNEE: ICD-10-CM

## 2021-11-29 DIAGNOSIS — R29.898 KNEE CLICKING: ICD-10-CM

## 2021-11-29 PROCEDURE — G1004 CDSM NDSC: HCPCS

## 2021-11-29 PROCEDURE — 73721 MRI JNT OF LWR EXTRE W/O DYE: CPT

## 2021-12-02 ENCOUNTER — HOSPITAL ENCOUNTER (EMERGENCY)
Facility: HOSPITAL | Age: 46
Discharge: HOME | End: 2021-12-03
Attending: EMERGENCY MEDICINE | Admitting: EMERGENCY MEDICINE
Payer: COMMERCIAL

## 2021-12-02 ENCOUNTER — APPOINTMENT (EMERGENCY)
Dept: RADIOLOGY | Facility: HOSPITAL | Age: 46
End: 2021-12-02
Payer: COMMERCIAL

## 2021-12-02 VITALS
BODY MASS INDEX: 36.19 KG/M2 | SYSTOLIC BLOOD PRESSURE: 112 MMHG | OXYGEN SATURATION: 99 % | DIASTOLIC BLOOD PRESSURE: 65 MMHG | HEART RATE: 80 BPM | TEMPERATURE: 97.8 F | HEIGHT: 64 IN | WEIGHT: 212 LBS | RESPIRATION RATE: 16 BRPM

## 2021-12-02 DIAGNOSIS — N83.209 OVARIAN CYST: Primary | ICD-10-CM

## 2021-12-02 LAB
ALBUMIN SERPL BCP-MCNC: 3.4 G/DL (ref 3.5–5)
ALP SERPL-CCNC: 78 U/L (ref 46–116)
ALT SERPL W P-5'-P-CCNC: 32 U/L (ref 12–78)
ANION GAP SERPL CALCULATED.3IONS-SCNC: 6 MMOL/L (ref 4–13)
AST SERPL W P-5'-P-CCNC: 16 U/L (ref 5–45)
BASOPHILS # BLD AUTO: 0.09 THOUSANDS/ΜL (ref 0–0.1)
BASOPHILS NFR BLD AUTO: 1 % (ref 0–1)
BILIRUB SERPL-MCNC: 0.34 MG/DL (ref 0.2–1)
BUN SERPL-MCNC: 20 MG/DL (ref 5–25)
CALCIUM ALBUM COR SERPL-MCNC: 10.2 MG/DL (ref 8.3–10.1)
CALCIUM SERPL-MCNC: 9.7 MG/DL (ref 8.3–10.1)
CHLORIDE SERPL-SCNC: 106 MMOL/L (ref 100–108)
CO2 SERPL-SCNC: 25 MMOL/L (ref 21–32)
CREAT SERPL-MCNC: 0.91 MG/DL (ref 0.6–1.3)
EOSINOPHIL # BLD AUTO: 0.16 THOUSAND/ΜL (ref 0–0.61)
EOSINOPHIL NFR BLD AUTO: 2 % (ref 0–6)
ERYTHROCYTE [DISTWIDTH] IN BLOOD BY AUTOMATED COUNT: 17 % (ref 11.6–15.1)
EXT PREG TEST URINE: NEGATIVE
EXT. CONTROL ED NAV: NORMAL
GFR SERPL CREATININE-BSD FRML MDRD: 76 ML/MIN/1.73SQ M
GLUCOSE SERPL-MCNC: 100 MG/DL (ref 65–140)
HCT VFR BLD AUTO: 34.5 % (ref 34.8–46.1)
HGB BLD-MCNC: 10.3 G/DL (ref 11.5–15.4)
IMM GRANULOCYTES # BLD AUTO: 0.03 THOUSAND/UL (ref 0–0.2)
IMM GRANULOCYTES NFR BLD AUTO: 0 % (ref 0–2)
LYMPHOCYTES # BLD AUTO: 2.8 THOUSANDS/ΜL (ref 0.6–4.47)
LYMPHOCYTES NFR BLD AUTO: 33 % (ref 14–44)
MCH RBC QN AUTO: 22 PG (ref 26.8–34.3)
MCHC RBC AUTO-ENTMCNC: 29.9 G/DL (ref 31.4–37.4)
MCV RBC AUTO: 74 FL (ref 82–98)
MONOCYTES # BLD AUTO: 0.73 THOUSAND/ΜL (ref 0.17–1.22)
MONOCYTES NFR BLD AUTO: 9 % (ref 4–12)
NEUTROPHILS # BLD AUTO: 4.76 THOUSANDS/ΜL (ref 1.85–7.62)
NEUTS SEG NFR BLD AUTO: 55 % (ref 43–75)
NRBC BLD AUTO-RTO: 0 /100 WBCS
PLATELET # BLD AUTO: 364 THOUSANDS/UL (ref 149–390)
PMV BLD AUTO: 10 FL (ref 8.9–12.7)
POTASSIUM SERPL-SCNC: 4.2 MMOL/L (ref 3.5–5.3)
PROT SERPL-MCNC: 7.6 G/DL (ref 6.4–8.2)
RBC # BLD AUTO: 4.68 MILLION/UL (ref 3.81–5.12)
SODIUM SERPL-SCNC: 137 MMOL/L (ref 136–145)
WBC # BLD AUTO: 8.57 THOUSAND/UL (ref 4.31–10.16)

## 2021-12-02 PROCEDURE — G1004 CDSM NDSC: HCPCS

## 2021-12-02 PROCEDURE — 85025 COMPLETE CBC W/AUTO DIFF WBC: CPT

## 2021-12-02 PROCEDURE — 99284 EMERGENCY DEPT VISIT MOD MDM: CPT

## 2021-12-02 PROCEDURE — 36415 COLL VENOUS BLD VENIPUNCTURE: CPT

## 2021-12-02 PROCEDURE — 81025 URINE PREGNANCY TEST: CPT

## 2021-12-02 PROCEDURE — 80053 COMPREHEN METABOLIC PANEL: CPT

## 2021-12-02 PROCEDURE — 74176 CT ABD & PELVIS W/O CONTRAST: CPT

## 2021-12-02 PROCEDURE — 99285 EMERGENCY DEPT VISIT HI MDM: CPT | Performed by: EMERGENCY MEDICINE

## 2021-12-02 PROCEDURE — 96374 THER/PROPH/DIAG INJ IV PUSH: CPT

## 2021-12-02 RX ORDER — FENTANYL CITRATE 50 UG/ML
25 INJECTION, SOLUTION INTRAMUSCULAR; INTRAVENOUS ONCE
Status: COMPLETED | OUTPATIENT
Start: 2021-12-02 | End: 2021-12-02

## 2021-12-02 RX ADMIN — IOHEXOL 50 ML: 240 INJECTION, SOLUTION INTRATHECAL; INTRAVASCULAR; INTRAVENOUS; ORAL at 21:07

## 2021-12-02 RX ADMIN — FENTANYL CITRATE 25 MCG: 50 INJECTION INTRAMUSCULAR; INTRAVENOUS at 20:31

## 2021-12-03 ENCOUNTER — APPOINTMENT (EMERGENCY)
Dept: RADIOLOGY | Facility: HOSPITAL | Age: 46
End: 2021-12-03
Payer: COMMERCIAL

## 2021-12-03 PROCEDURE — 76830 TRANSVAGINAL US NON-OB: CPT

## 2021-12-03 PROCEDURE — 96375 TX/PRO/DX INJ NEW DRUG ADDON: CPT

## 2021-12-03 PROCEDURE — 76856 US EXAM PELVIC COMPLETE: CPT

## 2021-12-03 RX ORDER — KETOROLAC TROMETHAMINE 30 MG/ML
15 INJECTION, SOLUTION INTRAMUSCULAR; INTRAVENOUS ONCE
Status: COMPLETED | OUTPATIENT
Start: 2021-12-03 | End: 2021-12-03

## 2021-12-03 RX ORDER — NAPROXEN 500 MG/1
500 TABLET ORAL 2 TIMES DAILY WITH MEALS
Qty: 30 TABLET | Refills: 0 | Status: SHIPPED | OUTPATIENT
Start: 2021-12-03 | End: 2022-05-03

## 2021-12-03 RX ADMIN — KETOROLAC TROMETHAMINE 15 MG: 30 INJECTION, SOLUTION INTRAMUSCULAR; INTRAVENOUS at 01:50

## 2021-12-13 ENCOUNTER — OFFICE VISIT (OUTPATIENT)
Dept: OBGYN CLINIC | Facility: CLINIC | Age: 46
End: 2021-12-13

## 2021-12-13 VITALS
DIASTOLIC BLOOD PRESSURE: 62 MMHG | HEART RATE: 98 BPM | HEIGHT: 64 IN | WEIGHT: 216 LBS | SYSTOLIC BLOOD PRESSURE: 134 MMHG | BODY MASS INDEX: 36.88 KG/M2

## 2021-12-13 DIAGNOSIS — N83.202 CYST OF LEFT OVARY: Primary | ICD-10-CM

## 2021-12-13 PROCEDURE — 99213 OFFICE O/P EST LOW 20 MIN: CPT | Performed by: OBSTETRICS & GYNECOLOGY

## 2021-12-13 PROCEDURE — 3008F BODY MASS INDEX DOCD: CPT | Performed by: PHYSICIAN ASSISTANT

## 2022-01-25 ENCOUNTER — TELEPHONE (OUTPATIENT)
Dept: NEPHROLOGY | Facility: CLINIC | Age: 47
End: 2022-01-25

## 2022-01-25 NOTE — TELEPHONE ENCOUNTER
Tried to call patient to schedule a follow up appointment with patient but the line is busy  This is the second attempt

## 2022-01-31 ENCOUNTER — ANNUAL EXAM (OUTPATIENT)
Dept: OBGYN CLINIC | Facility: CLINIC | Age: 47
End: 2022-01-31

## 2022-01-31 VITALS
HEIGHT: 64 IN | HEART RATE: 83 BPM | BODY MASS INDEX: 36.84 KG/M2 | SYSTOLIC BLOOD PRESSURE: 116 MMHG | WEIGHT: 215.8 LBS | DIASTOLIC BLOOD PRESSURE: 71 MMHG

## 2022-01-31 DIAGNOSIS — Z01.419 WOMEN'S ANNUAL ROUTINE GYNECOLOGICAL EXAMINATION: ICD-10-CM

## 2022-01-31 DIAGNOSIS — Z12.4 CERVICAL CANCER SCREENING: ICD-10-CM

## 2022-01-31 DIAGNOSIS — Z01.419 WELL WOMAN EXAM: Primary | ICD-10-CM

## 2022-01-31 DIAGNOSIS — Z11.3 SCREEN FOR STD (SEXUALLY TRANSMITTED DISEASE): ICD-10-CM

## 2022-01-31 DIAGNOSIS — Z12.31 ENCOUNTER FOR SCREENING MAMMOGRAM FOR MALIGNANT NEOPLASM OF BREAST: ICD-10-CM

## 2022-01-31 PROCEDURE — 99396 PREV VISIT EST AGE 40-64: CPT | Performed by: NURSE PRACTITIONER

## 2022-01-31 PROCEDURE — 87491 CHLMYD TRACH DNA AMP PROBE: CPT | Performed by: NURSE PRACTITIONER

## 2022-01-31 PROCEDURE — 87624 HPV HI-RISK TYP POOLED RSLT: CPT | Performed by: NURSE PRACTITIONER

## 2022-01-31 PROCEDURE — 88175 CYTOPATH C/V AUTO FLUID REDO: CPT | Performed by: PATHOLOGY

## 2022-01-31 PROCEDURE — 87591 N.GONORRHOEAE DNA AMP PROB: CPT | Performed by: NURSE PRACTITIONER

## 2022-01-31 PROCEDURE — 88141 CYTOPATH C/V INTERPRET: CPT | Performed by: PATHOLOGY

## 2022-01-31 NOTE — PROGRESS NOTES
Andres is a 40 y o  female who presents today for annual GYN exam   Her last pap smear was performed 10/2019 and result was ASCUS with +HR-HPV, negative for 16/18  Follow up colposcopy  Low-grade squamous intra-epithelial lesion with HPV related changes and benign cervical tissue with moderate chronic cervicitis  She has not had a pap smear since  She has not received the HPV vaccine series  She has not yet had her first mammogram  She reports menses as regular  Patient's last menstrual period was 01/10/2022  Her contraceptive method is BTL  Her general medical history has been reviewed and she reports it as follows:    Past Medical History:   Diagnosis Date    Renal calculi     Renal disorder     Toe fracture, left      Past Surgical History:   Procedure Laterality Date    CHOLECYSTECTOMY      CHOLECYSTECTOMY LAPAROSCOPIC N/A 12/10/2018    Procedure: CHOLECYSTECTOMY LAPAROSCOPIC; [address];  Surgeon: Kady Sheridan MD;  [facility];  Service: General    KIDNEY SURGERY      removed L kidney from damage from kidney stone -PERFORMED IN [place]    NEPHRECTOMY      TUBAL LIGATION      AGE 33     OB History        2    Para   2    Term   2            AB        Living   2       SAB        IAB        Ectopic        Multiple        Live Births                   Social History     Tobacco Use    Smoking status: Never Smoker    Smokeless tobacco: Never Used   Vaping Use    Vaping Use: Never used   Substance Use Topics    Alcohol use: No    Drug use: No     Cancer-related family history is not on file      Current Outpatient Medications   Medication Instructions    acetaminophen (TYLENOL) 650 mg, Oral, Every 8 hours PRN    calcium-vitamin D (OSCAL) 250-125 MG-UNIT per tablet 1 tablet, Oral, 2 times daily    clotrimazole-betamethasone (LOTRISONE) 1-0 05 % cream Topical, 2 times daily    Diclofenac Sodium (VOLTAREN) 2 g, Topical, 4 times daily    Elastic Bandages & Supports (Futuro Elbow Brace) MISC Does not apply, Daily    famotidine (PEPCID) 20 mg, Oral, Daily PRN    naproxen (NAPROSYN) 500 mg, Oral, 2 times daily with meals       Review of Systems:  Review of Systems   Constitutional: Negative  Gastrointestinal: Negative  Genitourinary: Negative  Skin: Negative  Physical Exam:  /71 (BP Location: Right arm, Patient Position: Sitting, Cuff Size: Adult)   Pulse 83   Ht 5' 4" (1 626 m)   Wt 97 9 kg (215 lb 12 8 oz)   LMP 01/10/2022   Breastfeeding No   BMI 37 04 kg/m²   Physical Exam  Constitutional:       General: She is not in acute distress  Appearance: Normal appearance  Genitourinary:      Vulva and bladder normal       No lesions in the vagina  No vaginal erythema or ulceration  Right Adnexa: not tender and no mass present  Left Adnexa: not tender and no mass present  No cervical motion tenderness or lesion  Uterus is not enlarged or tender  No uterine mass detected  Breasts:      Right: No mass, nipple discharge or skin change  Left: No mass, nipple discharge or skin change  Cardiovascular:      Rate and Rhythm: Normal rate and regular rhythm  Pulmonary:      Effort: Pulmonary effort is normal       Breath sounds: Normal breath sounds  Abdominal:      General: Abdomen is flat  Palpations: Abdomen is soft  Musculoskeletal:      Cervical back: Neck supple  Neurological:      Mental Status: She is alert  Skin:     General: Skin is warm and dry  Psychiatric:         Mood and Affect: Mood normal          Behavior: Behavior normal    Vitals reviewed  Assessment/Plan:   1  Normal well-woman GYN exam   2  Cervical cancer screening:  Normal cervical exam   Pap smear done with HPV co-testing  3  STD screening: Orders placed for vaginal GC/CT cultures    Orders placed for serum anti-HIV, anti-HCV, HbsAg, RPR    4  Breast cancer screening:  Normal breast exam   Order placed for bilateral screening mammogram   Reviewed breast self-awareness  5  Depression Screening: Patient's depression screening was assessed with a PHQ-2 score of 0  Their PHQ-9 score was 0  Clinically patient does not have depression  No treatment is required  6  BMI Counseling: Body mass index is 37 04 kg/m²  Discussed the patient's BMI with her  The BMI is above normal  Nutrition recommendations include reducing portion sizes, decreasing overall calorie intake, 3-5 servings of fruits/vegetables daily, moderation in carbohydrate intake and increasing intake of lean protein  Exercise recommendations include moderate aerobic physical activity for 150 minutes/week and exercising 3-5 times per week  7  Contraception:  BTL   8  Discussed HPV vaccine, would need to complete prior to 47th birthday  She will consider this  9  Return to office in one year for annual or sooner as needed  Reviewed with patient that [facility], but that they will not necessarily be reviewed by me immediately  Explained that I will review results at my earliest opportunity and contact patient appropriately

## 2022-02-03 LAB
C TRACH DNA SPEC QL NAA+PROBE: NEGATIVE
N GONORRHOEA DNA SPEC QL NAA+PROBE: NEGATIVE

## 2022-02-07 LAB
LAB AP GYN PRIMARY INTERPRETATION: ABNORMAL
Lab: ABNORMAL
PATH INTERP SPEC-IMP: ABNORMAL

## 2022-02-14 ENCOUNTER — TELEPHONE (OUTPATIENT)
Dept: OBGYN CLINIC | Facility: CLINIC | Age: 47
End: 2022-02-14

## 2022-02-19 ENCOUNTER — APPOINTMENT (OUTPATIENT)
Dept: LAB | Facility: HOSPITAL | Age: 47
End: 2022-02-19
Payer: MEDICARE

## 2022-02-19 DIAGNOSIS — Z11.3 SCREEN FOR STD (SEXUALLY TRANSMITTED DISEASE): ICD-10-CM

## 2022-02-19 DIAGNOSIS — Z90.5 STATUS POST NEPHRECTOMY: ICD-10-CM

## 2022-02-19 DIAGNOSIS — R80.1 PERSISTENT PROTEINURIA: ICD-10-CM

## 2022-02-19 DIAGNOSIS — N18.2 CKD (CHRONIC KIDNEY DISEASE), STAGE II: ICD-10-CM

## 2022-02-19 DIAGNOSIS — E78.2 MIXED HYPERLIPIDEMIA: ICD-10-CM

## 2022-02-19 LAB
ANION GAP SERPL CALCULATED.3IONS-SCNC: 6 MMOL/L (ref 4–13)
BACTERIA UR QL AUTO: ABNORMAL /HPF
BILIRUB UR QL STRIP: NEGATIVE
BUN SERPL-MCNC: 9 MG/DL (ref 5–25)
CALCIUM SERPL-MCNC: 9.7 MG/DL (ref 8.3–10.1)
CHLORIDE SERPL-SCNC: 107 MMOL/L (ref 100–108)
CHOLEST SERPL-MCNC: 240 MG/DL
CLARITY UR: ABNORMAL
CO2 SERPL-SCNC: 26 MMOL/L (ref 21–32)
COLOR UR: ABNORMAL
CREAT SERPL-MCNC: 0.88 MG/DL (ref 0.6–1.3)
CREAT UR-MCNC: 153 MG/DL
ERYTHROCYTE [DISTWIDTH] IN BLOOD BY AUTOMATED COUNT: 16.4 % (ref 11.6–15.1)
GFR SERPL CREATININE-BSD FRML MDRD: 79 ML/MIN/1.73SQ M
GLUCOSE P FAST SERPL-MCNC: 94 MG/DL (ref 65–99)
GLUCOSE UR STRIP-MCNC: NEGATIVE MG/DL
HBV SURFACE AG SER QL: NORMAL
HCT VFR BLD AUTO: 38.2 % (ref 34.8–46.1)
HCV AB SER QL: NORMAL
HDLC SERPL-MCNC: 44 MG/DL
HGB BLD-MCNC: 11.5 G/DL (ref 11.5–15.4)
HGB UR QL STRIP.AUTO: NEGATIVE
HYALINE CASTS #/AREA URNS LPF: ABNORMAL /LPF
KETONES UR STRIP-MCNC: NEGATIVE MG/DL
LDLC SERPL CALC-MCNC: 162 MG/DL (ref 0–100)
LEUKOCYTE ESTERASE UR QL STRIP: ABNORMAL
MCH RBC QN AUTO: 22.5 PG (ref 26.8–34.3)
MCHC RBC AUTO-ENTMCNC: 30.1 G/DL (ref 31.4–37.4)
MCV RBC AUTO: 75 FL (ref 82–98)
MICROALBUMIN UR-MCNC: 11.4 MG/L (ref 0–20)
MICROALBUMIN/CREAT 24H UR: 7 MG/G CREATININE (ref 0–30)
NITRITE UR QL STRIP: NEGATIVE
NON-SQ EPI CELLS URNS QL MICRO: ABNORMAL /HPF
PH UR STRIP.AUTO: 7 [PH]
PLATELET # BLD AUTO: 369 THOUSANDS/UL (ref 149–390)
PMV BLD AUTO: 10.6 FL (ref 8.9–12.7)
POTASSIUM SERPL-SCNC: 3.9 MMOL/L (ref 3.5–5.3)
PROT UR STRIP-MCNC: NEGATIVE MG/DL
RBC # BLD AUTO: 5.11 MILLION/UL (ref 3.81–5.12)
RBC #/AREA URNS AUTO: ABNORMAL /HPF
SODIUM SERPL-SCNC: 139 MMOL/L (ref 136–145)
SP GR UR STRIP.AUTO: 1.02 (ref 1–1.03)
TRIGL SERPL-MCNC: 170 MG/DL
UROBILINOGEN UR QL STRIP.AUTO: 0.2 E.U./DL
WBC # BLD AUTO: 6.86 THOUSAND/UL (ref 4.31–10.16)
WBC #/AREA URNS AUTO: ABNORMAL /HPF

## 2022-02-19 PROCEDURE — 87340 HEPATITIS B SURFACE AG IA: CPT

## 2022-02-19 PROCEDURE — 80061 LIPID PANEL: CPT

## 2022-02-19 PROCEDURE — 82043 UR ALBUMIN QUANTITATIVE: CPT

## 2022-02-19 PROCEDURE — 87389 HIV-1 AG W/HIV-1&-2 AB AG IA: CPT

## 2022-02-19 PROCEDURE — 85027 COMPLETE CBC AUTOMATED: CPT

## 2022-02-19 PROCEDURE — 86803 HEPATITIS C AB TEST: CPT

## 2022-02-19 PROCEDURE — 80048 BASIC METABOLIC PNL TOTAL CA: CPT

## 2022-02-19 PROCEDURE — 86592 SYPHILIS TEST NON-TREP QUAL: CPT

## 2022-02-19 PROCEDURE — 81001 URINALYSIS AUTO W/SCOPE: CPT

## 2022-02-19 PROCEDURE — 82570 ASSAY OF URINE CREATININE: CPT

## 2022-02-19 PROCEDURE — 36415 COLL VENOUS BLD VENIPUNCTURE: CPT

## 2022-02-21 ENCOUNTER — TELEPHONE (OUTPATIENT)
Dept: OBGYN CLINIC | Facility: CLINIC | Age: 47
End: 2022-02-21

## 2022-02-21 LAB
HIV 1+2 AB+HIV1 P24 AG SERPL QL IA: NORMAL
RPR SER QL: NORMAL

## 2022-02-22 ENCOUNTER — TELEPHONE (OUTPATIENT)
Dept: LABOR AND DELIVERY | Facility: HOSPITAL | Age: 47
End: 2022-02-22

## 2022-02-22 DIAGNOSIS — N39.0 UTI (URINARY TRACT INFECTION): Primary | ICD-10-CM

## 2022-02-22 RX ORDER — NITROFURANTOIN 25; 75 MG/1; MG/1
100 CAPSULE ORAL 2 TIMES DAILY
Qty: 14 CAPSULE | Refills: 0 | Status: SHIPPED | OUTPATIENT
Start: 2022-02-22 | End: 2022-03-01

## 2022-02-22 NOTE — TELEPHONE ENCOUNTER
Tried calling patient regarding recent urine analysis , there was no answer and unable to leave message

## 2022-02-23 ENCOUNTER — TELEPHONE (OUTPATIENT)
Dept: NEPHROLOGY | Facility: CLINIC | Age: 47
End: 2022-02-23

## 2022-02-23 ENCOUNTER — PROCEDURE VISIT (OUTPATIENT)
Dept: OBGYN CLINIC | Facility: CLINIC | Age: 47
End: 2022-02-23

## 2022-02-23 VITALS
HEIGHT: 64 IN | SYSTOLIC BLOOD PRESSURE: 122 MMHG | HEART RATE: 76 BPM | BODY MASS INDEX: 36.88 KG/M2 | WEIGHT: 216 LBS | DIASTOLIC BLOOD PRESSURE: 84 MMHG

## 2022-02-23 DIAGNOSIS — B37.9 YEAST INFECTION: ICD-10-CM

## 2022-02-23 DIAGNOSIS — R82.998 LEUKOCYTES IN URINE: ICD-10-CM

## 2022-02-23 DIAGNOSIS — R87.610 ASCUS WITH POSITIVE HIGH RISK HPV CERVICAL: Primary | ICD-10-CM

## 2022-02-23 DIAGNOSIS — R87.810 ASCUS WITH POSITIVE HIGH RISK HPV CERVICAL: Primary | ICD-10-CM

## 2022-02-23 DIAGNOSIS — R80.1 PERSISTENT PROTEINURIA: Primary | ICD-10-CM

## 2022-02-23 LAB — SL AMB POCT URINE HCG: NORMAL

## 2022-02-23 PROCEDURE — 81025 URINE PREGNANCY TEST: CPT | Performed by: OBSTETRICS & GYNECOLOGY

## 2022-02-23 PROCEDURE — 88305 TISSUE EXAM BY PATHOLOGIST: CPT | Performed by: SPECIALIST

## 2022-02-23 PROCEDURE — 88344 IMHCHEM/IMCYTCHM EA MLT ANTB: CPT | Performed by: SPECIALIST

## 2022-02-23 PROCEDURE — 88312 SPECIAL STAINS GROUP 1: CPT | Performed by: SPECIALIST

## 2022-02-23 PROCEDURE — 57454 BX/CURETT OF CERVIX W/SCOPE: CPT | Performed by: OBSTETRICS & GYNECOLOGY

## 2022-02-23 RX ORDER — FLUCONAZOLE 150 MG/1
150 TABLET ORAL ONCE
Qty: 1 TABLET | Refills: 0 | Status: SHIPPED | OUTPATIENT
Start: 2022-02-23 | End: 2022-02-23

## 2022-02-23 NOTE — PROGRESS NOTES
Colposcopy    Date/Time: 2/23/2022 3:01 PM  Performed by: Deepali Salomon MD  Authorized by: Deepali Salomon MD     Consent:     Consent obtained:  Verbal and written    Consent given by:  Patient    Procedural risks discussed:  Bleeding, damage to other organs, infection and repeat procedure    Patient questions answered: yes      Patient agrees, verbalizes understanding, and wants to proceed: yes    Indication:     Indication:  LSIL  Procedure:     Procedure: Colposcopy w/ cervical biopsy and ECC      Under satisfactory analgesia the patient was prepped and draped in the dorsal lithotomy position: yes      Bucks speculum was placed in the vagina: yes      Under colposcopic examination the transition zone was seen in entirety: yes      Intracervical block was performed: no      Endocervix was curetted using a Kevorkian curette: yes      Cervical biopsy performed with a cervical biopsy punch: yes    Comments:      10/2019: ASCUS with +HR-HPV, negative for 16/18  Follow up colposcopy  Low-grade squamous intra-epithelial lesion with HPV related changes and benign cervical tissue with moderate chronic cervicitis  1/31/22 Low grade squamous intraepithelial lesion fungal organisms morphologically consistent with Candida spp HR HPV +  I have discussed patient nature of HPV infection  Her cervix is large , indurated at palpation mainly at 12 o clock  Evidence of aceto white changes at 4,6,12 o'clock , several 12 o clock biopsies were taken due to significant induration at this level, they were level as central and peripheral biopsies  A tenaculum was required to stabilize her cervix in order to be able to take the 12 o clock biopsies due to cervical retraction/ induration  Punch biopsy and ECC was performed without complications  Further bleeding from the biopsy site was managed with lugol's solution with subsequent adequate hemostasis  Patient tolerated procedure      Patient states vaginal itching, Fluconazol 150 mg once PO has been order     Dr Bijal Driscoll present in the entire procedure    Vasquez De Santiago MD

## 2022-02-23 NOTE — TELEPHONE ENCOUNTER
UA shows multiple wbc's  Does patient have fever, chills, lower abdominal pain, urinary symptoms like dysuria, urinary frequency/urgency, foul smell in urine, cloudy urine, etc ? If has no symptoms, will monitor without any need for antibiotics

## 2022-02-24 NOTE — TELEPHONE ENCOUNTER
Agree with urine culture, please make sure she goes for urine sample if lab cannot at this to existing specimen

## 2022-02-24 NOTE — TELEPHONE ENCOUNTER
I left detailed message for patient relaying all information and our phone number to call with any question or concerns  Thank you

## 2022-03-05 ENCOUNTER — TELEPHONE (OUTPATIENT)
Dept: OTHER | Facility: HOSPITAL | Age: 47
End: 2022-03-05

## 2022-03-05 NOTE — TELEPHONE ENCOUNTER
Called patient for discussion of colposcopy  Patient with LGSIL on colpo, Following The [employer]CCP guidances she require co-testing in 1 year  I have inform her about the diagnosis and follow up     All questions answered to her Mariza Ocampo MD

## 2022-03-09 ENCOUNTER — TELEPHONE (OUTPATIENT)
Dept: NEPHROLOGY | Facility: CLINIC | Age: 47
End: 2022-03-09

## 2022-03-09 NOTE — TELEPHONE ENCOUNTER
Tried to call patient to schedule a follow up with Dr Christel Luke but the phone just beeps  Will be sending letter

## 2022-03-15 ENCOUNTER — HOSPITAL ENCOUNTER (EMERGENCY)
Facility: HOSPITAL | Age: 47
Discharge: HOME | End: 2022-03-15
Attending: EMERGENCY MEDICINE
Payer: MEDICARE

## 2022-03-15 ENCOUNTER — APPOINTMENT (EMERGENCY)
Dept: CT IMAGING | Facility: HOSPITAL | Age: 47
End: 2022-03-15
Payer: MEDICARE

## 2022-03-15 VITALS
RESPIRATION RATE: 18 BRPM | HEART RATE: 94 BPM | TEMPERATURE: 98.2 F | SYSTOLIC BLOOD PRESSURE: 139 MMHG | DIASTOLIC BLOOD PRESSURE: 65 MMHG | OXYGEN SATURATION: 100 %

## 2022-03-15 DIAGNOSIS — N83.202 LEFT OVARIAN CYST: ICD-10-CM

## 2022-03-15 DIAGNOSIS — R10.9 RIGHT FLANK PAIN: Primary | ICD-10-CM

## 2022-03-15 DIAGNOSIS — R10.2 SUPRAPUBIC PAIN: ICD-10-CM

## 2022-03-15 LAB
ALBUMIN SERPL BCP-MCNC: 3.8 G/DL (ref 3.5–5)
ALP SERPL-CCNC: 76 U/L (ref 46–116)
ALT SERPL W P-5'-P-CCNC: 35 U/L (ref 12–78)
ANION GAP SERPL CALCULATED.3IONS-SCNC: 11 MMOL/L (ref 4–13)
AST SERPL W P-5'-P-CCNC: 20 U/L (ref 5–45)
BACTERIA UR QL AUTO: NORMAL /HPF
BASOPHILS # BLD AUTO: 0.07 THOUSANDS/ΜL (ref 0–0.1)
BASOPHILS NFR BLD AUTO: 1 % (ref 0–1)
BILIRUB SERPL-MCNC: 0.21 MG/DL (ref 0.2–1)
BILIRUB UR QL STRIP: NEGATIVE
BUN SERPL-MCNC: 15 MG/DL (ref 5–25)
CALCIUM SERPL-MCNC: 9.4 MG/DL (ref 8.3–10.1)
CHLORIDE SERPL-SCNC: 101 MMOL/L (ref 100–108)
CLARITY UR: CLEAR
CO2 SERPL-SCNC: 26 MMOL/L (ref 21–32)
COLOR UR: YELLOW
CREAT SERPL-MCNC: 1.05 MG/DL (ref 0.6–1.3)
EOSINOPHIL # BLD AUTO: 0.2 THOUSAND/ΜL (ref 0–0.61)
EOSINOPHIL NFR BLD AUTO: 2 % (ref 0–6)
ERYTHROCYTE [DISTWIDTH] IN BLOOD BY AUTOMATED COUNT: 16 % (ref 11.6–15.1)
GFR SERPL CREATININE-BSD FRML MDRD: 64 ML/MIN/1.73SQ M
GLUCOSE SERPL-MCNC: 117 MG/DL (ref 65–140)
GLUCOSE UR STRIP-MCNC: NEGATIVE MG/DL
HCT VFR BLD AUTO: 35.2 % (ref 34.8–46.1)
HGB BLD-MCNC: 11.2 G/DL (ref 11.5–15.4)
HGB UR QL STRIP.AUTO: ABNORMAL
IMM GRANULOCYTES # BLD AUTO: 0.02 THOUSAND/UL (ref 0–0.2)
IMM GRANULOCYTES NFR BLD AUTO: 0 % (ref 0–2)
KETONES UR STRIP-MCNC: NEGATIVE MG/DL
LEUKOCYTE ESTERASE UR QL STRIP: ABNORMAL
LIPASE SERPL-CCNC: 135 U/L (ref 73–393)
LYMPHOCYTES # BLD AUTO: 2.91 THOUSANDS/ΜL (ref 0.6–4.47)
LYMPHOCYTES NFR BLD AUTO: 33 % (ref 14–44)
MCH RBC QN AUTO: 22.9 PG (ref 26.8–34.3)
MCHC RBC AUTO-ENTMCNC: 31.8 G/DL (ref 31.4–37.4)
MCV RBC AUTO: 72 FL (ref 82–98)
MONOCYTES # BLD AUTO: 0.79 THOUSAND/ΜL (ref 0.17–1.22)
MONOCYTES NFR BLD AUTO: 9 % (ref 4–12)
NEUTROPHILS # BLD AUTO: 4.86 THOUSANDS/ΜL (ref 1.85–7.62)
NEUTS SEG NFR BLD AUTO: 55 % (ref 43–75)
NITRITE UR QL STRIP: NEGATIVE
NON-SQ EPI CELLS URNS QL MICRO: NORMAL /HPF
NRBC BLD AUTO-RTO: 0 /100 WBCS
PH UR STRIP.AUTO: 6 [PH]
PLATELET # BLD AUTO: 354 THOUSANDS/UL (ref 149–390)
PMV BLD AUTO: 10.5 FL (ref 8.9–12.7)
POTASSIUM SERPL-SCNC: 3.8 MMOL/L (ref 3.5–5.3)
PROT SERPL-MCNC: 7.8 G/DL (ref 6.4–8.2)
PROT UR STRIP-MCNC: NEGATIVE MG/DL
RBC # BLD AUTO: 4.9 MILLION/UL (ref 3.81–5.12)
RBC #/AREA URNS AUTO: NORMAL /HPF
SODIUM SERPL-SCNC: 138 MMOL/L (ref 136–145)
SP GR UR STRIP.AUTO: >=1.03 (ref 1–1.03)
UROBILINOGEN UR QL STRIP.AUTO: 0.2 E.U./DL
WBC # BLD AUTO: 8.85 THOUSAND/UL (ref 4.31–10.16)
WBC #/AREA URNS AUTO: NORMAL /HPF

## 2022-03-15 PROCEDURE — 83690 ASSAY OF LIPASE: CPT | Performed by: EMERGENCY MEDICINE

## 2022-03-15 PROCEDURE — 85025 COMPLETE CBC W/AUTO DIFF WBC: CPT | Performed by: EMERGENCY MEDICINE

## 2022-03-15 PROCEDURE — 80053 COMPREHEN METABOLIC PANEL: CPT | Performed by: EMERGENCY MEDICINE

## 2022-03-15 PROCEDURE — G1004 CDSM NDSC: HCPCS

## 2022-03-15 PROCEDURE — 36415 COLL VENOUS BLD VENIPUNCTURE: CPT | Performed by: EMERGENCY MEDICINE

## 2022-03-15 PROCEDURE — 74176 CT ABD & PELVIS W/O CONTRAST: CPT

## 2022-03-15 PROCEDURE — 81001 URINALYSIS AUTO W/SCOPE: CPT | Performed by: EMERGENCY MEDICINE

## 2022-03-15 PROCEDURE — 99284 EMERGENCY DEPT VISIT MOD MDM: CPT

## 2022-03-15 PROCEDURE — 99284 EMERGENCY DEPT VISIT MOD MDM: CPT | Performed by: EMERGENCY MEDICINE

## 2022-03-15 RX ORDER — ACETAMINOPHEN 325 MG/1
975 TABLET ORAL ONCE
Status: COMPLETED | OUTPATIENT
Start: 2022-03-15 | End: 2022-03-15

## 2022-03-15 RX ADMIN — ACETAMINOPHEN 975 MG: 325 TABLET ORAL at 20:17

## 2022-03-15 NOTE — ED PROVIDER NOTES
History  Chief Complaint   Patient presents with    Flank Pain     R sided flank pain, hx of kidney stone  Reports chills, nausea  Reports pain radiates into R lower pelvis  Reports hematuria tonight  Symptoms x2 days  81-year-old female, history of a left nephrectomy after multiple episodes of nephrolithiasis, presents today with 2 days right flank pain, nausea, chills, suprapubic tenderness, today she had 1 episode of hematuria, patient reports she feels like she may have another kidney stone or a urinary tract infection  Patient denies any fevers, headache, dizziness, chest pain, cough, shortness of breath, distension, constipation or diarrhea, no vaginal discharge bleeding, and no other complaints  Prior to Admission Medications   Prescriptions Last Dose Informant Patient Reported? Taking?    Diclofenac Sodium (VOLTAREN) 1 %   No No   Sig: Apply 2 g topically 4 (four) times a day   Patient not taking: Reported on 3/17/2022    Elastic Bandages & Supports (Futuro Elbow Brace) [facility]   No No   Sig: Use daily   Patient not taking: Reported on 3/17/2022    calcium-vitamin D (OSCAL) 250-125 MG-UNIT per tablet   No No   Sig: Take 1 tablet by mouth 2 (two) times a day   Patient not taking: Reported on 1/31/2022    famotidine (PEPCID) 20 mg tablet   No No   Sig: Take 1 tablet (20 mg total) by mouth daily as needed for heartburn   naproxen (Naprosyn) 500 mg tablet   No No   Sig: Take 1 tablet (500 mg total) by mouth 2 (two) times a day with meals      Facility-Administered Medications: None       Past Medical History:   Diagnosis Date    Abnormal Pap smear of cervix     Renal calculi     Renal disorder     Toe fracture, left        Past Surgical History:   Procedure Laterality Date    CHOLECYSTECTOMY      CHOLECYSTECTOMY LAPAROSCOPIC N/A 12/10/2018    Procedure: CHOLECYSTECTOMY LAPAROSCOPIC; [address];  Surgeon: Elvia Nelson MD;  [facility];  Service: General    KIDNEY SURGERY 2013    removed L kidney from damage from kidney stone [place]PERFORMED IN [place]    NEPHRECTOMY      TUBAL LIGATION      AGE 33       Family History   Problem Relation Age of Onset    Diabetes Mother     Hyperlipidemia Mother     Hypertension Mother     Other Mother         low back pain    Heart disease Mother     Diabetes Father     No Known Problems Sister     No Known Problems Brother     No Known Problems Daughter     No Known Problems Son     Kidney failure Maternal Grandmother         on dialysis    Kidney disease Maternal Grandmother     Heart disease Maternal Grandmother     Breast cancer Neg Hx     Colon cancer Neg Hx     Ovarian cancer Neg Hx      I have reviewed and agree with the history as documented  E-Cigarette/Vaping    E-Cigarette Use Never User      E-Cigarette/Vaping Substances    Nicotine No     THC No     CBD No     Flavoring No     Other No     Unknown No      Social History     Tobacco Use    Smoking status: Never Smoker    Smokeless tobacco: Never Used   Vaping Use    Vaping Use: Never used   Substance Use Topics    Alcohol use: No    Drug use: No       Review of Systems   Constitutional: Positive for chills  Negative for fever  HENT: Negative for congestion  Eyes: Negative for visual disturbance  Respiratory: Negative for cough and shortness of breath  Cardiovascular: Negative for chest pain  Gastrointestinal: Positive for nausea  Negative for constipation, diarrhea and vomiting  Endocrine: Negative for polyuria  Genitourinary: Positive for hematuria and pelvic pain  Negative for dysuria  Musculoskeletal: Negative for myalgias  Neurological: Negative for dizziness and headaches  Physical Exam  Physical Exam  Vitals and nursing note reviewed  Constitutional:       Appearance: Normal appearance  HENT:      Head: Normocephalic and atraumatic        Right Ear: External ear normal       Left Ear: External ear normal       Mouth/Throat: Mouth: Mucous membranes are moist       Pharynx: Oropharynx is clear  Eyes:      Extraocular Movements: Extraocular movements intact  Conjunctiva/sclera: Conjunctivae normal    Cardiovascular:      Rate and Rhythm: Normal rate and regular rhythm  Heart sounds: Normal heart sounds  Pulmonary:      Effort: Pulmonary effort is normal       Breath sounds: Normal breath sounds  Abdominal:      General: Abdomen is flat  There is no distension  Palpations: Abdomen is soft  Tenderness: There is abdominal tenderness in the right lower quadrant and suprapubic area  There is right CVA tenderness  Musculoskeletal:         General: No deformity  Normal range of motion  Cervical back: Normal range of motion  Skin:     General: Skin is warm and dry  Neurological:      General: No focal deficit present  Mental Status: She is alert        Gait: Gait normal    Psychiatric:         Mood and Affect: Mood normal          Behavior: Behavior normal          Vital Signs  ED Triage Vitals   Temperature Pulse Respirations Blood Pressure SpO2   03/15/22 1917 03/15/22 1915 03/15/22 1915 03/15/22 1915 03/15/22 1915   98 2 °F (36 8 °C) 94 18 139/65 100 %      Temp Source Heart Rate Source Patient Position - Orthostatic VS BP Location FiO2 (%)   03/15/22 1917 -- -- -- --   Oral          Pain Score       03/15/22 1915       9           Vitals:    03/15/22 1915   BP: 139/65   Pulse: 94         Visual Acuity  Visual Acuity      Most Recent Value   L Pupil Size (mm) 2   R Pupil Size (mm) 2          ED Medications  Medications   acetaminophen (TYLENOL) tablet 975 mg (975 mg Oral Given 3/15/22 2017)       Diagnostic Studies  Results Reviewed     Procedure Component Value Units Date/Time    Urine Microscopic [[id number]]  (Normal) Collected: 03/15/22 1956    Lab Status: Final result Specimen: Urine, Clean Catch Updated: 03/15/22 2056     RBC, UA 0-1 /hpf      WBC, UA 1-2 /hpf      Epithelial Cells Occasional /hpf Bacteria, UA None Seen /hpf     Comprehensive metabolic panel [[id number]] Collected: 03/15/22 1956    Lab Status: Final result Specimen: Blood from Arm, Right Updated: 03/15/22 2031     Sodium 138 mmol/L      Potassium 3 8 mmol/L      Chloride 101 mmol/L      CO2 26 mmol/L      ANION GAP 11 mmol/L      BUN 15 mg/dL      Creatinine 1 05 mg/dL      Glucose 117 mg/dL      Calcium 9 4 mg/dL      AST 20 U/L      ALT 35 U/L      Alkaline Phosphatase 76 U/L      Total Protein 7 8 g/dL      Albumin 3 8 g/dL      Total Bilirubin 0 21 mg/dL      eGFR 64 ml/min/1 73sq m     Narrative:      National Kidney Disease Foundation guidelines for Chronic Kidney Disease (CKD):     Stage 1 with normal or high GFR (GFR > 90 mL/min/1 73 square meters)    Stage 2 Mild CKD (GFR = 60-89 mL/min/1 73 square meters)    Stage 3A Moderate CKD (GFR = 45-59 mL/min/1 73 square meters)    Stage 3B Moderate CKD (GFR = 30-44 mL/min/1 73 square meters)    Stage 4 Severe CKD (GFR = 15-29 mL/min/1 73 square meters)    Stage 5 End Stage CKD (GFR <15 mL/min/1 73 square meters)  Note: GFR calculation is accurate only with a steady state creatinine    Lipase [[id number]]  (Normal) Collected: 03/15/22 1956    Lab Status: Final result Specimen: Blood from Arm, Right Updated: 03/15/22 2031     Lipase 135 u/L     CBC and differential [[id number]]  (Abnormal) Collected: 03/15/22 1956    Lab Status: Final result Specimen: Blood from Arm, Right Updated: 03/15/22 2005     WBC 8 85 Thousand/uL      RBC 4 90 Million/uL      Hemoglobin 11 2 g/dL      Hematocrit 35 2 %      MCV 72 fL      MCH 22 9 pg      MCHC 31 8 g/dL      RDW 16 0 %      MPV 10 5 fL      Platelets 977 Thousands/uL      nRBC 0 /100 WBCs      Neutrophils Relative 55 %      Immat GRANS % 0 %      Lymphocytes Relative 33 %      Monocytes Relative 9 %      Eosinophils Relative 2 %      Basophils Relative 1 %      Neutrophils Absolute 4 86 Thousands/µL      Immature Grans Absolute 0 02 Thousand/uL Lymphocytes Absolute 2 91 Thousands/µL      Monocytes Absolute 0 79 Thousand/µL      Eosinophils Absolute 0 20 Thousand/µL      Basophils Absolute 0 07 Thousands/µL     UA w Reflex to Microscopic w Reflex to Culture [[id number]]  (Abnormal) Collected: 03/15/22 1956    Lab Status: Final result Specimen: Urine, Clean Catch Updated: 03/15/22 2004     Color, UA Yellow     Clarity, UA Clear     Specific Gravity, UA >=1 030     pH, UA 6 0     Leukocytes, UA Small     Nitrite, UA Negative     Protein, UA Negative mg/dl      Glucose, UA Negative mg/dl      Ketones, UA Negative mg/dl      Urobilinogen, UA 0 2 E U /dl      Bilirubin, UA Negative     Blood, UA Trace-Intact                 CT abdomen pelvis wo contrast   Final Result by Alexi Nelson MD (03/15 2143)      1  Stable postoperative changes of left nephrectomy  No evidence of right-sided obstructive uropathy  2   4 3 cm simple appearing left ovarian cystic structure  Workstation performed: [id number]                    Procedures  Procedures         ED Course    MDM  Number of Diagnoses or Management Options  Left ovarian cyst  Right flank pain  Suprapubic pain  Diagnosis management comments: Aside from patient's ovarian cyst, patient's workup was unremarkable, patient is safe for discharge home with follow-up with OB and primary care as an outpatient  Patient encouraged to use Tylenol ibuprofen for pain control, return indications and follow-up instructions given        Disposition  Final diagnoses:   Right flank pain   Suprapubic pain   Left ovarian cyst     Time reflects when diagnosis was documented in both MDM as applicable and the Disposition within this note     Time User Action Codes Description Comment    3/15/2022 10:39 PM Arnoldo Poot Add [R10 9] Right flank pain     3/15/2022 10:40 PM [place] Poot Add [R10 2] Suprapubic pain     3/15/2022 10:41 PM Arnoldo Poot Add [I17 657] Left ovarian cyst       ED Disposition     ED Disposition Condition Date/Time Comment    Discharge Stable Tue Mar 15, 2022 10:39 PM Janna Severs discharge to home/self care              Follow-up Information     Follow up With Specialties Details Why Contact Info Additional Information    [facility] Schedule an appointment as soon as possible for a visit in 3 days For follow-up [address] [facility] [address] [phone] [facility], [address], [address], [place], [place], [address]    [address] Emergency Department Emergency Medicine Go to  As needed [address] [facility] [address] [facility] Emergency Department, [address], [place], [place], [address] [facility] Schedule an appointment as soon as possible for a visit in 3 days For follow-up [address] [facility] [address] [facility] [address], [address] [facility], [address] [facility], [facility] [[email or web address]] [address]   [phone]          Discharge Medication List as of 3/15/2022 10:42 PM      CONTINUE these medications which have NOT CHANGED    Details   calcium-vitamin D (OSCAL) 250-125 MG-UNIT per tablet Take 1 tablet by mouth 2 (two) times a day, Starting Thu 9/17/2020, Normal      Diclofenac Sodium (VOLTAREN) 1 % Apply 2 g topically 4 (four) times a day, Starting Tue 10/26/2021, Until Thu 11/25/2021, Normal      Elastic Bandages & Supports (Futuro Elbow Brace) MISC Use daily, Starting Tue 3/16/2021, Print      famotidine (PEPCID) 20 mg tablet Take 1 tablet (20 mg total) by mouth daily as needed for heartburn, Starting Thu 9/17/2020, Normal      naproxen (Naprosyn) 500 mg tablet Take 1 tablet (500 mg total) by mouth 2 (two) times a day with meals, Starting Fri 12/3/2021, Normal      acetaminophen (TYLENOL) 650 mg CR tablet Take 1 tablet (650 mg total) by mouth every 8 (eight) hours as needed for mild pain, Starting Mon 7/12/2021, Normal      clotrimazole-betamethasone (LOTRISONE) 1-0 05 % cream Apply topically 2 (two) times a day, Starting u 4/15/2021, Normal             No discharge procedures on file      PDMP Review     None          ED Provider  Electronically Signed by           Olu Alvarado MD  03/26/22 9616

## 2022-03-17 ENCOUNTER — OFFICE VISIT (OUTPATIENT)
Dept: INTERNAL MEDICINE CLINIC | Facility: CLINIC | Age: 47
End: 2022-03-17

## 2022-03-17 ENCOUNTER — TELEPHONE (OUTPATIENT)
Dept: OBGYN CLINIC | Facility: CLINIC | Age: 47
End: 2022-03-17

## 2022-03-17 VITALS
DIASTOLIC BLOOD PRESSURE: 71 MMHG | WEIGHT: 217.4 LBS | TEMPERATURE: 98 F | BODY MASS INDEX: 37.32 KG/M2 | HEART RATE: 85 BPM | OXYGEN SATURATION: 99 % | SYSTOLIC BLOOD PRESSURE: 135 MMHG

## 2022-03-17 DIAGNOSIS — M22.2X1 PATELLOFEMORAL ARTHRALGIA OF BOTH KNEES: ICD-10-CM

## 2022-03-17 DIAGNOSIS — M22.2X2 PATELLOFEMORAL ARTHRALGIA OF BOTH KNEES: ICD-10-CM

## 2022-03-17 DIAGNOSIS — R10.9 RIGHT SIDED ABDOMINAL PAIN: Primary | ICD-10-CM

## 2022-03-17 DIAGNOSIS — R10.9 RIGHT FLANK PAIN: ICD-10-CM

## 2022-03-17 DIAGNOSIS — Z90.5 STATUS POST NEPHRECTOMY: ICD-10-CM

## 2022-03-17 PROCEDURE — 99214 OFFICE O/P EST MOD 30 MIN: CPT | Performed by: INTERNAL MEDICINE

## 2022-03-17 RX ORDER — SENNOSIDES 8.6 MG
650 CAPSULE ORAL EVERY 8 HOURS PRN
Qty: 30 TABLET | Refills: 0 | Status: SHIPPED | OUTPATIENT
Start: 2022-03-17 | End: 2022-07-12 | Stop reason: SDUPTHER

## 2022-03-17 RX ORDER — FLUCONAZOLE 150 MG/1
TABLET ORAL
COMMUNITY
Start: 2022-02-23 | End: 2022-03-17 | Stop reason: ALTCHOICE

## 2022-03-17 RX ORDER — TAMSULOSIN HYDROCHLORIDE 0.4 MG/1
0.4 CAPSULE ORAL
Qty: 7 CAPSULE | Refills: 0 | Status: SHIPPED | OUTPATIENT
Start: 2022-03-17 | End: 2022-04-25 | Stop reason: SDUPTHER

## 2022-03-17 NOTE — PROGRESS NOTES
[facility]  INTERNAL MEDICINE OFFICE VISIT     PATIENT INFORMATION     Bernardino Merlos   40 y o  female   MRN: [id number]    ASSESSMENT/PLAN     Diagnoses and all orders for this visit:    Right sided abdominal pain  Pain appears to be more muscular but possibility of ureteral spasms  Patient advised to start on Flomax 0 4mg daily for a week, Tylenol 650mg q6 prn and to utilize heat packs instead of ice for pain  Unlikely to be nephrolithiasis given negative lab, imaging and physical exam findings  -     tamsulosin (FLOMAX) 0 4 mg; Take 1 capsule (0 4 mg total) by mouth daily with dinner    Status post nephrectomy  -     tamsulosin (FLOMAX) 0 4 mg; Take 1 capsule (0 4 mg total) by mouth daily with dinner    Patellofemoral arthralgia of both knees  -     acetaminophen (TYLENOL) 650 mg CR tablet; Take 1 tablet (650 mg total) by mouth every 8 (eight) hours as needed for mild pain    Other orders  -     Discontinue: fluconazole (DIFLUCAN) 150 mg tablet;  (Patient not taking: Reported on 3/17/2022 )      Follow next scheduled appointment and call clinic if fevers, chills,  excessive vaginal bloody discharge, green or malodorous discharge, worsening pain       HEALTH MAINTENANCE     Immunization History   Administered Date(s) Administered    INFLUENZA 01/12/2018    Influenza Quadrivalent, 6-35 Months IM 01/12/2018    Influenza, injectable, quadrivalent, preservative free 0 5 mL 11/20/2020, 10/26/2021    Tdap 01/12/2018    Tuberculin Skin Test-PPD Intradermal 05/08/2018     CHIEF COMPLAINT     Chief Complaint   Patient presents with    Follow-up     ED follow       Abiel Ranjith Smith is a 40 y o  patient with a past medical history of Renal calculi, Renal disorder and has a past surgical history that includes left nephrectomy (2012) who presents to the clinic for concerns about right-sided abdominal pain is similar to her pain from kidney stones many years ago     She complains of right sided pain for the last 3 days  She is unclear of what may have triggered it  She describes it as a pulling pain or a spasm that can occur randomly, but seems to worsen with specific movements such as side bending  She has tried Tylenol and ice packs  She did have a UTI back in February  In [facility], they diagnosed her kidney stones a little too late requiring a complete nephrectomy so she is worried she is having kidney stones again  She recently went to the ED and CT imaging and UA showed no signs of infection or nephrolithiasis but she felt unsatisfied with the lack of explanation about what may have happened and wanted to come to the clinic  She also notes some vaginal discomfort with possible white discharge but does not call it a burning sensation  Patient denies any fevers, chills, nausea, vomiting, repeat hematuria, green or bloody vaginal discharge, malodorous discharge  REVIEW OF SYSTEMS     Review of Systems   Constitutional: Negative for chills, fatigue and fever  HENT: Negative for sneezing, sore throat and tinnitus  Respiratory: Negative for cough, shortness of breath and wheezing  Cardiovascular: Negative for chest pain  Gastrointestinal: Positive for abdominal pain  Negative for abdominal distention, constipation, diarrhea, nausea and vomiting  Genitourinary: Positive for flank pain and vaginal discharge  Negative for decreased urine volume, hematuria, pelvic pain, vaginal bleeding and vaginal pain  Musculoskeletal: Negative for arthralgias  Skin: Negative for color change  Neurological: Negative for dizziness, weakness, light-headedness, numbness and headaches  Psychiatric/Behavioral: Negative for confusion and decreased concentration       OBJECTIVE     Vitals:    03/17/22 1143   BP: 135/71   BP Location: Right arm   Patient Position: Sitting   Cuff Size: Large   Pulse: 85   Temp: 98 °F (36 7 °C)   TempSrc: Temporal   SpO2: 99% Weight: 98 6 kg (217 lb 6 4 oz)     Physical Exam  Vitals reviewed  Constitutional:       General: She is not in acute distress  Appearance: Normal appearance  She is not ill-appearing  HENT:      Head: Normocephalic and atraumatic  Eyes:      Conjunctiva/sclera: Conjunctivae normal    Cardiovascular:      Rate and Rhythm: Normal rate and regular rhythm  Pulses: Normal pulses  Heart sounds: Normal heart sounds  No murmur heard  Pulmonary:      Effort: Pulmonary effort is normal  No respiratory distress  Breath sounds: No rhonchi  Abdominal:      General: Abdomen is flat  Bowel sounds are normal  There is no distension  Palpations: Abdomen is soft  There is no mass  Tenderness: There is no abdominal tenderness  There is no right CVA tenderness or guarding  Musculoskeletal:         General: No swelling  Right lower leg: No edema  Left lower leg: No edema  Comments: Hypertonicity on the right paraspinal muscles   Skin:     General: Skin is warm and dry  Capillary Refill: Capillary refill takes less than 2 seconds  Coloration: Skin is not jaundiced  Neurological:      General: No focal deficit present  Mental Status: She is alert     Psychiatric:         Mood and Affect: Mood normal          Behavior: Behavior normal        CURRENT MEDICATIONS     Current Outpatient Medications:     acetaminophen (TYLENOL) 650 mg CR tablet, Take 1 tablet (650 mg total) by mouth every 8 (eight) hours as needed for mild pain, Disp: 30 tablet, Rfl: 0    calcium-vitamin D (OSCAL) 250-125 MG-UNIT per tablet, Take 1 tablet by mouth 2 (two) times a day (Patient not taking: Reported on 1/31/2022 ), Disp: 60 tablet, Rfl: 2    Diclofenac Sodium (VOLTAREN) 1 %, Apply 2 g topically 4 (four) times a day (Patient not taking: Reported on 3/17/2022 ), Disp: 240 g, Rfl: 2    Elastic Bandages & Supports (Futuro Elbow Brace) MISC, Use daily (Patient not taking: Reported on 3/17/2022 ), Disp: 1 each, Rfl: 0    famotidine (PEPCID) 20 mg tablet, Take 1 tablet (20 mg total) by mouth daily as needed for heartburn (Patient not taking: Reported on 1/31/2022 ), Disp: 90 tablet, Rfl: 2    naproxen (Naprosyn) 500 mg tablet, Take 1 tablet (500 mg total) by mouth 2 (two) times a day with meals (Patient not taking: Reported on 1/31/2022 ), Disp: 30 tablet, Rfl: 0    tamsulosin (FLOMAX) 0 4 mg, Take 1 capsule (0 4 mg total) by mouth daily with dinner, Disp: 7 capsule, Rfl: 0    PAST MEDICAL & SURGICAL HISTORY     Past Medical History:   Diagnosis Date    Abnormal Pap smear of cervix     Renal calculi     Renal disorder     Toe fracture, left      Past Surgical History:   Procedure Laterality Date    CHOLECYSTECTOMY      CHOLECYSTECTOMY LAPAROSCOPIC N/A 12/10/2018    Procedure: CHOLECYSTECTOMY LAPAROSCOPIC; INCISIONAL HERNIA REPAIR;  Surgeon: Pallavi Diez MD;  [facility]: [facility];  Service: General    KIDNEY SURGERY  2013    removed L kidney from damage from kidney stone -PERFORMED IN [place]    NEPHRECTOMY      TUBAL LIGATION      AGE 32     SOCIAL & FAMILY HISTORY     Social History     Socioeconomic History    Marital status: Single     Spouse name: Not on file    Number of children: 2    Years of education: Not on file    Highest education level: Not on file   Occupational History     Comment: unemployed, not looking for work   Tobacco Use    Smoking status: Never Smoker    Smokeless tobacco: Never Used   Vaping Use    Vaping Use: Never used   Substance and Sexual Activity    Alcohol use: No    Drug use: No    Sexual activity: Yes     Partners: Male     Birth control/protection: Female Sterilization   Other Topics Concern    Not on file   Social History Narrative    Caffeine use-1 cup of coffee daily    Does not exercise     Social Determinants of Health     Financial Resource Strain: Low Risk     Difficulty of Paying Living Expenses: Not hard at all   Food Insecurity: No Food Insecurity    Worried About Running Out of Food in the Last Year: Never true    Makenzie of Food in the Last Year: Never true   Transportation Needs: No Transportation Needs    Lack of Transportation (Medical): No    Lack of Transportation (Non-Medical): No   Physical Activity: Insufficiently Active    Days of Exercise per Week: 2 days    Minutes of Exercise per Session: 30 min   Stress: Not on file   Social Connections: Not on file   Intimate Partner Violence: Not on file   Housing Stability: Low Risk     Unable to Pay for Housing in the Last Year: No    Number of Places Lived in the Last Year: 1    Unstable Housing in the Last Year: No     Social History     Substance and Sexual Activity   Alcohol Use No     Substance and Sexual Activity   Alcohol Use No        Substance and Sexual Activity   Drug Use No     Social History     Tobacco Use   Smoking Status Never Smoker   Smokeless Tobacco Never Used     Family History   Problem Relation Age of Onset    Diabetes Mother     Hyperlipidemia Mother     Hypertension Mother     Other Mother         low back pain    Heart disease Mother     Diabetes Father     No Known Problems Sister     No Known Problems Brother     No Known Problems Daughter     No Known Problems Son     Kidney failure Maternal Grandmother         on dialysis    Kidney disease Maternal Grandmother     Heart disease Maternal Grandmother     Breast cancer Neg Hx     Colon cancer Neg Hx     Ovarian cancer Neg Hx          ==  Tejal Whyte DO  [facility]'s Internal Medicine Residency, PGY-1    [facility]  [address]   [facility] [address] , [address] [address] [facility] [address] [facility]  Office: [phone]  Fax: [phone]

## 2022-03-17 NOTE — TELEPHONE ENCOUNTER
Pt walked into office today because she thought her appointment was today and it was yesterday 3/16/22  She has some questions and would like to know if you could call her when you are available  Thank you!

## 2022-03-18 ENCOUNTER — HOSPITAL ENCOUNTER (OUTPATIENT)
Dept: RADIOLOGY | Age: 47
Discharge: HOME | End: 2022-03-18
Payer: MEDICARE

## 2022-03-18 DIAGNOSIS — N83.202 CYST OF LEFT OVARY: ICD-10-CM

## 2022-03-18 PROCEDURE — 76830 TRANSVAGINAL US NON-OB: CPT

## 2022-03-18 PROCEDURE — 76856 US EXAM PELVIC COMPLETE: CPT

## 2022-03-23 ENCOUNTER — OFFICE VISIT (OUTPATIENT)
Dept: OBGYN CLINIC | Facility: CLINIC | Age: 47
End: 2022-03-23

## 2022-03-23 VITALS
BODY MASS INDEX: 36.54 KG/M2 | DIASTOLIC BLOOD PRESSURE: 76 MMHG | SYSTOLIC BLOOD PRESSURE: 120 MMHG | WEIGHT: 214 LBS | HEIGHT: 64 IN | HEART RATE: 86 BPM

## 2022-03-23 DIAGNOSIS — N76.0 BV (BACTERIAL VAGINOSIS): Primary | ICD-10-CM

## 2022-03-23 DIAGNOSIS — B96.89 BV (BACTERIAL VAGINOSIS): Primary | ICD-10-CM

## 2022-03-23 PROCEDURE — 99213 OFFICE O/P EST LOW 20 MIN: CPT | Performed by: OBSTETRICS & GYNECOLOGY

## 2022-03-23 RX ORDER — METRONIDAZOLE 500 MG/1
500 TABLET ORAL EVERY 12 HOURS SCHEDULED
Qty: 14 TABLET | Refills: 0 | Status: SHIPPED | OUTPATIENT
Start: 2022-03-23 | End: 2022-03-30

## 2022-03-25 NOTE — PROGRESS NOTES
[address] [address]  Phone#  [phone]  Fax#  [phone]  0 [facility] Selina Avila is a 40 y o  female here with multiple complains, patient states this month she has been experiencing intermenstrual spotting, she do not require pads but states she see blood on the toilet paper  In addition patient states  flank pain irradiated to right side abdominal pain  Patient was informed she has a ovarian cyst and she is wondering if that is the reason of her flank pain  Patient was evaluated last 3/15/22 in the ED due to flank pain, CT scan with report of stable postoperative changes of left nephrectomy  No evidence of right-sided obstructive uropathy  And 4 3 cm simple appearing left ovarian cystic structure  Patient denies left pelvic pain  She has a history of t left nephrectomy  in the setting of recurrent nephrolithiasis, recurrent UTI as per patient in [place] and currently with CKD stage 2  At that visit UA with small leukocytes and trace blood  Patient complains as well of abnormal vaginal discharge  Personal health questionnaire reviewed: yes  Obstetric History  OB History    Para Term  AB Living   2 2 2     2   SAB IAB Ectopic Multiple Live Births                  # Outcome Date GA Lbr Lance/2nd Weight Sex Delivery Anes PTL Lv   2 Term            1 Term                  The following portions of the patient's history were reviewed and updated as appropriate: allergies, past social history, past surgical history and problem list     Review of Systems  Pertinent items are noted in HPI        Objective     /76   Pulse 86   Ht 5' 4" (1 626 m)   Wt 97 1 kg (214 lb)   BMI 36 73 kg/m²     General Appearance:    Alert, cooperative, no distress, appears stated age   Head:    Normocephalic, without obvious abnormality, atraumatic   Eyes:    PERRL, conjunctiva/corneas clear, EOM's intact, fundi     benign, both eyes   Ears:    Normal TM's and external ear canals, both ears   Nose:   Nares normal, septum midline, mucosa normal, no drainage    or sinus tenderness   Throat:   Lips, mucosa, and tongue normal; teeth and gums normal   Neck:   Supple, symmetrical, trachea midline, no adenopathy;     thyroid:  no enlargement/tenderness/nodules; no carotid    bruit or JVD   Back:     Symmetric, no curvature, ROM normal, no CVA tenderness   Lungs:     Clear to auscultation bilaterally, respirations unlabored   Chest Wall:    No tenderness or deformity    Heart:    Regular rate and rhythm, S1 and S2 normal, no murmur, rub   or gallop   Breast Exam:    No tenderness, masses, or nipple abnormality   Abdomen:     Soft, non-tender, bowel sounds active all four quadrants,     no masses, no organomegaly   Genitalia:   moderate gray vaginal discharge, no evidence of bleeding   Rectal:   external visualization normal    Extremities:   Extremities normal, atraumatic, no cyanosis or edema   Pulses:   2+ and symmetric all extremities   Skin:   Skin color, texture, turgor normal, no rashes or lesions   Lymph nodes:   Cervical, supraclavicular, and axillary nodes normal   Neurologic:   CNII-XII intact, normal strength, sensation and reflexes     throughout         Assessment/Plan    Left ovarian cyst (incidental finding)  · Unchanged left adnexal simple appearing cysts since 12/3/2021  Based on the ACR O-RADS system, this is O-RADS category 2 (almost certain benign with <3% risk of malignancy )   · Patient without left side pelvic pain or discomfort, I have discussed with her there is a low probability that her left ovarian cyst be the reason of her right flank pain  Possible pain could be related with nephrolithiasis  I have recommended her to follow up with her urologist and continue tamsulosin that was ordered for her in the ED    · In regard of ovarian cyst will observe for now    Bacterial vaginosis  · Amsel criteria:  PH 4 5, white vaginal discharge, positive clue cells  · Metronidazole 500 mg b i d   For 7 days has been prescribed   · Patient has been advised to avoid alcoholic beverage during therapy  ·     D/w Dr Linn Gomez MD

## 2022-04-01 DIAGNOSIS — R10.13 DYSPEPSIA: ICD-10-CM

## 2022-04-04 RX ORDER — FAMOTIDINE 20 MG/1
20 TABLET, FILM COATED ORAL DAILY PRN
Qty: 90 TABLET | Refills: 2 | Status: SHIPPED | OUTPATIENT
Start: 2022-04-04

## 2022-04-14 ENCOUNTER — TELEPHONE (OUTPATIENT)
Dept: NEPHROLOGY | Facility: CLINIC | Age: 47
End: 2022-04-14

## 2022-04-14 NOTE — TELEPHONE ENCOUNTER
Tried to call patient to inform her that her appointment with Beverley Packer has to be rescheduled due to a change in providers schedule, but the line kept beeping  I tried twice  I will sent letter

## 2022-04-20 ENCOUNTER — TELEPHONE (OUTPATIENT)
Dept: NEPHROLOGY | Facility: CLINIC | Age: 47
End: 2022-04-20

## 2022-04-25 ENCOUNTER — TELEMEDICINE (OUTPATIENT)
Dept: INTERNAL MEDICINE CLINIC | Facility: CLINIC | Age: 47
End: 2022-04-25

## 2022-04-25 DIAGNOSIS — R10.9 RIGHT SIDED ABDOMINAL PAIN: ICD-10-CM

## 2022-04-25 DIAGNOSIS — Z90.5 STATUS POST NEPHRECTOMY: ICD-10-CM

## 2022-04-25 DIAGNOSIS — K43.9 VENTRAL HERNIA WITHOUT OBSTRUCTION OR GANGRENE: Primary | ICD-10-CM

## 2022-04-25 PROCEDURE — 99213 OFFICE O/P EST LOW 20 MIN: CPT | Performed by: INTERNAL MEDICINE

## 2022-04-25 RX ORDER — TAMSULOSIN HYDROCHLORIDE 0.4 MG/1
0.4 CAPSULE ORAL
Qty: 30 CAPSULE | Refills: 1 | Status: SHIPPED | OUTPATIENT
Start: 2022-04-25 | End: 2022-07-07 | Stop reason: ALTCHOICE

## 2022-04-25 NOTE — PROGRESS NOTES
Virtual Brief Visit    Patient is located in the following state in which I hold an active license [place]    Presents for Follow-up on right-sided abdominal pain, request for refills  Reports her right-sided abdominal pain has improved on tamsulosin and naproxen  However has been limiting naproxen to 2 or 3 days considering history of left nephrectomy  Evaluated by Berto January for her left ovarian cyst   Ultrasound on 03/18 showed that this has been stable  Discussed findings of multiple small ventral hernia at surgical site  She is interested in being evaluated by General surgery  Assessment/Plan:    Problem List Items Addressed This Visit        Other    Status post nephrectomy    Relevant Medications    tamsulosin (FLOMAX) 0 4 mg      Other Visit Diagnoses     Ventral hernia without obstruction or gangrene    -  Primary    Relevant Orders    Ambulatory Referral to General Surgery    Right sided abdominal pain        Relevant Medications    tamsulosin (FLOMAX) 0 4 mg          Needs an appointment for physical exam for work      Recent Visits  No visits were found meeting these conditions  Showing recent visits within past 7 days and meeting all other requirements  Today's Visits  Date Type Provider Dept   04/25/22 [address], 32 [facility] today's visits and meeting all other requirements  Future Appointments  No visits were found meeting these conditions    Showing future appointments within next 150 days and meeting all other requirements         I spent 20 minutes directly with the patient during this visit

## 2022-04-26 ENCOUNTER — TELEPHONE (OUTPATIENT)
Dept: INTERNAL MEDICINE CLINIC | Facility: CLINIC | Age: 47
End: 2022-04-26

## 2022-04-26 NOTE — TELEPHONE ENCOUNTER
Folder Color-Blue    Name of Form-  Staff Health Assessment    Form to be filled out by-Dr Yoav Alejandro     Form to be given to patient   She has an appointment on 5/3/2022

## 2022-05-03 ENCOUNTER — OFFICE VISIT (OUTPATIENT)
Dept: INTERNAL MEDICINE CLINIC | Facility: CLINIC | Age: 47
End: 2022-05-03

## 2022-05-03 VITALS
BODY MASS INDEX: 37.15 KG/M2 | HEART RATE: 80 BPM | WEIGHT: 217.6 LBS | HEIGHT: 64 IN | SYSTOLIC BLOOD PRESSURE: 111 MMHG | DIASTOLIC BLOOD PRESSURE: 72 MMHG | TEMPERATURE: 98 F

## 2022-05-03 DIAGNOSIS — N83.209 OVARIAN CYST: ICD-10-CM

## 2022-05-03 DIAGNOSIS — Z12.11 ENCOUNTER FOR SCREENING COLONOSCOPY: ICD-10-CM

## 2022-05-03 DIAGNOSIS — D64.9 ANEMIA, UNSPECIFIED TYPE: ICD-10-CM

## 2022-05-03 DIAGNOSIS — Z02.89 ENCOUNTER FOR PHYSICAL EXAMINATION RELATED TO EMPLOYMENT: Primary | ICD-10-CM

## 2022-05-03 DIAGNOSIS — E66.9 OBESITY (BMI 30-39.9): ICD-10-CM

## 2022-05-03 PROCEDURE — 99213 OFFICE O/P EST LOW 20 MIN: CPT | Performed by: INTERNAL MEDICINE

## 2022-05-03 RX ORDER — NAPROXEN 500 MG/1
500 TABLET ORAL 2 TIMES DAILY PRN
Qty: 30 TABLET | Refills: 0 | Status: SHIPPED | OUTPATIENT
Start: 2022-05-03 | End: 2022-07-01

## 2022-05-03 NOTE — PATIENT INSTRUCTIONS
Weight Management   WHAT YOU NEED TO KNOW:   Being overweight increases your risk of health conditions such as heart disease, high blood pressure, type 2 diabetes, and certain types of cancer  It can also increase your risk for osteoarthritis, sleep apnea, and other respiratory problems  Aim for a slow, steady weight loss  Even a small amount of weight loss can lower your risk of health problems  DISCHARGE INSTRUCTIONS:   How to lose weight safely:  A safe and healthy way to lose weight is to eat fewer calories and get regular exercise  · You can lose up about 1 pound a week by decreasing the number of calories you eat by 500 calories each day  You can decrease calories by eating smaller portion sizes or by cutting out high-calorie foods  Read labels to find out how many calories are in the foods you eat  · You can also burn calories with exercise such as walking, swimming, or biking  You will be more likely to keep weight off if you make these changes part of your lifestyle  Exercise at least 30 minutes per day on most days of the week  You can also fit in more physical activity by taking the stairs instead of the elevator or parking farther away from stores  Ask your healthcare provider about the best exercise plan for you  Healthy meal plan for weight management:  A healthy meal plan includes a variety of foods, contains fewer calories, and helps you stay healthy  A healthy meal plan includes the following:     · Eat whole-grain foods more often  A healthy meal plan should contain fiber  Fiber is the part of grains, fruits, and vegetables that is not broken down by your body  Whole-grain foods are healthy and provide extra fiber in your diet  Some examples of whole-grain foods are whole-wheat breads and pastas, oatmeal, brown rice, and bulgur  · Eat a variety of vegetables every day  Include dark, leafy greens such as spinach, kale, justino greens, and mustard greens   Eat yellow and orange vegetables such as carrots, sweet potatoes, and winter squash  · Eat a variety of fruits every day  Choose fresh or canned fruit (canned in its own juice or light syrup) instead of juice  Fruit juice has very little or no fiber  · Eat low-fat dairy foods  Drink fat-free (skim) milk or 1% milk  Eat fat-free yogurt and low-fat cottage cheese  Try low-fat cheeses such as mozzarella and other reduced-fat cheeses  · Choose meat and other protein foods that are low in fat  Choose beans or other legumes such as split peas or lentils  Choose fish, skinless poultry (chicken or turkey), or lean cuts of red meat (beef or pork)  Before you cook meat or poultry, cut off any visible fat  · Use less fat and oil  Try baking foods instead of frying them  Add less fat, such as margarine, sour cream, regular salad dressing, and mayonnaise to foods  Eat fewer high-fat foods  Some examples of high-fat foods include french fries, doughnuts, ice cream, and cakes  · Eat fewer sweets  Limit foods and drinks that are high in sugar  This includes candy, cookies, regular soda, and sweetened drinks  Ways to decrease calories:   · Eat smaller portions  ? Use a small plate with smaller servings  ? Do not eat second helpings  ? When you eat at a restaurant, ask for a box and place half of your meal in the box before you eat  ? Share an entrée with someone else  · Replace high-calorie snacks with healthy, low-calorie snacks  ? Choose fresh fruit, vegetables, fat-free rice cakes, or air-popped popcorn instead of potato chips, nuts, or chocolate  ? Choose water or calorie-free drinks instead of soda or sweetened drinks  · Do not shop for groceries when you are hungry  You may be more likely to make unhealthy food choices  Take a grocery list of healthy foods and shop after you have eaten  · Eat regular meals  Do not skip meals  Skipping meals can lead to overeating later in the day   This can make it harder for you to lose weight  Eat a healthy snack in place of a meal if you do not have time to eat a regular meal  Talk with a dietitian to help you create a meal plan and schedule that is right for you  Other things to consider as you try to lose weight:   · Be aware of situations that may give you the urge to overeat, such as eating while watching television  Find ways to avoid these situations  For example, read a book, go for a walk, or do crafts  · Meet with a weight loss support group or friends who are also trying to lose weight  This may help you stay motivated to continue working on your weight loss goals  © Copyright [employer] 2022 Information is for End User's use only and may not be sold, redistributed or otherwise used for commercial purposes  All illustrations and images included in CareNotes® are the copyrighted property of [employer]  or Alexis Calderon   The above information is an  only  It is not intended as medical advice for individual conditions or treatments  Talk to your doctor, nurse or pharmacist before following any medical regimen to see if it is safe and effective for you

## 2022-05-03 NOTE — PROGRESS NOTES
INTERNAL MEDICINE FOLLOW-UP OFFICE VISIT  [facility]  [address] [place] [address]    NAME: Oswald Michaels  AGE: 40 y o  SEX: female    DATE OF ENCOUNTER: 5/3/2022    Assessment and Plan     1  Encounter for physical examination related to employment  Employment form filled out and given to patient  · Follow-up in 6 months for weight management and chronic conditions  2  Encounter for screening colonoscopy  Agreeable to perform colonoscopy, normal risk, no family history of colon cancer  · Screening colonoscopy ordered  - Ambulatory referral for colonoscopy; Future    3  Anemia, unspecified type  Hemoglobin of 11 3  No overt signs of bleeding  · Check iron panel   · Follow-up at next visit  - Iron Panel (Includes Ferritin, Iron Sat%, Iron, and TIBC); Future    4  Obesity (BMI 30-39  9)  BMI of 37 35, patient has been trying to lose weight to little success  · Dietary modifications discussed   · Referral to bariatric medicine provided  · Check TSH  · Follow up in 6 months  - Ambulatory Referral to Weight Management; Future  - TSH, 3rd generation with Free T4 reflex; Future    5  Ovarian cyst  Stable, patient takes every 2-3 weeks  · Refill for naproxen given  - naproxen (Naprosyn) 500 mg tablet; Take 1 tablet (500 mg total) by mouth 2 (two) times a day as needed for mild pain  Dispense: 30 tablet; Refill: 0    Orders Placed This Encounter   Procedures    Ambulatory referral for colonoscopy    Ambulatory Referral to Weight Management             Healthcare Maintenance  Colonoscopy script given   Otherwise up-to-date      Chief Complaint     Chief Complaint   Patient presents with    Physical Exam     work physical need PPD        History of Present Illness     16-year-old female with past medical history of migraines, ovarian cysts, status post left  nephrectomy for nephrolithiasis was attending an office visit for work physical   She feels well and denies any chest pain, shortness a breath, recent illnesses  She denies any smoking, alcohol, drug use  She works in a   Does not require PPD  has been trying to lose weight with dietary and lifestyle modification to little relief  The following portions of the patient's history were reviewed and updated as appropriate: allergies, current medications, past family history, past medical history, past social history, past surgical history and problem list     Review of Systems       Review of Systems   Constitutional: Negative for chills and fever  HENT: Negative for congestion and sinus pressure  Respiratory: Negative for cough and shortness of breath  Cardiovascular: Negative for chest pain, palpitations and leg swelling  Gastrointestinal: Negative for abdominal pain, diarrhea, nausea and vomiting  Genitourinary: Negative for dysuria and hematuria  Musculoskeletal: Negative for arthralgias and back pain  Skin: Negative for color change and rash  Neurological: Negative for dizziness and headaches  Psychiatric/Behavioral: Negative for agitation and confusion  All other systems reviewed and are negative  All other systems reviewed and were negative      Medical History     Past Medical History:   Diagnosis Date    Abnormal Pap smear of cervix     Renal calculi     Renal disorder     Toe fracture, left      Past Surgical History:   Procedure Laterality Date    CHOLECYSTECTOMY      CHOLECYSTECTOMY LAPAROSCOPIC N/A 12/10/2018    Procedure: CHOLECYSTECTOMY LAPAROSCOPIC; INCISIONAL HERNIA REPAIR;  Surgeon: Diamond Rodriguez MD;  [facility];  Service: General    KIDNEY SURGERY  2013    removed L kidney from damage from kidney stone -PERFORMED IN [place]    NEPHRECTOMY      TUBAL LIGATION      AGE 32       Current Outpatient Medications:     acetaminophen (TYLENOL) 650 mg CR tablet, Take 1 tablet (650 mg total) by mouth every 8 (eight) hours as needed for mild pain, Disp: 30 tablet, Rfl: 0    famotidine (PEPCID) 20 mg tablet, Take 1 tablet (20 mg total) by mouth daily as needed for heartburn, Disp: 90 tablet, Rfl: 2    naproxen (Naprosyn) 500 mg tablet, Take 1 tablet (500 mg total) by mouth 2 (two) times a day as needed for mild pain, Disp: 30 tablet, Rfl: 0    tamsulosin (FLOMAX) 0 4 mg, Take 1 capsule (0 4 mg total) by mouth daily with dinner, Disp: 30 capsule, Rfl: 1    calcium-vitamin D (OSCAL) 250-125 MG-UNIT per tablet, Take 1 tablet by mouth 2 (two) times a day (Patient not taking: Reported on 1/31/2022 ), Disp: 60 tablet, Rfl: 2   Family History   Problem Relation Age of Onset    Diabetes Mother     Hyperlipidemia Mother     Hypertension Mother    [facility] Other Mother         low back pain    Heart disease Mother     Diabetes Father     No Known Problems Sister     No Known Problems Brother     No Known Problems Daughter     No Known Problems Son     Kidney failure Maternal Grandmother         on dialysis    Kidney disease Maternal Grandmother     Heart disease Maternal Grandmother     Breast cancer Neg Hx     Colon cancer Neg Hx     Ovarian cancer Neg Hx       Social History     Socioeconomic History    Marital status: Single     Spouse name: None    Number of children: 2    Years of education: None    Highest education level: None   Occupational History     Comment: unemployed, not looking for work   Tobacco Use    Smoking status: Never Smoker    Smokeless tobacco: Never Used   Vaping Use    Vaping Use: Never used   Substance and Sexual Activity    Alcohol use: No    Drug use: No    Sexual activity: Yes     Partners: Male     Birth control/protection: Female Sterilization   Other Topics Concern    None   Social History Narrative    Caffeine use-1 cup of coffee daily    Does not exercise     Social Determinants of Health     Financial Resource Strain: Low Risk     Difficulty of Paying Living Expenses: Not hard at all   Food Insecurity: No Food Insecurity    Worried About Running Out of Food in the Last Year: Never true    Makenzie of Food in the Last Year: Never true   Transportation Needs: No Transportation Needs    Lack of Transportation (Medical): No    Lack of Transportation (Non-Medical): No   Physical Activity: Not on file   Stress: Not on file   Social Connections: Not on file   Intimate Partner Violence: Not on file   Housing Stability: Low Risk     Unable to Pay for Housing in the Last Year: No    Number of Places Lived in the Last Year: 1    Unstable Housing in the Last Year: No      No Known Allergies     Active Problem List     Patient Active Problem List   Diagnosis    Astigmatism of both eyes    Double vision    Dyspepsia    Low iron stores    Low mean corpuscular volume (MCV)    Mixed hyperlipidemia    Myopia of both eyes    Obesity (BMI 30-39  9)    Migraine with aura and with status migrainosus, not intractable    Pain of right upper extremity    Right flank pain    Status post nephrectomy    ASCUS with positive high risk HPV cervical    Patellofemoral arthralgia of both knees    Osteoarthritis of both knees    CKD (chronic kidney disease), stage II    Persistent proteinuria    Nephrolithiasis       Objective     /72 (BP Location: Right arm, Patient Position: Sitting, Cuff Size: Large)   Pulse 80   Temp 98 °F (36 7 °C) (Temporal)   Ht 5' 4" (1 626 m)   Wt 98 7 kg (217 lb 9 6 oz)   BMI 37 35 kg/m²     Physical Exam  Vitals and nursing note reviewed  Constitutional:       General: She is not in acute distress  Appearance: Normal appearance  She is well-developed  She is obese  She is not ill-appearing  HENT:      Head: Normocephalic and atraumatic  Mouth/Throat:      Mouth: Mucous membranes are moist    Eyes:      Extraocular Movements: Extraocular movements intact  Conjunctiva/sclera: Conjunctivae normal       Pupils: Pupils are equal, round, and reactive to light     Cardiovascular: Rate and Rhythm: Normal rate and regular rhythm  Pulses: Normal pulses  Heart sounds: Normal heart sounds  No murmur heard  No friction rub  No gallop  Pulmonary:      Effort: Pulmonary effort is normal  No respiratory distress  Breath sounds: Normal breath sounds  No wheezing or rales  Abdominal:      General: Abdomen is flat  Bowel sounds are normal  There is no distension  Palpations: Abdomen is soft  Tenderness: There is no abdominal tenderness  There is no guarding  Musculoskeletal:      Cervical back: Neck supple  Right lower leg: No edema  Left lower leg: No edema  Skin:     General: Skin is warm and dry  Neurological:      General: No focal deficit present  Mental Status: She is alert and oriented to person, place, and time     Psychiatric:         Mood and Affect: Mood normal          Behavior: Behavior normal           Pertinent Laboratory/Diagnostic Studies:  CBC:   Lab Results   Component Value Date/Time    WBC 8 85 03/15/2022 07:56 PM    RBC 4 90 03/15/2022 07:56 PM    HGB 11 2 (L) 03/15/2022 07:56 PM    HCT 35 2 03/15/2022 07:56 PM    MCV 72 (L) 03/15/2022 07:56 PM    MCH 22 9 (L) 03/15/2022 07:56 PM    MCHC 31 8 03/15/2022 07:56 PM    RDW 16 0 (H) 03/15/2022 07:56 PM    MPV 10 5 03/15/2022 07:56 PM     03/15/2022 07:56 PM    NRBC 0 03/15/2022 07:56 PM    NEUTOPHILPCT 55 03/15/2022 07:56 PM    LYMPHOPCT 33 03/15/2022 07:56 PM    MONOPCT 9 03/15/2022 07:56 PM    EOSPCT 2 03/15/2022 07:56 PM    BASOPCT 1 03/15/2022 07:56 PM    NEUTROABS 4 86 03/15/2022 07:56 PM    LYMPHSABS 2 91 03/15/2022 07:56 PM    MONOSABS 0 79 03/15/2022 07:56 PM    EOSABS 0 20 03/15/2022 07:56 PM     Chemistry Profile:   Lab Results   Component Value Date/Time    K 3 8 03/15/2022 07:56 PM     03/15/2022 07:56 PM    CO2 26 03/15/2022 07:56 PM    BUN 15 03/15/2022 07:56 PM    CREATININE 1 05 03/15/2022 07:56 PM    GLUC 117 03/15/2022 07:56 PM    GLUF 94 02/19/2022 08:10 AM    CALCIUM 9 4 03/15/2022 07:56 PM    CORRECTEDCA 10 2 (H) 12/02/2021 08:29 PM    AST 20 03/15/2022 07:56 PM    ALT 35 03/15/2022 07:56 PM    ALKPHOS 76 03/15/2022 07:56 PM    EGFR 64 03/15/2022 07:56 PM     Endocrine Studies:   Lab Results   Component Value Date/Time    HGBA1C 5 7 08/24/2019 08:44 AM    VXJ1JPMKSRHH 0 977 12/22/2017 09:27 AM    TRIG 170 (H) 02/19/2022 08:10 AM    CHOLESTEROL 240 (H) 02/19/2022 08:10 AM    HDL 44 (L) 02/19/2022 08:10 AM    LDLCALC 162 (H) 02/19/2022 08:10 AM       Current Medications     Current Outpatient Medications:     acetaminophen (TYLENOL) 650 mg CR tablet, Take 1 tablet (650 mg total) by mouth every 8 (eight) hours as needed for mild pain, Disp: 30 tablet, Rfl: 0    famotidine (PEPCID) 20 mg tablet, Take 1 tablet (20 mg total) by mouth daily as needed for heartburn, Disp: 90 tablet, Rfl: 2    naproxen (Naprosyn) 500 mg tablet, Take 1 tablet (500 mg total) by mouth 2 (two) times a day as needed for mild pain, Disp: 30 tablet, Rfl: 0    tamsulosin (FLOMAX) 0 4 mg, Take 1 capsule (0 4 mg total) by mouth daily with dinner, Disp: 30 capsule, Rfl: 1    calcium-vitamin D (OSCAL) 250-125 MG-UNIT per tablet, Take 1 tablet by mouth 2 (two) times a day (Patient not taking: Reported on 1/31/2022 ), Disp: 60 tablet, Rfl: 2    Health Maintenance     Health Maintenance   Topic Date Due    Breast Cancer Screening: Mammogram  Never done    COVID-19 Vaccine (2 - Booster for Saji series) 05/13/2021    Colorectal Cancer Screening  Never done    Depression Screening  01/31/2023    BMI: Followup Plan  01/31/2023    Annual Physical  01/31/2023    BMI: Adult  03/23/2023    Cervical Cancer Screening  01/31/2027    DTaP,Tdap,and Td Vaccines (2 - Td or Tdap) 01/12/2028    HIV Screening  Completed    Hepatitis C Screening  Completed    Influenza Vaccine  Completed    Pneumococcal Vaccine: Pediatrics (0 to 5 Years) and At-Risk Patients (6 to 59 Years)  Aged Out    HIB Vaccine Aged Out    Hepatitis B Vaccine  Aged Out    IPV Vaccine  Aged Out    Hepatitis A Vaccine  Aged Out    Meningococcal ACWY Vaccine  Aged Out    HPV Vaccine  Aged Out     Immunization History   Administered Date(s) Administered    COVID-19 J&J ([employer]) vaccine 0 5 mL 03/18/2021    INFLUENZA 01/12/2018    Influenza Quadrivalent, 6-35 Months IM 01/12/2018    Influenza, injectable, quadrivalent, preservative free 0 5 mL 11/20/2020, 10/26/2021    Tdap 01/12/2018    Tuberculin Skin Test-PPD Intradermal 05/08/2018       Efe Nilda DIGGS    Internal Medicine PGY-3  5/3/2022 10:56 AM

## 2022-05-12 ENCOUNTER — PREP FOR PROCEDURE (OUTPATIENT)
Dept: SURGERY | Facility: CLINIC | Age: 47
End: 2022-05-12

## 2022-05-12 ENCOUNTER — CONSULT (OUTPATIENT)
Dept: SURGERY | Facility: CLINIC | Age: 47
End: 2022-05-12
Payer: MEDICARE

## 2022-05-12 DIAGNOSIS — K43.2 INCISIONAL HERNIA: Primary | ICD-10-CM

## 2022-05-12 DIAGNOSIS — K43.2 INCISIONAL HERNIA, WITHOUT OBSTRUCTION OR GANGRENE: ICD-10-CM

## 2022-05-12 PROCEDURE — 99244 OFF/OP CNSLTJ NEW/EST MOD 40: CPT | Performed by: SURGERY

## 2022-05-12 NOTE — PROGRESS NOTES
Assessment/Plan:    Diagnoses and all orders for this visit:    Incisional hernia, without obstruction or gangrene  -     Ambulatory Referral to General Surgery    Risks and benefits for incisional hernia repair were discussed including potential for recurrence, bowel injury, or pain issues and she agrees to proceed  Subjective:      Patient ID: Odette Capone is a 40 y o  female  Presents for hernia consult  Ventral hernia on Ct Scan 3/15/2022  Discomfort when exercising or lifting, bulge present  Complaint of abdominal pain and gas after eating  History of a left nephrectomy in [facility] in 2013 for kidney stones  Recently has noticed a bulge near her midline scar  She gets occasional pain in this region as well  The following portions of the patient's history were reviewed and updated as appropriate:     She  has a past medical history of Abnormal Pap smear of cervix, Renal calculi, Renal disorder, and Toe fracture, left  She  has a past surgical history that includes Kidney surgery (2013); Tubal ligation; CHOLECYSTECTOMY LAPAROSCOPIC (N/A, 12/10/2018); Cholecystectomy; and Nephrectomy  Her family history includes Diabetes in her father and mother; Heart disease in her maternal grandmother and mother; Hyperlipidemia in her mother; Hypertension in her mother; Kidney disease in her maternal grandmother; Kidney failure in her maternal grandmother; No Known Problems in her brother, daughter, sister, and son; Other in her mother  She  reports that she has never smoked  She has never used smokeless tobacco  She reports that she does not drink alcohol and does not use drugs    Current Outpatient Medications   Medication Sig Dispense Refill    acetaminophen (TYLENOL) 650 mg CR tablet Take 1 tablet (650 mg total) by mouth every 8 (eight) hours as needed for mild pain 30 tablet 0    famotidine (PEPCID) 20 mg tablet Take 1 tablet (20 mg total) by mouth daily as needed for heartburn 90 tablet 2    naproxen (Naprosyn) 500 mg tablet Take 1 tablet (500 mg total) by mouth 2 (two) times a day as needed for mild pain 30 tablet 0    tamsulosin (FLOMAX) 0 4 mg Take 1 capsule (0 4 mg total) by mouth daily with dinner 30 capsule 1     No current facility-administered medications for this visit  She has No Known Allergies       Review of Systems      Objective: There were no vitals taken for this visit  Physical Exam  Constitutional:       Appearance: She is obese  HENT:      Head: Atraumatic  Mouth/Throat:      Mouth: Mucous membranes are moist    Eyes:      Extraocular Movements: Extraocular movements intact  Cardiovascular:      Rate and Rhythm: Normal rate  Pulmonary:      Effort: Pulmonary effort is normal    Abdominal:      General: Bowel sounds are normal       Palpations: Abdomen is soft  Hernia: A hernia (Incisional hernia noted around her periumbilical midline incision  Noted especially at the superior aspect) is present  Musculoskeletal:      Cervical back: Normal range of motion  Skin:     General: Skin is warm and dry  Neurological:      Mental Status: She is alert         extremities: No edema

## 2022-06-21 ENCOUNTER — TELEPHONE (OUTPATIENT)
Dept: NEPHROLOGY | Facility: CLINIC | Age: 47
End: 2022-06-21

## 2022-06-21 DIAGNOSIS — N20.0 NEPHROLITHIASIS: ICD-10-CM

## 2022-06-21 DIAGNOSIS — Z90.5 STATUS POST NEPHRECTOMY: ICD-10-CM

## 2022-06-21 DIAGNOSIS — N18.2 CKD (CHRONIC KIDNEY DISEASE), STAGE II: Primary | ICD-10-CM

## 2022-06-21 NOTE — TELEPHONE ENCOUNTER
----- Message from Troy Patel MD sent at 6/21/2022  3:57 PM EDT -----  Regarding: RE: Labs  BMP and urine microalbumin/creatinine ratio  ----- Message -----  From: Olivia Sawant  Sent: 6/21/2022   3:38 PM EDT  To: Troy Patel MD  Subject: Labs                                             Hi any new labs before this patient's appointment?

## 2022-06-23 NOTE — TELEPHONE ENCOUNTER
Called and spoke with Patient to complete their bloodwork prior to their appointment on 07/01/22 with Dr Yahaira Alvarenga at the [facility]

## 2022-06-24 ENCOUNTER — TELEPHONE (OUTPATIENT)
Dept: NEPHROLOGY | Facility: CLINIC | Age: 47
End: 2022-06-24

## 2022-06-24 NOTE — TELEPHONE ENCOUNTER
Called patients pcp office to find out if patient needs an insurance referral prior to appt 07/01  pcp office stated that there needs to be provider to provider referral and that's good until 07/19/22

## 2022-06-25 ENCOUNTER — APPOINTMENT (OUTPATIENT)
Dept: LAB | Facility: HOSPITAL | Age: 47
End: 2022-06-25
Attending: INTERNAL MEDICINE
Payer: MEDICARE

## 2022-06-25 DIAGNOSIS — N20.0 NEPHROLITHIASIS: ICD-10-CM

## 2022-06-25 DIAGNOSIS — D64.9 ANEMIA, UNSPECIFIED TYPE: ICD-10-CM

## 2022-06-25 DIAGNOSIS — N18.2 CKD (CHRONIC KIDNEY DISEASE), STAGE II: ICD-10-CM

## 2022-06-25 DIAGNOSIS — R80.1 PERSISTENT PROTEINURIA: ICD-10-CM

## 2022-06-25 DIAGNOSIS — R82.998 LEUKOCYTES IN URINE: ICD-10-CM

## 2022-06-25 DIAGNOSIS — E66.9 OBESITY (BMI 30-39.9): ICD-10-CM

## 2022-06-25 DIAGNOSIS — K43.2 INCISIONAL HERNIA: ICD-10-CM

## 2022-06-25 LAB
ANION GAP SERPL CALCULATED.3IONS-SCNC: 4 MMOL/L (ref 4–13)
BACTERIA UR QL AUTO: ABNORMAL /HPF
BILIRUB UR QL STRIP: NEGATIVE
BUN SERPL-MCNC: 18 MG/DL (ref 5–25)
CALCIUM SERPL-MCNC: 9.5 MG/DL (ref 8.3–10.1)
CHLORIDE SERPL-SCNC: 107 MMOL/L (ref 100–108)
CLARITY UR: CLEAR
CO2 SERPL-SCNC: 28 MMOL/L (ref 21–32)
COLOR UR: ABNORMAL
CREAT SERPL-MCNC: 0.92 MG/DL (ref 0.6–1.3)
CREAT UR-MCNC: 230 MG/DL
ERYTHROCYTE [DISTWIDTH] IN BLOOD BY AUTOMATED COUNT: 16.2 % (ref 11.6–15.1)
FERRITIN SERPL-MCNC: 5 NG/ML (ref 8–388)
GFR SERPL CREATININE-BSD FRML MDRD: 75 ML/MIN/1.73SQ M
GLUCOSE P FAST SERPL-MCNC: 94 MG/DL (ref 65–99)
GLUCOSE UR STRIP-MCNC: NEGATIVE MG/DL
GRAN CASTS #/AREA URNS LPF: ABNORMAL /[LPF]
HCT VFR BLD AUTO: 36.8 % (ref 34.8–46.1)
HGB BLD-MCNC: 11.4 G/DL (ref 11.5–15.4)
HGB UR QL STRIP.AUTO: NEGATIVE
IRON SATN MFR SERPL: 5 % (ref 15–50)
IRON SERPL-MCNC: 24 UG/DL (ref 50–170)
KETONES UR STRIP-MCNC: NEGATIVE MG/DL
LEUKOCYTE ESTERASE UR QL STRIP: NEGATIVE
MCH RBC QN AUTO: 22.4 PG (ref 26.8–34.3)
MCHC RBC AUTO-ENTMCNC: 31 G/DL (ref 31.4–37.4)
MCV RBC AUTO: 72 FL (ref 82–98)
MICROALBUMIN UR-MCNC: 9.7 MG/L (ref 0–20)
MICROALBUMIN/CREAT 24H UR: 4 MG/G CREATININE (ref 0–30)
NITRITE UR QL STRIP: NEGATIVE
NON-SQ EPI CELLS URNS QL MICRO: ABNORMAL /HPF
PH UR STRIP.AUTO: 5.5 [PH]
PLATELET # BLD AUTO: 322 THOUSANDS/UL (ref 149–390)
PMV BLD AUTO: 10.7 FL (ref 8.9–12.7)
POTASSIUM SERPL-SCNC: 4.3 MMOL/L (ref 3.5–5.3)
PROT UR STRIP-MCNC: ABNORMAL MG/DL
RBC # BLD AUTO: 5.09 MILLION/UL (ref 3.81–5.12)
RBC #/AREA URNS AUTO: ABNORMAL /HPF
SODIUM SERPL-SCNC: 139 MMOL/L (ref 136–145)
SP GR UR STRIP.AUTO: 1.03 (ref 1–1.03)
TIBC SERPL-MCNC: 480 UG/DL (ref 250–450)
TSH SERPL DL<=0.05 MIU/L-ACNC: 1.2 UIU/ML (ref 0.45–4.5)
UROBILINOGEN UR STRIP-ACNC: <2 MG/DL
WBC # BLD AUTO: 7.12 THOUSAND/UL (ref 4.31–10.16)
WBC #/AREA URNS AUTO: ABNORMAL /HPF

## 2022-06-25 PROCEDURE — 82728 ASSAY OF FERRITIN: CPT

## 2022-06-25 PROCEDURE — 82570 ASSAY OF URINE CREATININE: CPT

## 2022-06-25 PROCEDURE — 84443 ASSAY THYROID STIM HORMONE: CPT

## 2022-06-25 PROCEDURE — 83550 IRON BINDING TEST: CPT

## 2022-06-25 PROCEDURE — 81001 URINALYSIS AUTO W/SCOPE: CPT

## 2022-06-25 PROCEDURE — 83540 ASSAY OF IRON: CPT

## 2022-06-25 PROCEDURE — 85027 COMPLETE CBC AUTOMATED: CPT

## 2022-06-25 PROCEDURE — 80048 BASIC METABOLIC PNL TOTAL CA: CPT

## 2022-06-25 PROCEDURE — 87086 URINE CULTURE/COLONY COUNT: CPT

## 2022-06-25 PROCEDURE — 36415 COLL VENOUS BLD VENIPUNCTURE: CPT

## 2022-06-25 PROCEDURE — 82043 UR ALBUMIN QUANTITATIVE: CPT

## 2022-06-26 LAB — BACTERIA UR CULT: NORMAL

## 2022-06-27 DIAGNOSIS — D50.9 IRON DEFICIENCY ANEMIA, UNSPECIFIED IRON DEFICIENCY ANEMIA TYPE: Primary | ICD-10-CM

## 2022-06-27 RX ORDER — FERROUS SULFATE TAB EC 324 MG (65 MG FE EQUIVALENT) 324 (65 FE) MG
324 TABLET DELAYED RESPONSE ORAL EVERY OTHER DAY
Qty: 90 TABLET | Refills: 0 | Status: SHIPPED | OUTPATIENT
Start: 2022-06-27 | End: 2022-07-01 | Stop reason: SDUPTHER

## 2022-06-28 ENCOUNTER — CONSULT (OUTPATIENT)
Dept: BARIATRICS | Facility: CLINIC | Age: 47
End: 2022-06-28
Payer: MEDICARE

## 2022-06-28 VITALS
TEMPERATURE: 97.8 F | DIASTOLIC BLOOD PRESSURE: 70 MMHG | SYSTOLIC BLOOD PRESSURE: 120 MMHG | HEART RATE: 86 BPM | WEIGHT: 212.9 LBS | BODY MASS INDEX: 36.35 KG/M2 | HEIGHT: 64 IN

## 2022-06-28 DIAGNOSIS — E66.9 OBESITY (BMI 30-39.9): Primary | ICD-10-CM

## 2022-06-28 DIAGNOSIS — N20.0 NEPHROLITHIASIS: ICD-10-CM

## 2022-06-28 DIAGNOSIS — E78.2 MIXED HYPERLIPIDEMIA: ICD-10-CM

## 2022-06-28 PROCEDURE — 99244 OFF/OP CNSLTJ NEW/EST MOD 40: CPT | Performed by: NURSE PRACTITIONER

## 2022-06-28 NOTE — PROGRESS NOTES
Assessment/Plan:    Obesity (BMI 30-39 9)  - Discussed options of HealthyCORE-Intensive Lifestyle Intervention Program, Very Low Calorie Diet-VLCD and Conservative Program and the role of weight loss medications  - Explained the importance of making lifestyle changes first before starting any anti-obesity medications  Patient should demonstrate lifestyle changes first before anti-obesity medication can be initiated  - Patient is interested in pursuing Conservative Program  - Initial weight loss goal of 5-10% weight loss for improved health  - Weight loss can improve patient's co-morbid conditions and/or prevent weight-related complications  - Stop Bang 1/8  - Labs reviewed: TSH 6/25/2022 and CMP 3/15/2022 and all within acceptable range  - Check lipid panel, A1C and fasting insulin    - NOT a candidate for Topamax given significant history of nephrolithiasis requiring nephrectomy  Goals:  Do not skip meals  Food log (ie ) www myfitnesspal com,sparkpeople  com,loseit com,calorieking  com,etc  baritastic (use skinnytaste  com, dietdoctor  com or smartphone nathanael [employer]s for recipes)  No sugary beverages  At least 64oz of water daily  Increase physical activity by 10 minutes daily  Gradually increase physical activity to a goal of 5 days per week for 30 minutes of MODERATE intensity PLUS 2 days per week of FULL BODY resistance training (use smartphone apps Solutionreach, Home Workout, etc )  - Start food logging, weighing and measuring including milk and honey  - Start walking 3 days per week for 15 minutes  - 9624-9659 calories per day  Sample menu given  - Not a candidate for Topamax given nephrolithiasis s/p nephrectomy  Mixed hyperlipidemia  - May improve with weight loss and lifestyle modification  Nephrolithiasis  S/P nephrectomy  Not a candidate for Topamax  Quitnen Renteria was seen today for consult      Diagnoses and all orders for this visit:    Obesity (BMI 30-39 9)  -     Ambulatory Referral to Weight Management  -     Lipid panel; Future  -     HEMOGLOBIN A1C W/ EAG ESTIMATION; Future  -     Insulin, fasting; Future    Mixed hyperlipidemia    Nephrolithiasis          Follow up in approximately 2 months with Non-Surgical Physician/Advanced Practitioner  Subjective:   Chief Complaint   Patient presents with    Consult     MWM goal to lose 40 pounds, waist 45, S/B 1-8 neg        Patient ID: Taya Hernandez  is a 40 y o  female with excess weight/obesity here to pursue weight management  [employer]  offered, but she declined, as she did not feel it was necessary  Previous notes and records have been reviewed      Past Medical History:   Diagnosis Date    Abnormal Pap smear of cervix     Renal calculi     Renal disorder     Toe fracture, left      Past Surgical History:   Procedure Laterality Date    CHOLECYSTECTOMY      CHOLECYSTECTOMY LAPAROSCOPIC N/A 12/10/2018    Procedure: CHOLECYSTECTOMY LAPAROSCOPIC; INCISIONAL HERNIA REPAIR;  Surgeon: Otilia Pérez MD;  [facility];  Service: General    KIDNEY SURGERY  2013    removed L kidney from damage from kidney stone -PERFORMED IN [place]    NEPHRECTOMY      TUBAL LIGATION      AGE 32       HPI:  Wt Readings from Last 20 Encounters:   06/28/22 96 6 kg (212 lb 14 4 oz)   05/03/22 98 7 kg (217 lb 9 6 oz)   03/23/22 97 1 kg (214 lb)   03/17/22 98 6 kg (217 lb 6 4 oz)   02/23/22 98 kg (216 lb)   01/31/22 97 9 kg (215 lb 12 8 oz)   12/13/21 98 kg (216 lb)   12/02/21 96 2 kg (212 lb)   11/29/21 97 5 kg (215 lb)   11/01/21 97 7 kg (215 lb 6 4 oz)   10/26/21 98 4 kg (217 lb)   07/19/21 95 3 kg (210 lb)   07/12/21 96 4 kg (212 lb 9 6 oz)   04/15/21 94 6 kg (208 lb 9 6 oz)   03/16/21 95 4 kg (210 lb 6 4 oz)   11/20/20 98 2 kg (216 lb 6 4 oz)   09/17/20 96 2 kg (212 lb 1 3 oz)   08/24/20 95 8 kg (211 lb 3 4 oz)   08/24/20 95 8 kg (211 lb 3 2 oz)   11/05/19 90 3 kg (199 lb)     Obesity/Excess Weight:  Severity: Moderate  Onset:  2 years Modifiers: Diet and Exercise  Contributing factors: Insufficient Physical Activity  Associated symptoms: increased joint pain    Will be having hernia repair 2022  Hydration: 4-5 bottles of water, 1 cup of coffee with honey and lactose free FF milk  Alcohol: none  Smoking: denies  Exercise: none  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[address],[address]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  Sleep: 6-7 hours  STOP ban/8    Highest weight: 225 lbs   Current weight: 212 9 lbs  Goal weight: wants to lose 40 lbs  Colonoscopy: due, has referral at home, encouraged to schedule   Mammogram: due, has order at home, encouraged to schedule    The following portions of the patient's history were reviewed and updated as appropriate: allergies, current medications, past family history, past medical history, past social history, past surgical history, and problem list     Review of Systems   HENT: Negative for sore throat  Respiratory: Negative for cough and shortness of breath  Cardiovascular: Negative for chest pain and palpitations  Gastrointestinal: Negative for constipation, diarrhea, nausea and vomiting         + GERD controlled with medication   Endocrine: Negative for cold intolerance and heat intolerance  Genitourinary: Negative for dysuria  Musculoskeletal: Positive for arthralgias (knees L>R)  Negative for back pain  Skin: Negative for rash  Neurological: Positive for headaches (history of migraines, remaining stable, worse in winter)  Psychiatric/Behavioral: Negative for suicidal ideas (or HI)  Denies depression and anxiety       Objective:  /70 (BP Location: Right arm, Patient Position: Sitting, Cuff Size: Standard)   Pulse 86   Temp 97 8 °F (36 6 °C) (Tympanic)   Ht 5' 3 9" (1 623 m)   Wt 96 6 kg (212 lb 14 4 oz)   Breastfeeding No   BMI 36 66 kg/m²     Physical Exam  Vitals and nursing note reviewed  Constitutional   General appearance: Abnormal   well developed and obese     Eyes No conjunctival injection  Ears, Nose, Mouth, and Throat Oral mucosa moist    Pulmonary   Respiratory effort: No increased work of breathing or signs of respiratory distress  Cardiovascular     Examination of extremities for edema and/or varicosities: Normal   no edema  Abdomen   Abdomen: Abnormal   The abdomen was obese  Musculoskeletal   Gait and station: Normal     Psychiatric   Orientation to person, place and time: Normal     Affect: appropriate      Labs  Recent labs have been personally reviewed      Lab Results   Component Value Date    SODIUM 139 06/25/2022    K 4 3 06/25/2022     06/25/2022    CO2 28 06/25/2022    AGAP 4 06/25/2022    BUN 18 06/25/2022    CREATININE 0 92 06/25/2022    GLUC 117 03/15/2022    GLUF 94 06/25/2022    CALCIUM 9 5 06/25/2022    AST 20 03/15/2022    ALT 35 03/15/2022    ALKPHOS 76 03/15/2022    TP 7 8 03/15/2022    TBILI 0 21 03/15/2022    EGFR 75 06/25/2022     Lab Results   Component Value Date    YZT8GISQLJCM 1 200 06/25/2022

## 2022-06-28 NOTE — ASSESSMENT & PLAN NOTE
- Discussed options of HealthyCORE-Intensive Lifestyle Intervention Program, Very Low Calorie Diet-VLCD and Conservative Program and the role of weight loss medications  - Explained the importance of making lifestyle changes first before starting any anti-obesity medications  Patient should demonstrate lifestyle changes first before anti-obesity medication can be initiated  - Patient is interested in pursuing Conservative Program  - Initial weight loss goal of 5-10% weight loss for improved health  - Weight loss can improve patient's co-morbid conditions and/or prevent weight-related complications  - Stop Bang 1/8  - Labs reviewed: TSH 6/25/2022 and CMP 3/15/2022 and all within acceptable range  - Check lipid panel, A1C and fasting insulin    - NOT a candidate for Topamax given significant history of nephrolithiasis requiring nephrectomy  Goals:  Do not skip meals  Food log (ie ) www myfitnesspal com,sparkpeople  com,loseit com,calorieking  com,etc  baritastic (use skinnytaste  com, dietdoctor  com or smartphone [employer] for recipes)  No sugary beverages  At least 64oz of water daily  Increase physical activity by 10 minutes daily  Gradually increase physical activity to a goal of 5 days per week for 30 minutes of MODERATE intensity PLUS 2 days per week of FULL BODY resistance training (use smartphone apps "[employer] Home Workout, etc )  - Start food logging, weighing and measuring including milk and honey  - Start walking 3 days per week for 15 minutes  - 3791-4691 calories per day  Sample menu given  - Not a candidate for Topamax given nephrolithiasis s/p nephrectomy

## 2022-06-30 ENCOUNTER — TELEPHONE (OUTPATIENT)
Dept: NEPHROLOGY | Facility: CLINIC | Age: 47
End: 2022-06-30

## 2022-06-30 NOTE — TELEPHONE ENCOUNTER
Appointment Confirmation   Person confirmed appointment with  If not patient, name of the person Patient    Date and time of appointment 7/1  1:00    Patient acknowledged and will be at appointment? yes    Did you advise the patient that they will need a urine sample if they are a new patient?  N/A    Did you advise the patient to bring their current medications for verification? (including any OTC) Yes    Additional Information

## 2022-07-01 ENCOUNTER — OFFICE VISIT (OUTPATIENT)
Dept: LAB | Facility: HOSPITAL | Age: 47
End: 2022-07-01
Attending: SURGERY
Payer: MEDICARE

## 2022-07-01 ENCOUNTER — OFFICE VISIT (OUTPATIENT)
Dept: NEPHROLOGY | Facility: CLINIC | Age: 47
End: 2022-07-01
Payer: MEDICARE

## 2022-07-01 VITALS
DIASTOLIC BLOOD PRESSURE: 78 MMHG | HEART RATE: 82 BPM | BODY MASS INDEX: 36.02 KG/M2 | OXYGEN SATURATION: 92 % | HEIGHT: 64 IN | SYSTOLIC BLOOD PRESSURE: 118 MMHG | WEIGHT: 211 LBS

## 2022-07-01 DIAGNOSIS — E66.9 OBESITY (BMI 30-39.9): ICD-10-CM

## 2022-07-01 DIAGNOSIS — N20.0 NEPHROLITHIASIS: ICD-10-CM

## 2022-07-01 DIAGNOSIS — N18.2 CKD (CHRONIC KIDNEY DISEASE), STAGE II: Primary | ICD-10-CM

## 2022-07-01 DIAGNOSIS — D50.9 IRON DEFICIENCY ANEMIA, UNSPECIFIED IRON DEFICIENCY ANEMIA TYPE: ICD-10-CM

## 2022-07-01 DIAGNOSIS — K43.2 INCISIONAL HERNIA: ICD-10-CM

## 2022-07-01 DIAGNOSIS — Z90.5 STATUS POST NEPHRECTOMY: ICD-10-CM

## 2022-07-01 PROCEDURE — 93005 ELECTROCARDIOGRAM TRACING: CPT

## 2022-07-01 PROCEDURE — 99214 OFFICE O/P EST MOD 30 MIN: CPT | Performed by: INTERNAL MEDICINE

## 2022-07-01 RX ORDER — FERROUS SULFATE TAB EC 324 MG (65 MG FE EQUIVALENT) 324 (65 FE) MG
324 TABLET DELAYED RESPONSE ORAL DAILY
Qty: 90 TABLET | Refills: 2 | Status: SHIPPED | OUTPATIENT
Start: 2022-07-01

## 2022-07-01 NOTE — PROGRESS NOTES
NEPHROLOGY OUTPATIENT PROGRESS NOTE   Triny Hammond 40 y o  female MRN: [id number]  DATE: 7/1/2022  Reason for visit:   Chief Complaint   Patient presents with    Follow-up    Chronic Kidney Disease     ASSESSMENT and PLAN:  Mild CKD stage 2, baseline creatinine 0 8 to 1 0  -last creatinine 0 9 in June 2022  -avoid nephrotoxins or NSAIDs   -advised to drink adequate liquid to stay hydrated  -UA in June 2022 bland without significant proteinuria or hematuria  Urine microalbumin/creatinine ratio nonsignificant  -?  CKD in the setting of solitary kidney, hyper filtration/obesity related? Secondary FSGS     Status post left nephrectomy 2012 in the setting of recurrent nephrolithiasis, recurrent UTI as per patient in [facility]  -since nephrectomy, she has not had any stone or UTI flare-up issues  -CT scan in 2020 shows no evidence of stones on the right side  -CT scan in March 2022 shows no right-sided hydro, no report of calculi   -renal ultrasound in 2019 shows right kidney 13 5 cm      History of nephrolithiasis requiring nephrectomy in the past   Patient has not had any kidney stone issues since then  CT scan as above   -advised to drink plenty of free water to stay hydrated and goal urine output greater than 2 L per day     Blood pressure well controlled in the office today  She denies any obvious history of hypertension will continue to monitor  Salt restricted diet recommended  Iron-deficiency anemia  -iron saturation 5% with ferritin five, hemoglobin stable 11 4   -we will start iron supplement ferrous sulfate one tablet daily  -repeat iron studies before next visit    Diagnoses and all orders for this visit:    CKD (chronic kidney disease), stage II  -     Microalbumin / creatinine urine ratio; Future  -     Iron Saturation %; Future  -     Ferritin; Future  -     CBC; Future  -     Basic metabolic panel;  Future    Status post nephrectomy    Persistent proteinuria    Nephrolithiasis    Iron deficiency anemia, unspecified iron deficiency anemia type  -     Iron Saturation %; Future  -     Ferritin; Future  -     CBC; Future  -     ferrous sulfate 324 (65 Fe) mg; Take 1 tablet (324 mg total) by mouth in the morning          SUBJECTIVE / HPI:  Darrel Wise is a 40y o  year old female with medical issues of hyperlipidemia, migraine, status post left nephrectomy due to recurrent nephrolithiasis/UTI in 2012, arthritis who presents for regular follow-up of CKD, solitary kidney management  Patient had nephrectomy on left side in 2012 which she believes secondary to recurrent nephrolithiasis and recurrent UTI issues  This was done in [facility]   Since then she denies having any UTI or nephrolithiasis episodes on her right kidney  In March 2022 she had episode of significant flank pain when she had CT scan which did not report any calculi  She denies any current urinary complaint  Denies any chest pain, shortness of breath, nausea vomiting      No recent NSAID exposure  Denies any obvious history of hypertension or diabetes      Family history includes parents having diabetes, grandmother was on dialysis, patient does not know more details  REVIEW OF SYSTEMS:  More than 10 point review of systems were obtained and discussed in length with the patient  Complete review of systems were negative / unremarkable except mentioned above  PHYSICAL EXAM:  Vitals:    07/01/22 1244 07/01/22 1302   BP: 130/80 118/78   BP Location: Left arm    Patient Position: Sitting    Cuff Size: Large    Pulse: 82    SpO2: 92%    Weight: 95 7 kg (211 lb)    Height: 5' 4" (1 626 m)      Body mass index is 36 22 kg/m²  Physical Exam  Vitals reviewed  Constitutional:       Appearance: She is well-developed  HENT:      Head: Normocephalic and atraumatic        Right Ear: External ear normal       Left Ear: External ear normal       Nose:      Comments: External examination of nose unremarkable  Eyes: Conjunctiva/sclera: Conjunctivae normal    Cardiovascular:      Comments: S1, S2 present  Pulmonary:      Effort: Pulmonary effort is normal       Breath sounds: Normal breath sounds  No wheezing or rales  Abdominal:      General: Bowel sounds are normal  There is no distension  Palpations: Abdomen is soft  Tenderness: There is no abdominal tenderness  Musculoskeletal:         General: No deformity  Right lower leg: No edema  Left lower leg: No edema  Lymphadenopathy:      Cervical: No cervical adenopathy  Skin:     Findings: No rash  Neurological:      Mental Status: She is alert and oriented to person, place, and time     Psychiatric:         Behavior: Behavior normal          PAST MEDICAL HISTORY:  Past Medical History:   Diagnosis Date    Abnormal Pap smear of cervix     Hernia of abdominal wall     Renal calculi     Renal disorder     Toe fracture, left        PAST SURGICAL HISTORY:  Past Surgical History:   Procedure Laterality Date    CHOLECYSTECTOMY      CHOLECYSTECTOMY LAPAROSCOPIC N/A 12/10/2018    Procedure: CHOLECYSTECTOMY LAPAROSCOPIC; INCISIONAL HERNIA REPAIR;  Surgeon: Elvia Nelson MD;  [facility];  Service: General    KIDNEY SURGERY  2013    removed L kidney from damage from kidney stone [place] IN [place]    NEPHRECTOMY      TUBAL LIGATION      AGE 32       SOCIAL HISTORY:  Social History     Substance and Sexual Activity   Alcohol Use No     Social History     Substance and Sexual Activity   Drug Use No     Social History     Tobacco Use   Smoking Status Never Smoker   Smokeless Tobacco Never Used       FAMILY HISTORY:  Family History   Problem Relation Age of Onset    Diabetes Mother     Hyperlipidemia Mother     Hypertension Mother     Other Mother         low back pain    Heart disease Mother     Diabetes Father     No Known Problems Sister     No Known Problems Brother     No Known Problems Daughter     No Known Problems Son     Throat cancer Paternal Aunt     Lung cancer Paternal Aunt     Skin cancer Paternal Aunt     Kidney failure Maternal Grandmother         on dialysis    Kidney disease Maternal Grandmother     Heart disease Maternal Grandmother     Colon cancer Paternal Grandfather     Breast cancer Neg Hx     Ovarian cancer Neg Hx     Thyroid disease Neg Hx     Stroke Neg Hx        MEDICATIONS:    Current Outpatient Medications:     acetaminophen (TYLENOL) 650 mg CR tablet, Take 1 tablet (650 mg total) by mouth every 8 (eight) hours as needed for mild pain, Disp: 30 tablet, Rfl: 0    famotidine (PEPCID) 20 mg tablet, Take 1 tablet (20 mg total) by mouth daily as needed for heartburn, Disp: 90 tablet, Rfl: 2    ferrous sulfate 324 (65 Fe) mg, Take 1 tablet (324 mg total) by mouth in the morning, Disp: 90 tablet, Rfl: 2    tamsulosin (FLOMAX) 0 4 mg, Take 1 capsule (0 4 mg total) by mouth daily with dinner (Patient not taking: Reported on 6/28/2022), Disp: 30 capsule, Rfl: 1    Lab Results:   Results for orders placed or performed in visit on 06/25/22   Urine culture    Specimen: Urine, Other   Result Value Ref Range    Urine Culture 50,000-59,000 cfu/ml     Urinalysis with microscopic   Result Value Ref Range    Color, UA Light Yellow     Clarity, UA Clear     Specific Gravity, UA 1 027 1 003 - 1 030    pH, UA 5 5 4 5, 5 0, 5 5, 6 0, 6 5, 7 0, 7 5, 8 0    Leukocytes, UA Negative Negative    Nitrite, UA Negative Negative    Protein, UA Trace (A) Negative mg/dl    Glucose, UA Negative Negative mg/dl    Ketones, UA Negative Negative mg/dl    Urobilinogen, UA <2 0 <2 0 mg/dl mg/dl    Bilirubin, UA Negative Negative    Occult Blood, UA Negative Negative    RBC, UA 1-2 None Seen, 1-2 /hpf    WBC, UA 1-2 None Seen, 1-2 /hpf    Epithelial Cells Moderate (A) None Seen, Occasional /hpf    Bacteria, UA None Seen None Seen, Occasional /hpf    Granular Casts, UA 0-3 (A) (none)   TSH, 3rd generation with Free T4 reflex   Result Value Ref Range    TSH 3RD GENERATON 1 200 0 450 - 4 500 uIU/mL   CBC and Platelet   Result Value Ref Range    WBC 7 12 4 31 - 10 16 Thousand/uL    RBC 5 09 3 81 - 5 12 Million/uL    Hemoglobin 11 4 (L) 11 5 - 15 4 g/dL    Hematocrit 36 8 34 8 - 46 1 %    MCV 72 (L) 82 - 98 fL    MCH 22 4 (L) 26 8 - 34 3 pg    MCHC 31 0 (L) 31 4 - 37 4 g/dL    RDW 16 2 (H) 11 6 - 15 1 %    Platelets 250 443 - 340 Thousands/uL    MPV 10 7 8 9 - 12 7 fL   Basic metabolic panel   Result Value Ref Range    Sodium 139 136 - 145 mmol/L    Potassium 4 3 3 5 - 5 3 mmol/L    Chloride 107 100 - 108 mmol/L    CO2 28 21 - 32 mmol/L    ANION GAP 4 4 - 13 mmol/L    BUN 18 5 - 25 mg/dL    Creatinine 0 92 0 60 - 1 30 mg/dL    Glucose, Fasting 94 65 - 99 mg/dL    Calcium 9 5 8 3 - 10 1 mg/dL    eGFR 75 ml/min/1 73sq m   Iron Saturation %   Result Value Ref Range    Iron Saturation 5 (L) 15 - 50 %    TIBC 480 (H) 250 - 450 ug/dL    Iron 24 (L) 50 - 170 ug/dL   Ferritin   Result Value Ref Range    Ferritin 5 (L) 8 - 388 ng/mL

## 2022-07-05 ENCOUNTER — ANESTHESIA EVENT (OUTPATIENT)
Dept: PERIOP | Facility: AMBULARY SURGERY CENTER | Age: 47
End: 2022-07-05
Payer: MEDICARE

## 2022-07-05 LAB
ATRIAL RATE: 88 BPM
P AXIS: 28 DEGREES
PR INTERVAL: 152 MS
QRS AXIS: 44 DEGREES
QRSD INTERVAL: 74 MS
QT INTERVAL: 330 MS
QTC INTERVAL: 399 MS
T WAVE AXIS: -12 DEGREES
VENTRICULAR RATE: 88 BPM

## 2022-07-05 PROCEDURE — 93010 ELECTROCARDIOGRAM REPORT: CPT | Performed by: INTERNAL MEDICINE

## 2022-07-06 ENCOUNTER — OFFICE VISIT (OUTPATIENT)
Dept: SURGERY | Facility: CLINIC | Age: 47
End: 2022-07-06
Payer: MEDICARE

## 2022-07-06 VITALS
DIASTOLIC BLOOD PRESSURE: 69 MMHG | BODY MASS INDEX: 36.02 KG/M2 | HEIGHT: 64 IN | HEART RATE: 87 BPM | WEIGHT: 211 LBS | SYSTOLIC BLOOD PRESSURE: 122 MMHG

## 2022-07-06 DIAGNOSIS — K43.2 INCISIONAL HERNIA, WITHOUT OBSTRUCTION OR GANGRENE: Primary | ICD-10-CM

## 2022-07-06 PROCEDURE — 99212 OFFICE O/P EST SF 10 MIN: CPT | Performed by: SURGERY

## 2022-07-06 NOTE — H&P (VIEW-ONLY)
Assessment/Plan:    Diagnoses and all orders for this visit:    Incisional hernia, without obstruction or gangrene    Risks and benefits for an incisional hernia repair were discussed with her including potential recurrence or underlying bowel injury and she agrees to proceed  Subjective:      Patient ID: Darrel Wise is a 40 y o  female  Patient presents for pre op consult  Scheduled for incisional hernia repair 7/11/2022  Nothing is particularly changed since her last visit in May  She would undergone a history of a hand assisted laparoscopic nephrectomy many years ago  CT scan was reviewed  The following portions of the patient's history were reviewed and updated as appropriate:     She  has a past medical history of Abnormal Pap smear of cervix, Hernia of abdominal wall, Renal calculi, Renal disorder, and Toe fracture, left  She  has a past surgical history that includes Kidney surgery (2013); Tubal ligation; CHOLECYSTECTOMY LAPAROSCOPIC (N/A, 12/10/2018); Cholecystectomy; and Nephrectomy  Her family history includes Colon cancer in her paternal grandfather; Diabetes in her father and mother; Heart disease in her maternal grandmother and mother; Hyperlipidemia in her mother; Hypertension in her mother; Kidney disease in her maternal grandmother; Kidney failure in her maternal grandmother; Lung cancer in her paternal aunt; No Known Problems in her brother, daughter, sister, and son; Other in her mother; Skin cancer in her paternal aunt; Throat cancer in her paternal aunt  She  reports that she has never smoked  She has never used smokeless tobacco  She reports that she does not drink alcohol and does not use drugs    Current Outpatient Medications   Medication Sig Dispense Refill    acetaminophen (TYLENOL) 650 mg CR tablet Take 1 tablet (650 mg total) by mouth every 8 (eight) hours as needed for mild pain 30 tablet 0    famotidine (PEPCID) 20 mg tablet Take 1 tablet (20 mg total) by mouth daily as needed for heartburn 90 tablet 2    ferrous sulfate 324 (65 Fe) mg Take 1 tablet (324 mg total) by mouth in the morning 90 tablet 2    tamsulosin (FLOMAX) 0 4 mg Take 1 capsule (0 4 mg total) by mouth daily with dinner (Patient not taking: Reported on 6/28/2022) 30 capsule 1     No current facility-administered medications for this visit  She has No Known Allergies       Review of Systems   All other systems reviewed and are negative  Objective:      /69   Pulse 87   Ht 5' 4" (1 626 m)   Wt 95 7 kg (211 lb)   BMI 36 22 kg/m²          Physical Exam  Constitutional:       Appearance: She is obese  HENT:      Head: Atraumatic  Mouth/Throat:      Mouth: Mucous membranes are moist    Eyes:      Extraocular Movements: Extraocular movements intact  Cardiovascular:      Rate and Rhythm: Normal rate  Pulmonary:      Effort: Pulmonary effort is normal    Abdominal:      General: Bowel sounds are normal       Palpations: Abdomen is soft  Hernia: A hernia (Incisional hernia around her periumbilical scar ) is present  Musculoskeletal:      Cervical back: Normal range of motion  Skin:     General: Skin is warm and dry         extremities: No edema

## 2022-07-06 NOTE — PROGRESS NOTES
Assessment/Plan:    Diagnoses and all orders for this visit:    Incisional hernia, without obstruction or gangrene    Risks and benefits for an incisional hernia repair were discussed with her including potential recurrence or underlying bowel injury and she agrees to proceed  Subjective:      Patient ID: Driscilla Babinski is a 40 y o  female  Patient presents for pre op consult  Scheduled for incisional hernia repair 7/11/2022  Nothing is particularly changed since her last visit in May  She would undergone a history of a hand assisted laparoscopic nephrectomy many years ago  CT scan was reviewed  The following portions of the patient's history were reviewed and updated as appropriate:     She  has a past medical history of Abnormal Pap smear of cervix, Hernia of abdominal wall, Renal calculi, Renal disorder, and Toe fracture, left  She  has a past surgical history that includes Kidney surgery (2013); Tubal ligation; CHOLECYSTECTOMY LAPAROSCOPIC (N/A, 12/10/2018); Cholecystectomy; and Nephrectomy  Her family history includes Colon cancer in her paternal grandfather; Diabetes in her father and mother; Heart disease in her maternal grandmother and mother; Hyperlipidemia in her mother; Hypertension in her mother; Kidney disease in her maternal grandmother; Kidney failure in her maternal grandmother; Lung cancer in her paternal aunt; No Known Problems in her brother, daughter, sister, and son; Other in her mother; Skin cancer in her paternal aunt; Throat cancer in her paternal aunt  She  reports that she has never smoked  She has never used smokeless tobacco  She reports that she does not drink alcohol and does not use drugs    Current Outpatient Medications   Medication Sig Dispense Refill    acetaminophen (TYLENOL) 650 mg CR tablet Take 1 tablet (650 mg total) by mouth every 8 (eight) hours as needed for mild pain 30 tablet 0    famotidine (PEPCID) 20 mg tablet Take 1 tablet (20 mg total) by mouth daily as needed for heartburn 90 tablet 2    ferrous sulfate 324 (65 Fe) mg Take 1 tablet (324 mg total) by mouth in the morning 90 tablet 2    tamsulosin (FLOMAX) 0 4 mg Take 1 capsule (0 4 mg total) by mouth daily with dinner (Patient not taking: Reported on 6/28/2022) 30 capsule 1     No current facility-administered medications for this visit  She has No Known Allergies       Review of Systems   All other systems reviewed and are negative  Objective:      /69   Pulse 87   Ht 5' 4" (1 626 m)   Wt 95 7 kg (211 lb)   BMI 36 22 kg/m²          Physical Exam  Constitutional:       Appearance: She is obese  HENT:      Head: Atraumatic  Mouth/Throat:      Mouth: Mucous membranes are moist    Eyes:      Extraocular Movements: Extraocular movements intact  Cardiovascular:      Rate and Rhythm: Normal rate  Pulmonary:      Effort: Pulmonary effort is normal    Abdominal:      General: Bowel sounds are normal       Palpations: Abdomen is soft  Hernia: A hernia (Incisional hernia around her periumbilical scar ) is present  Musculoskeletal:      Cervical back: Normal range of motion  Skin:     General: Skin is warm and dry         extremities: No edema

## 2022-07-07 NOTE — PRE-PROCEDURE INSTRUCTIONS
Pre-Surgery Instructions:   Medication Instructions    acetaminophen (TYLENOL) 650 mg CR tablet Uses PRN- OK to take day of surgery    famotidine (PEPCID) 20 mg tablet Uses PRN- OK to take day of surgery    ferrous sulfate 324 (65 Fe) mg Hold day of surgery       Pt verbalizes understanding of the following:    - Bathing instructions, has chg, neck down, no genitals  - No lotions, powders, sprays, deodorant, jewelry, body piercings or make-up    - NPO after MN  - Avoid OTC non-directed Vit/ Suppl/ Herbals 7 days prior to surgery to ensure no drug interactions with perioperative surgical/ anesthetic meds  - Avoid NSAIDs 3 days prior  - Avoid ASA containing products 5 days prior    - Bring list of meds with last dose noted  - Kadriana cards & photo id

## 2022-07-11 ENCOUNTER — HOSPITAL ENCOUNTER (OUTPATIENT)
Facility: AMBULARY SURGERY CENTER | Age: 47
Setting detail: OUTPATIENT SURGERY
Discharge: HOME | End: 2022-07-11
Attending: SURGERY | Admitting: SURGERY
Payer: MEDICARE

## 2022-07-11 ENCOUNTER — ANESTHESIA (OUTPATIENT)
Dept: PERIOP | Facility: AMBULARY SURGERY CENTER | Age: 47
End: 2022-07-11
Payer: MEDICARE

## 2022-07-11 VITALS
HEIGHT: 64 IN | OXYGEN SATURATION: 96 % | TEMPERATURE: 96.6 F | DIASTOLIC BLOOD PRESSURE: 61 MMHG | BODY MASS INDEX: 36.02 KG/M2 | WEIGHT: 211 LBS | RESPIRATION RATE: 18 BRPM | HEART RATE: 77 BPM | SYSTOLIC BLOOD PRESSURE: 110 MMHG

## 2022-07-11 DIAGNOSIS — K43.2 INCISIONAL HERNIA, WITHOUT OBSTRUCTION OR GANGRENE: Primary | ICD-10-CM

## 2022-07-11 LAB
EXT PREGNANCY TEST URINE: NEGATIVE
EXT. CONTROL: NORMAL

## 2022-07-11 PROCEDURE — 49568 PR IMPLANT MESH HERNIA REPAIR/DEBRIDEMENT CLOSURE: CPT | Performed by: SURGERY

## 2022-07-11 PROCEDURE — C1781 MESH (IMPLANTABLE): HCPCS | Performed by: SURGERY

## 2022-07-11 PROCEDURE — 81025 URINE PREGNANCY TEST: CPT | Performed by: STUDENT IN AN ORGANIZED HEALTH CARE EDUCATION/TRAINING PROGRAM

## 2022-07-11 PROCEDURE — 49560 PR REPAIR INCISIONAL HERNIA,REDUCIBLE: CPT | Performed by: SURGERY

## 2022-07-11 DEVICE — VENTRALEX ST HERNIA PATCH
Type: IMPLANTABLE DEVICE | Site: ABDOMEN | Status: FUNCTIONAL
Brand: VENTRALEX ST HERNIA PATCH

## 2022-07-11 RX ORDER — KETOROLAC TROMETHAMINE 30 MG/ML
15 INJECTION, SOLUTION INTRAMUSCULAR; INTRAVENOUS ONCE AS NEEDED
Status: COMPLETED | OUTPATIENT
Start: 2022-07-11 | End: 2022-07-11

## 2022-07-11 RX ORDER — HYDROMORPHONE HCL/PF 1 MG/ML
0.25 SYRINGE (ML) INJECTION
Status: DISCONTINUED | OUTPATIENT
Start: 2022-07-11 | End: 2022-07-11 | Stop reason: HOSPADM

## 2022-07-11 RX ORDER — LIDOCAINE HYDROCHLORIDE 10 MG/ML
INJECTION, SOLUTION EPIDURAL; INFILTRATION; INTRACAUDAL; PERINEURAL AS NEEDED
Status: DISCONTINUED | OUTPATIENT
Start: 2022-07-11 | End: 2022-07-11

## 2022-07-11 RX ORDER — SODIUM CHLORIDE, SODIUM LACTATE, POTASSIUM CHLORIDE, CALCIUM CHLORIDE 600; 310; 30; 20 MG/100ML; MG/100ML; MG/100ML; MG/100ML
125 INJECTION, SOLUTION INTRAVENOUS CONTINUOUS
Status: DISCONTINUED | OUTPATIENT
Start: 2022-07-11 | End: 2022-07-11 | Stop reason: HOSPADM

## 2022-07-11 RX ORDER — MAGNESIUM HYDROXIDE 1200 MG/15ML
LIQUID ORAL AS NEEDED
Status: DISCONTINUED | OUTPATIENT
Start: 2022-07-11 | End: 2022-07-11 | Stop reason: HOSPADM

## 2022-07-11 RX ORDER — NEOSTIGMINE METHYLSULFATE 1 MG/ML
INJECTION INTRAVENOUS AS NEEDED
Status: DISCONTINUED | OUTPATIENT
Start: 2022-07-11 | End: 2022-07-11

## 2022-07-11 RX ORDER — BUPIVACAINE HYDROCHLORIDE 2.5 MG/ML
INJECTION, SOLUTION EPIDURAL; INFILTRATION; INTRACAUDAL AS NEEDED
Status: DISCONTINUED | OUTPATIENT
Start: 2022-07-11 | End: 2022-07-11 | Stop reason: HOSPADM

## 2022-07-11 RX ORDER — ONDANSETRON 2 MG/ML
4 INJECTION INTRAMUSCULAR; INTRAVENOUS ONCE AS NEEDED
Status: DISCONTINUED | OUTPATIENT
Start: 2022-07-11 | End: 2022-07-11 | Stop reason: HOSPADM

## 2022-07-11 RX ORDER — OXYCODONE HYDROCHLORIDE 5 MG/1
5 TABLET ORAL EVERY 4 HOURS PRN
Status: DISCONTINUED | OUTPATIENT
Start: 2022-07-11 | End: 2022-07-11 | Stop reason: HOSPADM

## 2022-07-11 RX ORDER — PROPOFOL 10 MG/ML
INJECTION, EMULSION INTRAVENOUS AS NEEDED
Status: DISCONTINUED | OUTPATIENT
Start: 2022-07-11 | End: 2022-07-11

## 2022-07-11 RX ORDER — CEFAZOLIN SODIUM 2 G/50ML
2000 SOLUTION INTRAVENOUS ONCE
Status: COMPLETED | OUTPATIENT
Start: 2022-07-11 | End: 2022-07-11

## 2022-07-11 RX ORDER — FENTANYL CITRATE 50 UG/ML
INJECTION, SOLUTION INTRAMUSCULAR; INTRAVENOUS AS NEEDED
Status: DISCONTINUED | OUTPATIENT
Start: 2022-07-11 | End: 2022-07-11

## 2022-07-11 RX ORDER — ONDANSETRON 2 MG/ML
INJECTION INTRAMUSCULAR; INTRAVENOUS AS NEEDED
Status: DISCONTINUED | OUTPATIENT
Start: 2022-07-11 | End: 2022-07-11

## 2022-07-11 RX ORDER — MORPHINE SULFATE 4 MG/ML
4 INJECTION, SOLUTION INTRAMUSCULAR; INTRAVENOUS
Status: DISCONTINUED | OUTPATIENT
Start: 2022-07-11 | End: 2022-07-11 | Stop reason: HOSPADM

## 2022-07-11 RX ORDER — FENTANYL CITRATE/PF 50 MCG/ML
25 SYRINGE (ML) INJECTION
Status: COMPLETED | OUTPATIENT
Start: 2022-07-11 | End: 2022-07-11

## 2022-07-11 RX ORDER — GLYCOPYRROLATE 0.2 MG/ML
INJECTION INTRAMUSCULAR; INTRAVENOUS AS NEEDED
Status: DISCONTINUED | OUTPATIENT
Start: 2022-07-11 | End: 2022-07-11

## 2022-07-11 RX ORDER — ROCURONIUM BROMIDE 10 MG/ML
INJECTION, SOLUTION INTRAVENOUS AS NEEDED
Status: DISCONTINUED | OUTPATIENT
Start: 2022-07-11 | End: 2022-07-11

## 2022-07-11 RX ORDER — OXYCODONE HYDROCHLORIDE 5 MG/1
5 TABLET ORAL EVERY 4 HOURS PRN
Qty: 10 TABLET | Refills: 0 | Status: SHIPPED | OUTPATIENT
Start: 2022-07-11 | End: 2022-07-21

## 2022-07-11 RX ORDER — SODIUM CHLORIDE, SODIUM LACTATE, POTASSIUM CHLORIDE, CALCIUM CHLORIDE 600; 310; 30; 20 MG/100ML; MG/100ML; MG/100ML; MG/100ML
INJECTION, SOLUTION INTRAVENOUS CONTINUOUS PRN
Status: DISCONTINUED | OUTPATIENT
Start: 2022-07-11 | End: 2022-07-11

## 2022-07-11 RX ORDER — MIDAZOLAM HYDROCHLORIDE 2 MG/2ML
INJECTION, SOLUTION INTRAMUSCULAR; INTRAVENOUS AS NEEDED
Status: DISCONTINUED | OUTPATIENT
Start: 2022-07-11 | End: 2022-07-11

## 2022-07-11 RX ORDER — DEXAMETHASONE SODIUM PHOSPHATE 10 MG/ML
INJECTION, SOLUTION INTRAMUSCULAR; INTRAVENOUS AS NEEDED
Status: DISCONTINUED | OUTPATIENT
Start: 2022-07-11 | End: 2022-07-11

## 2022-07-11 RX ORDER — ONDANSETRON 2 MG/ML
4 INJECTION INTRAMUSCULAR; INTRAVENOUS EVERY 4 HOURS PRN
Status: DISCONTINUED | OUTPATIENT
Start: 2022-07-11 | End: 2022-07-11 | Stop reason: HOSPADM

## 2022-07-11 RX ADMIN — FENTANYL CITRATE 25 MCG: 50 INJECTION INTRAMUSCULAR; INTRAVENOUS at 12:50

## 2022-07-11 RX ADMIN — SODIUM CHLORIDE, SODIUM LACTATE, POTASSIUM CHLORIDE, AND CALCIUM CHLORIDE: .6; .31; .03; .02 INJECTION, SOLUTION INTRAVENOUS at 11:33

## 2022-07-11 RX ADMIN — SODIUM CHLORIDE, SODIUM LACTATE, POTASSIUM CHLORIDE, AND CALCIUM CHLORIDE: .6; .31; .03; .02 INJECTION, SOLUTION INTRAVENOUS at 11:47

## 2022-07-11 RX ADMIN — MIDAZOLAM 2 MG: 1 INJECTION INTRAMUSCULAR; INTRAVENOUS at 11:47

## 2022-07-11 RX ADMIN — NEOSTIGMINE METHYLSULFATE 3 MG: 1 INJECTION, SOLUTION INTRAMUSCULAR; INTRAVENOUS; SUBCUTANEOUS at 12:26

## 2022-07-11 RX ADMIN — FENTANYL CITRATE 50 MCG: 50 INJECTION INTRAMUSCULAR; INTRAVENOUS at 11:51

## 2022-07-11 RX ADMIN — FENTANYL CITRATE 25 MCG: 50 INJECTION INTRAMUSCULAR; INTRAVENOUS at 13:06

## 2022-07-11 RX ADMIN — ONDANSETRON 4 MG: 2 INJECTION INTRAMUSCULAR; INTRAVENOUS at 12:17

## 2022-07-11 RX ADMIN — FENTANYL CITRATE 25 MCG: 50 INJECTION INTRAMUSCULAR; INTRAVENOUS at 12:56

## 2022-07-11 RX ADMIN — KETOROLAC TROMETHAMINE 15 MG: 30 INJECTION, SOLUTION INTRAMUSCULAR at 13:04

## 2022-07-11 RX ADMIN — OXYCODONE HYDROCHLORIDE 5 MG: 5 TABLET ORAL at 13:54

## 2022-07-11 RX ADMIN — HYDROMORPHONE HYDROCHLORIDE 0.25 MG: 1 INJECTION, SOLUTION INTRAMUSCULAR; INTRAVENOUS; SUBCUTANEOUS at 13:21

## 2022-07-11 RX ADMIN — ROCURONIUM BROMIDE 50 MG: 10 INJECTION, SOLUTION INTRAVENOUS at 11:51

## 2022-07-11 RX ADMIN — FENTANYL CITRATE 50 MCG: 50 INJECTION INTRAMUSCULAR; INTRAVENOUS at 12:01

## 2022-07-11 RX ADMIN — DEXAMETHASONE SODIUM PHOSPHATE 10 MG: 10 INJECTION, SOLUTION INTRAMUSCULAR; INTRAVENOUS at 11:51

## 2022-07-11 RX ADMIN — LIDOCAINE HYDROCHLORIDE 50 MG: 10 INJECTION, SOLUTION EPIDURAL; INFILTRATION; INTRACAUDAL at 11:51

## 2022-07-11 RX ADMIN — PROPOFOL 200 MG: 10 INJECTION, EMULSION INTRAVENOUS at 11:51

## 2022-07-11 RX ADMIN — FENTANYL CITRATE 25 MCG: 50 INJECTION INTRAMUSCULAR; INTRAVENOUS at 13:11

## 2022-07-11 RX ADMIN — GLYCOPYRROLATE 0.6 MCG: 0.2 INJECTION, SOLUTION INTRAMUSCULAR; INTRAVENOUS at 12:26

## 2022-07-11 RX ADMIN — CEFAZOLIN SODIUM 2000 MG: 2 SOLUTION INTRAVENOUS at 11:49

## 2022-07-11 NOTE — OP NOTE
OPERATIVE REPORT  PATIENT NAME: Urbano Linares    :  1975  MRN: [id number]  Pt Location: [facility]    SURGERY DATE: 2022    Surgeon(s) and Role:     * Stephen Vasquez, DO - Primary     * Marlene Khalil, DO - Assisting    Preop Diagnosis:  Incisional hernia [K43 2]    Post-Op Diagnosis Codes:     * Incisional hernia [K43 2]    Procedure(s) (LRB):  REPAIR HERNIA INCISIONAL (N/A) with 1 7 in Ventralex patch    Specimen(s):  * No specimens in log *    Estimated Blood Loss:   Minimal    Drains:  * No LDAs found *    Anesthesia Type:   General/local    Operative Indications:  Incisional hernia [K43 2]      Operative Findings:  Small incisional hernia  Height 64 in weight 96 kg/211 lb  BMI 36  ASA 2  Wound class 2    Complications:   None    Procedure and Technique:  Patient was brought the operative suite and identified by visualization, conversation, by armband  Sequential compression pumps were placed  She was given Ancef perioperatively  Once under anesthesia abdomen is then prepped and draped in a sterile fashion  Time-out was performed was assured that the prep was dry  Local was instilled to her old scar around the umbilicus  Skin incision was made the subcutaneous tissues divided hot cautery to the hernia sac was noted hernia sac was freed up from surrounding tissues as well as the underlying fascial defect  Was actually trimmed away  Fascial defect was relatively small and easily cover with a 1 7 in Ventralex patch  This was placed under the fascia  Was anchored with 0 Vicryl the superior inferior edges  Tails were trimmed  Irrigation is carried out  0 Vicryl was used in a figure-of-eight fashion to close the fascia on top of the mesh  More local was instilled  Irrigation is carried out  2-0 Vicryl was used to reapproximate subcutaneous tissues  Four Monocryl was used to close skin in a subcuticular fashion  Wounds washed and dried    Sterile skin glue was applied  She was awakened in the operating returned to the recovery area in stable condition having tolerated the procedure well     I was present for the entire procedure    Patient Disposition:  PACU       SIGNATURE: Lilo Calzada DO  DATE: July 11, 2022  TIME: 12:19 PM

## 2022-07-11 NOTE — ANESTHESIA POSTPROCEDURE EVALUATION
Post-Op Assessment Note    CV Status:  Stable  Pain Score: 0    Pain management: adequate     Mental Status:  Alert and awake   Hydration Status:  Euvolemic   PONV Controlled:  Controlled   Airway Patency:  Patent      Post Op Vitals Reviewed: Yes      Staff: CRNA         No complications documented      /64 (07/11/22 1245)    Temp (!) 97 2 °F (36 2 °C) (07/11/22 1245) 97   Pulse 90 (07/11/22 1245)78   Resp 12 (07/11/22 1245) 14   SpO2   99

## 2022-07-11 NOTE — ANESTHESIA PREPROCEDURE EVALUATION
Procedure:  REPAIR HERNIA INCISIONAL (N/A Abdomen)    Relevant Problems   CARDIO   (+) Migraine with aura and with status migrainosus, not intractable   (+) Mixed hyperlipidemia      /RENAL   (+) CKD (chronic kidney disease), stage II   (+) Nephrolithiasis      HEMATOLOGY   (+) Iron deficiency anemia      MUSCULOSKELETAL   (+) Osteoarthritis of both knees      NEURO/PSYCH   (+) Double vision   (+) Migraine with aura and with status migrainosus, not intractable      Labs 6/25/22   Hgb 11 4, Plt 322  K 4 3, Cr 0 92    Physical Exam    Airway    Mallampati score: I  TM Distance: >3 FB  Neck ROM: full     Dental   No notable dental hx     Cardiovascular      Pulmonary      Other Findings  obese      Anesthesia Plan  ASA Score- 2     Anesthesia Type- general with ASA Monitors  Additional Monitors:   Airway Plan: LMA  Plan Factors-Exercise tolerance (METS): >4 METS  Chart reviewed  Existing labs reviewed  Patient summary reviewed  Patient is not a current smoker  Obstructive sleep apnea risk education given perioperatively  Induction- intravenous  Postoperative Plan-     Informed Consent- Anesthetic plan and risks discussed with patient  I personally reviewed this patient with the CRNA  Discussed and agreed on the Anesthesia Plan with the CRNA  Kathie Jack

## 2022-07-11 NOTE — INTERVAL H&P NOTE
H&P reviewed  After examining the patient I find no changes in the patients condition since the H&P had been written      Vitals:    07/11/22 1130   BP: 135/60   Pulse: 82   Resp: 18   Temp: (!) 97 2 °F (36 2 °C)   SpO2: 100%

## 2022-07-12 DIAGNOSIS — M22.2X2 PATELLOFEMORAL ARTHRALGIA OF BOTH KNEES: ICD-10-CM

## 2022-07-12 DIAGNOSIS — M22.2X1 PATELLOFEMORAL ARTHRALGIA OF BOTH KNEES: ICD-10-CM

## 2022-07-12 NOTE — TELEPHONE ENCOUNTER
Patient stated she had surgery yesterday and she was given medication oxycodone 5mg  But she doesn't like the way it makes her feel  Her heart was racing all night, she couldn't sleep  She would like to get the tylenol 650mg that was prescribed to her before

## 2022-07-13 RX ORDER — SENNOSIDES 8.6 MG
650 CAPSULE ORAL EVERY 8 HOURS PRN
Qty: 30 TABLET | Refills: 0 | Status: SHIPPED | OUTPATIENT
Start: 2022-07-13

## 2022-07-27 ENCOUNTER — OFFICE VISIT (OUTPATIENT)
Dept: SURGERY | Facility: CLINIC | Age: 47
End: 2022-07-27

## 2022-07-27 DIAGNOSIS — K43.2 INCISIONAL HERNIA, WITHOUT OBSTRUCTION OR GANGRENE: Primary | ICD-10-CM

## 2022-07-27 PROCEDURE — 99024 POSTOP FOLLOW-UP VISIT: CPT | Performed by: SURGERY

## 2022-07-27 NOTE — PROGRESS NOTES
Assessment/Plan:    Diagnoses and all orders for this visit:    Incisional hernia, without obstruction or gangrene    Doing quite well status post incisional hernia repair with mesh  Resume unrestricted activity as of August 11th    Postoperative restrictions reviewed  All questions answered  ______________________________________________________  HPI: Patient presents post operatively  Incisional hernia repair 7/11/2022  Denies any particular issues       ROS:  General ROS: negative for - chills, fatigue, fever or night sweats, weight loss  Respiratory ROS: no cough, shortness of breath, or wheezing  Cardiovascular ROS: no chest pain or dyspnea on exertion  Genito-Urinary ROS: no dysuria, trouble voiding, or hematuria  Musculoskeletal ROS: negative for - gait disturbance, joint pain or muscle pain  Neurological ROS: no TIA or stroke symptoms  GI ROS: see HPI  Skin ROS: no new rashes or lesions   Lymphatic ROS: no new adenopathy noted by pt  GYN ROS: see HPI, no new GYN history or bleeding noted  Psy ROS: no new mental or behavioral disturbances         Patient Active Problem List   Diagnosis    Astigmatism of both eyes    Double vision    Dyspepsia    Low iron stores    Low mean corpuscular volume (MCV)    Mixed hyperlipidemia    Myopia of both eyes    Obesity (BMI 30-39  9)    Migraine with aura and with status migrainosus, not intractable    Pain of right upper extremity    Right flank pain    Status post nephrectomy    ASCUS with positive high risk HPV cervical    Patellofemoral arthralgia of both knees    Osteoarthritis of both knees    CKD (chronic kidney disease), stage II    Persistent proteinuria    Nephrolithiasis    Incisional hernia, without obstruction or gangrene    Iron deficiency anemia       Allergies:  Patient has no known allergies        Current Outpatient Medications:     acetaminophen (TYLENOL) 650 mg CR tablet, Take 1 tablet (650 mg total) by mouth every 8 (eight) hours as needed for mild pain, Disp: 30 tablet, Rfl: 0    famotidine (PEPCID) 20 mg tablet, Take 1 tablet (20 mg total) by mouth daily as needed for heartburn, Disp: 90 tablet, Rfl: 2    ferrous sulfate 324 (65 Fe) mg, Take 1 tablet (324 mg total) by mouth in the morning, Disp: 90 tablet, Rfl: 2    Past Medical History:   Diagnosis Date    Abdominal hernia     Abnormal Pap smear of cervix     Anemia     Iron    Chronic kidney disease     COVID 12/2020 01/2022    GERD (gastroesophageal reflux disease)     Hernia of abdominal wall     Kidney stone     Renal calculi     Renal disorder     Toe fracture, left     Wears glasses        Past Surgical History:   Procedure Laterality Date    CHOLECYSTECTOMY      CHOLECYSTECTOMY LAPAROSCOPIC N/A 12/10/2018    Procedure: CHOLECYSTECTOMY LAPAROSCOPIC; [address];  Surgeon: Analy Youngblood MD;  Location: [facility] Main OR;  Service: General    KIDNEY SURGERY  2013    removed L kidney from damage from kidney stone [place]FORMED [place]    NEPHRECTOMY      [place] REPAIR INCISIONAL HERNIA,REDUCIBLE N/A 7/11/2022    Procedure: REPAIR HERNIA INCISIONAL;  Surgeon: Satnam Diaz DO;  Location: [facility] MA[facility];  Service: General    TUBAL LIGATION      AGE 33       Family History   Problem Relation Age of Onset    Diabetes Mother     Hyperlipidemia Mother     Hypertension Mother    Richard Inyo Other Mother         low back pain    Heart disease Mother     Diabetes Father     No Known Problems Sister     No Known Problems Brother     No Known Problems Daughter     No Known Problems Son     Throat cancer Paternal Aunt     Lung cancer Paternal Aunt     Skin cancer Paternal Aunt     Kidney failure Maternal Grandmother         on dialysis    Kidney disease Maternal Grandmother     Heart disease Maternal Grandmother     Colon cancer Paternal Grandfather     Breast cancer Neg Hx     Ovarian cancer Neg Hx     Thyroid disease Neg Hx     Stroke Neg Hx reports that she has never smoked  She has never used smokeless tobacco  She reports that she does not drink alcohol and does not use drugs  PHYSICAL EXAM    LMP 06/30/2022     General: normal, cooperative, no distress  Abdominal: soft, nondistended or nontender  Incision: clean, dry, and intact and healing well    Healing ridge as expected      Rylie Saucedo DO    Date: 7/27/2022 Time: 11:13 AM

## 2022-09-30 ENCOUNTER — CLINICAL SUPPORT (OUTPATIENT)
Dept: INTERNAL MEDICINE CLINIC | Facility: CLINIC | Age: 47
End: 2022-09-30

## 2022-09-30 ENCOUNTER — TELEPHONE (OUTPATIENT)
Dept: INTERNAL MEDICINE CLINIC | Facility: CLINIC | Age: 47
End: 2022-09-30

## 2022-09-30 DIAGNOSIS — Z23 NEED FOR TUBERCULOSIS VACCINATION: Primary | ICD-10-CM

## 2022-09-30 DIAGNOSIS — Z11.1 SCREENING FOR TUBERCULOSIS: ICD-10-CM

## 2022-09-30 PROCEDURE — 86580 TB INTRADERMAL TEST: CPT

## 2022-09-30 NOTE — PROGRESS NOTES
Patient is here today 09/30/22  for PPD placement  PPD placed in right forearm  Patient does have paperwork to be completed  Form can be found in the ORANGE bin  TB test was placed on 09/30/22 at 15 Pm  Patient is aware to return for TB test reading on 10/03/22 before 12pm     Patient was provided a TB appointment reminder with this information

## 2022-09-30 NOTE — TELEPHONE ENCOUNTER
Folder Color- [address]    Name of [address] [place] [address] Record    Form to be filled out by- Covering Provider    Form to be picked up by patient    Patient made aware of 10 business day policy

## 2022-10-03 ENCOUNTER — APPOINTMENT (OUTPATIENT)
Dept: LAB | Facility: CLINIC | Age: 47
End: 2022-10-03
Payer: MEDICARE

## 2022-10-03 DIAGNOSIS — Z23 NEED FOR MEASLES-MUMPS-RUBELLA (MMR) VACCINE: ICD-10-CM

## 2022-10-03 DIAGNOSIS — Z11.59 NEED FOR HEPATITIS B SCREENING TEST: ICD-10-CM

## 2022-10-03 DIAGNOSIS — Z01.84 IMMUNITY TO VARICELLA DETERMINED BY SEROLOGIC TEST: ICD-10-CM

## 2022-10-03 DIAGNOSIS — Z11.59 NEED FOR HEPATITIS B SCREENING TEST: Primary | ICD-10-CM

## 2022-10-03 LAB
CHOLEST SERPL-MCNC: 243 MG/DL
EST. AVERAGE GLUCOSE BLD GHB EST-MCNC: 128 MG/DL
HBA1C MFR BLD: 6.1 %
HBV CORE AB SER QL: NORMAL
HBV CORE IGM SER QL: NORMAL
HBV SURFACE AB SER-ACNC: <3.1 MIU/ML
HDLC SERPL-MCNC: 37 MG/DL
INDURATION: 5 MM
INSULIN SERPL-ACNC: 32.9 MU/L (ref 3–25)
NONHDLC SERPL-MCNC: 206 MG/DL
RUBV IGG SERPL IA-ACNC: >175 IU/ML
TB SKIN TEST: NEGATIVE
TRIGL SERPL-MCNC: 408 MG/DL

## 2022-10-03 PROCEDURE — 86787 VARICELLA-ZOSTER ANTIBODY: CPT

## 2022-10-03 PROCEDURE — 86705 HEP B CORE ANTIBODY IGM: CPT

## 2022-10-03 PROCEDURE — 80061 LIPID PANEL: CPT

## 2022-10-03 PROCEDURE — 86706 HEP B SURFACE ANTIBODY: CPT

## 2022-10-03 PROCEDURE — 36415 COLL VENOUS BLD VENIPUNCTURE: CPT

## 2022-10-03 PROCEDURE — 86735 MUMPS ANTIBODY: CPT

## 2022-10-03 PROCEDURE — 86762 RUBELLA ANTIBODY: CPT

## 2022-10-03 PROCEDURE — 83036 HEMOGLOBIN GLYCOSYLATED A1C: CPT

## 2022-10-03 PROCEDURE — 83525 ASSAY OF INSULIN: CPT

## 2022-10-03 PROCEDURE — 86704 HEP B CORE ANTIBODY TOTAL: CPT

## 2022-10-03 PROCEDURE — 86765 RUBEOLA ANTIBODY: CPT

## 2022-10-03 NOTE — TELEPHONE ENCOUNTER
Patient was made aware that she needed titers done and that form will be completed once we receive lab results  Form will be in [facility] clerical folder

## 2022-10-03 NOTE — TELEPHONE ENCOUNTER
Patient came in today to have PPD read  PPD read as negative (5mm), no chest x-ray needed  Small red wheel was present  PPD was verified by Chelsey Acosta  Patient unsure if she received BCG vaccine

## 2022-10-04 LAB
MEV IGG SER QL IA: NORMAL
MUV IGG SER QL IA: NORMAL
VZV IGG SER QL IA: NORMAL

## 2022-10-04 NOTE — TELEPHONE ENCOUNTER
As doc of the day, please review immunity labs patient had done on 10/3/22 and advise  Patient has physical form that needs completion and we were awaiting these results

## 2022-10-04 NOTE — TELEPHONE ENCOUNTER
Patient immune to varicella, rubella, measels, and rubeola   Unfortunately not enough protective immunity against Hep B, thus will need to come in for nursing visit for Hep B shot and then may be considered immune

## 2022-10-05 NOTE — TELEPHONE ENCOUNTER
Called patient, scheduled patient for a NV on 10/7/2022  Patient aware is a total of 3 doses and had no questions at this time

## 2022-10-06 ENCOUNTER — TELEPHONE (OUTPATIENT)
Dept: BARIATRICS | Facility: CLINIC | Age: 47
End: 2022-10-06

## 2022-10-06 NOTE — TELEPHONE ENCOUNTER
----- Message from Ed Cosme, [address] sent at 10/5/2022  5:36 PM EDT -----  Please let the patient know I have reviewed her A1C, lipid panel, and fasting insulin  HgbA1c is elevated consistent with prediabetes and fasting insulin is elevated as well  She should avoid refined carbohydrates including white bread, rice, pasta, pretzels, chips, desserts and sugary beverages  This can improve weight weight loss and cardiovascular exercise  Chol was elevated and HDL chol was low, which will likely improve with weight loss and lifestyle changes  Her triglycerides were quite elevated and she should follow-up with her primary care provider regarding that   ThanksEsperanza

## 2022-10-07 ENCOUNTER — CLINICAL SUPPORT (OUTPATIENT)
Dept: INTERNAL MEDICINE CLINIC | Facility: CLINIC | Age: 47
End: 2022-10-07

## 2022-10-07 DIAGNOSIS — Z23 NEED FOR INFLUENZA VACCINATION: ICD-10-CM

## 2022-10-07 DIAGNOSIS — Z23 NEED FOR HEPATITIS B BOOSTER VACCINATION: Primary | ICD-10-CM

## 2022-10-07 PROCEDURE — 90686 IIV4 VACC NO PRSV 0.5 ML IM: CPT

## 2022-10-07 PROCEDURE — 90471 IMMUNIZATION ADMIN: CPT

## 2022-10-07 PROCEDURE — 90472 IMMUNIZATION ADMIN EACH ADD: CPT

## 2022-10-07 PROCEDURE — 90746 HEPB VACCINE 3 DOSE ADULT IM: CPT

## 2022-10-07 NOTE — PROGRESS NOTES
Patient came into the office today for nurse visit for Hep B #1 due to lack of immunity   Patient will return for second dose in 1-2 months

## 2022-11-03 DIAGNOSIS — R10.13 DYSPEPSIA: ICD-10-CM

## 2022-11-03 DIAGNOSIS — M22.2X1 PATELLOFEMORAL ARTHRALGIA OF BOTH KNEES: ICD-10-CM

## 2022-11-03 DIAGNOSIS — D50.9 IRON DEFICIENCY ANEMIA, UNSPECIFIED IRON DEFICIENCY ANEMIA TYPE: ICD-10-CM

## 2022-11-03 DIAGNOSIS — M22.2X2 PATELLOFEMORAL ARTHRALGIA OF BOTH KNEES: ICD-10-CM

## 2022-11-03 RX ORDER — SENNOSIDES 8.6 MG
650 CAPSULE ORAL EVERY 8 HOURS PRN
Qty: 30 TABLET | Refills: 0 | Status: SHIPPED | OUTPATIENT
Start: 2022-11-03

## 2022-11-03 RX ORDER — FAMOTIDINE 20 MG/1
20 TABLET, FILM COATED ORAL DAILY PRN
Qty: 90 TABLET | Refills: 2 | Status: SHIPPED | OUTPATIENT
Start: 2022-11-03

## 2022-11-03 RX ORDER — FERROUS SULFATE TAB EC 324 MG (65 MG FE EQUIVALENT) 324 (65 FE) MG
324 TABLET DELAYED RESPONSE ORAL DAILY
Qty: 90 TABLET | Refills: 2 | Status: SHIPPED | OUTPATIENT
Start: 2022-11-03

## 2022-12-23 ENCOUNTER — OFFICE VISIT (OUTPATIENT)
Dept: OBGYN CLINIC | Facility: CLINIC | Age: 47
End: 2022-12-23

## 2022-12-23 VITALS
OXYGEN SATURATION: 97 % | WEIGHT: 214.8 LBS | DIASTOLIC BLOOD PRESSURE: 82 MMHG | TEMPERATURE: 98.1 F | HEIGHT: 64 IN | SYSTOLIC BLOOD PRESSURE: 124 MMHG | BODY MASS INDEX: 36.67 KG/M2 | HEART RATE: 87 BPM

## 2022-12-23 DIAGNOSIS — Z12.4 SCREENING FOR CERVICAL CANCER: ICD-10-CM

## 2022-12-23 DIAGNOSIS — N93.9 ABNORMAL UTERINE BLEEDING: Primary | ICD-10-CM

## 2022-12-23 DIAGNOSIS — N83.202 CYST OF LEFT OVARY: ICD-10-CM

## 2022-12-23 DIAGNOSIS — Z11.3 SCREEN FOR STD (SEXUALLY TRANSMITTED DISEASE): ICD-10-CM

## 2022-12-23 PROBLEM — N87.0 CIN I (CERVICAL INTRAEPITHELIAL NEOPLASIA I): Status: ACTIVE | Noted: 2019-11-05

## 2022-12-23 PROBLEM — Z90.5 STATUS POST NEPHRECTOMY: Status: RESOLVED | Noted: 2019-01-28 | Resolved: 2022-12-23

## 2022-12-23 NOTE — ASSESSMENT & PLAN NOTE
Discussed potential etiologies of pelvic pain - will repeat pelvic US   Briefly reviewed potential risks of surgery given her previous surgical history

## 2022-12-23 NOTE — PROGRESS NOTES
Subjective   Patient ID: Felicita Fenton is a 56 y o  female  Patient is here for a problem visit  Chief Complaint   Patient presents with   • Establish Care     New pt problem visit   • Ovarian Cyst     Pt had u/s in March, showing simple cysts on left ovary, pt states she has pain on that side intermittently; pt also states that her last period lasted for 2 wks, which is new for her     New GYN - previously at [facility]    For several months - stabbing type pelvic pain, persistent for a few minutes at a time, 3-4 times a week  increased pain with walking, bending over, not with rest but with getting back up  Sometimes midline, radiating to right side  Sometimes nausea precedes pain  Takes tylenol which helps somewhat     Pelvic US 3/2022: left ovarian simple cyst 4 2 x 3 6 x 4 2 cm with eccentric septation with smaller cyst 2 2 x 1 4 x 1 5 cm (stable from Dec 2021 US)    LMP lasted 2 weeks  Similar episode happened few months ago  Most recently first 3 days were spotting, then 7 days of heavier bleeding, then started spotting again   Otherwise monthly periods  Not painful   No hot flashes     Increased vaginal discharge and itching   Not SA for 6 months - new partner at that time - requests STI testing     Pap 2022 LSIL/HPV+, colpo 2022 biopsies LSIL, ECC neg  colpo in 2019 biopsies also LSIL    S/p BTL     Menstrual History:  OB History        2    Para   2    Term   2            AB        Living   2       SAB        IAB        Ectopic        Multiple        Live Births                    Patient's last menstrual period was 2022 (approximate)           Past Medical History:   Diagnosis Date   • Abdominal hernia    • Abnormal Pap smear of cervix    • Anemia     Iron   • Chronic kidney disease    • COVID 2020   • GERD (gastroesophageal reflux disease)    • Hernia of abdominal wall    • Kidney stone    • Renal calculi    • Renal disorder    • Status post nephrectomy 1/28/2019   • Toe fracture, left    • Wears glasses        Past Surgical History:   Procedure Laterality Date   • CHOLECYSTECTOMY     • CHOLECYSTECTOMY LAPAROSCOPIC N/A 12/10/2018    Procedure: CHOLECYSTECTOMY LAPAROSCOPIC; INCISIONAL HERNIA REPAIR;  Surgeon: Caleb Delarosa MD;  Location: [facility] OR;  Service: General   • KIDNEY SURGERY  2013    removed L kidney from damage from kidney stone -PERFORMED IN [place]   • NEPHRECTOMY     • [place] REPAIR INCISIONAL HERNIA,REDUCIBLE N/A 7/11/2022    Procedure: REPAIR HERNIA INCISIONAL;  Surgeon: Osvaldo Diaz DO;  Location: [facility] MAIN OR;  Service: General   • TUBAL LIGATION      AGE 32       Social History     Tobacco Use   • Smoking status: Never   • Smokeless tobacco: Never   Vaping Use   • Vaping Use: Never used   Substance Use Topics   • Alcohol use: No   • Drug use: No        No Known Allergies      Current Outpatient Medications:   •  acetaminophen (TYLENOL) 650 mg CR tablet, Take 1 tablet (650 mg total) by mouth every 8 (eight) hours as needed for mild pain, Disp: 30 tablet, Rfl: 0  •  famotidine (PEPCID) 20 mg tablet, Take 1 tablet (20 mg total) by mouth daily as needed for heartburn, Disp: 90 tablet, Rfl: 2  •  ferrous sulfate 324 (65 Fe) mg, Take 1 tablet (324 mg total) by mouth in the morning, Disp: 90 tablet, Rfl: 2      Review of Systems   Constitutional: Negative for appetite change, chills and fever  Eyes: Negative for visual disturbance  Respiratory: Negative for cough, chest tightness and shortness of breath  Cardiovascular: Negative for chest pain  Gastrointestinal: Negative for abdominal distention, abdominal pain, constipation, diarrhea, nausea and vomiting  Endocrine: Negative for cold intolerance and heat intolerance  Genitourinary: Positive for pelvic pain and vaginal discharge  Negative for difficulty urinating, dyspareunia, dysuria, frequency, genital sores, urgency, vaginal bleeding and vaginal pain     Musculoskeletal: Negative for arthralgias  Neurological: Negative for light-headedness and headaches  Hematological: Does not bruise/bleed easily  Psychiatric/Behavioral: Negative for behavioral problems  All other systems reviewed and are negative  /82 (BP Location: Right arm, Patient Position: Sitting, Cuff Size: Adult)   Pulse 87   Temp 98 1 °F (36 7 °C) (Tympanic)   Ht 5' 4" (1 626 m)   Wt 97 4 kg (214 lb 12 8 oz)   LMP 12/07/2022 (Approximate)   SpO2 97%   BMI 36 87 kg/m²       Physical Exam  Constitutional:       General: She is not in acute distress  Appearance: Normal appearance  She is not ill-appearing  Genitourinary:      Bladder and urethral meatus normal       Right Labia: No rash, tenderness, lesions or skin changes  Left Labia: No tenderness, lesions, skin changes or rash  No labial fusion noted  No inguinal adenopathy present in the right or left side  Vaginal discharge present  No vaginal erythema, tenderness, bleeding or ulceration  Right Adnexa: not tender, not full and no mass present  Left Adnexa: tender  Left Adnexa: not full and no mass present  Cervical friability present  No cervical motion tenderness, discharge, lesion, polyp or eversion  Cervical exam comments: Not well visualized due to lateral wall prolapse   Uterus is tender  Uterus is not enlarged, fixed or irregular  No uterine mass detected  Uterus is anteverted  Pelvic exam was performed with patient in the lithotomy position  HENT:      Head: Normocephalic  Cardiovascular:      Rate and Rhythm: Normal rate  Heart sounds: Normal heart sounds  Pulmonary:      Effort: Pulmonary effort is normal  No accessory muscle usage or respiratory distress  Abdominal:      General: There is no distension  Palpations: Abdomen is soft  There is no mass  Tenderness: There is no abdominal tenderness  There is no guarding or rebound  Musculoskeletal:         General: Normal range of motion  Cervical back: No rigidity  Lymphadenopathy:      Lower Body: No right inguinal adenopathy  No left inguinal adenopathy  Neurological:      General: No focal deficit present  Mental Status: She is alert  Mental status is at baseline  Skin:     General: Skin is warm and dry  Psychiatric:         Mood and Affect: Mood normal          Behavior: Behavior normal    Vitals and nursing note reviewed  Exam conducted with a chaperone present  Appropriate laboratory testing, imaging studies, and prior external records were reviewed:     Assessment/Plan:       Problem List Items Addressed This Visit        Endocrine    Cyst of left ovary     Discussed potential etiologies of pelvic pain - will repeat pelvic US  Briefly reviewed potential risks of surgery given her previous surgical history          Relevant Orders    US pelvis complete w transvaginal    Liquid-based pap, diagnostic       Genitourinary    Abnormal uterine bleeding - Primary     Will evaluate with labs, US   Can have EMB done at time of colposcopy if needed         Relevant Orders    Follicle stimulating hormone    Estradiol    TSH, 3rd generation with Free T4 reflex    US pelvis complete w transvaginal    Liquid-based pap, diagnostic    VAGINOSIS DNA PROBE (AFFIRM)   Other Visit Diagnoses     Screening for cervical cancer        Relevant Orders    Liquid-based pap, diagnostic    Screen for STD (sexually transmitted disease)        Relevant Orders    Chlamydia/GC amplified DNA by PCR

## 2022-12-23 NOTE — PATIENT INSTRUCTIONS
Call Central scheduling at [phone] to set up this appointment          Recommended the following vulvar hygiene measures:  Avoid scented products (pads, tampons, soaps, detergents, perfumes, sprays, bubble baths, wipes)  Avoid unnecessary prolonged usage of pads  Wear cotton underwear and avoid tight fitting pants/tights/leotards  Change out of damp exercise clothes and swimsuits as soon as possible  Sleep without underwear  No douching  If you use the Always brand pad, try switching to another brand or tampons instead - go with all cotton/natural products such as [employer] Generation and Natracare  Do not use a washcloth or soap directly on the vagina

## 2022-12-24 LAB
C TRACH DNA SPEC QL NAA+PROBE: NEGATIVE
CANDIDA RRNA VAG QL PROBE: NEGATIVE
G VAGINALIS RRNA GENITAL QL PROBE: POSITIVE
N GONORRHOEA DNA SPEC QL NAA+PROBE: NEGATIVE
T VAGINALIS RRNA GENITAL QL PROBE: POSITIVE

## 2022-12-27 ENCOUNTER — APPOINTMENT (OUTPATIENT)
Dept: LAB | Age: 47
End: 2022-12-27

## 2022-12-27 ENCOUNTER — HOSPITAL ENCOUNTER (OUTPATIENT)
Dept: RADIOLOGY | Age: 47
Discharge: HOME | End: 2022-12-27

## 2022-12-27 DIAGNOSIS — N93.9 ABNORMAL UTERINE BLEEDING: ICD-10-CM

## 2022-12-27 DIAGNOSIS — N83.202 CYST OF LEFT OVARY: ICD-10-CM

## 2022-12-27 DIAGNOSIS — A59.9 TRICHOMONAS INFECTION: Primary | ICD-10-CM

## 2022-12-27 LAB
ESTRADIOL SERPL-MCNC: 68 PG/ML
FSH SERPL-ACNC: 4.9 MIU/ML
HPV HR 12 DNA CVX QL NAA+PROBE: POSITIVE
HPV16 DNA CVX QL NAA+PROBE: NEGATIVE
HPV18 DNA CVX QL NAA+PROBE: NEGATIVE
TSH SERPL DL<=0.05 MIU/L-ACNC: 0.92 UIU/ML (ref 0.45–4.5)

## 2022-12-27 RX ORDER — METRONIDAZOLE 500 MG/1
500 TABLET ORAL EVERY 12 HOURS SCHEDULED
Qty: 14 TABLET | Refills: 0 | Status: SHIPPED | OUTPATIENT
Start: 2022-12-27 | End: 2023-01-03

## 2022-12-30 ENCOUNTER — TELEPHONE (OUTPATIENT)
Age: 47
End: 2022-12-30

## 2022-12-30 NOTE — TELEPHONE ENCOUNTER
Pelvic US with previous left adnexal structure is again visualized is stable in size at 5 8 x 3 8 x 5 2 cm (body of radiology report indicates finding is on the right, thus a call was made to the [facility] radiology reading room to review and make an addendum as appropriate)  Called pt to review, no answer, left VM   Will send [employer] message and plan to discuss further at upcoming appt

## 2023-01-03 LAB
LAB AP GYN PRIMARY INTERPRETATION: ABNORMAL
Lab: ABNORMAL
PATH INTERP SPEC-IMP: ABNORMAL

## 2023-01-05 ENCOUNTER — TELEPHONE (OUTPATIENT)
Dept: INTERNAL MEDICINE CLINIC | Facility: CLINIC | Age: 48
End: 2023-01-05

## 2023-01-05 NOTE — TELEPHONE ENCOUNTER
Folder Color- Purple     Name of Form- [employer] Perm[employer]     Form to be filled out by- Dr Valencia     Form to be picked up by pt  Patient made aware of 10 business day policy

## 2023-01-06 ENCOUNTER — DOCUMENTATION (OUTPATIENT)
Dept: NEPHROLOGY | Facility: CLINIC | Age: 48
End: 2023-01-06

## 2023-01-06 NOTE — PROGRESS NOTES
Mailing recall card to patient to schedule  Patient is due back in July to see Dr Demetria Lott  This is the first attempt

## 2023-01-06 NOTE — TELEPHONE ENCOUNTER
Form signed by Dr Leora Vidal and placed in [address] clerical folder to be picked up by patient

## 2023-01-17 ENCOUNTER — PROCEDURE VISIT (OUTPATIENT)
Dept: OBGYN CLINIC | Facility: CLINIC | Age: 48
End: 2023-01-17

## 2023-01-17 VITALS
DIASTOLIC BLOOD PRESSURE: 90 MMHG | WEIGHT: 211.4 LBS | HEIGHT: 64 IN | SYSTOLIC BLOOD PRESSURE: 142 MMHG | BODY MASS INDEX: 36.09 KG/M2 | HEART RATE: 104 BPM

## 2023-01-17 DIAGNOSIS — Z01.812 PRE-PROCEDURE LAB EXAM: ICD-10-CM

## 2023-01-17 DIAGNOSIS — N93.9 ABNORMAL UTERINE BLEEDING: ICD-10-CM

## 2023-01-17 DIAGNOSIS — N87.0 CIN I (CERVICAL INTRAEPITHELIAL NEOPLASIA I): Primary | ICD-10-CM

## 2023-01-17 DIAGNOSIS — N83.202 CYST OF LEFT OVARY: ICD-10-CM

## 2023-01-17 LAB — SL AMB POCT URINE HCG: NEGATIVE

## 2023-01-17 NOTE — ASSESSMENT & PLAN NOTE
ECC also obtained   Colposcopy impression low grade changes  Discussed potential option of excisional procedure given CIN1 > 2 years

## 2023-01-17 NOTE — PROGRESS NOTES
Colposcopy     Date/Time 1/17/2023 6:24 PM     Universal Protocol   Consent: Verbal consent obtained  Written consent obtained    Risks and benefits: risks, benefits and alternatives were discussed  Consent given by: patient  Patient understanding: patient states understanding of the procedure being performed  Patient consent: the patient's understanding of the procedure matches consent given  Procedure consent: procedure consent matches procedure scheduled  Relevant documents: relevant documents present and verified  Test results: test results available and properly labeled  Required items: required blood products, implants, devices, and special equipment available       Performed by  Jonah Edmonds MD     Authorized by Jonah Edmonds MD        Pre-procedure details     Prepped with: acetic acid       Procedure Details   Procedure: Colposcopy w/ cervical biopsy and ECC      Under satisfactory analgesia the patient was prepped and draped in the dorsal lithotomy position: yes      Tutor Key speculum was placed in the vagina: yes      Under colposcopic examination the transition zone was seen in entirety: yes      Cervical biopsy performed with a cervical biopsy punch: yes      Monsel's solution was applied: yes      Biopsy(s): yes      Location:  4 and 6 o clock    Specimen to pathology: yes       Post-procedure      Findings: White epithelium      Impression: Low grade cervical dysplasia       Comments       Mild ACW changes noted at 4 and 6 o clock, biopsies taken with ECC   Endometrial biopsy    Date/Time: 1/17/2023 6:26 PM  Performed by: Jonah Edmonds MD  Authorized by: Jonah Edmonds MD   Universal Protocol:  Risks and benefits: risks, benefits and alternatives were discussed  Consent given by: patient  Patient understanding: patient states understanding of the procedure being performed  Patient consent: the patient's understanding of the procedure matches consent given  Procedure consent: procedure consent matches procedure scheduled  Relevant documents: relevant documents present and verified  Test results: test results available and properly labeled  Patient identity confirmed: verbally with patient      Procedure:     Procedure: endometrial biopsy with Pipelle      A bivalve speculum was placed in the vagina: yes      Cervix cleaned and prepped: yes      Uterus sounded: yes      Uterus sound depth (cm):  9    Specimen collected: specimen collected and sent to pathology          Results for orders placed or performed in visit on 01/17/23   POCT urine HCG   Result Value Ref Range    URINE HCG Negative      ANUPAMA I (cervical intraepithelial neoplasia I)  ECC also obtained   Colposcopy impression low grade changes  Discussed potential option of excisional procedure given CIN1 > 2 years    Abnormal uterine bleeding  EMB performed w/o difficulty     Cyst of left ovary  Reviewed imaging results from recent US  Left adnexal anechoic septated structure may represent paraovarian cyst   This is not significantly changed since study of 12/3/2021     Briefly reviewed risks of dx [address] which could be done at time of excisional procedure  - pt to consider  Will discuss further at next appt

## 2023-01-17 NOTE — ASSESSMENT & PLAN NOTE
Reviewed imaging results from recent US  Left adnexal anechoic septated structure may represent paraovarian cyst   This is not significantly changed since study of 12/3/2021     Briefly reviewed risks of dx [address] which could be done at time of excisional procedure  - pt to consider  Will discuss further at next appt

## 2023-01-30 ENCOUNTER — CONSULT (OUTPATIENT)
Dept: OBGYN CLINIC | Facility: CLINIC | Age: 48
End: 2023-01-30

## 2023-01-30 VITALS
TEMPERATURE: 98.4 F | BODY MASS INDEX: 36.47 KG/M2 | SYSTOLIC BLOOD PRESSURE: 124 MMHG | HEIGHT: 64 IN | DIASTOLIC BLOOD PRESSURE: 84 MMHG | OXYGEN SATURATION: 96 % | WEIGHT: 213.6 LBS | HEART RATE: 94 BPM

## 2023-01-30 DIAGNOSIS — N83.202 CYST OF LEFT OVARY: ICD-10-CM

## 2023-01-30 DIAGNOSIS — D06.9 HIGH GRADE SQUAMOUS INTRAEPITHELIAL LESION (HGSIL), GRADE 3 CIN, ON BIOPSY OF CERVIX: Primary | ICD-10-CM

## 2023-01-30 DIAGNOSIS — N93.9 ABNORMAL UTERINE BLEEDING: ICD-10-CM

## 2023-01-30 RX ORDER — TRANEXAMIC ACID 650 MG/1
1300 TABLET ORAL 3 TIMES DAILY
Qty: 30 TABLET | Refills: 3 | Status: SHIPPED | OUTPATIENT
Start: 2023-01-30 | End: 2023-02-04

## 2023-01-30 NOTE — H&P
Subjective   Patient ID: Chen Sandoval is a 56 y o  female  Patient is here for a problem visit  Chief Complaint   Patient presents with   • Consult     Discuss procedure after + colposcopy     Here to discuss surgery    Recent colposcopy with focal ANUPAMA 2-3 at 6 o clock  EMB performed for AUB was benign secretory phase endometrium   ECC benign     H/o chronic pelvic pain, known left adnexal mass - most recent pelvic US with septated anechoic structure, slightly increased in size compared to 3/2022 US, 5 8 x 3 8 x 5 2 cm - possible paraovarian cyst     H/o AUB - recently had episode of prolonged bleeding lasting 2 weeks  Menses started last Friday, second day of period had very  Heavy bleeding while in the shower with passage of clots     H/o incisional hernia with mesh placed at umbilicus, 1 7 inch patch, performed 2022       Menstrual History:  OB History        2    Para   2    Term   2            AB        Living   2       SAB        IAB        Ectopic        Multiple        Live Births                    Patient's last menstrual period was 2023 (exact date)           Past Medical History:   Diagnosis Date   • Abdominal hernia    • Abnormal Pap smear of cervix    • Anemia     Iron   • Chronic kidney disease    • COVID 2020   • GERD (gastroesophageal reflux disease)    • Hernia of abdominal wall    • Kidney stone    • Renal calculi    • Renal disorder    • Status post nephrectomy 2019   • Toe fracture, left    • Wears glasses        Past Surgical History:   Procedure Laterality Date   • CHOLECYSTECTOMY     • CHOLECYSTECTOMY LAPAROSCOPIC N/A 12/10/2018    Procedure: CHOLECYSTECTOMY LAPAROSCOPIC; INCISIONAL HERNIA REPAIR;  Surgeon: Dante Chavez MD;  [facility];  Service: General   • KIDNEY SURGERY      removed L kidney from damage from kidney stone -PERFORMED IN [place]   • NEPHRECTOMY     • [place] REPAIR FIRST ABDOMINAL WALL HERNIA N/A 2022 Procedure: REPAIR HERNIA INCISIONAL;  Surgeon: Maribel Diaz DO;  [facility];  Service: General   • TUBAL LIGATION      AGE 32       Social History     Tobacco Use   • Smoking status: Never   • Smokeless tobacco: Never   Vaping Use   • Vaping Use: Never used   Substance Use Topics   • Alcohol use: No   • Drug use: No        No Known Allergies      Current Outpatient Medications:   •  acetaminophen (TYLENOL) 650 mg CR tablet, Take 1 tablet (650 mg total) by mouth every 8 (eight) hours as needed for mild pain, Disp: 30 tablet, Rfl: 0  •  famotidine (PEPCID) 20 mg tablet, Take 1 tablet (20 mg total) by mouth daily as needed for heartburn, Disp: 90 tablet, Rfl: 2  •  ferrous sulfate 324 (65 Fe) mg, Take 1 tablet (324 mg total) by mouth in the morning, Disp: 90 tablet, Rfl: 2  •  Tranexamic Acid 650 MG TABS, Take 2 tablets (1,300 mg total) by mouth 3 (three) times a day for 5 days, Disp: 30 tablet, Rfl: 3      Review of Systems   Constitutional: Negative for appetite change, chills and fever  Eyes: Negative for visual disturbance  Respiratory: Negative for cough, chest tightness and shortness of breath  Cardiovascular: Negative for chest pain  Gastrointestinal: Negative for abdominal distention, abdominal pain, constipation, diarrhea, nausea and vomiting  Endocrine: Negative for cold intolerance and heat intolerance  Genitourinary: Positive for vaginal bleeding  Negative for difficulty urinating, dyspareunia, dysuria, frequency, genital sores, pelvic pain, urgency, vaginal discharge and vaginal pain  Musculoskeletal: Negative for arthralgias  Neurological: Negative for light-headedness and headaches  Hematological: Does not bruise/bleed easily  Psychiatric/Behavioral: Negative for behavioral problems  All other systems reviewed and are negative          /84 (BP Location: Right arm, Patient Position: Sitting, Cuff Size: Adult)   Pulse 94   Temp 98 4 °F (36 9 °C) (Tympanic)   Ht 5' 4" (1 626 m)   Wt 96 9 kg (213 lb 9 6 oz)   LMP 01/26/2023 (Exact Date)   SpO2 96%   BMI 36 66 kg/m²       Physical Exam  Constitutional:       General: She is not in acute distress  Appearance: Normal appearance  HENT:      Head: Normocephalic and atraumatic  Cardiovascular:      Rate and Rhythm: Normal rate  Pulmonary:      Effort: Pulmonary effort is normal  No respiratory distress  Neurological:      General: No focal deficit present  Mental Status: She is alert  Psychiatric:         Mood and Affect: Mood normal          Behavior: Behavior normal    Vitals and nursing note reviewed  Results for orders placed or performed in visit on 01/17/23   POCT urine HCG   Result Value Ref Range    URINE HCG Negative    Tissue Exam   Result Value Ref Range    Case Report       Surgical Pathology Report                         Case: [id number]                                   Authorizing Provider:  Daniel Buchanan MD      Collected:           01/17/2023 1828              Ordering Location:     [address] [place] [address] OB/GYN Complete  Received:            01/17/2023 1828                                     [facility]                                                                 Pathologist:           Manoj Wolfe MD                                                           Specimens:   A) - Endometrium, EMB                                                                               B) - Endocervical, ECC                                                                              C) - Vaginal/Cervical, CX BX AT 4 OCLOCK                                                            D) - Vaginal/Cervical, CX BX AT 6 OCLOCK                                                   Final Diagnosis       A  Uterine contents, "EMB," Endometrial curettage:  - Secretory phase endometrium  - Negative for hyperplasia and carcinoma    B   Uterine cervix, "ECC," Biopsy:  - Fragments of benign endocervical glands and stroma with focal squamous metaplasia  - Detached, minute fragments of benign cervical squamous mucosa    C  Uterine cervix, "Cervix biopsy at 4:00," Biopsy:  - Low grade squamous intraepithelial lesion, mild dysplasia (LGSIL, ANUPAMA-I) with viral cytopathic effect, supported by Ki-67/p16 imunohistochemistry  - Transformation zone present, negative for dysplasia  - Mild chronic cervicitis    D  Uterine cervix, "Cervix biopsy at 6:00," Biopsy:  - Focal high grade squamous intraepithelial lesion, moderate-severe dysplasia (HGSIL, ANUPAMA-II/III) with viral cytopathic effect, supported by p16/Ki-67 immunohistochemistry   - Transformation zone present, negative for dysplasia  - Mild chronic cervicitis        Additional Information       All reported additional testing was performed with appropriately reactive controls  These tests were developed and their performance characteristics determined by [facility] or appropriate performing facility, though some tests may be performed on tissues which have not been validated for performance characteristics (such as staining performed on alcohol exposed cell blocks and decalcified tissues)  Results should be interpreted with caution and in the context of the patients’ clinical condition  These tests may not be cleared or approved by the [place]  Food and Drug Administration, though the FDA has determined that such clearance or approval is not necessary  These tests are used for clinical purposes and they should not be regarded as investigational or for research  This laboratory has been approved by CLIA 88, designated as a high-complexity laboratory and is qualified to perform these tests   Interpretation performed at [address] [place] Mccoy [address]        Gross Description       A  The specimen is received in formalin, labeled with the patient's name and hospital number, and is designated " EMB"    The specimen consists of multiple tan-brown, hemorrhagic and soft tissue fragments measuring in aggregate of 2 8 x 1 4 x 0 2 cm  Entirely submitted  One cassette  In an embedding bag   B  The specimen is received in formalin, labeled with the patient's name and hospital number, and is designated " ECC"  The specimen consists of multiple tan soft tissue fragments measuring in aggregate of 1 x 0 5 x 0 1 cm  Entirely submitted  One cassette  In an embedding bag  Due to the size and consistency of the specimen may not survive histological processing  C  The specimen is received in formalin, labeled with the patient's name and hospital number, and is designated " cervix biopsy at 4:00"  The specimen consists of 1 white to tan, rubbery soft tissue fragment measuring 0 4 cm  Entirely submitted  One screened cassette  D  The specimen is received in formalin, labeled with the patient's name and hospital number, and is designated " cervix biopsy at 6:00"  The specimen consists of 1 white soft tissue fragment measuring 0 7 cm  Entirely submitted  One screened cassette  Note: The estimated total formalin fixation time based upon information provided by the submitting clinician and the standard processing schedule is under 72 hours         [facility], [facility]         Appropriate laboratory testing, imaging studies, and prior external records were reviewed:     Assessment/Plan:       Problem List Items Addressed This Visit        Endocrine    Cyst of left ovary     Reviewed potential risks of dx LSC - pt is not interested in pursuing this at this time due to constraints with work schedule            Genitourinary    Abnormal uterine bleeding - Primary     Discussed potential medical tx modalities - pt interested in trial of TXA - instructions reviewed, rx sent, will start with next menses          Relevant Medications    Tranexamic Acid 650 MG TABS       Other    High grade squamous intraepithelial lesion (HGSIL), grade 3 ANUPAMA, on biopsy of cervix Discussed with patient pathology results in detail  Discussed with patient excision versus ablation  Excisional therapy is gold standard since that provide specimen and histologic evaluation  Ablated therapy is reserved for women who are at lower risk of invasive cervical cancer  Excision procedure using loop electrosurgical excision procedure (LEEP) was discussed with patient and described as excision of the transformation zone using a loop electrosurgical device  Discussed with patient operative technique in detail an indication to treat moderate to severe dysplasia  Discussed with patient complications including intraoperative bleeding, postoperative bleeding, infection  Discussed with patient late complications  including cervical stenosis as well as cervical insufficiency  Rates of cervical stenosis very anywhere from 0-27%  Discussed with patient that cervical stenosis can cause hematometra as well as inability to examine the transformation zone as well as failure to dilate normally during labor  Discussed with patient that treatment of ANUPAMA does not appear to impair fertility  Previous cervical conization is associated with an increased risk of 2nd trimester pregnancy loss, PPROM on  delivery  Discussed with the patient that there are factors that determined these risks including depth of excision, number procedures, short interval from treatment pregnancy, multiple gestation    Discussed PAT labs ahead of procedure, office to contact to set up

## 2023-01-30 NOTE — PROGRESS NOTES
Subjective   Patient ID: Shiraz Wayne is a 56 y o  female  Patient is here for a problem visit  Chief Complaint   Patient presents with   • Consult     Discuss procedure after + colposcopy     Here to discuss surgery    Recent colposcopy with focal ANUPAMA 2-3 at 6 o clock  EMB performed for AUB was benign secretory phase endometrium   ECC benign     H/o chronic pelvic pain, known left adnexal mass - most recent pelvic US with septated anechoic structure, slightly increased in size compared to 3/2022 US, 5 8 x 3 8 x 5 2 cm - possible paraovarian cyst     H/o AUB - recently had episode of prolonged bleeding lasting 2 weeks  Menses started last Friday, second day of period had very  Heavy bleeding while in the shower with passage of clots     H/o incisional hernia with mesh placed at umbilicus, 1 7 inch patch, performed 2022       Menstrual History:  OB History        2    Para   2    Term   2            AB        Living   2       SAB        IAB        Ectopic        Multiple        Live Births                    Patient's last menstrual period was 2023 (exact date)           Past Medical History:   Diagnosis Date   • Abdominal hernia    • Abnormal Pap smear of cervix    • Anemia     Iron   • Chronic kidney disease    • COVID 2020   • GERD (gastroesophageal reflux disease)    • Hernia of abdominal wall    • Kidney stone    • Renal calculi    • Renal disorder    • Status post nephrectomy 2019   • Toe fracture, left    • Wears glasses        Past Surgical History:   Procedure Laterality Date   • CHOLECYSTECTOMY     • CHOLECYSTECTOMY LAPAROSCOPIC N/A 12/10/2018    Procedure: CHOLECYSTECTOMY LAPAROSCOPIC; INCISIONAL HERNIA REPAIR;  Surgeon: Leo Lozano MD;  [facility];  Service: General   • KIDNEY SURGERY      removed L kidney from damage from kidney stone -PERFORMED IN [place]   • NEPHRECTOMY     • [place] REPAIR FIRST ABDOMINAL WALL HERNIA N/A 2022 Procedure: REPAIR HERNIA INCISIONAL;  Surgeon: Shankar Diaz DO;  [facility];  Service: General   • TUBAL LIGATION      AGE 32       Social History     Tobacco Use   • Smoking status: Never   • Smokeless tobacco: Never   Vaping Use   • Vaping Use: Never used   Substance Use Topics   • Alcohol use: No   • Drug use: No        No Known Allergies      Current Outpatient Medications:   •  acetaminophen (TYLENOL) 650 mg CR tablet, Take 1 tablet (650 mg total) by mouth every 8 (eight) hours as needed for mild pain, Disp: 30 tablet, Rfl: 0  •  famotidine (PEPCID) 20 mg tablet, Take 1 tablet (20 mg total) by mouth daily as needed for heartburn, Disp: 90 tablet, Rfl: 2  •  ferrous sulfate 324 (65 Fe) mg, Take 1 tablet (324 mg total) by mouth in the morning, Disp: 90 tablet, Rfl: 2  •  Tranexamic Acid 650 MG TABS, Take 2 tablets (1,300 mg total) by mouth 3 (three) times a day for 5 days, Disp: 30 tablet, Rfl: 3      Review of Systems   Constitutional: Negative for appetite change, chills and fever  Eyes: Negative for visual disturbance  Respiratory: Negative for cough, chest tightness and shortness of breath  Cardiovascular: Negative for chest pain  Gastrointestinal: Negative for abdominal distention, abdominal pain, constipation, diarrhea, nausea and vomiting  Endocrine: Negative for cold intolerance and heat intolerance  Genitourinary: Positive for vaginal bleeding  Negative for difficulty urinating, dyspareunia, dysuria, frequency, genital sores, pelvic pain, urgency, vaginal discharge and vaginal pain  Musculoskeletal: Negative for arthralgias  Neurological: Negative for light-headedness and headaches  Hematological: Does not bruise/bleed easily  Psychiatric/Behavioral: Negative for behavioral problems  All other systems reviewed and are negative          /84 (BP Location: Right arm, Patient Position: Sitting, Cuff Size: Adult)   Pulse 94   Temp 98 4 °F (36 9 °C) (Tympanic)   Ht 5' 4" (1 626 m)   Wt 96 9 kg (213 lb 9 6 oz)   LMP 01/26/2023 (Exact Date)   SpO2 96%   BMI 36 66 kg/m²       Physical Exam  Constitutional:       General: She is not in acute distress  Appearance: Normal appearance  HENT:      Head: Normocephalic and atraumatic  Cardiovascular:      Rate and Rhythm: Normal rate  Pulmonary:      Effort: Pulmonary effort is normal  No respiratory distress  Neurological:      General: No focal deficit present  Mental Status: She is alert  Psychiatric:         Mood and Affect: Mood normal          Behavior: Behavior normal    Vitals and nursing note reviewed  Results for orders placed or performed in visit on 01/17/23   POCT urine HCG   Result Value Ref Range    URINE HCG Negative    Tissue Exam   Result Value Ref Range    Case Report       Surgical Pathology Report                         Case: [id number]                                   Authorizing Provider:  Jonah Edmonds MD      Collected:           01/17/2023 1828              Ordering Location:     [facility]  Received:            01/17/2023 1828                                     [facility]                                                                 Pathologist:           Kelsea Francis MD                                                           Specimens:   A) - Endometrium, EMB                                                                               B) - Endocervical, ECC                                                                              C) - Vaginal/Cervical, CX BX AT 4 OCLOCK                                                            D) - Vaginal/Cervical, CX BX AT 6 OCLOCK                                                   Final Diagnosis       A  Uterine contents, "EMB," Endometrial curettage:  - Secretory phase endometrium  - Negative for hyperplasia and carcinoma    B   Uterine cervix, "ECC," Biopsy:  - Fragments of benign endocervical glands and stroma with focal squamous metaplasia  - Detached, minute fragments of benign cervical squamous mucosa    C  Uterine cervix, "Cervix biopsy at 4:00," Biopsy:  - Low grade squamous intraepithelial lesion, mild dysplasia (LGSIL, ANUPAMA-I) with viral cytopathic effect, supported by Ki-67/p16 imunohistochemistry  - Transformation zone present, negative for dysplasia  - Mild chronic cervicitis    D  Uterine cervix, "Cervix biopsy at 6:00," Biopsy:  - Focal high grade squamous intraepithelial lesion, moderate-severe dysplasia (HGSIL, ANUPAMA-II/III) with viral cytopathic effect, supported by p16/Ki-67 immunohistochemistry   - Transformation zone present, negative for dysplasia  - Mild chronic cervicitis        Additional Information       All reported additional testing was performed with appropriately reactive controls  These tests were developed and their performance characteristics determined by [facility] or appropriate performing facility, though some tests may be performed on tissues which have not been validated for performance characteristics (such as staining performed on alcohol exposed cell blocks and decalcified tissues)  Results should be interpreted with caution and in the context of the patients’ clinical condition  These tests may not be cleared or approved by the [place]  Food and Drug Administration, though the FDA has determined that such clearance or approval is not necessary  These tests are used for clinical purposes and they should not be regarded as investigational or for research  This laboratory has been approved by CLIA 88, designated as a high-complexity laboratory and is qualified to perform these tests   Interpretation performed at [address] [place] [address]        Gross Description       A  The specimen is received in formalin, labeled with the patient's name and hospital number, and is designated " EMB"    The specimen consists of multiple tan-brown, hemorrhagic and soft tissue fragments measuring in aggregate of 2 8 x 1 4 x 0 2 cm  Entirely submitted  One cassette  In an embedding bag   B  The specimen is received in formalin, labeled with the patient's name and hospital number, and is designated " ECC"  The specimen consists of multiple tan soft tissue fragments measuring in aggregate of 1 x 0 5 x 0 1 cm  Entirely submitted  One cassette  In an embedding bag  Due to the size and consistency of the specimen may not survive histological processing  C  The specimen is received in formalin, labeled with the patient's name and hospital number, and is designated " cervix biopsy at 4:00"  The specimen consists of 1 white to tan, rubbery soft tissue fragment measuring 0 4 cm  Entirely submitted  One screened cassette  D  The specimen is received in formalin, labeled with the patient's name and hospital number, and is designated " cervix biopsy at 6:00"  The specimen consists of 1 white soft tissue fragment measuring 0 7 cm  Entirely submitted  One screened cassette  Note: The estimated total formalin fixation time based upon information provided by the submitting clinician and the standard processing schedule is under 72 hours         [facility]         Appropriate laboratory testing, imaging studies, and prior external records were reviewed:     Assessment/Plan:       Problem List Items Addressed This Visit        Endocrine    Cyst of left ovary     Reviewed potential risks of dx LSC - pt is not interested in pursuing this at this time due to constraints with work schedule            Genitourinary    Abnormal uterine bleeding     Discussed potential medical tx modalities - pt interested in trial of TXA - instructions reviewed, rx sent, will start with next menses          Relevant Medications    Tranexamic Acid 650 MG TABS       Other    High grade squamous intraepithelial lesion (HGSIL), grade 3 ANUPAMA, on biopsy of cervix - Primary Discussed with patient pathology results in detail  Discussed with patient excision versus ablation  Excisional therapy is gold standard since that provide specimen and histologic evaluation  Ablated therapy is reserved for women who are at lower risk of invasive cervical cancer  Excision procedure using loop electrosurgical excision procedure (LEEP) was discussed with patient and described as excision of the transformation zone using a loop electrosurgical device  Discussed with patient operative technique in detail an indication to treat moderate to severe dysplasia  Discussed with patient complications including intraoperative bleeding, postoperative bleeding, infection  Discussed with patient late complications  including cervical stenosis as well as cervical insufficiency  Rates of cervical stenosis very anywhere from 0-27%  Discussed with patient that cervical stenosis can cause hematometra as well as inability to examine the transformation zone as well as failure to dilate normally during labor  Discussed with patient that treatment of ANUPAMA does not appear to impair fertility  Previous cervical conization is associated with an increased risk of 2nd trimester pregnancy loss, PPROM on  delivery  Discussed with the patient that there are factors that determined these risks including depth of excision, number procedures, short interval from treatment pregnancy, multiple gestation    Discussed PAT labs ahead of procedure, office to contact to set up          Relevant Orders    Case request operating room: BIOPSY LEEP CERVIX (Completed)    Type and screen    CBC and Platelet

## 2023-01-30 NOTE — ASSESSMENT & PLAN NOTE
Discussed potential medical tx modalities - pt interested in trial of TXA - instructions reviewed, rx sent, will start with next menses

## 2023-01-30 NOTE — ASSESSMENT & PLAN NOTE
Reviewed potential risks of dx [address] - pt is not interested in pursuing this at this time due to constraints with work schedule

## 2023-01-30 NOTE — ASSESSMENT & PLAN NOTE
Discussed with patient pathology results in detail  Discussed with patient excision versus ablation  Excisional therapy is gold standard since that provide specimen and histologic evaluation  Ablated therapy is reserved for women who are at lower risk of invasive cervical cancer  Excision procedure using loop electrosurgical excision procedure (LEEP) was discussed with patient and described as excision of the transformation zone using a loop electrosurgical device  Discussed with patient operative technique in detail an indication to treat moderate to severe dysplasia  Discussed with patient complications including intraoperative bleeding, postoperative bleeding, infection  Discussed with patient late complications  including cervical stenosis as well as cervical insufficiency  Rates of cervical stenosis very anywhere from 0-27%  Discussed with patient that cervical stenosis can cause hematometra as well as inability to examine the transformation zone as well as failure to dilate normally during labor  Discussed with patient that treatment of ANUPAMA does not appear to impair fertility  Previous cervical conization is associated with an increased risk of 2nd trimester pregnancy loss, PPROM on  delivery  Discussed with the patient that there are factors that determined these risks including depth of excision, number procedures, short interval from treatment pregnancy, multiple gestation    Discussed PAT labs ahead of procedure, office to contact to set up

## 2023-02-01 ENCOUNTER — TELEPHONE (OUTPATIENT)
Dept: NEPHROLOGY | Facility: CLINIC | Age: 48
End: 2023-02-01

## 2023-02-01 NOTE — TELEPHONE ENCOUNTER
Left voice mail to schedule appointment with Dr Junior Ndiaye in the [place] location  This is for their July appointment  This is the 3rd attempt

## 2023-03-02 ENCOUNTER — TELEPHONE (OUTPATIENT)
Dept: OBGYN CLINIC | Facility: MEDICAL CENTER | Age: 48
End: 2023-03-02

## 2023-03-02 NOTE — TELEPHONE ENCOUNTER
Per [facility] prior authorization list CPT code 53711 does not require auth  [email or web address]  [email or web address]

## 2023-03-03 ENCOUNTER — ANESTHESIA EVENT (OUTPATIENT)
Dept: PERIOP | Facility: HOSPITAL | Age: 48
End: 2023-03-03

## 2023-03-10 NOTE — PRE-PROCEDURE INSTRUCTIONS
Pre-Surgery Instructions:   Medication Instructions   • acetaminophen (TYLENOL) 650 mg CR tablet Uses PRN- OK to take day of surgery   • famotidine (PEPCID) 20 mg tablet Uses PRN- OK to take day of surgery   • ferrous sulfate 324 (65 Fe) mg Hold day of surgery  Medication instructions for day surgery reviewed  Please use only a sip of water to take your instructed medications  Avoid all over the counter vitamins, supplements and NSAIDS for one week prior to surgery per anesthesia guidelines  Tylenol is ok to take as needed  You will receive a call one business day prior to surgery with an arrival time and hospital directions  If your surgery is scheduled on a Monday, the hospital will be calling you on the Friday prior to your surgery  If you have not heard from anyone by 8pm, please call the hospital supervisor through the hospital  at [phone]  Lita Shriley [phone])  Do not eat or drink anything after midnight the night before your surgery, including candy, mints, lifesavers, or chewing gum  Do not drink alcohol 24hrs before your surgery  Try not to smoke at least 24hrs before your surgery  Follow the pre surgery showering instructions as listed in the [facility] Surgical Experience Booklet” or otherwise provided by your surgeon's office  Do not shave the surgical area 24 hours before surgery  Do not apply any lotions, creams, including makeup, cologne, deodorant, or perfumes after showering on the day of your surgery  No contact lenses, eye make-up, or artificial eyelashes  Remove nail polish, including gel polish, and any artificial, gel, or acrylic nails if possible  Remove all jewelry including rings and body piercing jewelry  Wear causal clothing that is easy to take on and off  Consider your type of surgery  Keep any valuables, jewelry, piercings at home  Please bring any specially ordered equipment (sling, braces) if indicated      Arrange for a responsible person to drive you to and from the hospital on the day of your surgery  Visitor Guidelines discussed  Call the surgeon's office with any new illnesses, exposures, or additional questions prior to surgery  Please reference your [facility] Surgical Experience Booklet” for additional information to prepare for your upcoming surgery

## 2023-03-11 ENCOUNTER — LAB REQUISITION (OUTPATIENT)
Dept: LAB | Facility: HOSPITAL | Age: 48
End: 2023-03-11

## 2023-03-11 ENCOUNTER — APPOINTMENT (OUTPATIENT)
Dept: LAB | Facility: CLINIC | Age: 48
End: 2023-03-11

## 2023-03-11 DIAGNOSIS — D06.9 CARCINOMA IN SITU OF CERVIX, UNSPECIFIED: ICD-10-CM

## 2023-03-11 DIAGNOSIS — D06.9 HIGH GRADE SQUAMOUS INTRAEPITHELIAL LESION (HGSIL), GRADE 3 CIN, ON BIOPSY OF CERVIX: ICD-10-CM

## 2023-03-11 LAB
ABO GROUP BLD: NORMAL
BLD GP AB SCN SERPL QL: NEGATIVE
ERYTHROCYTE [DISTWIDTH] IN BLOOD BY AUTOMATED COUNT: 14.5 % (ref 11.6–15.1)
HCT VFR BLD AUTO: 37.6 % (ref 34.8–46.1)
HGB BLD-MCNC: 11.3 G/DL (ref 11.5–15.4)
MCH RBC QN AUTO: 23.7 PG (ref 26.8–34.3)
MCHC RBC AUTO-ENTMCNC: 30.1 G/DL (ref 31.4–37.4)
MCV RBC AUTO: 79 FL (ref 82–98)
PLATELET # BLD AUTO: 313 THOUSANDS/UL (ref 149–390)
PMV BLD AUTO: 10.5 FL (ref 8.9–12.7)
RBC # BLD AUTO: 4.77 MILLION/UL (ref 3.81–5.12)
RH BLD: POSITIVE
SPECIMEN EXPIRATION DATE: NORMAL
WBC # BLD AUTO: 6.24 THOUSAND/UL (ref 4.31–10.16)

## 2023-03-16 ENCOUNTER — ANESTHESIA (OUTPATIENT)
Dept: PERIOP | Facility: HOSPITAL | Age: 48
End: 2023-03-16

## 2023-03-16 ENCOUNTER — HOSPITAL ENCOUNTER (OUTPATIENT)
Facility: HOSPITAL | Age: 48
Setting detail: OUTPATIENT SURGERY
Discharge: HOME | End: 2023-03-16
Attending: OBSTETRICS & GYNECOLOGY | Admitting: OBSTETRICS & GYNECOLOGY

## 2023-03-16 VITALS
RESPIRATION RATE: 16 BRPM | DIASTOLIC BLOOD PRESSURE: 77 MMHG | SYSTOLIC BLOOD PRESSURE: 130 MMHG | OXYGEN SATURATION: 100 % | HEIGHT: 64 IN | WEIGHT: 209.22 LBS | TEMPERATURE: 97.8 F | HEART RATE: 83 BPM | BODY MASS INDEX: 35.72 KG/M2

## 2023-03-16 DIAGNOSIS — D06.9 HIGH GRADE SQUAMOUS INTRAEPITHELIAL LESION (HGSIL), GRADE 3 CIN, ON BIOPSY OF CERVIX: ICD-10-CM

## 2023-03-16 PROBLEM — Z98.890 S/P LEEP: Status: ACTIVE | Noted: 2023-03-16

## 2023-03-16 LAB
ABO GROUP BLD: NORMAL
EXT PREGNANCY TEST URINE: NEGATIVE
EXT. CONTROL: NORMAL
RH BLD: POSITIVE

## 2023-03-16 RX ORDER — ONDANSETRON 2 MG/ML
4 INJECTION INTRAMUSCULAR; INTRAVENOUS ONCE AS NEEDED
Status: DISCONTINUED | OUTPATIENT
Start: 2023-03-16 | End: 2023-03-16 | Stop reason: HOSPADM

## 2023-03-16 RX ORDER — PROPOFOL 10 MG/ML
INJECTION, EMULSION INTRAVENOUS AS NEEDED
Status: DISCONTINUED | OUTPATIENT
Start: 2023-03-16 | End: 2023-03-16

## 2023-03-16 RX ORDER — IBUPROFEN 600 MG/1
600 TABLET ORAL EVERY 6 HOURS PRN
Status: DISCONTINUED | OUTPATIENT
Start: 2023-03-16 | End: 2023-03-16 | Stop reason: HOSPADM

## 2023-03-16 RX ORDER — ACETAMINOPHEN 325 MG/1
975 TABLET ORAL EVERY 6 HOURS PRN
Status: DISCONTINUED | OUTPATIENT
Start: 2023-03-16 | End: 2023-03-16 | Stop reason: HOSPADM

## 2023-03-16 RX ORDER — FENTANYL CITRATE/PF 50 MCG/ML
25 SYRINGE (ML) INJECTION
Status: DISCONTINUED | OUTPATIENT
Start: 2023-03-16 | End: 2023-03-16 | Stop reason: HOSPADM

## 2023-03-16 RX ORDER — LIDOCAINE HYDROCHLORIDE 20 MG/ML
INJECTION, SOLUTION EPIDURAL; INFILTRATION; INTRACAUDAL; PERINEURAL AS NEEDED
Status: DISCONTINUED | OUTPATIENT
Start: 2023-03-16 | End: 2023-03-16

## 2023-03-16 RX ORDER — LIDOCAINE HYDROCHLORIDE AND EPINEPHRINE 10; 10 MG/ML; UG/ML
INJECTION, SOLUTION INFILTRATION; PERINEURAL AS NEEDED
Status: DISCONTINUED | OUTPATIENT
Start: 2023-03-16 | End: 2023-03-16 | Stop reason: HOSPADM

## 2023-03-16 RX ORDER — DEXAMETHASONE SODIUM PHOSPHATE 10 MG/ML
INJECTION, SOLUTION INTRAMUSCULAR; INTRAVENOUS AS NEEDED
Status: DISCONTINUED | OUTPATIENT
Start: 2023-03-16 | End: 2023-03-16

## 2023-03-16 RX ORDER — SODIUM CHLORIDE 9 MG/ML
125 INJECTION, SOLUTION INTRAVENOUS CONTINUOUS
Status: DISCONTINUED | OUTPATIENT
Start: 2023-03-16 | End: 2023-03-16 | Stop reason: HOSPADM

## 2023-03-16 RX ORDER — MIDAZOLAM HYDROCHLORIDE 2 MG/2ML
INJECTION, SOLUTION INTRAMUSCULAR; INTRAVENOUS AS NEEDED
Status: DISCONTINUED | OUTPATIENT
Start: 2023-03-16 | End: 2023-03-16

## 2023-03-16 RX ORDER — ONDANSETRON 2 MG/ML
INJECTION INTRAMUSCULAR; INTRAVENOUS AS NEEDED
Status: DISCONTINUED | OUTPATIENT
Start: 2023-03-16 | End: 2023-03-16

## 2023-03-16 RX ORDER — FENTANYL CITRATE 50 UG/ML
INJECTION, SOLUTION INTRAMUSCULAR; INTRAVENOUS AS NEEDED
Status: DISCONTINUED | OUTPATIENT
Start: 2023-03-16 | End: 2023-03-16

## 2023-03-16 RX ORDER — SODIUM CHLORIDE, SODIUM LACTATE, POTASSIUM CHLORIDE, CALCIUM CHLORIDE 600; 310; 30; 20 MG/100ML; MG/100ML; MG/100ML; MG/100ML
INJECTION, SOLUTION INTRAVENOUS CONTINUOUS PRN
Status: DISCONTINUED | OUTPATIENT
Start: 2023-03-16 | End: 2023-03-16

## 2023-03-16 RX ADMIN — FENTANYL CITRATE 25 MCG: 50 INJECTION INTRAMUSCULAR; INTRAVENOUS at 16:17

## 2023-03-16 RX ADMIN — SODIUM CHLORIDE, SODIUM LACTATE, POTASSIUM CHLORIDE, AND CALCIUM CHLORIDE: .6; .31; .03; .02 INJECTION, SOLUTION INTRAVENOUS at 15:39

## 2023-03-16 RX ADMIN — LIDOCAINE HYDROCHLORIDE 100 MG: 20 INJECTION, SOLUTION EPIDURAL; INFILTRATION; INTRACAUDAL; PERINEURAL at 15:14

## 2023-03-16 RX ADMIN — FENTANYL CITRATE 25 MCG: 50 INJECTION INTRAMUSCULAR; INTRAVENOUS at 16:03

## 2023-03-16 RX ADMIN — SODIUM CHLORIDE 125 ML/HR: 0.9 INJECTION, SOLUTION INTRAVENOUS at 12:52

## 2023-03-16 RX ADMIN — MIDAZOLAM 2 MG: 1 INJECTION INTRAMUSCULAR; INTRAVENOUS at 15:08

## 2023-03-16 RX ADMIN — PROPOFOL 200 MG: 10 INJECTION, EMULSION INTRAVENOUS at 15:14

## 2023-03-16 RX ADMIN — ONDANSETRON 4 MG: 2 INJECTION INTRAMUSCULAR; INTRAVENOUS at 15:18

## 2023-03-16 RX ADMIN — FENTANYL CITRATE 25 MCG: 50 INJECTION INTRAMUSCULAR; INTRAVENOUS at 16:11

## 2023-03-16 RX ADMIN — FENTANYL CITRATE 100 MCG: 50 INJECTION INTRAMUSCULAR; INTRAVENOUS at 15:13

## 2023-03-16 RX ADMIN — DEXAMETHASONE SODIUM PHOSPHATE 10 MG: 10 INJECTION INTRAMUSCULAR; INTRAVENOUS at 15:18

## 2023-03-16 RX ADMIN — ACETAMINOPHEN 325MG 975 MG: 325 TABLET ORAL at 17:05

## 2023-03-16 NOTE — ANESTHESIA PREPROCEDURE EVALUATION
Procedure:  BIOPSY LEEP CERVIX (Cervix)    Relevant Problems   CARDIO   (+) Migraine with aura and with status migrainosus, not intractable   (+) Mixed hyperlipidemia      /RENAL   (+) CKD (chronic kidney disease), stage II   (+) Nephrolithiasis      HEMATOLOGY   (+) Iron deficiency anemia      MUSCULOSKELETAL   (+) Osteoarthritis of both knees      NEURO/PSYCH   (+) Double vision   (+) Migraine with aura and with status migrainosus, not intractable      Other   (+) Obesity (BMI 35 0-39 9 without comorbidity)        Physical Exam    Airway    Mallampati score: II  TM Distance: >3 FB  Neck ROM: full     Dental   No notable dental hx     Cardiovascular  Rhythm: regular, Rate: normal,     Pulmonary  Breath sounds clear to auscultation,     Other Findings        Anesthesia Plan  ASA Score- 2     Anesthesia Type- general with ASA Monitors  Additional Monitors:   Airway Plan: LMA  Comment: HGB=11 3   Plan Factors-    Chart reviewed  Patient summary reviewed  Patient is not a current smoker  Patient not instructed to abstain from smoking on day of procedure  Patient did not smoke on day of surgery  There is medical exclusion for perioperative obstructive sleep apnea risk education  Induction- intravenous  Postoperative Plan-     Informed Consent- Anesthetic plan and risks discussed with patient

## 2023-03-16 NOTE — OP NOTE
OPERATIVE REPORT  PATIENT NAME: Monserrat Morin    :  1975  MRN: [id number]  Pt Location: [facility] OR ROOM 02    SURGERY DATE: 3/16/2023    Surgeon(s) and Role:     * Cecilia Villagomez MD - Primary     * Anila Forde MD - Assisting    Preop Diagnosis:  High grade squamous intraepithelial lesion (HGSIL), grade 3 ANUPAMA, on biopsy of cervix [D06 9]    Post-Op Diagnosis Codes:     * High grade squamous intraepithelial lesion (HGSIL), grade 3 ANUPAMA, on biopsy of cervix [D06 9]    Procedure(s):  BIOPSY LEEP CERVIX    Specimen(s):  ID Type Source Tests Collected by Time Destination   1 : Anterior cervix Tissue Cervix TISSUE EXAM Cecilia Villagomez MD 3/16/2023 1531    2 : Posterior cervix Tissue Cervix TISSUE EXAM Cecilia Villagomez MD 3/16/2023 1533    3 : ECC Tissue Cervix, Endocervical TISSUE EXAM Cecilia Villagomez MD 3/16/2023 1537        Estimated Blood Loss: minimal    Anesthesia Type: General  LMA    Operative Indications:  High grade squamous intraepithelial lesion (HGSIL), grade 3 ANUPAMA, on biopsy of cervix [D06 9]    Operative Findings:  Normal appearing external female genitalia  Normal appearing multiparous cervical surface with multiple nabothian cysts  Non-gravid uterus  Hemostatic cervical tissue at conclusion of procedure    Indications for the procedure:   History: Ms Monserrat Morin is a 56y o  year old with a Pap smear showing ASCUS/HPV+ and a colposcopy showing CINII/III at 6 o'clock  Surgical risks: The patient was informed of all the risks and benefits of a loop electrosurgical excision procedure  Risks included but were not limited to bleeding, infection, injury to the vulva, vagina, cervix, uterine perforation, or negative effects on future pregnancy outcomes  The patient expressed understanding the risks involved, all portions were answered, the patient was consented to the procedure  Description of the procedure: The patient was taken to the operating room   General LMA anesthesia was administered and found to be adequate  The patient was then positioned on the operating table in dorsolithotomy position with legs supported by stirrups and SCDs bilaterally  All pressure points were padded  Warm blanket was placed to maintain control of core body temperature  The patient was then prepped and draped in usual sterile fashion  Operative technique: A timeout was performed to confirm correct patient and correct procedure  The bladder was emptied with a straight catheter and 100 milliliters of clear yellow urine were obtained  A bivalved coated speculum was inserted into the vagina and the cervix was visualized  Local anesthesia of 1%  with epinephrine was injected at 2,5,8,10 o'clock positions, 7 milliliters total  The 20x15 loop electrode was selected and used to excise the cervical sample in two separate parts--anterior and posterior cervix  The gross specimen was removed and sent to pathology  Endocervical curettage was completed using the Onitadarshian curet, placed on Telfa, and also sent to pathology  Ball electrocautery was used to obtain adequate hemostasis  Monsel's was then applied to maintain hemostasis  Good hemostasis was confirmed  The bivalve speculum was removed from the vagina  At the completion of the procedure all needle, sponge, instrument counts were noted to be correct ×2  Dr Gonzales He was present for all key portions of the procedure        Demetris Roland MD  DATE: March 16, 2023  TIME: 3:59 PM

## 2023-03-16 NOTE — ANESTHESIA POSTPROCEDURE EVALUATION
Post-Op Assessment Note    CV Status:  Stable    Pain management: adequate     Mental Status:  Awake   Hydration Status:  Stable   PONV Controlled:  Controlled   Airway Patency:  Patent      Post Op Vitals Reviewed: Yes      Staff: Anesthesiologist         No notable events documented      /67 (03/16/23 1617)    Temp     Pulse 80 (03/16/23 1617)   Resp 12 (03/16/23 1617)    SpO2 94 % (03/16/23 1617)

## 2023-03-17 ENCOUNTER — TELEPHONE (OUTPATIENT)
Dept: OBGYN CLINIC | Facility: CLINIC | Age: 48
End: 2023-03-17

## 2023-03-17 DIAGNOSIS — M22.2X1 PATELLOFEMORAL ARTHRALGIA OF BOTH KNEES: ICD-10-CM

## 2023-03-17 DIAGNOSIS — M22.2X2 PATELLOFEMORAL ARTHRALGIA OF BOTH KNEES: ICD-10-CM

## 2023-03-17 RX ORDER — SENNOSIDES 8.6 MG
650 CAPSULE ORAL EVERY 8 HOURS PRN
Qty: 30 TABLET | Refills: 0 | Status: SHIPPED | OUTPATIENT
Start: 2023-03-17

## 2023-03-17 NOTE — TELEPHONE ENCOUNTER
The patient called and asked if she would be able to get a refill on the Tylenol 650mg that she typically takes for pain management  Please advise

## 2023-03-20 ENCOUNTER — TELEPHONE (OUTPATIENT)
Dept: OBGYN CLINIC | Facility: CLINIC | Age: 48
End: 2023-03-20

## 2023-03-20 NOTE — TELEPHONE ENCOUNTER
Patient calling in regards to surgery she had on 3/16  Patient is requesting a note for work to excuse her for the days she missed  3/16,3/17,3/20  Would we be able to write this letter  Please advise

## 2023-04-06 ENCOUNTER — OFFICE VISIT (OUTPATIENT)
Dept: OBGYN CLINIC | Facility: CLINIC | Age: 48
End: 2023-04-06

## 2023-04-06 VITALS
WEIGHT: 213.2 LBS | HEART RATE: 78 BPM | BODY MASS INDEX: 36.4 KG/M2 | DIASTOLIC BLOOD PRESSURE: 82 MMHG | SYSTOLIC BLOOD PRESSURE: 124 MMHG | HEIGHT: 64 IN

## 2023-04-06 DIAGNOSIS — N93.9 ABNORMAL UTERINE BLEEDING: ICD-10-CM

## 2023-04-06 DIAGNOSIS — Z11.3 SCREENING FOR STD (SEXUALLY TRANSMITTED DISEASE): ICD-10-CM

## 2023-04-06 DIAGNOSIS — A59.9 TRICHOMONAS INFECTION: ICD-10-CM

## 2023-04-06 DIAGNOSIS — D06.9 HIGH GRADE SQUAMOUS INTRAEPITHELIAL LESION (HGSIL), GRADE 3 CIN, ON BIOPSY OF CERVIX: Primary | ICD-10-CM

## 2023-04-06 RX ORDER — TRANEXAMIC ACID 650 MG/1
1300 TABLET ORAL 3 TIMES DAILY
Qty: 30 TABLET | Refills: 6 | Status: SHIPPED | OUTPATIENT
Start: 2023-04-06 | End: 2023-04-11

## 2023-04-06 NOTE — ASSESSMENT & PLAN NOTE
TXA previously helping  H/o CKD due to previous nephrectomy, last Cr in 2022 was 0 92, none on file since then   No dose adjustment for TXA needed unless Cr > 1 4 but will check BMP

## 2023-04-06 NOTE — ASSESSMENT & PLAN NOTE
Reviewed need for repeat pap in 6 months - plan for August as this is 6 months from initial colposcopy diagnosis

## 2023-04-06 NOTE — PROGRESS NOTES
Subjective   Patient ID: Clare Lagos is a 56 y o  female  Patient is here for a problem visit  Chief Complaint   Patient presents with   • Follow-up     S/p LEEP on 3/16/23 - ANUPAMA 2/3 confirmed, negative margins, negative ECC  initial colposcopy on 23     Was dx with trichomonas 2022 and treated     initially first week after surgery had episodes of heavy bleeding when she was straining - afterwards became light spotting with wiping  Menses started again yesterday - heavy   Was using TXA as prescribed and was helping last few cycles - takes for 2-3 days      Menstrual History:  OB History        2    Para   2    Term   2            AB        Living   2       SAB        IAB        Ectopic        Multiple        Live Births                    Patient's last menstrual period was 2023 (exact date)           Past Medical History:   Diagnosis Date   • Abdominal hernia    • Abnormal Pap smear of cervix    • Anemia     Iron   • Chronic kidney disease    • COVID 2020 recovered @ home   • GERD (gastroesophageal reflux disease)    • Hernia of abdominal wall    • Kidney stone    • Renal calculi    • Renal disorder    • Status post nephrectomy 2019   • Toe fracture, left    • Wears glasses        Past Surgical History:   Procedure Laterality Date   • CHOLECYSTECTOMY LAPAROSCOPIC N/A 12/10/2018    Procedure: CHOLECYSTECTOMY LAPAROSCOPIC; INCISIONAL HERNIA REPAIR;  Surgeon: Bonifacio Ivan MD;  Location: [facility];  Service: General   • KIDNEY SURGERY      removed L kidney from damage from kidney stone -PERFORMED IN [place]   • NEPHRECTOMY Left    • [place] CONIZATION CERVIX W/WO D&C RPR ELTRD EXC N/A 3/16/2023    Procedure: BIOPSY LEEP CERVIX;  Surgeon: Tess Henderson MD;  Location: [facility];  Service: Gynecology   • [place] REPAIR FIRST ABDOMINAL WALL HERNIA N/A 2022    Procedure: REPAIR HERNIA INCISIONAL;  Surgeon: Nick Diaz DO;  [facility];  Service: General   • TUBAL LIGATION      AGE 32       Social History     Tobacco Use   • Smoking status: Never   • Smokeless tobacco: Never   Vaping Use   • Vaping Use: Never used   Substance Use Topics   • Alcohol use: No   • Drug use: No        No Known Allergies      Current Outpatient Medications:   •  acetaminophen (TYLENOL) 650 mg CR tablet, Take 1 tablet (650 mg total) by mouth every 8 (eight) hours as needed for mild pain, Disp: 30 tablet, Rfl: 0  •  famotidine (PEPCID) 20 mg tablet, Take 1 tablet (20 mg total) by mouth daily as needed for heartburn, Disp: 90 tablet, Rfl: 2  •  ferrous sulfate 324 (65 Fe) mg, Take 1 tablet (324 mg total) by mouth in the morning, Disp: 90 tablet, Rfl: 2  •  Tranexamic Acid 650 MG TABS, Take 2 tablets (1,300 mg total) by mouth 3 (three) times a day for 5 days, Disp: 30 tablet, Rfl: 6      Review of Systems   Constitutional: Negative for appetite change, chills and fever  Eyes: Negative for visual disturbance  Respiratory: Negative for cough, chest tightness and shortness of breath  Cardiovascular: Negative for chest pain  Gastrointestinal: Negative for abdominal distention, abdominal pain, constipation, diarrhea, nausea and vomiting  Endocrine: Negative for cold intolerance and heat intolerance  Genitourinary: Negative for difficulty urinating, dyspareunia, dysuria, frequency, genital sores, pelvic pain, urgency, vaginal bleeding, vaginal discharge and vaginal pain  Musculoskeletal: Negative for arthralgias  Neurological: Negative for light-headedness and headaches  Hematological: Does not bruise/bleed easily  Psychiatric/Behavioral: Negative for behavioral problems  All other systems reviewed and are negative  /82   Pulse 78   Ht 5' 4\" (1 626 m)   Wt 96 7 kg (213 lb 3 2 oz)   LMP 04/05/2023 (Exact Date)   BMI 36 60 kg/m²       Physical Exam  Constitutional:       General: She is not in acute distress       Appearance: Normal " appearance  She is not ill-appearing  Genitourinary:      Bladder and urethral meatus normal       Right Labia: No rash, tenderness, lesions or skin changes  Left Labia: No tenderness, lesions, skin changes or rash  No labial fusion noted  No inguinal adenopathy present in the right or left side  Vaginal bleeding present  No vaginal discharge, erythema, tenderness or ulceration  Vaginal exam comments: Bleeding appears uterine rather than from cervical bed   Right Adnexa: not tender, not full and no mass present  Left Adnexa: not tender, not full and no mass present  No cervical motion tenderness, discharge, friability, lesion, polyp or eversion  Cervical exam comments: Expected post surgical changes seen  Pelvic exam was performed with patient in the lithotomy position  HENT:      Head: Normocephalic  Cardiovascular:      Rate and Rhythm: Normal rate  Heart sounds: Normal heart sounds  Pulmonary:      Effort: Pulmonary effort is normal  No accessory muscle usage or respiratory distress  Abdominal:      General: There is no distension  Palpations: Abdomen is soft  There is no mass  Tenderness: There is no abdominal tenderness  There is no guarding or rebound  Musculoskeletal:         General: Normal range of motion  Cervical back: No rigidity  Lymphadenopathy:      Lower Body: No right inguinal adenopathy  No left inguinal adenopathy  Neurological:      General: No focal deficit present  Mental Status: She is alert  Mental status is at baseline  Skin:     General: Skin is warm and dry  Psychiatric:         Mood and Affect: Mood normal          Behavior: Behavior normal    Vitals and nursing note reviewed  Exam conducted with a chaperone present             Results for orders placed or performed during the hospital encounter of 03/16/23   POCT pregnancy, urine   Result Value Ref Range    EXT Preg Test, Ur Negative Negative "   Control Valid Valid   ABORh Recheck - Contact Blood Bank Prior to Collection   Result Value Ref Range    ABO Grouping O     Rh Factor Positive    Tissue Exam   Result Value Ref Range    Case Report       Surgical Pathology Report                         Case: [id number]                                   Authorizing Provider:  Ilana Narvaez MD      Collected:           03/16/2023 1531              Ordering Location:     [facility]        Received:            03/16/2023 1600 [address] Operating Room                                                     Pathologist:           Elray Goldberg, MD                                                           Specimens:   A) - Cervix, Anterior cervix                                                                        B) - Cervix, Posterior cervix                                                                       C) - Cervix, Endocervical, ECC                                                             Final Diagnosis       A  Uterine cervix, \"Anterior cervix, stitch at 12:00,\" Leep biopsy:  - Low grade squamous intraepithelial lesion, mild dysplasia (LGSIL, ANUPAMA-I) with viral cytopathic effect  - Transformation zone present, positive for dysplasia  - Mild chronic cervicitis  - Resection margins, negative for high grade dysplasia    B  Uterine cervix, \"Cervix posterior, stitch at 6:00,\" Biopsy:  - High grade squamous intraepithelial lesion, moderate-severe dysplasia (HGSIL, ANUPAMA-II/III) with viral cytopathic effect  - Transformation zone present, positive for HGSIL dysplasia  - HGSIL dysplasia involves endocervical glands  - Mild chronic cervicitis  - Resection margins, negative for high grade dysplasia  - Additional levels examined    C   Uterine cervix, \"Endocervical curettings,\" Leep biopsy:  - Fragments of endocervical glands  - Negative for dysplasia or carcinoma        Additional Information       All reported " "additional testing was performed with appropriately reactive controls  These tests were developed and their performance characteristics determined by [facility] or appropriate performing facility, though some tests may be performed on tissues which have not been validated for performance characteristics (such as staining performed on alcohol exposed cell blocks and decalcified tissues)  Results should be interpreted with caution and in the context of the patients’ clinical condition  These tests may not be cleared or approved by the [place]  Food and Drug Administration, though the FDA has determined that such clearance or approval is not necessary  These tests are used for clinical purposes and they should not be regarded as investigational or for research  This laboratory has been approved by [facility], designated as a high-complexity laboratory and is qualified to perform these tests   Interpretation performed at [address] SAMANTHA Mccoy [address]        Gross Description       A  The specimen is received in formalin, labeled with the patient's name and hospital number, and is designated \" anterior cervix, stitch at 12:00\"  The specimen consists of a single tan-pink rubbery portion of cervix measuring 3 9 x 2 0 x 1 5 cm  The specimen is serially sectioned in a 3-9:00 progression revealing tan-white rubbery cut surfaces that appear to be otherwise grossly unremarkable  The tissue is entirely, sequentially submitted in cassettes 1-6  Also in the specimen container, not included in the previous measurements of multiple colorless and mucinous tissue fragments measuring in aggregate of 1 0 x 0 8 x 0 3 cm  Entirely submitted  9 cassettes  B  The specimen is received in formalin, labeled with the patient's name and hospital number, and is designated \" cervix posterior, stitch at 6:00\"    The specimen consists of a single tan-pink rubbery portion of cervix measuring 2 8 x 2 1 x " "1 5 cm  The margin of resection is inked blue and is serially sectioned in a 3-9:00 progression revealing tan-white rubbery cut surfaces with focal cystic appearing cavities measuring 0 3 cm in greatest dimension  Entirely, sequentially submitted, 7 cassettes  C  The specimen is received in formalin, labeled with the patient's name and hospital number, and is designated \" endocervical curettings\"  The specimen consists of multiple colorless tissue fragments measuring in aggregate of 2 7 x 0 8 x 0 1 cm  Note: due to the size and consistency of specimen, the tissue may not survive histologic processing  The specimen is drained into an embedding bag  Entirely submitted  One cassette  Note: The estimated total formalin fixation time based upon information provided by the submitting clinician and the standard processing schedule is under 72 hours  Daniel Reich       Clinical Information Stitch at 12 o'clock        Appropriate laboratory testing, imaging studies, and prior external records were reviewed:     Assessment/Plan:       Problem List Items Addressed This Visit        Genitourinary    Abnormal uterine bleeding     TXA previously helping  H/o CKD due to previous nephrectomy, last Cr in 2022 was 0 92, none on file since then  No dose adjustment for TXA needed unless Cr > 1 4 but will check BMP          Relevant Medications    Tranexamic Acid 650 MG TABS    Other Relevant Orders    Basic metabolic panel       Other    High grade squamous intraepithelial lesion (HGSIL), grade 3 ANUPAMA, on biopsy of cervix - Primary     Reviewed need for repeat pap in 6 months - plan for August as this is 6 months from initial colposcopy diagnosis         Trichomonas infection     Reports compliance with tx, she is no longer with that partner   3 month repeat testing sent today        Other Visit Diagnoses     Screening for STD (sexually transmitted disease)        Relevant Orders    Chlamydia/GC amplified DNA by PCR    " Trichomonas vaginalis/Mycoplasma genitalium PCR

## 2023-04-07 LAB
M GENITALIUM DNA SPEC QL NAA+PROBE: NEGATIVE
T VAGINALIS DNA SPEC QL NAA+PROBE: NEGATIVE

## 2023-05-01 ENCOUNTER — OFFICE VISIT (OUTPATIENT)
Dept: URGENT CARE | Age: 48
End: 2023-05-01

## 2023-05-01 VITALS
TEMPERATURE: 97.9 F | OXYGEN SATURATION: 96 % | RESPIRATION RATE: 16 BRPM | DIASTOLIC BLOOD PRESSURE: 68 MMHG | SYSTOLIC BLOOD PRESSURE: 137 MMHG | HEART RATE: 98 BPM

## 2023-05-01 DIAGNOSIS — J02.9 SORE THROAT: ICD-10-CM

## 2023-05-01 DIAGNOSIS — R05.1 ACUTE COUGH: Primary | ICD-10-CM

## 2023-05-01 LAB — S PYO AG THROAT QL: NEGATIVE

## 2023-05-01 RX ORDER — BENZONATATE 200 MG/1
200 CAPSULE ORAL 3 TIMES DAILY PRN
Qty: 20 CAPSULE | Refills: 0 | Status: SHIPPED | OUTPATIENT
Start: 2023-05-01

## 2023-05-01 NOTE — PATIENT INSTRUCTIONS
Please take Tessalon every 8 hours as needed for cough  Please trial warm salt water gargles, Chloraseptic spray, Cepacol cough drops and warm tea with honey as needed for sore throat  Please take Tylenol as needed for fever and pain  Ensure adequate fluid intake  If throat culture is positive, we will contact you to initiate antibiotic therapy

## 2023-05-01 NOTE — PROGRESS NOTES
[facility]        NAME: Giovanni More is a 56 y o  female  : 1975    MRN: [id number]  DATE: May 1, 2023  TIME: 11:17 AM    Assessment and Plan   Acute cough [R05 1]  1  Acute cough  benzonatate (TESSALON) 200 MG capsule      2  Sore throat  POCT rapid strepA    Throat culture            Patient Instructions     Please take Tessalon every 8 hours as needed for cough  Please trial warm salt water gargles, Chloraseptic spray, Cepacol cough drops and warm tea with honey as needed for sore throat  Please take Tylenol as needed for fever and pain  Ensure adequate fluid intake  If throat culture is positive, we will contact you to initiate antibiotic therapy  Follow up with PCP in 3-5 days  Proceed to  ER if symptoms worsen  Chief Complaint     Chief Complaint   Patient presents with    Sore Throat     Dry cough, headache, sinus pressure, felt warm, since Saturday, home covid negative,          History of Present Illness       Patient presenting for evaluation of upper respiratory symptoms including sore throat, fever, cough and headaches  She states that her symptoms have been progressing over the past 3 days  She denies taking her temperature, but states that she felt feverish  She states that she took a home COVID test with negative test results  Patient denies the use of any over-the-counter medication at this time, but has been taking ginger and lemon with minimal relief of her symptoms  She denies any known sick contacts  Review of Systems   Review of Systems   Constitutional: Positive for fever  Negative for chills  HENT: Positive for sore throat  Negative for congestion  Respiratory: Positive for cough  Negative for shortness of breath  Cardiovascular: Negative for chest pain  Gastrointestinal: Negative for diarrhea, nausea and vomiting  Neurological: Positive for headaches  All other systems reviewed and are negative          Current Medications Current Outpatient Medications:     acetaminophen (TYLENOL) 650 mg CR tablet, Take 1 tablet (650 mg total) by mouth every 8 (eight) hours as needed for mild pain, Disp: 30 tablet, Rfl: 0    benzonatate (TESSALON) 200 MG capsule, Take 1 capsule (200 mg total) by mouth 3 (three) times a day as needed for cough, Disp: 20 capsule, Rfl: 0    famotidine (PEPCID) 20 mg tablet, Take 1 tablet (20 mg total) by mouth daily as needed for heartburn, Disp: 90 tablet, Rfl: 2    ferrous sulfate 324 (65 Fe) mg, Take 1 tablet (324 mg total) by mouth in the morning, Disp: 90 tablet, Rfl: 2    Current Allergies     Allergies as of 05/01/2023    (No Known Allergies)            The following portions of the patient's history were reviewed and updated as appropriate: allergies, current medications, past family history, past medical history, past social history, past surgical history and problem list      Past Medical History:   Diagnosis Date    Abdominal hernia     Abnormal Pap smear of cervix     Anemia     Iron    Chronic kidney disease     COVID 12/2020 01/2022 recovered @ home    GERD (gastroesophageal reflux disease)     Hernia of abdominal wall     Kidney stone     Renal calculi     Renal disorder     Status post nephrectomy 01/28/2019    Toe fracture, left     Wears glasses        Past Surgical History:   Procedure Laterality Date    CHOLECYSTECTOMY LAPAROSCOPIC N/A 12/10/2018    Procedure: CHOLECYSTECTOMY LAPAROSCOPIC; INCISIONAL HERNIA REPAIR;  Surgeon: Howard Ferreira MD;  [facility];  Service: General    KIDNEY SURGERY  2013    [place]oved L kidney [place]    NEPHRECTOMY Left     [place] CONIZATION CERVIX W/WO D&C RPR ELTRD EXC N/A 3/16/2023    Procedure: BIOPSY LEEP CERVIX;  Surgeon: Jorge Alberto Fitzgerald MD;  [facility];  Service: Gynecology    [place] REPAIR FIRST ABDOMINAL WALL HERNIA N/A 07/11/2022    Procedure: REPAIR HERNIA INCISIONAL;  Surgeon: Emily Diaz DO;  [facility];  Service: General    TUBAL LIGATION      AGE 33       Family History   Problem Relation Age of Onset    Diabetes Mother     Hyperlipidemia Mother     Hypertension Mother    Frances Signs Other Mother         low back pain    Heart disease Mother     Diabetes Father     No Known Problems Sister     No Known Problems Brother     No Known Problems Daughter     No Known Problems Son     Throat cancer Paternal Aunt     Lung cancer Paternal Aunt     Skin cancer Paternal Aunt     Kidney failure Maternal Grandmother         on dialysis    Kidney disease Maternal Grandmother     Heart disease Maternal Grandmother     Colon cancer Paternal Grandfather     Breast cancer Neg Hx     Ovarian cancer Neg Hx     Thyroid disease Neg Hx     Stroke Neg Hx          Medications have been verified  Objective   /68   Pulse 98   Temp 97 9 °F (36 6 °C)   Resp 16   LMP 04/05/2023 (Exact Date)   SpO2 96%        Physical Exam     Physical Exam  Vitals and nursing note reviewed  Constitutional:       General: She is not in acute distress  Appearance: Normal appearance  She is normal weight  She is not ill-appearing, toxic-appearing or diaphoretic  HENT:      Head: Normocephalic and atraumatic  Right Ear: Tympanic membrane normal       Left Ear: Tympanic membrane is erythematous  Nose: Congestion present  No rhinorrhea  Mouth/Throat:      Mouth: Mucous membranes are moist       Pharynx: Oropharynx is clear  Posterior oropharyngeal erythema present  No oropharyngeal exudate  Tonsils: Tonsillar exudate present  No tonsillar abscesses  2+ on the right  2+ on the left  Eyes:      General:         Right eye: No discharge  Left eye: No discharge  Cardiovascular:      Rate and Rhythm: Normal rate and regular rhythm  Pulses: Normal pulses  Heart sounds: Normal heart sounds  No murmur heard  No friction rub  No gallop  Pulmonary:      Effort: Pulmonary effort is normal  No respiratory distress  Breath sounds: Normal breath sounds  No stridor  No wheezing, rhonchi or rales  Chest:      Chest wall: No tenderness  Abdominal:      General: Bowel sounds are normal       Palpations: Abdomen is soft  Tenderness: There is no abdominal tenderness  Lymphadenopathy:      Cervical: Cervical adenopathy present  Skin:     General: Skin is warm and dry  Neurological:      Mental Status: She is alert     Psychiatric:         Mood and Affect: Mood normal          Behavior: Behavior normal

## 2023-05-03 LAB — BACTERIA THROAT CULT: NORMAL

## 2023-07-18 DIAGNOSIS — M22.2X1 PATELLOFEMORAL ARTHRALGIA OF BOTH KNEES: ICD-10-CM

## 2023-07-18 DIAGNOSIS — M22.2X2 PATELLOFEMORAL ARTHRALGIA OF BOTH KNEES: ICD-10-CM

## 2023-07-19 DIAGNOSIS — M22.2X2 PATELLOFEMORAL ARTHRALGIA OF BOTH KNEES: ICD-10-CM

## 2023-07-19 DIAGNOSIS — M22.2X1 PATELLOFEMORAL ARTHRALGIA OF BOTH KNEES: ICD-10-CM

## 2023-07-19 RX ORDER — SENNOSIDES 8.6 MG
650 CAPSULE ORAL EVERY 8 HOURS PRN
Qty: 30 TABLET | Refills: 0 | Status: SHIPPED | OUTPATIENT
Start: 2023-07-19

## 2023-07-19 RX ORDER — SENNOSIDES 8.6 MG
650 CAPSULE ORAL EVERY 8 HOURS PRN
Qty: 30 TABLET | Refills: 0 | Status: CANCELLED | OUTPATIENT
Start: 2023-07-19

## 2023-07-26 ENCOUNTER — APPOINTMENT (OUTPATIENT)
Dept: LAB | Facility: CLINIC | Age: 48
End: 2023-07-26
Payer: COMMERCIAL

## 2023-07-26 DIAGNOSIS — N93.9 ABNORMAL UTERINE BLEEDING: ICD-10-CM

## 2023-07-26 DIAGNOSIS — D50.9 IRON DEFICIENCY ANEMIA, UNSPECIFIED IRON DEFICIENCY ANEMIA TYPE: ICD-10-CM

## 2023-07-26 DIAGNOSIS — N18.2 CKD (CHRONIC KIDNEY DISEASE), STAGE II: Primary | ICD-10-CM

## 2023-07-26 DIAGNOSIS — N18.2 CKD (CHRONIC KIDNEY DISEASE), STAGE II: ICD-10-CM

## 2023-07-26 DIAGNOSIS — D06.9 HIGH GRADE SQUAMOUS INTRAEPITHELIAL LESION (HGSIL), GRADE 3 CIN, ON BIOPSY OF CERVIX: ICD-10-CM

## 2023-07-26 LAB
ANION GAP SERPL CALCULATED.3IONS-SCNC: 4 MMOL/L
BUN SERPL-MCNC: 12 MG/DL (ref 5–25)
CALCIUM SERPL-MCNC: 9.5 MG/DL (ref 8.3–10.1)
CHLORIDE SERPL-SCNC: 108 MMOL/L (ref 96–108)
CO2 SERPL-SCNC: 27 MMOL/L (ref 21–32)
CREAT SERPL-MCNC: 0.9 MG/DL (ref 0.6–1.3)
CREAT UR-MCNC: 252 MG/DL
ERYTHROCYTE [DISTWIDTH] IN BLOOD BY AUTOMATED COUNT: 16.4 % (ref 11.6–15.1)
FERRITIN SERPL-MCNC: 4 NG/ML (ref 11–307)
GFR SERPL CREATININE-BSD FRML MDRD: 76 ML/MIN/1.73SQ M
GLUCOSE P FAST SERPL-MCNC: 116 MG/DL (ref 65–99)
HCT VFR BLD AUTO: 34.6 % (ref 34.8–46.1)
HGB BLD-MCNC: 10.6 G/DL (ref 11.5–15.4)
IRON SATN MFR SERPL: 5 % (ref 15–50)
IRON SERPL-MCNC: 22 UG/DL (ref 50–170)
MCH RBC QN AUTO: 23.5 PG (ref 26.8–34.3)
MCHC RBC AUTO-ENTMCNC: 30.6 G/DL (ref 31.4–37.4)
MCV RBC AUTO: 77 FL (ref 82–98)
MICROALBUMIN UR-MCNC: 9.9 MG/L (ref 0–20)
MICROALBUMIN/CREAT 24H UR: 4 MG/G CREATININE (ref 0–30)
PLATELET # BLD AUTO: 333 THOUSANDS/UL (ref 149–390)
PMV BLD AUTO: 11.3 FL (ref 8.9–12.7)
POTASSIUM SERPL-SCNC: 3.7 MMOL/L (ref 3.5–5.3)
RBC # BLD AUTO: 4.51 MILLION/UL (ref 3.81–5.12)
SODIUM SERPL-SCNC: 139 MMOL/L (ref 135–147)
TIBC SERPL-MCNC: 464 UG/DL (ref 250–450)
WBC # BLD AUTO: 8.04 THOUSAND/UL (ref 4.31–10.16)

## 2023-07-26 PROCEDURE — 82570 ASSAY OF URINE CREATININE: CPT

## 2023-07-26 PROCEDURE — 85027 COMPLETE CBC AUTOMATED: CPT

## 2023-07-26 PROCEDURE — 36415 COLL VENOUS BLD VENIPUNCTURE: CPT

## 2023-07-26 PROCEDURE — 82728 ASSAY OF FERRITIN: CPT

## 2023-07-26 PROCEDURE — 82043 UR ALBUMIN QUANTITATIVE: CPT

## 2023-07-26 PROCEDURE — 83550 IRON BINDING TEST: CPT

## 2023-07-26 PROCEDURE — 83540 ASSAY OF IRON: CPT

## 2023-07-26 PROCEDURE — 80048 BASIC METABOLIC PNL TOTAL CA: CPT

## 2023-07-28 ENCOUNTER — OFFICE VISIT (OUTPATIENT)
Dept: NEPHROLOGY | Facility: CLINIC | Age: 48
End: 2023-07-28

## 2023-07-28 VITALS
HEIGHT: 64 IN | WEIGHT: 212 LBS | SYSTOLIC BLOOD PRESSURE: 158 MMHG | DIASTOLIC BLOOD PRESSURE: 98 MMHG | BODY MASS INDEX: 36.19 KG/M2

## 2023-07-28 DIAGNOSIS — E66.9 OBESITY (BMI 35.0-39.9 WITHOUT COMORBIDITY): ICD-10-CM

## 2023-07-28 DIAGNOSIS — R80.1 PERSISTENT PROTEINURIA: ICD-10-CM

## 2023-07-28 DIAGNOSIS — D50.9 IRON DEFICIENCY ANEMIA, UNSPECIFIED IRON DEFICIENCY ANEMIA TYPE: ICD-10-CM

## 2023-07-28 DIAGNOSIS — R79.0 LOW IRON STORES: ICD-10-CM

## 2023-07-28 DIAGNOSIS — N18.2 CKD (CHRONIC KIDNEY DISEASE), STAGE II: Primary | ICD-10-CM

## 2023-07-28 DIAGNOSIS — N20.0 NEPHROLITHIASIS: ICD-10-CM

## 2023-07-28 RX ORDER — FERROUS SULFATE TAB EC 324 MG (65 MG FE EQUIVALENT) 324 (65 FE) MG
324 TABLET DELAYED RESPONSE ORAL
Qty: 60 TABLET | Refills: 6 | Status: SHIPPED | OUTPATIENT
Start: 2023-07-28

## 2023-07-28 NOTE — PATIENT INSTRUCTIONS
We are seeing you today for your kidneys. Your kidney function remained stable. Your blood pressure slightly elevated. We discussed that we would just continue to monitor this for now. Please continue to follow a low-salt diet and hydrate with water. We will get back to you in regards to iron infusions hopefully sometime next week. If you do not hear from Lara Byrd, please feel free to call the office.     Thank you

## 2023-07-28 NOTE — PROGRESS NOTES
OFFICE FOLLOW UP - Nephrology   Patrick Gustavo 56 y.o. female MRN: [id number]       ASSESSMENT and PLAN:  Rik Andrews was seen today for follow-up and chronic kidney disease. 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 Baseline creatinine 0.8-1.0. Follows with Dr. Trae Lee. -most recent labs showed creatinine of 0.9, stable electrolytes. -Urine albumin to creatinine ratio 4 mg/g.  -Most recent renal imaging negative for stones in March 2022. History of renal calculi:  -recommend avoiding nephrotoxins including ibuprofen, motrin , and advil.   -encourage oral hydration.   -check labs prior to next appointment. Status post left nephrectomy: In 2012 due to recurrent nephrolithiasis. Also history of recurrent UTI while residing in [place].  Denies any episodes of renal calculi or UTIs since prior nephrectomy. History of renal calculi: Recommend continued hydration with water, drink enough to produce about 2 L of urine per day. Follow a low-salt diet. Blood pressure: Blood pressure currently 158/98 mmHg. Patient just came from hospital and her mom had surgery. She feels stressed. Her records reveal Bp in the 120-130's. -Current medications: None.  -medication changes: None. Consider starting on medications  If BP remains elevated. -recommend low salt diet. High-grade squamous intraepithelial lesion: Status post LEEP procedure on 3/16/23: Continues follow-up with OB/GYN team. Currently no cancer treatment. Has appointment next week. Iron deficiency anemia: Maintained on oral iron supplementation.  Reports heavy menstruation.   -Most recent iron panel with iron sat 5%, iron 22.  -Will increase iron supplementation to 1 tablet 2 times per day. She reports not taking her iron for 2 weeks.   -Would benefit from iron infusion. Will discuss and defer to Dr. Rekha Carson. Patient will follow up in 1 year  with Dr. Rekha Carson. HPI: Tawana Tovar is a 56 y.o. female who is here for routine follow up. The patient reports doing well. She is slightly stressed at this time as her mom is currently in the hospital and had surgery this morning. She denies chest discomfort or shortness of breath. She denies nausea, vomiting, diarrhea. She is avoiding NSAIDs. She reports hydrating well. She reports having normal appetite. She denies any further episodes of kidney stones. ROS:   A complete review of systems was done. Pertinent positives and negatives as noted in the HPI, otherwise the review of systems is negative. Allergies: Patient has no known allergies.     Medications:   Current Outpatient Medications:   •  acetaminophen (TYLENOL) 650 mg CR tablet, Take 1 tablet (650 mg total) by mouth every 8 (eight) hours as needed for mild pain, Disp: 30 tablet, Rfl: 0  •  famotidine (PEPCID) 20 mg tablet, Take 1 tablet (20 mg total) by mouth daily as needed for heartburn, Disp: 90 tablet, Rfl: 2  •  ferrous sulfate 324 (65 Fe) mg, Take 1 tablet (324 mg total) by mouth in the morning, Disp: 90 tablet, Rfl: 2  •  benzonatate (TESSALON) 200 MG capsule, Take 1 capsule (200 mg total) by mouth 3 (three) times a day as needed for cough (Patient not taking: Reported on 7/28/2023), Disp: 20 capsule, Rfl: 0    Past Medical History:   Diagnosis Date   • Abdominal hernia    • Abnormal Pap smear of cervix    • Anemia     Iron   • Chronic kidney disease    • COVID 12/2020 01/2022 recovered @ home   • GERD (gastroesophageal reflux disease)    • Hernia of abdominal wall    • Kidney stone    • Renal calculi    • Renal disorder    • Status post nephrectomy 01/28/2019   • Toe fracture, left    • Wears glasses      Past Surgical History:   Procedure Laterality Date   • CHOLECYSTECTOMY LAPAROSCOPIC N/A 12/10/2018    Procedure: CHOLECYSTECTOMY LAPAROSCOPIC; INCISIONAL HERNIA REPAIR;  Surgeon: Sonja Davis MD;  [facility];  Service: General   • KIDNEY SURGERY  2013    removed L kidney from damage from kidney stone -PERFORMED [place]   • NEPHRECTOMY Left    • [place] CONIZATION CERVIX W/WO D&C RPR ELTRD EXC N/A 3/16/2023    Procedure: BIOPSY LEEP CERVIX;  Surgeon: Louise Arshad MD;  [facility];  Service: Gynecology   • [place] REPAIR FIRST ABDOMINAL WALL HERNIA N/A 07/11/2022    Procedure: REPAIR HERNIA INCISIONAL;  Surgeon: Mirella Diaz DO;  [facility];  Service: General   • TUBAL LIGATION      AGE 33     Family History   Problem Relation Age of Onset   • Diabetes Mother    • Hyperlipidemia Mother    • Hypertension Mother    • Other Mother         low back pain   • Heart disease Mother    • Diabetes Father    • No Known Problems Sister    • No Known Problems Brother    • No Known Problems Daughter    • No Known Problems Son    • Throat cancer Paternal Aunt    • Lung cancer Paternal Aunt    • Skin cancer Paternal Aunt    • Kidney failure Maternal Grandmother         on dialysis   • Kidney disease Maternal Grandmother    • Heart disease Maternal Grandmother    • Colon cancer Paternal Grandfather    • Breast cancer Neg Hx    • Ovarian cancer Neg Hx    • Thyroid disease Neg Hx    • Stroke Neg Hx       reports that she has never smoked. She has never used smokeless tobacco. She reports that she does not drink alcohol and does not use drugs. Physical Exam:   Vitals:    07/28/23 1516   BP: 158/98   BP Location: Left arm   Patient Position: Sitting   Cuff Size: Standard   Weight: 96.2 kg (212 lb)   Height: 5' 4" (1.626 m)     Body mass index is 36.39 kg/m².     General: no acute distress   Eyes: conjunctivae pink, anicteric sclerae  ENT: mucous membranes moist  Neck: supple, no JVD  Chest: clear to auscultation bilaterally with no wheezes, rale or rhochi  CVS: regular rate and rhythm   Abdomen: soft, non-tender, non-distended  Extremities: no lower extremity edema   Skin: no rash  Neuro: awake and alert       Lab Results:  Results for orders placed or performed in visit on 46/97/61   Basic metabolic panel   Result Value Ref Range    Sodium 139 135 - 147 mmol/L    Potassium 3.7 3.5 - 5.3 mmol/L    Chloride 108 96 - 108 mmol/L    CO2 27 21 - 32 mmol/L    ANION GAP 4 mmol/L    BUN 12 5 - 25 mg/dL    Creatinine 0.90 0.60 - 1.30 mg/dL    Glucose, Fasting 116 (H) 65 - 99 mg/dL    Calcium 9.5 8.3 - 10.1 mg/dL    eGFR 76 ml/min/1.73sq m   CBC and Platelet   Result Value Ref Range    WBC 8.04 4.31 - 10.16 Thousand/uL    RBC 4.51 3.81 - 5.12 Million/uL    Hemoglobin 10.6 (L) 11.5 - 15.4 g/dL    Hematocrit 34.6 (L) 34.8 - 46.1 %    MCV 77 (L) 82 - 98 fL    MCH 23.5 (L) 26.8 - 34.3 pg    MCHC 30.6 (L) 31.4 - 37.4 g/dL    RDW 16.4 (H) 11.6 - 15.1 %    Platelets 941 629 - 082 Thousands/uL    MPV 11.3 8.9 - 12.7 fL   Ferritin   Result Value Ref Range    Ferritin 4 (L) 11 - 307 ng/mL   Iron Saturation %   Result Value Ref Range    Iron Saturation 5 (L) 15 - 50 %    TIBC 464 (H) 250 - 450 ug/dL    Iron 22 (L) 50 - 170 ug/dL   Albumin / creatinine urine ratio   Result Value Ref Range    Creatinine, Ur 252.0 mg/dL    Albumin,U,Random 9.9 0.0 - 20.0 mg/L    Albumin Creat Ratio 4 0 - 30 mg/g creatinine       Results from last 7 days   Lab Units 07/26/23  1608   WBC Thousand/uL 8.04   HEMOGLOBIN g/dL 10.6*   HEMATOCRIT % 34.6*   PLATELETS Thousands/uL 333   POTASSIUM mmol/L 3.7   CHLORIDE mmol/L 108   CO2 mmol/L 27   BUN mg/dL 12   CREATININE mg/dL 0.90   CALCIUM mg/dL 9.5         Portions of the record may have been created with voice recognition software. Occasional wrong word or "sound a like" substitutions may have occurred due to the inherent limitations of voice recognition software.  Read the chart carefully and recognize, using context, where substitutions have occurred. If you have any questions, please contact the dictating provider.

## 2023-07-31 ENCOUNTER — TELEPHONE (OUTPATIENT)
Dept: OTHER | Facility: HOSPITAL | Age: 48
End: 2023-07-31

## 2023-07-31 DIAGNOSIS — D50.8 OTHER IRON DEFICIENCY ANEMIA: Primary | ICD-10-CM

## 2023-07-31 RX ORDER — SODIUM CHLORIDE 9 MG/ML
20 INJECTION, SOLUTION INTRAVENOUS ONCE
Status: CANCELLED | OUTPATIENT
Start: 2023-08-01

## 2023-07-31 NOTE — TELEPHONE ENCOUNTER
Iron saturation remains significantly lower, I have prescribed IV Venofer total 3 doses 1 week apart at infusion center at [facility]. Can you please help set this up?

## 2023-08-02 ENCOUNTER — TELEPHONE (OUTPATIENT)
Dept: NEPHROLOGY | Facility: HOSPITAL | Age: 48
End: 2023-08-02

## 2023-08-02 NOTE — TELEPHONE ENCOUNTER
----- Message from [address] [place] [address].  Halle Shields sent at 8/2/2023  2:13 PM EDT -----  Regarding: Infusion  Contact: [phone]  I need the address my phone number is [phone]

## 2023-08-07 ENCOUNTER — TELEPHONE (OUTPATIENT)
Dept: OBGYN CLINIC | Facility: CLINIC | Age: 48
End: 2023-08-07

## 2023-08-07 DIAGNOSIS — N93.9 ABNORMAL UTERINE BLEEDING: Primary | ICD-10-CM

## 2023-08-07 RX ORDER — TRANEXAMIC ACID 650 MG/1
1300 TABLET ORAL 3 TIMES DAILY
Qty: 30 TABLET | Refills: 6 | Status: SHIPPED | OUTPATIENT
Start: 2023-08-07 | End: 2023-08-12

## 2023-08-07 NOTE — TELEPHONE ENCOUNTER
The patient called and stated that she needs the medication to help with her bleeding. States that she only had enough for her appointment which was rescheduled. Please advise.

## 2023-08-10 ENCOUNTER — HOSPITAL ENCOUNTER (OUTPATIENT)
Dept: INFUSION CENTER | Facility: HOSPITAL | Age: 48
Discharge: HOME | End: 2023-08-10
Attending: INTERNAL MEDICINE
Payer: COMMERCIAL

## 2023-08-10 DIAGNOSIS — D50.8 OTHER IRON DEFICIENCY ANEMIA: Primary | ICD-10-CM

## 2023-08-10 PROCEDURE — 96365 THER/PROPH/DIAG IV INF INIT: CPT

## 2023-08-10 PROCEDURE — 96366 THER/PROPH/DIAG IV INF ADDON: CPT

## 2023-08-10 RX ORDER — SODIUM CHLORIDE 9 MG/ML
20 INJECTION, SOLUTION INTRAVENOUS ONCE
OUTPATIENT
Start: 2023-08-18

## 2023-08-10 RX ORDER — SODIUM CHLORIDE 9 MG/ML
20 INJECTION, SOLUTION INTRAVENOUS ONCE
Status: COMPLETED | OUTPATIENT
Start: 2023-08-10 | End: 2023-08-10

## 2023-08-10 RX ADMIN — IRON SUCROSE 300 MG: 20 INJECTION, SOLUTION INTRAVENOUS at 12:57

## 2023-08-10 RX ADMIN — SODIUM CHLORIDE 20 ML/HR: 0.9 INJECTION, SOLUTION INTRAVENOUS at 12:57

## 2023-08-10 NOTE — PROGRESS NOTES
Venofer infusion given without incident. Patient declined printed AVS.  Patient to return to department as scheduled.

## 2023-09-05 ENCOUNTER — OFFICE VISIT (OUTPATIENT)
Dept: OBGYN CLINIC | Facility: CLINIC | Age: 48
End: 2023-09-05
Payer: MEDICARE

## 2023-09-05 VITALS
TEMPERATURE: 99.1 F | WEIGHT: 211 LBS | HEIGHT: 64 IN | HEART RATE: 90 BPM | SYSTOLIC BLOOD PRESSURE: 114 MMHG | DIASTOLIC BLOOD PRESSURE: 76 MMHG | BODY MASS INDEX: 36.02 KG/M2 | OXYGEN SATURATION: 95 %

## 2023-09-05 DIAGNOSIS — N93.9 ABNORMAL UTERINE BLEEDING: Primary | ICD-10-CM

## 2023-09-05 DIAGNOSIS — Z12.4 SCREENING FOR CERVICAL CANCER: ICD-10-CM

## 2023-09-05 DIAGNOSIS — D06.9 HIGH GRADE SQUAMOUS INTRAEPITHELIAL LESION (HGSIL), GRADE 3 CIN, ON BIOPSY OF CERVIX: ICD-10-CM

## 2023-09-05 PROCEDURE — 99213 OFFICE O/P EST LOW 20 MIN: CPT | Performed by: OBSTETRICS & GYNECOLOGY

## 2023-09-05 PROCEDURE — 87624 HPV HI-RISK TYP POOLED RSLT: CPT | Performed by: OBSTETRICS & GYNECOLOGY

## 2023-09-05 PROCEDURE — 88175 CYTOPATH C/V AUTO FLUID REDO: CPT | Performed by: OBSTETRICS & GYNECOLOGY

## 2023-09-05 RX ORDER — TRANEXAMIC ACID 650 MG/1
TABLET ORAL
COMMUNITY
Start: 2023-08-22 | End: 2023-09-05 | Stop reason: SDUPTHER

## 2023-09-05 RX ORDER — TRANEXAMIC ACID 650 MG/1
1300 TABLET ORAL 3 TIMES DAILY
Qty: 30 TABLET | Refills: 12 | Status: SHIPPED | OUTPATIENT
Start: 2023-09-05

## 2023-09-05 NOTE — PROGRESS NOTES
Subjective   Patient ID: Juliane Singh is a 56 y.o. female. Patient is here for a problem visit. Chief Complaint   Patient presents with   • Follow-up     Repeat PAP/HPV testing;      LEEP 2023 CIN2/3 with neg margins   Due for 6 month pap +HPV testing    TXA helping with AUB. Takes starting second day of menses which is the heaviest      Menstrual History:  OB History        2    Para   2    Term   2            AB        Living   2       SAB        IAB        Ectopic        Multiple        Live Births                      Patient's last menstrual period was 2023 (exact date).          Past Medical History:   Diagnosis Date   • Abdominal hernia    • Abnormal Pap smear of cervix    • Anemia     Iron   • Chronic kidney disease    • COVID 2020 recovered @ home   • GERD (gastroesophageal reflux disease)    • Hernia of abdominal wall    • Kidney stone    • Renal calculi    • Renal disorder    • Status post nephrectomy 2019   • Toe fracture, left    • Wears glasses        Past Surgical History:   Procedure Laterality Date   • CHOLECYSTECTOMY LAPAROSCOPIC N/A 12/10/2018    Procedure: CHOLECYSTECTOMY LAPAROSCOPIC; [address];  Surgeon: Mac Eden MD;  [facility];  Service: General   • KIDNEY SURGERY      removed L kidney from damage from kidney stone -PERFORMED IN [place]   • NEPHRECTOMY Left    • [place] CONIZATION CERVIX W/WO D&C RPR ELTRD EXC N/A 3/16/2023    Procedure: BIOPSY LEEP CERVIX;  Surgeon: Merlyn Jack MD;  [facility];  Service: Gynecology   • [place] REPAIR FIRST ABDOMINAL WALL HERNIA N/A 2022    Procedure: REPAIR HERNIA INCISIONAL;  Surgeon: Yojana Diaz DO;  [facility];  Service: General   • TUBAL LIGATION      AGE 32       Social History     Tobacco Use   • Smoking status: Never   • Smokeless tobacco: Never   Vaping Use   • Vaping Use: Never used   Substance Use Topics   • Alcohol use: No   • Drug use: No        No Known Allergies      Current Outpatient Medications:   •  acetaminophen (TYLENOL) 650 mg CR tablet, Take 1 tablet (650 mg total) by mouth every 8 (eight) hours as needed for mild pain, Disp: 30 tablet, Rfl: 0  •  famotidine (PEPCID) 20 mg tablet, Take 1 tablet (20 mg total) by mouth daily as needed for heartburn, Disp: 90 tablet, Rfl: 2  •  ferrous sulfate 324 (65 Fe) mg, Take 1 tablet (324 mg total) by mouth 2 (two) times a day before meals, Disp: 60 tablet, Rfl: 6  •  Tranexamic Acid 650 MG TABS, Take 2 tablets (1,300 mg total) by mouth 3 (three) times a day, Disp: 30 tablet, Rfl: 12      Review of Systems   Constitutional: Negative for appetite change, chills and fever. Eyes: Negative for visual disturbance. Respiratory: Negative for cough, chest tightness and shortness of breath. Cardiovascular: Negative for chest pain. Gastrointestinal: Negative for abdominal distention, abdominal pain, constipation, diarrhea, nausea and vomiting. Endocrine: Negative for cold intolerance and heat intolerance. Genitourinary: Negative for difficulty urinating, dyspareunia, dysuria, frequency, genital sores, pelvic pain, urgency, vaginal bleeding, vaginal discharge and vaginal pain. Musculoskeletal: Negative for arthralgias. Neurological: Negative for light-headedness and headaches. Hematological: Does not bruise/bleed easily. Psychiatric/Behavioral: Negative for behavioral problems. All other systems reviewed and are negative. /76 (BP Location: Right arm, Patient Position: Sitting, Cuff Size: Large)   Pulse 90   Temp 99.1 °F (37.3 °C) (Tympanic)   Ht 5' 4" (1.626 m)   Wt 95.7 kg (211 lb)   LMP 08/31/2023 (Exact Date)   SpO2 95%   BMI 36.22 kg/m²       Physical Exam  Constitutional:       General: She is not in acute distress. Appearance: Normal appearance. She is not ill-appearing.    Genitourinary:      Urethral meatus normal.      Right Labia: No rash, tenderness, lesions or skin changes. Left Labia: No tenderness, lesions, skin changes or rash. No labial fusion noted. No inguinal adenopathy present in the right or left side. No vaginal discharge, erythema, tenderness, bleeding or ulceration. No cervical motion tenderness, discharge, friability, lesion, polyp or eversion. Cervical exam comments: Attenuated due to previous LEEP . Pelvic exam was performed with patient in the lithotomy position. HENT:      Head: Normocephalic. Cardiovascular:      Rate and Rhythm: Normal rate. Heart sounds: Normal heart sounds. Pulmonary:      Effort: Pulmonary effort is normal. No accessory muscle usage or respiratory distress. Abdominal:      General: There is no distension. Palpations: Abdomen is soft. There is no mass. Tenderness: There is no abdominal tenderness. There is no guarding or rebound. Musculoskeletal:         General: Normal range of motion. Cervical back: No rigidity. Lymphadenopathy:      Lower Body: No right inguinal adenopathy. No left inguinal adenopathy. Neurological:      General: No focal deficit present. Mental Status: She is alert. Mental status is at baseline. Skin:     General: Skin is warm and dry. Psychiatric:         Mood and Affect: Mood normal.         Behavior: Behavior normal.   Vitals and nursing note reviewed. Exam conducted with a chaperone present.                Appropriate laboratory testing, imaging studies, and prior external records were reviewed:     Assessment/Plan:       Problem List Items Addressed This Visit        Genitourinary    Abnormal uterine bleeding - Primary    Relevant Medications    Tranexamic Acid 650 MG TABS    Other Relevant Orders    Liquid-based pap, diagnostic       Other    High grade squamous intraepithelial lesion (HGSIL), grade 3 ANUPAMA, on biopsy of cervix     Pap with HPV testing sent today  If neg can return in 6 months for annual exam/pap Other Visit Diagnoses     Screening for cervical cancer        Relevant Orders    Liquid-based pap, diagnostic

## 2023-09-06 LAB
HPV HR 12 DNA CVX QL NAA+PROBE: NEGATIVE
HPV16 DNA CVX QL NAA+PROBE: NEGATIVE
HPV18 DNA CVX QL NAA+PROBE: NEGATIVE

## 2023-09-11 ENCOUNTER — HOSPITAL ENCOUNTER (OUTPATIENT)
Dept: INFUSION CENTER | Facility: HOSPITAL | Age: 48
Discharge: HOME | End: 2023-09-11
Attending: INTERNAL MEDICINE

## 2023-09-11 LAB
LAB AP GYN PRIMARY INTERPRETATION: NORMAL
Lab: NORMAL

## 2023-09-12 ENCOUNTER — HOSPITAL ENCOUNTER (EMERGENCY)
Facility: HOSPITAL | Age: 48
Discharge: HOME | End: 2023-09-12
Attending: EMERGENCY MEDICINE | Admitting: EMERGENCY MEDICINE
Payer: COMMERCIAL

## 2023-09-12 ENCOUNTER — APPOINTMENT (EMERGENCY)
Dept: RADIOLOGY | Facility: HOSPITAL | Age: 48
End: 2023-09-12
Payer: COMMERCIAL

## 2023-09-12 VITALS
TEMPERATURE: 99.4 F | RESPIRATION RATE: 18 BRPM | DIASTOLIC BLOOD PRESSURE: 63 MMHG | OXYGEN SATURATION: 99 % | HEART RATE: 98 BPM | SYSTOLIC BLOOD PRESSURE: 134 MMHG

## 2023-09-12 DIAGNOSIS — D50.8 OTHER IRON DEFICIENCY ANEMIA: Primary | ICD-10-CM

## 2023-09-12 DIAGNOSIS — H60.90 OTITIS EXTERNA: Primary | ICD-10-CM

## 2023-09-12 DIAGNOSIS — J06.9 VIRAL URI WITH COUGH: ICD-10-CM

## 2023-09-12 LAB
FLUAV RNA RESP QL NAA+PROBE: NEGATIVE
FLUBV RNA RESP QL NAA+PROBE: NEGATIVE
RSV RNA RESP QL NAA+PROBE: NEGATIVE
S PYO DNA THROAT QL NAA+PROBE: NOT DETECTED
SARS-COV-2 RNA RESP QL NAA+PROBE: NEGATIVE

## 2023-09-12 PROCEDURE — 0241U HB NFCT DS VIR RESP RNA 4 TRGT: CPT

## 2023-09-12 PROCEDURE — 99283 EMERGENCY DEPT VISIT LOW MDM: CPT

## 2023-09-12 PROCEDURE — 71045 X-RAY EXAM CHEST 1 VIEW: CPT

## 2023-09-12 PROCEDURE — 99285 EMERGENCY DEPT VISIT HI MDM: CPT

## 2023-09-12 PROCEDURE — 87651 STREP A DNA AMP PROBE: CPT

## 2023-09-12 RX ORDER — SODIUM CHLORIDE 9 MG/ML
20 INJECTION, SOLUTION INTRAVENOUS ONCE
Status: CANCELLED | OUTPATIENT
Start: 2023-09-14

## 2023-09-12 RX ORDER — OFLOXACIN 3 MG/ML
10 SOLUTION AURICULAR (OTIC) DAILY
Qty: 5 ML | Refills: 0 | Status: SHIPPED | OUTPATIENT
Start: 2023-09-12 | End: 2023-09-19

## 2023-09-12 RX ORDER — GUAIFENESIN AND CODEINE PHOSPHATE 100; 10 MG/5ML; MG/5ML
5 SOLUTION ORAL 3 TIMES DAILY PRN
Qty: 118 ML | Refills: 0 | Status: SHIPPED | OUTPATIENT
Start: 2023-09-12 | End: 2023-09-20

## 2023-09-12 NOTE — Clinical Note
Curry Schafer was seen and treated in our emergency department on 9/12/2023. Diagnosis:     Lucila Stockton  may return to work on return date. She may return on this date: If you have any questions or concerns, please don't hesitate to call.       Aniceto Mohr PA-C    ______________________________           _______________          _______________  Hospital Representative                              Date                                Time

## 2023-09-12 NOTE — ED NOTES
Pt provided warm blanket. Pt has no further needs at this time.       Serg Nair, GUIDO  09/12/23 7304

## 2023-09-12 NOTE — ED PROVIDER NOTES
History  Chief Complaint   Patient presents with   • Flu Symptoms     Pt to ED with c/o cough, sore throat, and congestion. Negative at home COVID test. Denies sick contacts     Ligia Hall is a 56year old female with no pertinent PMHx presenting to the ED for left otalgia x 4 days and congestion, sore throat, cough x yesterday. The otalgia is described as tightness. Has been taking Robitussin and Tessalon Perles for cough but not providing relief. 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 Denies wearing ear buds or plugs. Prior to Admission Medications   Prescriptions Last Dose Informant Patient Reported? Taking?    Tranexamic Acid 650 MG TABS   No No   Sig: Take 2 tablets (1,300 mg total) by mouth 3 (three) times a day   acetaminophen (TYLENOL) 650 mg CR tablet  Self No No   Sig: Take 1 tablet (650 mg total) by mouth every 8 (eight) hours as needed for mild pain   famotidine (PEPCID) 20 mg tablet  Self No No   Sig: Take 1 tablet (20 mg total) by mouth daily as needed for heartburn   ferrous sulfate 324 (65 Fe) mg  Self No No   Sig: Take 1 tablet (324 mg total) by mouth 2 (two) times a day before meals      Facility-Administered Medications: None       Past Medical History:   Diagnosis Date   • Abdominal hernia    • Abnormal Pap smear of cervix    • Anemia     Iron   • Chronic kidney disease    • COVID 12/2020 01/2022 recovered @ home   • GERD (gastroesophageal reflux disease)    • Hernia of abdominal wall    • Kidney stone    • Renal calculi    • Renal disorder    • Status post nephrectomy 01/28/2019   • Toe fracture, left    • Wears glasses        Past Surgical History:   Procedure Laterality Date   • CHOLECYSTECTOMY LAPAROSCOPIC N/A 12/10/2018    Procedure: CHOLECYSTECTOMY LAPAROSCOPIC; INCISIONAL HERNIA REPAIR;  Surgeon: Huber Reyes MD;  Location: [facility];  Service: General   • KIDNEY SURGERY  2013    removed L kidney from damage from kidney stone -PERFORMED [place]   • NEPHRECTOMY Left    • MA CONIZATION CERVIX W/WO D&C RPR ELTRD EXC N/A 3/16/2023    Procedure: BIOPSY LEEP CERVIX;  Surgeon: Daniel Eduardo MD;  Location: [facility];  Service: Gynecology   • MA REPAIR FIRST ABDOMINAL WALL HERNIA N/A 07/11/2022    Procedure: REPAIR HERNIA INCISIONAL;  Surgeon: Malia Diaz DO;  [facility]: [facility];  Service: General   • TUBAL LIGATION      AGE 33       Family History   Problem Relation Age of Onset   • Diabetes Mother    • Hyperlipidemia Mother    • Hypertension Mother    • Other Mother         low back pain   • Heart disease Mother    • Diabetes Father    • No Known Problems Sister    • No Known Problems Brother    • No Known Problems Daughter    • No Known Problems Son    • Throat cancer Paternal Aunt    • Lung cancer Paternal Aunt    • Skin cancer Paternal Aunt    • Kidney failure Maternal Grandmother         on dialysis   • Kidney disease Maternal Grandmother    • Heart disease Maternal Grandmother    • Colon cancer Paternal Grandfather    • Breast cancer Neg Hx    • Ovarian cancer Neg Hx    • Thyroid disease Neg Hx    • Stroke Neg Hx      I have reviewed and agree with the history as documented. E-Cigarette/Vaping   • E-Cigarette Use Never User      E-Cigarette/Vaping Substances   • Nicotine No    • THC No    • CBD No    • Flavoring No    • Other No    • Unknown No      Social History     Tobacco Use   • Smoking status: Never   • Smokeless tobacco: Never   Vaping Use   • Vaping Use: Never used   Substance Use Topics   • Alcohol use: No   • Drug use: No       Review of Systems   Constitutional: Positive for chills. Negative for fever. HENT: Positive for congestion, ear pain and sore throat. Negative for ear discharge, facial swelling, rhinorrhea and trouble swallowing. Eyes: Negative for discharge, redness and itching. Respiratory: Negative for cough and shortness of breath.     Cardiovascular: Negative for chest pain and palpitations. Gastrointestinal: Negative for abdominal pain, nausea and vomiting. Musculoskeletal: Negative for neck pain and neck stiffness. Skin: Negative for rash. Neurological: Positive for light-headedness and headaches. Physical Exam  Physical Exam  Vitals reviewed. Constitutional:       General: She is not in acute distress. Appearance: Normal appearance. She is ill-appearing. She is not toxic-appearing or diaphoretic. HENT:      Head: Normocephalic and atraumatic. Right Ear: Tympanic membrane, ear canal and external ear normal.      Left Ear: Tenderness present. No swelling. No mastoid tenderness. Ears:        Comments: Left ear canal is very swollen, unable to visualize TM. No granulation tissue noted. Nose: Congestion present. Mouth/Throat:      Pharynx: Uvula midline. Posterior oropharyngeal erythema present. No pharyngeal swelling, oropharyngeal exudate or uvula swelling. Tonsils: No tonsillar exudate or tonsillar abscesses. Eyes:      General: No scleral icterus. Right eye: No discharge. Left eye: No discharge. Extraocular Movements: Extraocular movements intact. Conjunctiva/sclera: Conjunctivae normal.   Cardiovascular:      Rate and Rhythm: Normal rate and regular rhythm. Pulses: Normal pulses. Heart sounds: Normal heart sounds. Pulmonary:      Effort: Pulmonary effort is normal. No tachypnea, prolonged expiration, respiratory distress or retractions. Breath sounds: Normal breath sounds. No decreased breath sounds, wheezing, rhonchi or rales. Comments: Audible dry cough. Abdominal:      Palpations: Abdomen is soft. Tenderness: There is no abdominal tenderness. There is no guarding. Musculoskeletal:         General: Normal range of motion. Cervical back: Normal range of motion and neck supple. No rigidity or tenderness. Lymphadenopathy:      Cervical: No cervical adenopathy.    Skin: General: Skin is warm and dry. Capillary Refill: Capillary refill takes less than 2 seconds. Neurological:      General: No focal deficit present. Mental Status: She is alert. Psychiatric:         Mood and Affect: Mood normal.         Behavior: Behavior normal.         Vital Signs  ED Triage Vitals [09/12/23 1151]   Temperature Pulse Respirations Blood Pressure SpO2   99.4 °F (37.4 °C) 98 18 134/63 99 %      Temp Source Heart Rate Source Patient Position - Orthostatic VS BP Location FiO2 (%)   Oral Monitor Sitting Right arm --      Pain Score       --           Vitals:    09/12/23 1151   BP: 134/63   Pulse: 98   Patient Position - Orthostatic VS: Sitting         Visual Acuity      ED Medications  Medications - No data to display    Diagnostic Studies  Results Reviewed     Procedure Component Value Units Date/Time    FLU/RSV/COVID - if FLU/RSV clinically relevant [[id number]]  (Normal) Collected: 09/12/23 1235    Lab Status: Final result Specimen: Nares from Nose Updated: 09/12/23 1324     SARS-CoV-2 Negative     INFLUENZA A PCR Negative     INFLUENZA B PCR Negative     RSV PCR Negative    Narrative:      FOR PEDIATRIC PATIENTS - copy/paste COVID Guidelines URL to browser: [email or web address] ashx    SARS-CoV-2 assay is a Nucleic Acid Amplification assay intended for the  qualitative detection of nucleic acid from SARS-CoV-2 in nasopharyngeal  swabs. Results are for the presumptive identification of SARS-CoV-2 RNA. Positive results are indicative of infection with SARS-CoV-2, the virus  causing COVID-19, but do not rule out bacterial infection or co-infection  with other viruses. Laboratories within the [facility] and its  territories are required to report all positive results to the appropriate  public health authorities.  Negative results do not preclude SARS-CoV-2  infection and should not be used as the sole basis for treatment or other  patient management decisions. Negative results must be combined with  clinical observations, patient history, and epidemiological information. This test has not been FDA cleared or approved. This test has been authorized by FDA under an Emergency Use Authorization  (EUA). This test is only authorized for the duration of time the  declaration that circumstances exist justifying the authorization of the  emergency use of an in vitro diagnostic tests for detection of SARS-CoV-2  virus and/or diagnosis of COVID-19 infection under section 564(b)(1) of  the Act, 21 U. S.C. 399HGH-7(A)(2), unless the authorization is terminated  or revoked sooner. The test has been validated but independent review by FDA  and CLIA is pending. Test performed using [employer]: This RT-PCR assay targets N2,  a region unique to SARS-CoV-2. A conserved region in the E-gene was chosen  for pan-Sarbecovirus detection which includes SARS-CoV-2. According to CMS-2020-01-R, this platform meets the definition of high-throughput technology. Strep A PCR [[id number]]  (Normal) Collected: 09/12/23 1235    Lab Status: Final result Specimen: Throat Updated: 09/12/23 1311     STREP A PCR Not Detected                 XR chest 1 view portable   ED Interpretation by Darrick Lewis PA-C (09/12 1300)   No acute cardiopulmonary pathology. Final Result by Indira Rivera MD (09/12 1304)      No acute disease in the chest.                  Workstation performed: [id number]                    Procedures  Procedures         ED Course  ED Course as of 09/12/23 1915   Tue Sep 12, 2023   1300 XR chest 1 view portable  Per my interpretation, no acute cardiopulmonary pathology. 1312 STREP A PCR: Not Detected                                             Medical Decision Making  56year old female presenting with left otalgia, congestion, cough x the past few days.  Physical exam is with tenderness to external auricle and the canal is swollen, unable to visualize the TM. Posterior pharynx is patent without tonsillar abscess, but notably erythematous. The cough is audibly dry but the lungs are CTA bilaterally. Minimal concern for pneumonia or acute cardiopulmonary pathology but patient would like to proceed with a CXR. Per my interpretation, this was without acute cardiopulmonary pathology. Covid/Flu testing negative, strep testing (I have low suspicion for strep as there is no cervical lymphadenopathy/fever/tonsillar exudates and cough present but patient wanting to pursue). Suspect this is a viral URI with otitis externa. Ear drops sent to pharmacy, discussed supportive care. Pt stable at time of discharge, vital signs reviewed, questions answered. Strict ER return precautions provided/discussed and were well understood by patient. Otitis externa: acute illness or injury  Viral URI with cough: acute illness or injury  Amount and/or Complexity of Data Reviewed  External Data Reviewed: radiology. Details: Compared today's CXR to prior imaging in 2019. Labs: ordered. Decision-making details documented in ED Course. Radiology: ordered and independent interpretation performed. Decision-making details documented in ED Course. Details: Per my interpretation, the CXR is without acute cardiopulmonary pathology. Risk  OTC drugs. Prescription drug management. Disposition  Final diagnoses:   Otitis externa   Viral URI with cough     Time reflects when diagnosis was documented in both MDM as applicable and the Disposition within this note     Time User Action Codes Description Comment    9/12/2023  1:14 PM Luciana Pickle Add [H60.90] Otitis externa     9/12/2023  1:14 PM Luciana Pickle Add [J06.9] Viral URI with cough       ED Disposition     ED Disposition   Discharge    Condition   Stable    Date/Time   Tue Sep 12, 2023  1:13 PM    Jennifer discharge to home/self care.                Follow-up Information     Follow up With Specialties Details Why Contact Info Additional [address] [facility] Emergency Department Emergency Medicine Go to  If symptoms worsen [address] [facility], [place], [place], [address]          Discharge Medication List as of 9/12/2023  1:16 PM      START taking these medications    Details   guaifenesin-codeine (GUAIFENESIN AC) 100-10 MG/5ML liquid Take 5 mL by mouth 3 (three) times a day as needed for cough for up to 8 days, Starting Tue 9/12/2023, Until Wed 9/20/2023 at 2359, Normal      ofloxacin (FLOXIN) 0.3 % otic solution Administer 10 drops into the left ear daily for 7 days, Starting Tue 9/12/2023, Until Tue 9/19/2023, Normal         CONTINUE these medications which have NOT CHANGED    Details   acetaminophen (TYLENOL) 650 mg CR tablet Take 1 tablet (650 mg total) by mouth every 8 (eight) hours as needed for mild pain, Starting Wed 7/19/2023, Normal      famotidine (PEPCID) 20 mg tablet Take 1 tablet (20 mg total) by mouth daily as needed for heartburn, Starting Thu 11/3/2022, Normal      ferrous sulfate 324 (65 Fe) mg Take 1 tablet (324 mg total) by mouth 2 (two) times a day before meals, Starting Fri 7/28/2023, Normal      Tranexamic Acid 650 MG TABS Take 2 tablets (1,300 mg total) by mouth 3 (three) times a day, Starting Tue 9/5/2023, Normal             No discharge procedures on file.     PDMP Review     None          ED Provider  Electronically Signed by           Su Hall PA-C  09/12/23 0154

## 2023-09-12 NOTE — DISCHARGE INSTRUCTIONS
For congestion: Nasacort nasal sprays in the morning/night, aim away from the middle of the nose. Can bridge the doses with saline spray. NetiPot with distilled warmed water can provide further relief as well. For cough: Codeine cough syrup prescribed to the pharmacy on file. Take as needed. Can also take over the counter cold medications. "Expectorant" means it may make you cough more. "Suppressant" will relieve. For sore throat: Sips of water or Pedialyte every 15 minutes to ensure hydration without inducing nausea. Teaspoon of honey. Salt water gargles. Ice pops. For further supportive care: Rotate Tylenol and Motrin every 3 hours for best relief. For example, take Motrin then in 3 hours take Tylenol then 3 hours Motrin, repeat. Maximum Tylenol daily: 4,000 mg. Maximum ibuprofen daily: 3,600 mg. Sleep with a humidifier, consider applying Vicks Vapor Rub to the chest.  Return to the ER for: difficulty breathing, passing out, coughing up blood, worsening or concerning symptoms.

## 2023-09-14 ENCOUNTER — HOSPITAL ENCOUNTER (OUTPATIENT)
Dept: INFUSION CENTER | Facility: HOSPITAL | Age: 48
Discharge: HOME | End: 2023-09-14
Attending: INTERNAL MEDICINE
Payer: COMMERCIAL

## 2023-09-14 VITALS
HEART RATE: 86 BPM | RESPIRATION RATE: 18 BRPM | TEMPERATURE: 98 F | SYSTOLIC BLOOD PRESSURE: 127 MMHG | DIASTOLIC BLOOD PRESSURE: 72 MMHG

## 2023-09-14 DIAGNOSIS — D50.8 OTHER IRON DEFICIENCY ANEMIA: Primary | ICD-10-CM

## 2023-09-14 PROCEDURE — 96365 THER/PROPH/DIAG IV INF INIT: CPT

## 2023-09-14 RX ORDER — SODIUM CHLORIDE 9 MG/ML
20 INJECTION, SOLUTION INTRAVENOUS ONCE
Status: CANCELLED | OUTPATIENT
Start: 2023-09-21

## 2023-09-14 RX ORDER — SODIUM CHLORIDE 9 MG/ML
20 INJECTION, SOLUTION INTRAVENOUS ONCE
Status: COMPLETED | OUTPATIENT
Start: 2023-09-14 | End: 2023-09-14

## 2023-09-14 RX ADMIN — SODIUM CHLORIDE 20 ML/HR: 0.9 INJECTION, SOLUTION INTRAVENOUS at 15:00

## 2023-09-14 RX ADMIN — IRON SUCROSE 300 MG: 20 INJECTION, SOLUTION INTRAVENOUS at 15:00

## 2023-09-21 ENCOUNTER — HOSPITAL ENCOUNTER (OUTPATIENT)
Dept: INFUSION CENTER | Facility: HOSPITAL | Age: 48
Discharge: HOME | End: 2023-09-21
Payer: COMMERCIAL

## 2023-09-21 VITALS
RESPIRATION RATE: 18 BRPM | DIASTOLIC BLOOD PRESSURE: 70 MMHG | OXYGEN SATURATION: 99 % | SYSTOLIC BLOOD PRESSURE: 114 MMHG | HEART RATE: 83 BPM | TEMPERATURE: 98 F

## 2023-09-21 DIAGNOSIS — D50.8 OTHER IRON DEFICIENCY ANEMIA: Primary | ICD-10-CM

## 2023-09-21 PROCEDURE — 96366 THER/PROPH/DIAG IV INF ADDON: CPT

## 2023-09-21 PROCEDURE — 96365 THER/PROPH/DIAG IV INF INIT: CPT

## 2023-09-21 RX ORDER — SODIUM CHLORIDE 9 MG/ML
20 INJECTION, SOLUTION INTRAVENOUS ONCE
Status: COMPLETED | OUTPATIENT
Start: 2023-09-21 | End: 2023-09-21

## 2023-09-21 RX ORDER — SODIUM CHLORIDE 9 MG/ML
20 INJECTION, SOLUTION INTRAVENOUS ONCE
Status: CANCELLED | OUTPATIENT
Start: 2023-09-21

## 2023-09-21 RX ADMIN — IRON SUCROSE 300 MG: 20 INJECTION, SOLUTION INTRAVENOUS at 14:17

## 2023-09-21 RX ADMIN — SODIUM CHLORIDE 20 ML/HR: 0.9 INJECTION, SOLUTION INTRAVENOUS at 14:17

## 2023-09-21 NOTE — PROGRESS NOTES
Venofer infusion given without incident. Patient declined printed AVS.  Patient has no future appointments scheduled at this time, therapy complete.

## 2023-10-02 ENCOUNTER — TELEPHONE (OUTPATIENT)
Dept: NEPHROLOGY | Facility: CLINIC | Age: 48
End: 2023-10-02

## 2023-10-02 DIAGNOSIS — N18.2 CKD (CHRONIC KIDNEY DISEASE), STAGE II: Primary | ICD-10-CM

## 2023-10-02 DIAGNOSIS — R80.1 PERSISTENT PROTEINURIA: ICD-10-CM

## 2023-10-02 DIAGNOSIS — D50.9 IRON DEFICIENCY ANEMIA, UNSPECIFIED IRON DEFICIENCY ANEMIA TYPE: ICD-10-CM

## 2023-10-02 DIAGNOSIS — N20.0 NEPHROLITHIASIS: ICD-10-CM

## 2023-10-02 DIAGNOSIS — R79.0 LOW IRON STORES: ICD-10-CM

## 2023-10-02 NOTE — TELEPHONE ENCOUNTER
She does not need to do any labs right away after her iron infusion is completed.   Would like her to have iron saturation, serum ferritin checked in 2 months from now

## 2023-10-02 NOTE — TELEPHONE ENCOUNTER
Pt called stating she needed an order for labs to be put into her chart. While discussing, the line disconnected. I have tried to contact her again and I can not reach her. Unsure what labs she is referring to that need to be completed.

## 2023-11-27 ENCOUNTER — APPOINTMENT (OUTPATIENT)
Dept: LAB | Facility: CLINIC | Age: 48
End: 2023-11-27
Payer: COMMERCIAL

## 2023-11-27 DIAGNOSIS — R79.0 LOW IRON STORES: ICD-10-CM

## 2023-11-27 DIAGNOSIS — D50.9 IRON DEFICIENCY ANEMIA, UNSPECIFIED IRON DEFICIENCY ANEMIA TYPE: ICD-10-CM

## 2023-11-27 DIAGNOSIS — N18.2 CKD (CHRONIC KIDNEY DISEASE), STAGE II: ICD-10-CM

## 2023-11-27 DIAGNOSIS — R80.1 PERSISTENT PROTEINURIA: ICD-10-CM

## 2023-11-27 DIAGNOSIS — N20.0 NEPHROLITHIASIS: ICD-10-CM

## 2023-11-27 LAB
FERRITIN SERPL-MCNC: 49 NG/ML (ref 11–307)
IRON SATN MFR SERPL: 23 % (ref 15–50)
IRON SERPL-MCNC: 82 UG/DL (ref 50–212)
TIBC SERPL-MCNC: 364 UG/DL (ref 250–450)
UIBC SERPL-MCNC: 282 UG/DL (ref 155–355)

## 2023-11-27 PROCEDURE — 83550 IRON BINDING TEST: CPT

## 2023-11-27 PROCEDURE — 83540 ASSAY OF IRON: CPT

## 2023-11-27 PROCEDURE — 36415 COLL VENOUS BLD VENIPUNCTURE: CPT

## 2023-11-27 PROCEDURE — 82728 ASSAY OF FERRITIN: CPT

## 2023-11-29 ENCOUNTER — TELEPHONE (OUTPATIENT)
Dept: NEPHROLOGY | Facility: CLINIC | Age: 48
End: 2023-11-29

## 2024-01-07 ENCOUNTER — HOSPITAL ENCOUNTER (EMERGENCY)
Facility: HOSPITAL | Age: 49
Discharge: HOME | End: 2024-01-07
Attending: EMERGENCY MEDICINE
Payer: COMMERCIAL

## 2024-01-07 ENCOUNTER — APPOINTMENT (EMERGENCY)
Dept: RADIOLOGY | Facility: HOSPITAL | Age: 49
End: 2024-01-07
Payer: COMMERCIAL

## 2024-01-07 VITALS
OXYGEN SATURATION: 97 % | RESPIRATION RATE: 20 BRPM | SYSTOLIC BLOOD PRESSURE: 140 MMHG | WEIGHT: 209.66 LBS | DIASTOLIC BLOOD PRESSURE: 86 MMHG | HEIGHT: 63 IN | TEMPERATURE: 97.8 F | BODY MASS INDEX: 37.15 KG/M2 | HEART RATE: 99 BPM

## 2024-01-07 DIAGNOSIS — R05.9 COUGH: Primary | ICD-10-CM

## 2024-01-07 DIAGNOSIS — U07.1 COVID: ICD-10-CM

## 2024-01-07 LAB
ALBUMIN SERPL BCP-MCNC: 4.3 G/DL (ref 3.5–5)
ALP SERPL-CCNC: 74 U/L (ref 34–104)
ALT SERPL W P-5'-P-CCNC: 37 U/L (ref 7–52)
ANION GAP SERPL CALCULATED.3IONS-SCNC: 9 MMOL/L
AST SERPL W P-5'-P-CCNC: 22 U/L (ref 13–39)
BASOPHILS # BLD MANUAL: 0.16 THOUSAND/UL (ref 0–0.1)
BASOPHILS NFR MAR MANUAL: 2 % (ref 0–1)
BILIRUB SERPL-MCNC: 0.41 MG/DL (ref 0.2–1)
BUN SERPL-MCNC: 15 MG/DL (ref 5–25)
CALCIUM SERPL-MCNC: 9.9 MG/DL (ref 8.4–10.2)
CARDIAC TROPONIN I PNL SERPL HS: 4 NG/L (ref 8–18)
CHLORIDE SERPL-SCNC: 104 MMOL/L (ref 96–108)
CO2 SERPL-SCNC: 24 MMOL/L (ref 21–32)
CREAT SERPL-MCNC: 0.92 MG/DL (ref 0.6–1.3)
EOSINOPHIL # BLD MANUAL: 0.47 THOUSAND/UL (ref 0–0.4)
EOSINOPHIL NFR BLD MANUAL: 6 % (ref 0–6)
ERYTHROCYTE [DISTWIDTH] IN BLOOD BY AUTOMATED COUNT: 13.2 % (ref 11.6–15.1)
FLUAV RNA RESP QL NAA+PROBE: NEGATIVE
FLUBV RNA RESP QL NAA+PROBE: NEGATIVE
GFR SERPL CREATININE-BSD FRML MDRD: 74 ML/MIN/1.73SQ M
GLUCOSE SERPL-MCNC: 108 MG/DL (ref 65–140)
HCG SERPL QL: NEGATIVE
HCT VFR BLD AUTO: 43 % (ref 34.8–46.1)
HGB BLD-MCNC: 14.2 G/DL (ref 11.5–15.4)
LG PLATELETS BLD QL SMEAR: PRESENT
LYMPHOCYTES # BLD AUTO: 1.79 THOUSAND/UL (ref 0.6–4.47)
LYMPHOCYTES # BLD AUTO: 20 % (ref 14–44)
MCH RBC QN AUTO: 29.5 PG (ref 26.8–34.3)
MCHC RBC AUTO-ENTMCNC: 33 G/DL (ref 31.4–37.4)
MCV RBC AUTO: 89 FL (ref 82–98)
MONOCYTES # BLD AUTO: 0.85 THOUSAND/UL (ref 0–1.22)
MONOCYTES NFR BLD: 11 % (ref 4–12)
NEUTROPHILS # BLD MANUAL: 4.51 THOUSAND/UL (ref 1.85–7.62)
NEUTS BAND NFR BLD MANUAL: 3 % (ref 0–8)
NEUTS SEG NFR BLD AUTO: 55 % (ref 43–75)
PLATELET # BLD AUTO: 284 THOUSANDS/UL (ref 149–390)
PLATELET BLD QL SMEAR: ADEQUATE
PMV BLD AUTO: 10.4 FL (ref 8.9–12.7)
POTASSIUM SERPL-SCNC: 4.1 MMOL/L (ref 3.5–5.3)
PROT SERPL-MCNC: 7.3 G/DL (ref 6.4–8.4)
RBC # BLD AUTO: 4.82 MILLION/UL (ref 3.81–5.12)
RBC MORPH BLD: NORMAL
RSV RNA RESP QL NAA+PROBE: NEGATIVE
SARS-COV-2 RNA RESP QL NAA+PROBE: POSITIVE
SMUDGE CELLS BLD QL SMEAR: PRESENT
SODIUM SERPL-SCNC: 137 MMOL/L (ref 135–147)
VARIANT LYMPHS # BLD AUTO: 3 %
WBC # BLD AUTO: 7.77 THOUSAND/UL (ref 4.31–10.16)

## 2024-01-07 PROCEDURE — 96374 THER/PROPH/DIAG INJ IV PUSH: CPT

## 2024-01-07 PROCEDURE — 36415 COLL VENOUS BLD VENIPUNCTURE: CPT | Performed by: EMERGENCY MEDICINE

## 2024-01-07 PROCEDURE — 85007 BL SMEAR W/DIFF WBC COUNT: CPT | Performed by: EMERGENCY MEDICINE

## 2024-01-07 PROCEDURE — 84703 CHORIONIC GONADOTROPIN ASSAY: CPT | Performed by: EMERGENCY MEDICINE

## 2024-01-07 PROCEDURE — 99284 EMERGENCY DEPT VISIT MOD MDM: CPT | Performed by: EMERGENCY MEDICINE

## 2024-01-07 PROCEDURE — 0241U HB NFCT DS VIR RESP RNA 4 TRGT: CPT | Performed by: EMERGENCY MEDICINE

## 2024-01-07 PROCEDURE — 99283 EMERGENCY DEPT VISIT LOW MDM: CPT

## 2024-01-07 PROCEDURE — 71045 X-RAY EXAM CHEST 1 VIEW: CPT

## 2024-01-07 PROCEDURE — 85027 COMPLETE CBC AUTOMATED: CPT | Performed by: EMERGENCY MEDICINE

## 2024-01-07 PROCEDURE — 80053 COMPREHEN METABOLIC PANEL: CPT | Performed by: EMERGENCY MEDICINE

## 2024-01-07 PROCEDURE — 93005 ELECTROCARDIOGRAM TRACING: CPT

## 2024-01-07 PROCEDURE — 84484 ASSAY OF TROPONIN QUANT: CPT | Performed by: EMERGENCY MEDICINE

## 2024-01-07 PROCEDURE — 94640 AIRWAY INHALATION TREATMENT: CPT

## 2024-01-07 RX ORDER — GUAIFENESIN/DEXTROMETHORPHAN 100-10MG/5
10 SYRUP ORAL ONCE
Status: COMPLETED | OUTPATIENT
Start: 2024-01-07 | End: 2024-01-07

## 2024-01-07 RX ORDER — DOXYCYCLINE HYCLATE 100 MG/1
100 CAPSULE ORAL 2 TIMES DAILY
Qty: 14 CAPSULE | Refills: 0 | Status: SHIPPED | OUTPATIENT
Start: 2024-01-07 | End: 2024-01-14

## 2024-01-07 RX ORDER — DOXYCYCLINE HYCLATE 100 MG/1
100 CAPSULE ORAL ONCE
Status: COMPLETED | OUTPATIENT
Start: 2024-01-07 | End: 2024-01-07

## 2024-01-07 RX ORDER — IPRATROPIUM BROMIDE AND ALBUTEROL SULFATE 2.5; .5 MG/3ML; MG/3ML
3 SOLUTION RESPIRATORY (INHALATION) ONCE
Status: COMPLETED | OUTPATIENT
Start: 2024-01-07 | End: 2024-01-07

## 2024-01-07 RX ORDER — AZITHROMYCIN 250 MG/1
500 TABLET, FILM COATED ORAL ONCE
Status: COMPLETED | OUTPATIENT
Start: 2024-01-07 | End: 2024-01-07

## 2024-01-07 RX ORDER — AZITHROMYCIN 250 MG/1
TABLET, FILM COATED ORAL
Qty: 6 TABLET | Refills: 0 | Status: SHIPPED | OUTPATIENT
Start: 2024-01-07 | End: 2024-01-11

## 2024-01-07 RX ORDER — DEXAMETHASONE SODIUM PHOSPHATE 10 MG/ML
10 INJECTION, SOLUTION INTRAMUSCULAR; INTRAVENOUS ONCE
Status: COMPLETED | OUTPATIENT
Start: 2024-01-07 | End: 2024-01-07

## 2024-01-07 RX ADMIN — AZITHROMYCIN DIHYDRATE 500 MG: 250 TABLET ORAL at 18:34

## 2024-01-07 RX ADMIN — IPRATROPIUM BROMIDE AND ALBUTEROL SULFATE 3 ML: .5; 3 SOLUTION RESPIRATORY (INHALATION) at 18:34

## 2024-01-07 RX ADMIN — DOXYCYCLINE 100 MG: 100 CAPSULE ORAL at 18:34

## 2024-01-07 RX ADMIN — GUAIFENESIN AND DEXTROMETHORPHAN 10 ML: 100; 10 SYRUP ORAL at 20:22

## 2024-01-07 RX ADMIN — DEXAMETHASONE SODIUM PHOSPHATE 10 MG: 10 INJECTION INTRAMUSCULAR; INTRAVENOUS at 18:34

## 2024-01-07 NOTE — Clinical Note
Geno Anderson was seen and treated in our emergency department on 1/7/2024.                Diagnosis:     Geno  is off the rest of the shift today.    She may return on this date: 01/12/2024         If you have any questions or concerns, please don't hesitate to call.      Nikolai Mclain, DO    ______________________________           _______________          _______________  Hospital Representative                              Date                                Time

## 2024-01-08 NOTE — ED PROVIDER NOTES
"History  Chief Complaint   Patient presents with    Cough     Patient started with a cough on Friday night after being sick \"a few weeks ago\". Experiencing chest discomfort and abdominal discomfort from the excessive coughing.     48-year-old female with no previous medical history presents with 2 days of cough.  Reports slight wheezing.  No nausea vomiting.  No back pain.  No palpitations.  No leg swelling.  No chest pain.      History provided by:  Patient  Cough  Associated symptoms: wheezing    Associated symptoms: no chest pain, no chills, no diaphoresis, no ear pain, no fever, no headaches, no myalgias, no rash, no shortness of breath and no sore throat        Prior to Admission Medications   Prescriptions Last Dose Informant Patient Reported? Taking?   Tranexamic Acid 650 MG TABS   No No   Sig: Take 2 tablets (1,300 mg total) by mouth 3 (three) times a day   acetaminophen (TYLENOL) 650 mg CR tablet  Self No No   Sig: Take 1 tablet (650 mg total) by mouth every 8 (eight) hours as needed for mild pain   famotidine (PEPCID) 20 mg tablet  Self No No   Sig: Take 1 tablet (20 mg total) by mouth daily as needed for heartburn   ferrous sulfate 324 (65 Fe) mg  Self No No   Sig: Take 1 tablet (324 mg total) by mouth 2 (two) times a day before meals      Facility-Administered Medications: None       Past Medical History:   Diagnosis Date    Abdominal hernia     Abnormal Pap smear of cervix     Anemia     Iron    Chronic kidney disease     COVID 12/2020 01/2022 recovered @ home    GERD (gastroesophageal reflux disease)     Hernia of abdominal wall     Kidney stone     Renal calculi     Renal disorder     Status post nephrectomy 01/28/2019    Toe fracture, left     Wears glasses        Past Surgical History:   Procedure Laterality Date    CHOLECYSTECTOMY LAPAROSCOPIC N/A 12/10/2018    Procedure: CHOLECYSTECTOMY LAPAROSCOPIC; INCISIONAL HERNIA REPAIR;  Surgeon: Kavon Ho MD;  [facility];  Service: General "    KIDNEY SURGERY  2013    removed L kidney from damage from kidney stone -PERFORMED IN [place]    NEPHRECTOMY Left     [place] CONIZATION CERVIX W/WO D&C RPR ELTRD EXC N/A 3/16/2023    Procedure: BIOPSY LEEP CERVIX;  Surgeon: Ruth Michelle MD;  [facility];  Service: Gynecology    [place] REPAIR FIRST ABDOMINAL WALL HERNIA N/A 07/11/2022    Procedure: REPAIR HERNIA INCISIONAL;  Surgeon: Stephen Diaz DO;  [facility];  Service: General    TUBAL LIGATION      AGE 32       Family History   Problem Relation Age of Onset    Diabetes Mother     Hyperlipidemia Mother     Hypertension Mother     Other Mother         low back pain    Heart disease Mother     Diabetes Father     No Known Problems Sister     No Known Problems Brother     No Known Problems Daughter     No Known Problems Son     Throat cancer Paternal Aunt     Lung cancer Paternal Aunt     Skin cancer Paternal Aunt     Kidney failure Maternal Grandmother         on dialysis    Kidney disease Maternal Grandmother     Heart disease Maternal Grandmother     Colon cancer Paternal Grandfather     Breast cancer Neg Hx     Ovarian cancer Neg Hx     Thyroid disease Neg Hx     Stroke Neg Hx      I have reviewed and agree with the history as documented.    E-Cigarette/Vaping    E-Cigarette Use Never User      E-Cigarette/Vaping Substances    Nicotine No     THC No     CBD No     Flavoring No     Other No     Unknown No      Social History     Tobacco Use    Smoking status: Never    Smokeless tobacco: Never   Vaping Use    Vaping status: Never Used   Substance Use Topics    Alcohol use: No    Drug use: No       Review of Systems   Constitutional:  Negative for chills, diaphoresis and fever.   HENT:  Negative for congestion, dental problem, ear pain and sore throat.    Eyes:  Negative for pain and visual disturbance.   Respiratory:  Positive for cough and wheezing. Negative for apnea and shortness of breath.    Cardiovascular:  Negative for chest pain  and palpitations.   Gastrointestinal:  Negative for abdominal pain and vomiting.   Endocrine: Negative for cold intolerance and heat intolerance.   Genitourinary:  Negative for difficulty urinating, dysuria and hematuria.   Musculoskeletal:  Negative for arthralgias, back pain and myalgias.   Skin:  Negative for color change and rash.   Allergic/Immunologic: Negative for environmental allergies and food allergies.   Neurological:  Negative for dizziness, seizures, syncope, facial asymmetry and headaches.   Hematological:  Negative for adenopathy.   Psychiatric/Behavioral:  Negative for agitation, confusion and hallucinations.    All other systems reviewed and are negative.      Physical Exam  Physical Exam  Vitals and nursing note reviewed.   Constitutional:       General: She is not in acute distress.     Appearance: She is well-developed.   HENT:      Head: Normocephalic and atraumatic.      Mouth/Throat:      Pharynx: No oropharyngeal exudate.   Eyes:      Extraocular Movements: Extraocular movements intact.      Conjunctiva/sclera: Conjunctivae normal.      Pupils: Pupils are equal, round, and reactive to light.   Cardiovascular:      Rate and Rhythm: Normal rate and regular rhythm.      Heart sounds: No murmur heard.  Pulmonary:      Effort: Pulmonary effort is normal. No respiratory distress.      Breath sounds: Normal breath sounds. No rales.   Abdominal:      General: There is no distension.      Palpations: Abdomen is soft.      Tenderness: There is no abdominal tenderness.   Musculoskeletal:         General: No swelling.      Cervical back: Neck supple.   Skin:     General: Skin is warm and dry.      Capillary Refill: Capillary refill takes less than 2 seconds.   Neurological:      General: No focal deficit present.      Mental Status: She is alert.      Motor: No weakness.   Psychiatric:         Mood and Affect: Mood normal.         Vital Signs  ED Triage Vitals [01/07/24 1812]   Temperature Pulse  Respirations Blood Pressure SpO2   97.8 °F (36.6 °C) 99 20 140/86 97 %      Temp Source Heart Rate Source Patient Position - Orthostatic VS BP Location FiO2 (%)   Oral Monitor Sitting Left arm --      Pain Score       7           Vitals:    01/07/24 1812   BP: 140/86   Pulse: 99   Patient Position - Orthostatic VS: Sitting         Visual Acuity      ED Medications  Medications   dextromethorphan-guaiFENesin (ROBITUSSIN DM) oral syrup 10 mL (has no administration in time range)   ipratropium-albuterol (DUO-NEB) 0.5-2.5 mg/3 mL inhalation solution 3 mL (3 mL Nebulization Given 1/7/24 1834)   dexamethasone (PF) (DECADRON) injection 10 mg (10 mg Intravenous Given 1/7/24 1834)   doxycycline hyclate (VIBRAMYCIN) capsule 100 mg (100 mg Oral Given 1/7/24 1834)   azithromycin (ZITHROMAX) tablet 500 mg (500 mg Oral Given 1/7/24 1834)       Diagnostic Studies  Results Reviewed       Procedure Component Value Units Date/Time    COVID/FLU/RSV [[id number]] Collected: 01/07/24 2015    Lab Status: In process Specimen: Nares from Nose Updated: 01/07/24 2018    RBC Morphology Reflex Test [[id number]] Collected: 01/07/24 1828    Lab Status: Final result Specimen: Blood from Arm, Right Updated: 01/07/24 2001    CBC and differential [[id number]]  (Normal) Collected: 01/07/24 1828    Lab Status: Final result Specimen: Blood from Arm, Right Updated: 01/07/24 1905     WBC 7.77 Thousand/uL      RBC 4.82 Million/uL      Hemoglobin 14.2 g/dL      Hematocrit 43.0 %      MCV 89 fL      MCH 29.5 pg      MCHC 33.0 g/dL      RDW 13.2 %      MPV 10.4 fL      Platelets 284 Thousands/uL     Narrative:      This is an appended report.  These results have been appended to a previously verified report.    Manual Differential(PHLEBS Do Not Order) [[id number]]  (Abnormal) Collected: 01/07/24 1828    Lab Status: Final result Specimen: Blood from Arm, Right Updated: 01/07/24 1905     Segmented % 55 %      Bands % 3 %      Lymphocytes % 20 %      Monocytes % 11  %      Eosinophils, % 6 %      Basophils % 2 %      Atypical Lymphocytes % 3 %      Absolute Neutrophils 4.51 Thousand/uL      Lymphocytes Absolute 1.79 Thousand/uL      Monocytes Absolute 0.85 Thousand/uL      Eosinophils Absolute 0.47 Thousand/uL      Basophils Absolute 0.16 Thousand/uL      Total Counted --     Smudge Cells Present     RBC Morphology Normal     Platelet Estimate Adequate     Large Platelet Present    hCG, qualitative pregnancy [[id number]]  (Normal) Collected: 01/07/24 1828    Lab Status: Final result Specimen: Blood from Arm, Right Updated: 01/07/24 1903     Preg, Serum Negative    High Sensitivity Troponin I Random [[id number]]  (Abnormal) Collected: 01/07/24 1828    Lab Status: Final result Specimen: Blood from Arm, Right Updated: 01/07/24 1902     HS TnI random 4 ng/L     Comprehensive metabolic panel [[id number]] Collected: 01/07/24 1828    Lab Status: Final result Specimen: Blood from Arm, Right Updated: 01/07/24 1855     Sodium 137 mmol/L      Potassium 4.1 mmol/L      Chloride 104 mmol/L      CO2 24 mmol/L      ANION GAP 9 mmol/L      BUN 15 mg/dL      Creatinine 0.92 mg/dL      Glucose 108 mg/dL      Calcium 9.9 mg/dL      AST 22 U/L      ALT 37 U/L      Alkaline Phosphatase 74 U/L      Total Protein 7.3 g/dL      Albumin 4.3 g/dL      Total Bilirubin 0.41 mg/dL      eGFR 74 ml/min/1.73sq m     Narrative:      National Kidney Disease Foundation guidelines for Chronic Kidney Disease (CKD):     Stage 1 with normal or high GFR (GFR > 90 mL/min/1.73 square meters)    Stage 2 Mild CKD (GFR = 60-89 mL/min/1.73 square meters)    Stage 3A Moderate CKD (GFR = 45-59 mL/min/1.73 square meters)    Stage 3B Moderate CKD (GFR = 30-44 mL/min/1.73 square meters)    Stage 4 Severe CKD (GFR = 15-29 mL/min/1.73 square meters)    Stage 5 End Stage CKD (GFR <15 mL/min/1.73 square meters)  Note: GFR calculation is accurate only with a steady state creatinine                   XR chest 1 view portable    (Results  Pending)              Procedures  Procedures         ED Course     48-year-old female presents with several days of cough.  Chest x-ray is clear overall however patient does have persistent cough which is concerning to me.  Labs are reassuring.  Plan is to start the patient on doxycycline and azithromycin.  No risk factors for PE.  Plan is to discharge patient home with extensive turn precautions.                                SBIRT 20yo+      Flowsheet Row Most Recent Value   Initial Alcohol Screen: US AUDIT-C     1. How often do you have a drink containing alcohol? 0 Filed at: 01/07/2024 1842   2. How many drinks containing alcohol do you have on a typical day you are drinking?  0 Filed at: 01/07/2024 1842   3a. Male UNDER 65: How often do you have five or more drinks on one occasion? 0 Filed at: 01/07/2024 1842   3b. FEMALE Any Age, or MALE 65+: How often do you have 4 or more drinks on one occassion? 0 Filed at: 01/07/2024 1842   Audit-C Score 0 Filed at: 01/07/2024 1842   TEE: How many times in the past year have you...    Used an illegal drug or used a prescription medication for non-medical reasons? Never Filed at: 01/07/2024 1842                      Medical Decision Making  48-year-old female presents with several days of cough.  Chest x-ray is clear overall however patient does have persistent cough which is concerning to me.  Labs are reassuring.  Plan is to start the patient on doxycycline and azithromycin.  No risk factors for PE.  Plan is to discharge patient home with extensive turn precautions.    Amount and/or Complexity of Data Reviewed  External Data Reviewed: labs and radiology.  Labs: ordered.  Radiology: ordered.    Risk  OTC drugs.  Prescription drug management.             Disposition  Final diagnoses:   Cough     Time reflects when diagnosis was documented in both MDM as applicable and the Disposition within this note       Time User Action Codes Description Comment    1/7/2024  8:13 PM  Nikolai Mclain Add [R05.9] Cough           ED Disposition       ED Disposition   Discharge    Condition   Stable    Date/Time   Sun Jan 7, 2024 2012    Comment   Geno Anderson discharge to home/self care.                   Follow-up Information       Follow up With Specialties Details Why Contact Info Additional Information    [facility] Schedule an appointment as soon as possible for a visit in 1 day  [address]  [facility] [address] [phone] [facility], [address], [place], [address], [phone]            Patient's Medications   Discharge Prescriptions    AZITHROMYCIN (ZITHROMAX) 250 MG TABLET    Take 2 tablets today then 1 tablet daily x 4 days       Start Date: 1/7/2024  End Date: 1/11/2024       Order Dose: --       Quantity: 6 tablet    Refills: 0    DEXTROMETHORPHAN-GUAIFENESIN (MUCINEX DM)  MG PER 12 HR TABLET    Take 1 tablet by mouth every 12 (twelve) hours for 5 days       Start Date: 1/7/2024  End Date: 1/12/2024       Order Dose: 1 tablet       Quantity: 10 tablet    Refills: 0    DOXYCYCLINE HYCLATE (VIBRAMYCIN) 100 MG CAPSULE    Take 1 capsule (100 mg total) by mouth 2 (two) times a day for 7 days       Start Date: 1/7/2024  End Date: 1/14/2024       Order Dose: 100 mg       Quantity: 14 capsule    Refills: 0       No discharge procedures on file.    PDMP Review       None            ED Provider  Electronically Signed by             Nikolai Mclain DO  01/07/24 2031

## 2024-01-08 NOTE — RESULT ENCOUNTER NOTE
Patient was notified of positive COVID result.  She is going to wear a mask for 5 days.  She will continue the medications as instructed.  She may return if her symptoms worsen.

## 2024-01-09 LAB
ATRIAL RATE: 96 BPM
P AXIS: 68 DEGREES
PR INTERVAL: 156 MS
QRS AXIS: 65 DEGREES
QRSD INTERVAL: 76 MS
QT INTERVAL: 332 MS
QTC INTERVAL: 419 MS
T WAVE AXIS: 6 DEGREES
VENTRICULAR RATE: 96 BPM

## 2024-01-16 DIAGNOSIS — N93.9 ABNORMAL UTERINE BLEEDING: ICD-10-CM

## 2024-01-17 RX ORDER — TRANEXAMIC ACID 650 MG/1
1300 TABLET ORAL 3 TIMES DAILY
Qty: 30 TABLET | Refills: 0 | Status: SHIPPED | OUTPATIENT
Start: 2024-01-17

## 2024-01-18 NOTE — LETTER
----- Message from Mona Tolbert sent at 1/18/2024  9:27 AM CST -----  Contact: Joycelyn Polanco is calling in regards to requesting refills on the medication pantoprazole (PROTONIX) 40 MG tablet.  Please call back with status of fill at . [phone]    [email or web address]  [facility]    [address], [place] [address] · ~[address]  [phone]  Open · Closes 9 PM    Thanks     May 1, 2023     Patient: Prosper Malik   YOB: 1975   Date of Visit: 5/1/2023       To Whom it May Concern:    Katya Beltran was seen in my clinic on 5/1/2023  She may return to work on 5/2/2023  If you have any questions or concerns, please don't hesitate to call           Sincerely,          PAYAM Camacho        CC: No Recipients

## 2024-04-05 ENCOUNTER — OFFICE VISIT (OUTPATIENT)
Dept: OBGYN CLINIC | Facility: CLINIC | Age: 49
End: 2024-04-05
Payer: COMMERCIAL

## 2024-04-05 VITALS
WEIGHT: 218.8 LBS | SYSTOLIC BLOOD PRESSURE: 124 MMHG | DIASTOLIC BLOOD PRESSURE: 80 MMHG | HEART RATE: 88 BPM | BODY MASS INDEX: 38.77 KG/M2 | OXYGEN SATURATION: 98 % | HEIGHT: 63 IN

## 2024-04-05 DIAGNOSIS — Z12.31 SCREENING MAMMOGRAM FOR BREAST CANCER: ICD-10-CM

## 2024-04-05 DIAGNOSIS — Z12.11 SCREENING FOR COLON CANCER: ICD-10-CM

## 2024-04-05 DIAGNOSIS — Z12.4 SCREENING FOR CERVICAL CANCER: ICD-10-CM

## 2024-04-05 DIAGNOSIS — N93.9 ABNORMAL UTERINE BLEEDING: ICD-10-CM

## 2024-04-05 DIAGNOSIS — Z01.419 ENCOUNTER FOR ANNUAL ROUTINE GYNECOLOGICAL EXAMINATION: Primary | ICD-10-CM

## 2024-04-05 PROBLEM — Z98.890 S/P LEEP: Status: RESOLVED | Noted: 2023-03-16 | Resolved: 2024-04-05

## 2024-04-05 PROBLEM — A59.9 TRICHOMONAS INFECTION: Status: RESOLVED | Noted: 2023-04-06 | Resolved: 2024-04-05

## 2024-04-05 PROCEDURE — G0476 HPV COMBO ASSAY CA SCREEN: HCPCS | Performed by: OBSTETRICS & GYNECOLOGY

## 2024-04-05 PROCEDURE — S0612 ANNUAL GYNECOLOGICAL EXAMINA: HCPCS | Performed by: OBSTETRICS & GYNECOLOGY

## 2024-04-05 RX ORDER — TRANEXAMIC ACID 650 MG/1
1300 TABLET ORAL 3 TIMES DAILY
Qty: 30 TABLET | Refills: 12 | Status: SHIPPED | OUTPATIENT
Start: 2024-04-05

## 2024-04-05 NOTE — ASSESSMENT & PLAN NOTE
- Discussed ACOG guidelines for pap smear screening frequency: performed today  - Discussed healthy lifestyle recommendations for diet, exercise and self breast awareness.  - Discussed ACOG recommendations for screening mammograms: no prior, ordered  - Discussed age based recommendations for adequate calcium and vitamin D intake. No additional osteoporosis screening indicated at this time.  - Discussed ACOG recommendations for colon cancer screening: reviewed options,pt elects Cologuard - ordered  - Safe sex practices were discussed and STI testing was not desired by the patient  - Contraceptive options were reviewed: s/p BTL  - Routine follow up in 1 year was recommended or sooner as needed. All questions and concerns were addressed.

## 2024-04-05 NOTE — ASSESSMENT & PLAN NOTE
Well controlled with lysteda. No longer anemic. She would like to continue this regimen. Briefly reviewed other potential tx options for chronic pelvic pain, she does not desire to take additional medication or pursue other workup at this time. She would like to avoid more surgery unless absolutely necessary

## 2024-04-05 NOTE — PROGRESS NOTES
Subjective      Geno Anderson is a 48 y.o. female who presents for annual exam.      Chief Complaint   Patient presents with    Follow-up     Week before menses - lot of pain. Midline, cramping. Takes tylenol, controls somewhat   Bleeding only 5 days. Takes TXA first 2-3 days of cycle. Although sometimes decreases to 1 tab TID by last day. Heaviness of flow has been well controlled with TXA    No missed cycles   Not currently SA        Last Pap: 2023 NILM/HPV neg   Last mammogram: Not on file        Current contraception: tubal ligation  History of abnormal Pap smear: yes - h/o LEEP ANUPAMA 2/3, neg margins   History of abnormal mammogram: no      Family history of uterine or ovarian cancer: no  Family history of breast cancer: no  Family history of colon cancer: yes - PGF      Menstrual History:  OB History          2    Para   2    Term   2            AB        Living   2         SAB        IAB        Ectopic        Multiple        Live Births                        Patient's last menstrual period was 2024 (exact date).         Past Medical History:   Diagnosis Date    Abdominal hernia     Abnormal Pap smear of cervix     Anemia     Iron    Chronic kidney disease     COVID 2020 recovered @ home    GERD (gastroesophageal reflux disease)     Hernia of abdominal wall     Kidney stone     Renal calculi     Renal disorder     Status post nephrectomy 2019    Toe fracture, left     Wears glasses      Past Surgical History:   Procedure Laterality Date    CHOLECYSTECTOMY LAPAROSCOPIC N/A 12/10/2018    Procedure: CHOLECYSTECTOMY LAPAROSCOPIC; INCISIONAL HERNIA REPAIR;  Surgeon: Kavon Ho MD;  [facility];  Service: General    KIDNEY SURGERY      removed L kidney from damage from kidney stone -PERFORMED IN [place]    NEPHRECTOMY Left     [place] CONIZATION CERVIX W/WO D&C RPR ELTRD EXC N/A 3/16/2023    Procedure: BIOPSY LEEP CERVIX;  Surgeon: Ruth Michelle MD;   [facility];  Service: Gynecology    [place] REPAIR FIRST ABDOMINAL WALL HERNIA N/A 07/11/2022    Procedure: REPAIR HERNIA INCISIONAL;  Surgeon: Stephen Diaz DO;  [facility];  Service: General    TUBAL LIGATION      AGE 32     Family History   Problem Relation Age of Onset    Diabetes Mother     Hyperlipidemia Mother     Hypertension Mother     Other Mother         low back pain    Heart disease Mother     Diabetes Father     No Known Problems Sister     No Known Problems Brother     No Known Problems Daughter     No Known Problems Son     Throat cancer Paternal Aunt     Lung cancer Paternal Aunt     Skin cancer Paternal Aunt     Kidney failure Maternal Grandmother         on dialysis    Kidney disease Maternal Grandmother     Heart disease Maternal Grandmother     Colon cancer Paternal Grandfather     Breast cancer Neg Hx     Ovarian cancer Neg Hx     Thyroid disease Neg Hx     Stroke Neg Hx        Social History     Tobacco Use    Smoking status: Never    Smokeless tobacco: Never   Vaping Use    Vaping status: Never Used   Substance Use Topics    Alcohol use: No    Drug use: No          Current Outpatient Medications:     Tranexamic Acid 650 MG TABS, Take 2 tablets (1,300 mg total) by mouth 3 (three) times a day, Disp: 30 tablet, Rfl: 12    acetaminophen (TYLENOL) 650 mg CR tablet, Take 1 tablet (650 mg total) by mouth every 8 (eight) hours as needed for mild pain, Disp: 30 tablet, Rfl: 0    famotidine (PEPCID) 20 mg tablet, Take 1 tablet (20 mg total) by mouth daily as needed for heartburn, Disp: 90 tablet, Rfl: 2    ferrous sulfate 324 (65 Fe) mg, Take 1 tablet (324 mg total) by mouth 2 (two) times a day before meals, Disp: 60 tablet, Rfl: 6    No Known Allergies        Review of Systems   Constitutional:  Negative for appetite change, chills and fever.   Eyes:  Negative for visual disturbance.   Respiratory:  Negative for cough, chest tightness and shortness of breath.   "  Cardiovascular:  Negative for chest pain.   Gastrointestinal:  Negative for abdominal distention, abdominal pain, constipation, diarrhea, nausea and vomiting.   Endocrine: Negative for cold intolerance and heat intolerance.   Genitourinary:  Positive for menstrual problem. Negative for difficulty urinating, dyspareunia, dysuria, frequency, genital sores, pelvic pain, urgency, vaginal bleeding, vaginal discharge and vaginal pain.   Musculoskeletal:  Negative for arthralgias.   Neurological:  Negative for light-headedness and headaches.   Hematological:  Does not bruise/bleed easily.   Psychiatric/Behavioral:  Negative for behavioral problems.    All other systems reviewed and are negative.      /80   Pulse 88   Ht 5' 3\" (1.6 m)   Wt 99.2 kg (218 lb 12.8 oz)   LMP 03/25/2024 (Exact Date)   SpO2 98%   BMI 38.76 kg/m²         Physical Exam  Constitutional:       General: She is not in acute distress.     Appearance: Normal appearance.   Genitourinary:      Vulva, bladder and urethral meatus normal.      No lesions in the vagina.      Right Labia: No rash, tenderness, lesions or skin changes.     Left Labia: No tenderness, lesions, skin changes or rash.     No labial fusion noted.      No inguinal adenopathy present in the right or left side.     No vaginal discharge, erythema, tenderness, bleeding or ulceration.      No vaginal prolapse present.       Right Adnexa: not tender, not full and no mass present.     Left Adnexa: not tender, not full and no mass present.     No cervical motion tenderness, discharge, friability, lesion or polyp.      Uterus is not enlarged, fixed, tender or irregular.      No uterine mass detected.     Uterus is anteverted.      Pelvic exam was performed with patient in the lithotomy position.   Breasts:     Right: No swelling, bleeding, inverted nipple, mass, nipple discharge, skin change or tenderness.      Left: No swelling, bleeding, inverted nipple, mass, nipple discharge, " skin change or tenderness.   HENT:      Head: Normocephalic and atraumatic.   Neck:      Thyroid: No thyromegaly.   Cardiovascular:      Rate and Rhythm: Normal rate and regular rhythm.   Pulmonary:      Effort: Pulmonary effort is normal. No accessory muscle usage or respiratory distress.   Abdominal:      General: There is no distension.      Palpations: Abdomen is soft.      Tenderness: There is no abdominal tenderness. There is no guarding or rebound.   Musculoskeletal:         General: Normal range of motion.      Cervical back: Normal range of motion and neck supple.   Lymphadenopathy:      Upper Body:      Right upper body: No supraclavicular or axillary adenopathy.      Left upper body: No supraclavicular or axillary adenopathy.      Lower Body: No right inguinal and no right inguinal adenopathy. No left inguinal and no left inguinal adenopathy.   Neurological:      General: No focal deficit present.      Mental Status: She is alert.   Skin:     General: Skin is warm and dry.      Findings: No erythema.   Psychiatric:         Mood and Affect: Mood normal.         Behavior: Behavior normal.   Vitals and nursing note reviewed. Exam conducted with a chaperone present.               Encounter for annual routine gynecological examination  - Discussed ACOG guidelines for pap smear screening frequency: performed today  - Discussed healthy lifestyle recommendations for diet, exercise and self breast awareness.  - Discussed ACOG recommendations for screening mammograms: no prior, ordered  - Discussed age based recommendations for adequate calcium and vitamin D intake. No additional osteoporosis screening indicated at this time.  - Discussed ACOG recommendations for colon cancer screening: reviewed options,pt elects Cologuard - ordered  - Safe sex practices were discussed and STI testing was not desired by the patient  - Contraceptive options were reviewed: s/p BTL  - Routine follow up in 1 year was recommended or  sooner as needed. All questions and concerns were addressed.       Abnormal uterine bleeding  Well controlled with lysteda. No longer anemic. She would like to continue this regimen. Briefly reviewed other potential tx options for chronic pelvic pain, she does not desire to take additional medication or pursue other workup at this time. She would like to avoid more surgery unless absolutely necessary

## 2024-04-16 LAB
LAB AP GYN PRIMARY INTERPRETATION: NORMAL
Lab: NORMAL

## 2024-05-05 PROBLEM — Z01.419 ENCOUNTER FOR ANNUAL ROUTINE GYNECOLOGICAL EXAMINATION: Status: RESOLVED | Noted: 2024-04-05 | Resolved: 2024-05-05

## 2024-05-09 LAB — COLOGUARD RESULT REPORTABLE: NEGATIVE

## 2024-06-25 DIAGNOSIS — N93.9 ABNORMAL UTERINE BLEEDING: ICD-10-CM

## 2024-06-25 RX ORDER — TRANEXAMIC ACID 650 MG/1
1300 TABLET ORAL 3 TIMES DAILY
Qty: 540 TABLET | Refills: 0 | Status: SHIPPED | OUTPATIENT
Start: 2024-06-25

## 2024-06-25 NOTE — TELEPHONE ENCOUNTER
Reason for call:   [x] Refill   [] Prior Auth  [] Other:     Office:   [] PCP/Provider -   [x] Specialty/Provider - obgyn  Medication:           Does the patient have enough for 3 days?   [] Yes   [x] No - Send as HP to POD

## 2024-07-08 ENCOUNTER — TELEPHONE (OUTPATIENT)
Age: 49
End: 2024-07-08

## 2024-07-08 NOTE — TELEPHONE ENCOUNTER
Patient started with urinary frequency and burning this weekend.  Pain when she urinates.  Patient has one kidney and is concerned.  Called PCP no appointments available.

## 2024-07-09 ENCOUNTER — NURSE TRIAGE (OUTPATIENT)
Age: 49
End: 2024-07-09

## 2024-07-09 NOTE — TELEPHONE ENCOUNTER
"Patient calling reporting burning when urinating that started this past Sunday.  She denies any blood in her urine.  She reports only having 1 kidney and would like to be seen as soon as possible.  She confirms mild pain that she rates a 1 or 2/10.  Scheduled patient for an appointment to be further evaluated.  Advised patient to proceed to the ER to be further evaluated if symptoms worsen before her appt. Patient verbalized understanding and voiced appreciation for phone call.       Reason for Disposition   Patient wants to be seen    Answer Assessment - Initial Assessment Questions  1. SYMPTOM: \"What's the main symptom you're concerned about?\" (e.g., frequency, incontinence)      Urinary burning   2. ONSET: \"When did the  burning  start?\"      This past Sunday   3. PAIN: \"Is there any pain?\" If Yes, ask: \"How bad is it?\" (Scale: 1-10; mild, moderate, severe)      Mild 1 or 2/10   4. CAUSE: \"What do you think is causing the symptoms?\"      Possible uti symptoms   5. OTHER SYMPTOMS: \"Do you have any other symptoms?\" (e.g., fever, flank pain, blood in urine, pain with urination)      Denies   6. PREGNANCY: \"Is there any chance you are pregnant?\" \"When was your last menstrual period?\"      N/a    Protocols used: Urinary Symptoms-ADULT-[place]    "

## 2024-07-10 ENCOUNTER — TELEPHONE (OUTPATIENT)
Dept: NEPHROLOGY | Facility: CLINIC | Age: 49
End: 2024-07-10

## 2024-07-11 ENCOUNTER — OFFICE VISIT (OUTPATIENT)
Dept: OBGYN CLINIC | Facility: CLINIC | Age: 49
End: 2024-07-11
Payer: COMMERCIAL

## 2024-07-11 VITALS
WEIGHT: 214.4 LBS | OXYGEN SATURATION: 98 % | SYSTOLIC BLOOD PRESSURE: 116 MMHG | DIASTOLIC BLOOD PRESSURE: 72 MMHG | HEIGHT: 63 IN | HEART RATE: 58 BPM | BODY MASS INDEX: 37.99 KG/M2 | TEMPERATURE: 98.7 F

## 2024-07-11 DIAGNOSIS — Z11.3 SCREENING FOR STDS (SEXUALLY TRANSMITTED DISEASES): ICD-10-CM

## 2024-07-11 DIAGNOSIS — R30.0 DYSURIA: Primary | ICD-10-CM

## 2024-07-11 LAB
BACTERIA UR QL AUTO: ABNORMAL /HPF
BILIRUB UR QL STRIP: NEGATIVE
CLARITY UR: CLEAR
COLOR UR: ABNORMAL
GLUCOSE UR STRIP-MCNC: NEGATIVE MG/DL
HGB UR QL STRIP.AUTO: ABNORMAL
KETONES UR STRIP-MCNC: NEGATIVE MG/DL
LEUKOCYTE ESTERASE UR QL STRIP: ABNORMAL
NITRITE UR QL STRIP: NEGATIVE
NON-SQ EPI CELLS URNS QL MICRO: ABNORMAL /HPF
PH UR STRIP.AUTO: 5.5 [PH]
PROT UR STRIP-MCNC: NEGATIVE MG/DL
RBC #/AREA URNS AUTO: ABNORMAL /HPF
SP GR UR STRIP.AUTO: 1.02 (ref 1–1.03)
UROBILINOGEN UR STRIP-ACNC: <2 MG/DL
WBC #/AREA URNS AUTO: ABNORMAL /HPF

## 2024-07-11 PROCEDURE — 87660 TRICHOMONAS VAGIN DIR PROBE: CPT | Performed by: PHYSICIAN ASSISTANT

## 2024-07-11 PROCEDURE — 87086 URINE CULTURE/COLONY COUNT: CPT | Performed by: PHYSICIAN ASSISTANT

## 2024-07-11 PROCEDURE — 99213 OFFICE O/P EST LOW 20 MIN: CPT | Performed by: PHYSICIAN ASSISTANT

## 2024-07-11 PROCEDURE — 87510 GARDNER VAG DNA DIR PROBE: CPT | Performed by: PHYSICIAN ASSISTANT

## 2024-07-11 PROCEDURE — 87591 N.GONORRHOEAE DNA AMP PROB: CPT | Performed by: PHYSICIAN ASSISTANT

## 2024-07-11 PROCEDURE — 87480 CANDIDA DNA DIR PROBE: CPT | Performed by: PHYSICIAN ASSISTANT

## 2024-07-11 PROCEDURE — 81001 URINALYSIS AUTO W/SCOPE: CPT | Performed by: PHYSICIAN ASSISTANT

## 2024-07-11 PROCEDURE — 87491 CHLMYD TRACH DNA AMP PROBE: CPT | Performed by: PHYSICIAN ASSISTANT

## 2024-07-11 RX ORDER — SULFAMETHOXAZOLE AND TRIMETHOPRIM 800; 160 MG/1; MG/1
1 TABLET ORAL EVERY 12 HOURS SCHEDULED
Qty: 6 TABLET | Refills: 0 | Status: SHIPPED | OUTPATIENT
Start: 2024-07-11 | End: 2024-07-14

## 2024-07-11 NOTE — PROGRESS NOTES
Pt is complaining of urinary burning for 5 days. Symptoms have remained the same.   + urinary urgency, + urinary frequency, + burning, some low back pain, no nausea, no vomiting, no fever, + white vaginal discharge, no vaginal itching, no vaginal odor.  Her last UTI was over a year ago.  She has not been hospitalized in the last 6 months.  Requests gonorrhea/ chlamydia testing.      Current Outpatient Medications on File Prior to Visit   Medication Sig Dispense Refill    acetaminophen (TYLENOL) 650 mg CR tablet Take 1 tablet (650 mg total) by mouth every 8 (eight) hours as needed for mild pain 30 tablet 0    famotidine (PEPCID) 20 mg tablet Take 1 tablet (20 mg total) by mouth daily as needed for heartburn 90 tablet 2    ferrous sulfate 324 (65 Fe) mg Take 1 tablet (324 mg total) by mouth 2 (two) times a day before meals 60 tablet 6    Tranexamic Acid 650 MG TABS Take 2 tablets (1,300 mg total) by mouth 3 (three) times a day 540 tablet 0     No current facility-administered medications on file prior to visit.       No Known Allergies    Vitals:    07/11/24 0731   BP: 116/72   Pulse: 58   SpO2: 98%         no CVA tenderness  External genitalia: no lesions, no discoloration  Urethra: no discharge or erythema  Bladder: nontender, good support  Vagina: scant white discharge, no lesions  Cervix: no lesions, no cervical motion tenderness  Uterus: NT, normal size and shape  Adnexa: nontender, no masses    Assessment:  Dysuria suspected UTI    Plan:  urine sent for UA and C&S   Bactrim DS # 6  one tab BID 0RF  Gonorrhea/ chlamydia/ affirm  Increase fluids  Follow if no improvement or worsening symptoms

## 2024-07-12 ENCOUNTER — RA CDI HCC (OUTPATIENT)
Dept: OTHER | Facility: HOSPITAL | Age: 49
End: 2024-07-12

## 2024-07-12 ENCOUNTER — TELEPHONE (OUTPATIENT)
Dept: OBGYN CLINIC | Facility: CLINIC | Age: 49
End: 2024-07-12

## 2024-07-12 DIAGNOSIS — R31.9 HEMATURIA, UNSPECIFIED TYPE: ICD-10-CM

## 2024-07-12 DIAGNOSIS — B96.89 BACTERIAL VAGINOSIS: Primary | ICD-10-CM

## 2024-07-12 DIAGNOSIS — N76.0 BACTERIAL VAGINOSIS: Primary | ICD-10-CM

## 2024-07-12 LAB
BACTERIA UR CULT: NORMAL
C TRACH DNA SPEC QL NAA+PROBE: NEGATIVE
CANDIDA RRNA VAG QL PROBE: NOT DETECTED
G VAGINALIS RRNA GENITAL QL PROBE: DETECTED
N GONORRHOEA DNA SPEC QL NAA+PROBE: NEGATIVE
T VAGINALIS RRNA GENITAL QL PROBE: NOT DETECTED

## 2024-07-12 RX ORDER — METRONIDAZOLE 500 MG/1
500 TABLET ORAL 2 TIMES DAILY
Qty: 14 TABLET | Refills: 0 | Status: SHIPPED | OUTPATIENT
Start: 2024-07-12 | End: 2024-07-18

## 2024-07-12 NOTE — TELEPHONE ENCOUNTER
Spoke with patient reviewed that urine culture is negative for infection.  She should stop the bactrim.  Urine did show blood.   Recommend repeat UA in 2 weeks at lab.    Vaginal culture + for BV.   Patient prefers oral Rx.   Flagyl BID X 7 days.  Patient aware not to drink any alcohol while taking the medication.

## 2024-07-14 ENCOUNTER — APPOINTMENT (EMERGENCY)
Dept: CT IMAGING | Facility: HOSPITAL | Age: 49
End: 2024-07-14
Payer: COMMERCIAL

## 2024-07-14 ENCOUNTER — HOSPITAL ENCOUNTER (EMERGENCY)
Facility: HOSPITAL | Age: 49
Discharge: HOME | End: 2024-07-14
Attending: EMERGENCY MEDICINE | Admitting: EMERGENCY MEDICINE
Payer: COMMERCIAL

## 2024-07-14 VITALS
RESPIRATION RATE: 16 BRPM | SYSTOLIC BLOOD PRESSURE: 123 MMHG | TEMPERATURE: 98.6 F | HEART RATE: 79 BPM | OXYGEN SATURATION: 98 % | DIASTOLIC BLOOD PRESSURE: 60 MMHG

## 2024-07-14 DIAGNOSIS — N83.202 LEFT OVARIAN CYST: ICD-10-CM

## 2024-07-14 DIAGNOSIS — R10.9 RIGHT FLANK PAIN: Primary | ICD-10-CM

## 2024-07-14 LAB
ALBUMIN SERPL BCG-MCNC: 4.2 G/DL (ref 3.5–5)
ALP SERPL-CCNC: 61 U/L (ref 34–104)
ALT SERPL W P-5'-P-CCNC: 31 U/L (ref 7–52)
ANION GAP SERPL CALCULATED.3IONS-SCNC: 5 MMOL/L (ref 4–13)
AST SERPL W P-5'-P-CCNC: 23 U/L (ref 13–39)
BACTERIA UR QL AUTO: ABNORMAL /HPF
BASOPHILS # BLD AUTO: 0.07 THOUSANDS/ÂΜL (ref 0–0.1)
BASOPHILS NFR BLD AUTO: 1 % (ref 0–1)
BILIRUB SERPL-MCNC: 0.4 MG/DL (ref 0.2–1)
BILIRUB UR QL STRIP: NEGATIVE
BILIRUB UR QL STRIP: NEGATIVE
BUN SERPL-MCNC: 16 MG/DL (ref 5–25)
CALCIUM SERPL-MCNC: 10.1 MG/DL (ref 8.4–10.2)
CHLORIDE SERPL-SCNC: 103 MMOL/L (ref 96–108)
CLARITY UR: ABNORMAL
CLARITY UR: CLEAR
CO2 SERPL-SCNC: 28 MMOL/L (ref 21–32)
COLOR UR: NORMAL
COLOR UR: YELLOW
CREAT SERPL-MCNC: 1.12 MG/DL (ref 0.6–1.3)
EOSINOPHIL # BLD AUTO: 0.1 THOUSAND/ÂΜL (ref 0–0.61)
EOSINOPHIL NFR BLD AUTO: 1 % (ref 0–6)
ERYTHROCYTE [DISTWIDTH] IN BLOOD BY AUTOMATED COUNT: 12.8 % (ref 11.6–15.1)
EXT PREGNANCY TEST URINE: NEGATIVE
EXT. CONTROL: NORMAL
GFR SERPL CREATININE-BSD FRML MDRD: 58 ML/MIN/1.73SQ M
GLUCOSE SERPL-MCNC: 99 MG/DL (ref 65–140)
GLUCOSE UR STRIP-MCNC: NEGATIVE MG/DL
GLUCOSE UR STRIP-MCNC: NEGATIVE MG/DL
HCT VFR BLD AUTO: 42 % (ref 34.8–46.1)
HGB BLD-MCNC: 14.1 G/DL (ref 11.5–15.4)
HGB UR QL STRIP.AUTO: NEGATIVE
HGB UR QL STRIP.AUTO: NEGATIVE
IMM GRANULOCYTES # BLD AUTO: 0.02 THOUSAND/UL (ref 0–0.2)
IMM GRANULOCYTES NFR BLD AUTO: 0 % (ref 0–2)
KETONES UR STRIP-MCNC: NEGATIVE MG/DL
KETONES UR STRIP-MCNC: NEGATIVE MG/DL
LEUKOCYTE ESTERASE UR QL STRIP: ABNORMAL
LEUKOCYTE ESTERASE UR QL STRIP: NEGATIVE
LIPASE SERPL-CCNC: 24 U/L (ref 11–82)
LYMPHOCYTES # BLD AUTO: 2.03 THOUSANDS/ÂΜL (ref 0.6–4.47)
LYMPHOCYTES NFR BLD AUTO: 28 % (ref 14–44)
MCH RBC QN AUTO: 29.6 PG (ref 26.8–34.3)
MCHC RBC AUTO-ENTMCNC: 33.6 G/DL (ref 31.4–37.4)
MCV RBC AUTO: 88 FL (ref 82–98)
MONOCYTES # BLD AUTO: 0.74 THOUSAND/ÂΜL (ref 0.17–1.22)
MONOCYTES NFR BLD AUTO: 10 % (ref 4–12)
NEUTROPHILS # BLD AUTO: 4.4 THOUSANDS/ÂΜL (ref 1.85–7.62)
NEUTS SEG NFR BLD AUTO: 60 % (ref 43–75)
NITRITE UR QL STRIP: NEGATIVE
NITRITE UR QL STRIP: NEGATIVE
NON-SQ EPI CELLS URNS QL MICRO: ABNORMAL /HPF
NRBC BLD AUTO-RTO: 0 /100 WBCS
PH UR STRIP.AUTO: 5.5 [PH]
PH UR STRIP.AUTO: 6 [PH]
PLATELET # BLD AUTO: 268 THOUSANDS/UL (ref 149–390)
PMV BLD AUTO: 10.6 FL (ref 8.9–12.7)
POTASSIUM SERPL-SCNC: 4.1 MMOL/L (ref 3.5–5.3)
PROT SERPL-MCNC: 7.3 G/DL (ref 6.4–8.4)
PROT UR STRIP-MCNC: ABNORMAL MG/DL
PROT UR STRIP-MCNC: NEGATIVE MG/DL
RBC # BLD AUTO: 4.77 MILLION/UL (ref 3.81–5.12)
RBC #/AREA URNS AUTO: ABNORMAL /HPF
SODIUM SERPL-SCNC: 136 MMOL/L (ref 135–147)
SP GR UR STRIP.AUTO: 1.02 (ref 1–1.03)
SP GR UR STRIP.AUTO: 1.03 (ref 1–1.03)
UROBILINOGEN UR STRIP-ACNC: <2 MG/DL
UROBILINOGEN UR STRIP-ACNC: <2 MG/DL
WBC # BLD AUTO: 7.36 THOUSAND/UL (ref 4.31–10.16)
WBC #/AREA URNS AUTO: ABNORMAL /HPF

## 2024-07-14 PROCEDURE — 81025 URINE PREGNANCY TEST: CPT | Performed by: EMERGENCY MEDICINE

## 2024-07-14 PROCEDURE — 87086 URINE CULTURE/COLONY COUNT: CPT | Performed by: EMERGENCY MEDICINE

## 2024-07-14 PROCEDURE — 96375 TX/PRO/DX INJ NEW DRUG ADDON: CPT

## 2024-07-14 PROCEDURE — 81003 URINALYSIS AUTO W/O SCOPE: CPT | Performed by: EMERGENCY MEDICINE

## 2024-07-14 PROCEDURE — 80053 COMPREHEN METABOLIC PANEL: CPT | Performed by: EMERGENCY MEDICINE

## 2024-07-14 PROCEDURE — 99285 EMERGENCY DEPT VISIT HI MDM: CPT | Performed by: EMERGENCY MEDICINE

## 2024-07-14 PROCEDURE — 96361 HYDRATE IV INFUSION ADD-ON: CPT

## 2024-07-14 PROCEDURE — 74176 CT ABD & PELVIS W/O CONTRAST: CPT

## 2024-07-14 PROCEDURE — 96374 THER/PROPH/DIAG INJ IV PUSH: CPT

## 2024-07-14 PROCEDURE — 36415 COLL VENOUS BLD VENIPUNCTURE: CPT | Performed by: EMERGENCY MEDICINE

## 2024-07-14 PROCEDURE — 99284 EMERGENCY DEPT VISIT MOD MDM: CPT

## 2024-07-14 PROCEDURE — 85025 COMPLETE CBC W/AUTO DIFF WBC: CPT | Performed by: EMERGENCY MEDICINE

## 2024-07-14 PROCEDURE — 81001 URINALYSIS AUTO W/SCOPE: CPT | Performed by: EMERGENCY MEDICINE

## 2024-07-14 PROCEDURE — 83690 ASSAY OF LIPASE: CPT | Performed by: EMERGENCY MEDICINE

## 2024-07-14 RX ORDER — HYDROMORPHONE HCL/PF 1 MG/ML
0.5 SYRINGE (ML) INJECTION ONCE
Status: COMPLETED | OUTPATIENT
Start: 2024-07-14 | End: 2024-07-14

## 2024-07-14 RX ORDER — ONDANSETRON 2 MG/ML
4 INJECTION INTRAMUSCULAR; INTRAVENOUS ONCE
Status: COMPLETED | OUTPATIENT
Start: 2024-07-14 | End: 2024-07-14

## 2024-07-14 RX ADMIN — SODIUM CHLORIDE 1000 ML: 0.9 INJECTION, SOLUTION INTRAVENOUS at 11:43

## 2024-07-14 RX ADMIN — HYDROMORPHONE HYDROCHLORIDE 0.5 MG: 1 INJECTION, SOLUTION INTRAMUSCULAR; INTRAVENOUS; SUBCUTANEOUS at 11:45

## 2024-07-14 RX ADMIN — ONDANSETRON 4 MG: 2 INJECTION INTRAMUSCULAR; INTRAVENOUS at 11:44

## 2024-07-14 NOTE — DISCHARGE INSTRUCTIONS
You can use ibuprofen and Tylenol for pain as needed.  Follow-up with family doctor and your gynecologist.

## 2024-07-15 LAB — BACTERIA UR CULT: NORMAL

## 2024-07-17 NOTE — ED PROVIDER NOTES
"History  Chief Complaint   Patient presents with    Flank Pain     Pt reports R flank pain on going for 4 days, radiating into pelvic area. Taking tylenol for pain which has not reduced symptoms, increased urinary frequency. Hx of L nephrectomy d/t \"tons of stones\" per patient. Endorses normal bowel movements, LBM this morning       History provided by:  Patient   used: No    Flank Pain  Associated symptoms: no chest pain, no chills, no cough, no diarrhea, no dysuria, no fever, no nausea, no shortness of breath and no vomiting      48-year-old female presenting to emergency department right-sided flank pain for the last 4 days.  Radiates over the right side of the pelvis.  Taken Tylenol with minimal help.  Urinating more frequently.  No hematuria.  No vaginal discharge.  No diarrhea.  No fevers.  History of kidney stones and left-sided nephrectomy.  No chest pain or trouble breathing.      Prior to Admission Medications   Prescriptions Last Dose Informant Patient Reported? Taking?   Tranexamic Acid 650 MG TABS   No No   Sig: Take 2 tablets (1,300 mg total) by mouth 3 (three) times a day   acetaminophen (TYLENOL) 650 mg CR tablet  Self No No   Sig: Take 1 tablet (650 mg total) by mouth every 8 (eight) hours as needed for mild pain   famotidine (PEPCID) 20 mg tablet  Self No No   Sig: Take 1 tablet (20 mg total) by mouth daily as needed for heartburn   ferrous sulfate 324 (65 Fe) mg  Self No No   Sig: Take 1 tablet (324 mg total) by mouth 2 (two) times a day before meals   metroNIDAZOLE (FLAGYL) 500 mg tablet   No No   Sig: Take 1 tablet (500 mg total) by mouth 2 (two) times a day for 7 days Do not drink any alcohol while taking this medication   sulfamethoxazole-trimethoprim (BACTRIM DS) 800-160 mg per tablet   No No   Sig: Take 1 tablet by mouth every 12 (twelve) hours for 3 days      Facility-Administered Medications: None       Past Medical History:   Diagnosis Date    Abdominal hernia     " Abnormal Pap smear of cervix     Anemia     Iron    Chronic kidney disease     COVID 12/2020 01/2022 recovered @ home    GERD (gastroesophageal reflux disease)     Hernia of abdominal wall     Kidney stone     Renal calculi     Renal disorder     Status post nephrectomy 01/28/2019    Toe fracture, left     Wears glasses        Past Surgical History:   Procedure Laterality Date    CHOLECYSTECTOMY LAPAROSCOPIC N/A 12/10/2018    Procedure: CHOLECYSTECTOMY LAPAROSCOPIC; INCISIONAL HERNIA REPAIR;  Surgeon: Kavon Ho MD;  Location: [facility];  Service: General    KIDNEY SURGERY  2013    removed L kidney from damage from kidney stone -PERFORMED IN [place]    NEPHRECTOMY Left     [place] CONIZATION CERVIX W/WO D&C RPR ELTRD EXC N/A 3/16/2023    Procedure: BIOPSY LEEP CERVIX;  Surgeon: Ruth Michelle MD;  Location: [facility];  Service: Gynecology    [place] REPAIR FIRST ABDOMINAL WALL HERNIA N/A 07/11/2022    Procedure: REPAIR HERNIA INCISIONAL;  Surgeon: Stephen Diaz DO;  Location: [facility];  Service: General    TUBAL LIGATION      AGE 32       Family History   Problem Relation Age of Onset    Diabetes Mother     Hyperlipidemia Mother     Hypertension Mother     Other Mother         low back pain    Heart disease Mother     Diabetes Father     No Known Problems Sister     No Known Problems Brother     No Known Problems Daughter     No Known Problems Son     Throat cancer Paternal Aunt     Lung cancer Paternal Aunt     Skin cancer Paternal Aunt     Kidney failure Maternal Grandmother         on dialysis    Kidney disease Maternal Grandmother     Heart disease Maternal Grandmother     Colon cancer Paternal Grandfather     Breast cancer Neg Hx     Ovarian cancer Neg Hx     Thyroid disease Neg Hx     Stroke Neg Hx      I have reviewed and agree with the history as documented.    E-Cigarette/Vaping    E-Cigarette Use Never User      E-Cigarette/Vaping Substances    Nicotine No     THC No     CBD No     Flavoring No      Other No     Unknown No      Social History     Tobacco Use    Smoking status: Never    Smokeless tobacco: Never   Vaping Use    Vaping status: Never Used   Substance Use Topics    Alcohol use: No    Drug use: No       Review of Systems   Constitutional:  Negative for chills, diaphoresis and fever.   Respiratory:  Negative for cough, shortness of breath, wheezing and stridor.    Cardiovascular:  Negative for chest pain, palpitations and leg swelling.   Gastrointestinal:  Negative for abdominal pain, blood in stool, diarrhea, nausea and vomiting.   Genitourinary:  Positive for flank pain. Negative for dysuria, frequency and urgency.   Musculoskeletal:  Negative for neck pain and neck stiffness.   Skin:  Negative for pallor and rash.   Neurological:  Negative for dizziness, syncope, weakness, light-headedness and headaches.   All other systems reviewed and are negative.      Physical Exam  Physical Exam  Vitals reviewed.   Constitutional:       Appearance: Normal appearance. She is well-developed.   HENT:      Head: Normocephalic and atraumatic.   Eyes:      Extraocular Movements: Extraocular movements intact.      Pupils: Pupils are equal, round, and reactive to light.   Cardiovascular:      Rate and Rhythm: Normal rate and regular rhythm.      Heart sounds: Normal heart sounds.   Pulmonary:      Effort: Pulmonary effort is normal. No respiratory distress.      Breath sounds: Normal breath sounds.   Abdominal:      General: Bowel sounds are normal.      Palpations: Abdomen is soft.      Tenderness: There is no abdominal tenderness.   Musculoskeletal:         General: No swelling or tenderness. Normal range of motion.      Cervical back: Normal range of motion and neck supple.      Comments: Right cva tenderness   Skin:     General: Skin is warm and dry.      Capillary Refill: Capillary refill takes less than 2 seconds.   Neurological:      General: No focal deficit present.      Mental Status: She is alert and  oriented to person, place, and time.         Vital Signs  ED Triage Vitals [07/14/24 1037]   Temperature Pulse Respirations Blood Pressure SpO2   98.6 °F (37 °C) 86 18 150/65 99 %      Temp Source Heart Rate Source Patient Position - Orthostatic VS BP Location FiO2 (%)   Oral Monitor Sitting Right arm --      Pain Score       9           Vitals:    07/14/24 1037 07/14/24 1145 07/14/24 1330   BP: 150/65 124/60 123/60   Pulse: 86 80 79   Patient Position - Orthostatic VS: Sitting Lying Lying         Visual Acuity      ED Medications  Medications   sodium chloride 0.9 % bolus 1,000 mL (0 mL Intravenous Stopped 7/14/24 1243)   ondansetron (ZOFRAN) injection 4 mg (4 mg Intravenous Given 7/14/24 1144)   HYDROmorphone (DILAUDID) injection 0.5 mg (0.5 mg Intravenous Given 7/14/24 1145)       Diagnostic Studies  Results Reviewed       Procedure Component Value Units Date/Time    Urine culture [[id number]] Collected: 07/14/24 1102    Lab Status: Final result Specimen: Urine, Clean Catch Updated: 07/15/24 1341     Urine Culture 30,000-39,000 cfu/ml    UA w Reflex to Microscopic w Reflex to Culture [[id number]] Collected: 07/14/24 1322    Lab Status: Final result Specimen: Urine, Clean Catch Updated: 07/14/24 1333     Color, UA Light Yellow     Clarity, UA Clear     Specific Gravity, UA 1.021     pH, UA 6.0     Leukocytes, UA Negative     Nitrite, UA Negative     Protein, UA Negative mg/dl      Glucose, UA Negative mg/dl      Ketones, UA Negative mg/dl      Urobilinogen, UA <2.0 mg/dl      Bilirubin, UA Negative     Occult Blood, UA Negative    Comprehensive metabolic panel [[id number]] Collected: 07/14/24 1102    Lab Status: Final result Specimen: Blood from Arm, Left Updated: 07/14/24 1134     Sodium 136 mmol/L      Potassium 4.1 mmol/L      Chloride 103 mmol/L      CO2 28 mmol/L      ANION GAP 5 mmol/L      BUN 16 mg/dL      Creatinine 1.12 mg/dL      Glucose 99 mg/dL      Calcium 10.1 mg/dL      AST 23 U/L      ALT 31 U/L       Alkaline Phosphatase 61 U/L      Total Protein 7.3 g/dL      Albumin 4.2 g/dL      Total Bilirubin 0.40 mg/dL      eGFR 58 ml/min/1.73sq m     Narrative:      National Kidney Disease Foundation guidelines for Chronic Kidney Disease (CKD):     Stage 1 with normal or high GFR (GFR > 90 mL/min/1.73 square meters)    Stage 2 Mild CKD (GFR = 60-89 mL/min/1.73 square meters)    Stage 3A Moderate CKD (GFR = 45-59 mL/min/1.73 square meters)    Stage 3B Moderate CKD (GFR = 30-44 mL/min/1.73 square meters)    Stage 4 Severe CKD (GFR = 15-29 mL/min/1.73 square meters)    Stage 5 End Stage CKD (GFR <15 mL/min/1.73 square meters)  Note: GFR calculation is accurate only with a steady state creatinine    Lipase [[id number]]  (Normal) Collected: 07/14/24 1102    Lab Status: Final result Specimen: Blood from Arm, Left Updated: 07/14/24 1134     Lipase 24 u/L     Urine Microscopic [[id number]]  (Abnormal) Collected: 07/14/24 1102    Lab Status: Final result Specimen: Urine, Clean Catch Updated: 07/14/24 1130     RBC, UA 4-10 /hpf      WBC, UA 30-50 /hpf      Epithelial Cells Moderate /hpf      Bacteria, UA None Seen /hpf     CBC and differential [[id number]] Collected: 07/14/24 1102    Lab Status: Final result Specimen: Blood from Arm, Left Updated: 07/14/24 1125     WBC 7.36 Thousand/uL      RBC 4.77 Million/uL      Hemoglobin 14.1 g/dL      Hematocrit 42.0 %      MCV 88 fL      MCH 29.6 pg      MCHC 33.6 g/dL      RDW 12.8 %      MPV 10.6 fL      Platelets 268 Thousands/uL      nRBC 0 /100 WBCs      Segmented % 60 %      Immature Grans % 0 %      Lymphocytes % 28 %      Monocytes % 10 %      Eosinophils Relative 1 %      Basophils Relative 1 %      Absolute Neutrophils 4.40 Thousands/µL      Absolute Immature Grans 0.02 Thousand/uL      Absolute Lymphocytes 2.03 Thousands/µL      Absolute Monocytes 0.74 Thousand/µL      Eosinophils Absolute 0.10 Thousand/µL      Basophils Absolute 0.07 Thousands/µL     UA w Reflex to Microscopic w  Reflex to Culture [[id number]]  (Abnormal) Collected: 07/14/24 1102    Lab Status: Final result Specimen: Urine, Clean Catch Updated: 07/14/24 1122     Color, UA Yellow     Clarity, UA Turbid     Specific Gravity, UA 1.026     pH, UA 5.5     Leukocytes, UA Large     Nitrite, UA Negative     Protein, UA Trace mg/dl      Glucose, UA Negative mg/dl      Ketones, UA Negative mg/dl      Urobilinogen, UA <2.0 mg/dl      Bilirubin, UA Negative     Occult Blood, UA Negative    POCT pregnancy, urine [[id number]]  (Normal) Resulted: 07/14/24 1106    Lab Status: Final result Updated: 07/14/24 1106     EXT Preg Test, Ur Negative     Control Valid                   CT abdomen pelvis wo contrast   Final Result by Linn Carey MD (07/14 1326)      Right kidney appears within normal limits. No nephroureterolithiasis. No acute abnormality identified on the right to account for presenting symptoms.      Persistent multiloculated cystic lesion in the left adnexa. Although essentially similar in total size compared to priors, altered in position, now located anterior within the pelvis. The adjacent contiguous ovary remains normal size and grossly within    normal limits. Last ultrasound was in 2022. Suggest nonemergent ultrasound follow-up to further assess this indeterminate finding.      The study was marked in EPIC for significant notification.      Workstation performed: [id number]                    Procedures  Procedures         ED Course                                 SBIRT 22yo+      Flowsheet Row Most Recent Value   Initial Alcohol Screen: US AUDIT-C     1. How often do you have a drink containing alcohol? 0 Filed at: 07/14/2024 1046   2. How many drinks containing alcohol do you have on a typical day you are drinking?  0 Filed at: 07/14/2024 1046   3a. Male UNDER 65: How often do you have five or more drinks on one occasion? 0 Filed at: 07/14/2024 1046   3b. FEMALE Any Age, or MALE 65+: How often do you have  4 or more drinks on one occassion? 0 Filed at: 07/14/2024 1046   Audit-C Score 0 Filed at: 07/14/2024 1046   TEE: How many times in the past year have you...    Used an illegal drug or used a prescription medication for non-medical reasons? Never Filed at: 07/14/2024 1046                      Medical Decision Making  48-year-old female presenting with right-sided flank pain, differential includes musculoskeletal pain versus kidney stone versus kidney infection.  Will get CT, check blood work, urine, pain control.    No stone if imaging, no sign of UTI, discussed findings of a left ovarian cyst which patient is aware of.  Does not have any left-sided pelvic pain or tenderness.  Patient has her own gynecologist she will follow-up with.    Amount and/or Complexity of Data Reviewed  Labs: ordered.  Radiology: ordered.    Risk  Prescription drug management.                 Disposition  Final diagnoses:   Right flank pain   Left ovarian cyst     Time reflects when diagnosis was documented in both MDM as applicable and the Disposition within this note       Time User Action Codes Description Comment    7/14/2024  1:38 PM Salima Tolbert [R10.9] Right flank pain     7/14/2024  1:38 PM Salima Tolbert [N83.202] Left ovarian cyst           ED Disposition       ED Disposition   Discharge    Condition   Stable    Date/Time   Sun Jul 14, 2024 4898    Comment   Geno Anderson discharge to home/self care.                   Follow-up Information       Follow up With Specialties Details Why Contact Info Additional Information    [facility] Emergency Department Emergency Medicine  If symptoms worsen [address] [facility] [id number] [phone] [facility] Emergency Department, [address], [place], [address]    [facility] Medicine [facility] Schedule an appointment as soon as possible for a visit  please follow up with  family doctor [address] [facility] [address] [phone] [facility], [address] [place] [address], [place], [address] [phone]    gynecologist  Schedule an appointment as soon as possible for a visit  Follow-up with your gynecologist as soon as possible for ultrasound and evaluation of left ovarian cyst              Discharge Medication List as of 7/14/2024  1:39 PM        CONTINUE these medications which have NOT CHANGED    Details   acetaminophen (TYLENOL) 650 mg CR tablet Take 1 tablet (650 mg total) by mouth every 8 (eight) hours as needed for mild pain, Starting Wed 7/19/2023, Normal      famotidine (PEPCID) 20 mg tablet Take 1 tablet (20 mg total) by mouth daily as needed for heartburn, Starting Thu 11/3/2022, Normal      ferrous sulfate 324 (65 Fe) mg Take 1 tablet (324 mg total) by mouth 2 (two) times a day before meals, Starting Fri 7/28/2023, Normal      metroNIDAZOLE (FLAGYL) 500 mg tablet Take 1 tablet (500 mg total) by mouth 2 (two) times a day for 7 days Do not drink any alcohol while taking this medication, Starting Fri 7/12/2024, Until Fri 7/19/2024, Normal      sulfamethoxazole-trimethoprim (BACTRIM DS) 800-160 mg per tablet Take 1 tablet by mouth every 12 (twelve) hours for 3 days, Starting Thu 7/11/2024, Until Sun 7/14/2024, Normal      Tranexamic Acid 650 MG TABS Take 2 tablets (1,300 mg total) by mouth 3 (three) times a day, Starting Tue 6/25/2024, Normal             No discharge procedures on file.    PDMP Review       None            ED Provider  Electronically Signed by             Salima Tolbert MD  07/17/24 6063

## 2024-07-18 ENCOUNTER — OFFICE VISIT (OUTPATIENT)
Dept: INTERNAL MEDICINE CLINIC | Facility: CLINIC | Age: 49
End: 2024-07-18
Payer: MEDICARE

## 2024-07-18 VITALS
OXYGEN SATURATION: 97 % | TEMPERATURE: 98.1 F | HEIGHT: 63 IN | SYSTOLIC BLOOD PRESSURE: 124 MMHG | DIASTOLIC BLOOD PRESSURE: 78 MMHG | HEART RATE: 91 BPM | BODY MASS INDEX: 38.24 KG/M2 | WEIGHT: 215.8 LBS

## 2024-07-18 DIAGNOSIS — Z00.00 ANNUAL PHYSICAL EXAM: Primary | ICD-10-CM

## 2024-07-18 DIAGNOSIS — N83.202 CYST OF LEFT OVARY: ICD-10-CM

## 2024-07-18 DIAGNOSIS — R10.13 DYSPEPSIA: ICD-10-CM

## 2024-07-18 DIAGNOSIS — E78.2 MIXED HYPERLIPIDEMIA: ICD-10-CM

## 2024-07-18 DIAGNOSIS — N18.2 CKD (CHRONIC KIDNEY DISEASE), STAGE II: ICD-10-CM

## 2024-07-18 DIAGNOSIS — Z90.5 H/O LEFT NEPHRECTOMY: ICD-10-CM

## 2024-07-18 DIAGNOSIS — D50.8 OTHER IRON DEFICIENCY ANEMIA: ICD-10-CM

## 2024-07-18 PROBLEM — H53.2 DOUBLE VISION: Status: RESOLVED | Noted: 2017-12-19 | Resolved: 2024-07-18

## 2024-07-18 PROBLEM — H52.203 ASTIGMATISM OF BOTH EYES: Status: RESOLVED | Noted: 2017-12-19 | Resolved: 2024-07-18

## 2024-07-18 PROBLEM — M79.601 PAIN OF RIGHT UPPER EXTREMITY: Status: RESOLVED | Noted: 2018-05-08 | Resolved: 2024-07-18

## 2024-07-18 PROBLEM — R10.9 RIGHT FLANK PAIN: Status: RESOLVED | Noted: 2019-01-28 | Resolved: 2024-07-18

## 2024-07-18 PROBLEM — R79.0 LOW IRON STORES: Status: RESOLVED | Noted: 2018-01-12 | Resolved: 2024-07-18

## 2024-07-18 PROCEDURE — 99204 OFFICE O/P NEW MOD 45 MIN: CPT | Performed by: NURSE PRACTITIONER

## 2024-07-18 PROCEDURE — 99396 PREV VISIT EST AGE 40-64: CPT | Performed by: NURSE PRACTITIONER

## 2024-07-18 NOTE — PROGRESS NOTES
Adult Annual Physical  Name: Geno Anderson      : 1975      MRN: [id number]  Encounter Provider: PAYAM Higuera  Encounter Date: 2024   Encounter department: [facility] INTERNAL MEDICINE    Assessment & Plan   1. Annual physical exam  -     TSH, 3rd generation with Free T4 reflex; Future  2. Cyst of left ovary  Assessment & Plan:  Scheduled with GYN next week.   3. CKD (chronic kidney disease), stage II  Assessment & Plan:  Lab Results   Component Value Date    EGFR 58 2024    EGFR 74 2024    EGFR 76 2023    CREATININE 1.12 2024    CREATININE 0.92 2024    CREATININE 0.90 2023   Follows with nephrology.   Avoid nsaids.   Orders:  -     Basic metabolic panel; Future  4. Other iron deficiency anemia  Assessment & Plan:  On an iron supplement daily.   5. H/O left nephrectomy  Assessment & Plan:  Sees nephrology.   6. Mixed hyperlipidemia  Assessment & Plan:  Due for lipids.   Orders:  -     Lipid Panel with Direct LDL reflex; Future  7. Dyspepsia  Assessment & Plan:  Takes pepcid as needed.     Immunizations and preventive care screenings were discussed with patient today. Appropriate education was printed on patient's after visit summary.         History of Present Illness     Adult Annual Physical:  Patient presents for annual physical. Establish care visit. .     Diet and Physical Activity:  - Diet/Nutrition: well balanced diet.  - Exercise: 3-4 times a week on average.    Depression Screening:  - PHQ-2 Score: 0    General Health:  - Sleep: sleeps well.  - Hearing: normal hearing right ear and normal hearing left ear.  - Vision: wears glasses and goes for regular eye exams.  - Dental: regular dental visits.    /GYN Health:  - Follows with GYN: yes.   - Contraception:. regular periods      Review of Systems   Constitutional:  Negative for activity change, appetite change, fatigue and unexpected weight change.   Eyes:  Negative for visual  "disturbance.   Respiratory:  Negative for cough and shortness of breath.    Cardiovascular:  Negative for chest pain, palpitations and leg swelling.   Gastrointestinal:  Negative for abdominal pain, blood in stool, constipation and diarrhea.   Genitourinary:  Negative for difficulty urinating, flank pain and hematuria.   Musculoskeletal:  Negative for arthralgias.   Skin:  Negative for rash.   Neurological:  Negative for dizziness, light-headedness and headaches.   Psychiatric/Behavioral:  Negative for sleep disturbance.          Objective     /78 (BP Location: Left arm, Patient Position: Sitting, Cuff Size: Large)   Pulse 91   Temp 98.1 °F (36.7 °C) (Temporal)   Ht 5' 3\" (1.6 m)   Wt 97.9 kg (215 lb 12.8 oz)   LMP 06/28/2024 (Exact Date)   SpO2 97%   BMI 38.23 kg/m²     Physical Exam  Vitals reviewed.   Constitutional:       Appearance: Normal appearance.   HENT:      Head: Normocephalic and atraumatic.      Right Ear: Tympanic membrane, ear canal and external ear normal.      Left Ear: Tympanic membrane, ear canal and external ear normal.   Eyes:      Conjunctiva/sclera: Conjunctivae normal.   Cardiovascular:      Rate and Rhythm: Normal rate and regular rhythm.      Pulses: Normal pulses.      Heart sounds: Normal heart sounds.   Pulmonary:      Effort: Pulmonary effort is normal.      Breath sounds: Normal breath sounds.   Abdominal:      General: Bowel sounds are normal.      Palpations: Abdomen is soft.   Musculoskeletal:         General: No swelling. Normal range of motion.      Cervical back: Neck supple.   Lymphadenopathy:      Cervical: No cervical adenopathy.   Skin:     General: Skin is warm and dry.   Neurological:      Mental Status: She is alert and oriented to person, place, and time.   Psychiatric:         Mood and Affect: Mood normal.         Behavior: Behavior normal.         "

## 2024-07-18 NOTE — ASSESSMENT & PLAN NOTE
Lab Results   Component Value Date    EGFR 58 07/14/2024    EGFR 74 01/07/2024    EGFR 76 07/26/2023    CREATININE 1.12 07/14/2024    CREATININE 0.92 01/07/2024    CREATININE 0.90 07/26/2023   Follows with nephrology.   Avoid nsaids.

## 2024-07-22 ENCOUNTER — OFFICE VISIT (OUTPATIENT)
Dept: OBGYN CLINIC | Facility: CLINIC | Age: 49
End: 2024-07-22
Payer: MEDICARE

## 2024-07-22 VITALS
HEIGHT: 63 IN | WEIGHT: 215 LBS | BODY MASS INDEX: 38.09 KG/M2 | DIASTOLIC BLOOD PRESSURE: 82 MMHG | SYSTOLIC BLOOD PRESSURE: 124 MMHG

## 2024-07-22 DIAGNOSIS — N83.202 CYST OF LEFT OVARY: Primary | ICD-10-CM

## 2024-07-22 PROCEDURE — 99213 OFFICE O/P EST LOW 20 MIN: CPT | Performed by: OBSTETRICS & GYNECOLOGY

## 2024-07-22 NOTE — PROGRESS NOTES
Subjective   Patient ID: Geno Anderson is a 48 y.o. female.    Patient is here for a problem visit.    Chief Complaint   Patient presents with    Follow-up     Follow-up on cyst on left ovary.     Seen in ER on  for right sided flank pain, radiating to pelvis. Started 3 weeks prior, got worse over that time  Feels like once current menstrual cycle started the pain improved      CT with normal appearing right kidney and no stones. Urine culture with no UTI   CT made note of left adnexal lesion stable in size, previous US (last ) had demonstrated left adnexal septated structure, 5.8 x 3.8 x 5.2 cm     Menstrual History:  OB History          2    Para   2    Term   2            AB        Living   2         SAB        IAB        Ectopic        Multiple        Live Births   2                  Patient's last menstrual period was 2024 (exact date).         Past Medical History:   Diagnosis Date    Abdominal hernia     Abnormal Pap smear of cervix     Anemia     Iron    Chronic kidney disease     COVID 2020 recovered @ home    GERD (gastroesophageal reflux disease)     Hernia of abdominal wall     Kidney stone     Renal calculi     Renal disorder     Status post nephrectomy 2019    Toe fracture, left     Wears glasses        Past Surgical History:   Procedure Laterality Date    CHOLECYSTECTOMY LAPAROSCOPIC N/A 12/10/2018    Procedure: CHOLECYSTECTOMY LAPAROSCOPIC; INCISIONAL HERNIA REPAIR;  Surgeon: Kavon Ho MD;  [facility];  Service: General    KIDNEY SURGERY      removed L kidney from damage from kidney stone -PERFORMED IN [place]    NEPHRECTOMY Left     ND CONIZATION CERVIX W/WO D&C RPR ELTRD EXC N/A 3/16/2023    Procedure: BIOPSY LEEP CERVIX;  Surgeon: Ruth Michelle MD;  [facility]: [facility];  Service: Gynecology    ND REPAIR FIRST ABDOMINAL WALL HERNIA N/A 2022    Procedure: REPAIR HERNIA INCISIONAL;  Surgeon: Stephen Diaz DO;   "Location: [facility];  Service: General    TUBAL LIGATION      AGE 32       Social History     Tobacco Use    Smoking status: Never    Smokeless tobacco: Never   Vaping Use    Vaping status: Never Used   Substance Use Topics    Alcohol use: No    Drug use: Never        No Known Allergies      Current Outpatient Medications:     acetaminophen (TYLENOL) 650 mg CR tablet, Take 1 tablet (650 mg total) by mouth every 8 (eight) hours as needed for mild pain, Disp: 30 tablet, Rfl: 0    famotidine (PEPCID) 20 mg tablet, Take 1 tablet (20 mg total) by mouth daily as needed for heartburn, Disp: 90 tablet, Rfl: 2    ferrous sulfate 324 (65 Fe) mg, Take 1 tablet (324 mg total) by mouth 2 (two) times a day before meals, Disp: 60 tablet, Rfl: 6    Tranexamic Acid 650 MG TABS, Take 2 tablets (1,300 mg total) by mouth 3 (three) times a day, Disp: 540 tablet, Rfl: 0      Review of Systems   Constitutional:  Negative for appetite change, chills and fever.   Eyes:  Negative for visual disturbance.   Respiratory:  Negative for cough, chest tightness and shortness of breath.    Cardiovascular:  Negative for chest pain.   Gastrointestinal:  Negative for abdominal distention, abdominal pain, constipation, diarrhea, nausea and vomiting.   Endocrine: Negative for cold intolerance and heat intolerance.   Genitourinary:  Positive for pelvic pain. Negative for difficulty urinating, dyspareunia, dysuria, frequency, genital sores, urgency, vaginal bleeding, vaginal discharge and vaginal pain.   Musculoskeletal:  Negative for arthralgias.   Neurological:  Negative for light-headedness and headaches.   Hematological:  Does not bruise/bleed easily.   Psychiatric/Behavioral:  Negative for behavioral problems.    All other systems reviewed and are negative.        /82 (BP Location: Right arm, Patient Position: Sitting, Cuff Size: Large)   Ht 5' 3\" (1.6 m)   Wt 97.5 kg (215 lb)   LMP 07/18/2024 (Exact Date)   BMI 38.09 kg/m² "       Physical Exam  Constitutional:       General: She is not in acute distress.     Appearance: Normal appearance.   HENT:      Head: Normocephalic and atraumatic.   Cardiovascular:      Rate and Rhythm: Normal rate.   Pulmonary:      Effort: Pulmonary effort is normal. No respiratory distress.   Abdominal:      General: There is no distension.      Palpations: Abdomen is soft.      Tenderness: There is no abdominal tenderness. There is no guarding or rebound.   Neurological:      General: No focal deficit present.      Mental Status: She is alert.   Psychiatric:         Mood and Affect: Mood normal.         Behavior: Behavior normal.   Vitals and nursing note reviewed.               Appropriate laboratory testing, imaging studies, and prior external records were reviewed:     Assessment/Plan:       Problem List Items Addressed This Visit       Cyst of left ovary - Primary     Will reevaluate with pelvic US  Not interested in surgical management at this time         Relevant Orders    US pelvis complete w transvaginal

## 2024-08-20 ENCOUNTER — HOSPITAL ENCOUNTER (OUTPATIENT)
Dept: RADIOLOGY | Facility: HOSPITAL | Age: 49
Discharge: HOME | End: 2024-08-20
Attending: OBSTETRICS & GYNECOLOGY
Payer: COMMERCIAL

## 2024-08-20 DIAGNOSIS — N83.202 CYST OF LEFT OVARY: ICD-10-CM

## 2024-08-20 PROCEDURE — 76830 TRANSVAGINAL US NON-OB: CPT

## 2024-08-20 PROCEDURE — 76856 US EXAM PELVIC COMPLETE: CPT

## 2024-09-17 ENCOUNTER — TELEPHONE (OUTPATIENT)
Age: 49
End: 2024-09-17

## 2024-09-17 DIAGNOSIS — N18.2 CKD (CHRONIC KIDNEY DISEASE), STAGE II: Primary | ICD-10-CM

## 2024-09-17 DIAGNOSIS — N20.0 NEPHROLITHIASIS: ICD-10-CM

## 2024-09-17 NOTE — TELEPHONE ENCOUNTER
BRENNONM for pt letting her know I have added new labs in the system per Dr. Kaur for pt to get done prior to her next office visit with Dr. Kaur in November. Asked pt to give us a call back with any questions or concerns.

## 2024-09-17 NOTE — TELEPHONE ENCOUNTER
Pt called she r/s her appt./  pt was in as a same day/ for .  She is asking if she needs labs, hers are .  Pt is a teacher and needed late afternoon. She is in for .  Can u please let her know when the labs are fixed.

## 2024-09-23 ENCOUNTER — APPOINTMENT (OUTPATIENT)
Dept: LAB | Facility: CLINIC | Age: 49
End: 2024-09-23
Payer: COMMERCIAL

## 2024-09-23 DIAGNOSIS — Z00.00 ANNUAL PHYSICAL EXAM: ICD-10-CM

## 2024-09-23 DIAGNOSIS — R79.0 LOW IRON STORES: ICD-10-CM

## 2024-09-23 DIAGNOSIS — R31.9 HEMATURIA, UNSPECIFIED TYPE: ICD-10-CM

## 2024-09-23 DIAGNOSIS — N18.2 CKD (CHRONIC KIDNEY DISEASE), STAGE II: ICD-10-CM

## 2024-09-23 LAB
ANION GAP SERPL CALCULATED.3IONS-SCNC: 9 MMOL/L (ref 4–13)
BACTERIA UR QL AUTO: ABNORMAL /HPF
BILIRUB UR QL STRIP: NEGATIVE
BUN SERPL-MCNC: 12 MG/DL (ref 5–25)
CALCIUM SERPL-MCNC: 9.1 MG/DL (ref 8.4–10.2)
CHLORIDE SERPL-SCNC: 103 MMOL/L (ref 96–108)
CLARITY UR: CLEAR
CO2 SERPL-SCNC: 24 MMOL/L (ref 21–32)
COLOR UR: YELLOW
CREAT SERPL-MCNC: 0.93 MG/DL (ref 0.6–1.3)
CREAT UR-MCNC: 253.9 MG/DL
ERYTHROCYTE [DISTWIDTH] IN BLOOD BY AUTOMATED COUNT: 12.5 % (ref 11.6–15.1)
GFR SERPL CREATININE-BSD FRML MDRD: 73 ML/MIN/1.73SQ M
GLUCOSE SERPL-MCNC: 118 MG/DL (ref 65–140)
GLUCOSE UR STRIP-MCNC: NEGATIVE MG/DL
HCT VFR BLD AUTO: 39.8 % (ref 34.8–46.1)
HGB BLD-MCNC: 13.1 G/DL (ref 11.5–15.4)
HGB UR QL STRIP.AUTO: NEGATIVE
IRON SATN MFR SERPL: 24 % (ref 15–50)
IRON SERPL-MCNC: 92 UG/DL (ref 50–212)
KETONES UR STRIP-MCNC: NEGATIVE MG/DL
LEUKOCYTE ESTERASE UR QL STRIP: ABNORMAL
MCH RBC QN AUTO: 29 PG (ref 26.8–34.3)
MCHC RBC AUTO-ENTMCNC: 32.9 G/DL (ref 31.4–37.4)
MCV RBC AUTO: 88 FL (ref 82–98)
MICROALBUMIN UR-MCNC: 11.6 MG/L
MICROALBUMIN/CREAT 24H UR: 5 MG/G CREATININE (ref 0–30)
MUCOUS THREADS UR QL AUTO: ABNORMAL
NITRITE UR QL STRIP: NEGATIVE
NON-SQ EPI CELLS URNS QL MICRO: ABNORMAL /HPF
PH UR STRIP.AUTO: 6 [PH]
PLATELET # BLD AUTO: 292 THOUSANDS/UL (ref 149–390)
PMV BLD AUTO: 11 FL (ref 8.9–12.7)
POTASSIUM SERPL-SCNC: 3.7 MMOL/L (ref 3.5–5.3)
PROT UR STRIP-MCNC: ABNORMAL MG/DL
RBC # BLD AUTO: 4.51 MILLION/UL (ref 3.81–5.12)
RBC #/AREA URNS AUTO: ABNORMAL /HPF
SODIUM SERPL-SCNC: 136 MMOL/L (ref 135–147)
SP GR UR STRIP.AUTO: 1.03 (ref 1–1.03)
TIBC SERPL-MCNC: 381 UG/DL (ref 250–450)
TSH SERPL DL<=0.05 MIU/L-ACNC: 0.92 UIU/ML (ref 0.45–4.5)
UIBC SERPL-MCNC: 289 UG/DL (ref 155–355)
UROBILINOGEN UR STRIP-ACNC: 2 MG/DL
WBC # BLD AUTO: 8.7 THOUSAND/UL (ref 4.31–10.16)
WBC #/AREA URNS AUTO: ABNORMAL /HPF

## 2024-09-23 PROCEDURE — 80048 BASIC METABOLIC PNL TOTAL CA: CPT

## 2024-09-23 PROCEDURE — 81001 URINALYSIS AUTO W/SCOPE: CPT

## 2024-09-23 PROCEDURE — 36415 COLL VENOUS BLD VENIPUNCTURE: CPT

## 2024-09-23 PROCEDURE — 84443 ASSAY THYROID STIM HORMONE: CPT

## 2024-09-23 PROCEDURE — 83540 ASSAY OF IRON: CPT

## 2024-09-23 PROCEDURE — 85027 COMPLETE CBC AUTOMATED: CPT

## 2024-09-23 PROCEDURE — 83550 IRON BINDING TEST: CPT

## 2024-09-23 PROCEDURE — 82043 UR ALBUMIN QUANTITATIVE: CPT

## 2024-09-23 PROCEDURE — 82570 ASSAY OF URINE CREATININE: CPT

## 2024-09-25 ENCOUNTER — TELEPHONE (OUTPATIENT)
Age: 49
End: 2024-09-25

## 2024-09-25 NOTE — TELEPHONE ENCOUNTER
Hadley called form [employer], to verify patient name is :  Geno Brendanavel Anderson as sample did not have Monica added. Verified name/ and MRN provided by [facility] representative is same in Epic.

## 2024-09-27 ENCOUNTER — APPOINTMENT (EMERGENCY)
Dept: RADIOLOGY | Facility: HOSPITAL | Age: 49
End: 2024-09-27
Payer: COMMERCIAL

## 2024-09-27 ENCOUNTER — HOSPITAL ENCOUNTER (EMERGENCY)
Facility: HOSPITAL | Age: 49
Discharge: HOME | End: 2024-09-27
Attending: EMERGENCY MEDICINE
Payer: COMMERCIAL

## 2024-09-27 VITALS
SYSTOLIC BLOOD PRESSURE: 135 MMHG | HEART RATE: 92 BPM | OXYGEN SATURATION: 100 % | DIASTOLIC BLOOD PRESSURE: 71 MMHG | TEMPERATURE: 98.1 F | RESPIRATION RATE: 18 BRPM

## 2024-09-27 DIAGNOSIS — R51.9 HEADACHE: ICD-10-CM

## 2024-09-27 DIAGNOSIS — R07.9 CHEST PAIN: Primary | ICD-10-CM

## 2024-09-27 LAB
ALBUMIN SERPL BCG-MCNC: 4.1 G/DL (ref 3.5–5)
ALP SERPL-CCNC: 66 U/L (ref 34–104)
ALT SERPL W P-5'-P-CCNC: 35 U/L (ref 7–52)
ANION GAP SERPL CALCULATED.3IONS-SCNC: 9 MMOL/L (ref 4–13)
AST SERPL W P-5'-P-CCNC: 25 U/L (ref 13–39)
BASOPHILS # BLD AUTO: 0.09 THOUSANDS/ΜL (ref 0–0.1)
BASOPHILS NFR BLD AUTO: 1 % (ref 0–1)
BILIRUB SERPL-MCNC: 0.31 MG/DL (ref 0.2–1)
BUN SERPL-MCNC: 13 MG/DL (ref 5–25)
CALCIUM SERPL-MCNC: 10 MG/DL (ref 8.4–10.2)
CARDIAC TROPONIN I PNL SERPL HS: <2 NG/L
CHLORIDE SERPL-SCNC: 101 MMOL/L (ref 96–108)
CO2 SERPL-SCNC: 25 MMOL/L (ref 21–32)
CREAT SERPL-MCNC: 0.86 MG/DL (ref 0.6–1.3)
EOSINOPHIL # BLD AUTO: 0.08 THOUSAND/ΜL (ref 0–0.61)
EOSINOPHIL NFR BLD AUTO: 1 % (ref 0–6)
ERYTHROCYTE [DISTWIDTH] IN BLOOD BY AUTOMATED COUNT: 12.5 % (ref 11.6–15.1)
GFR SERPL CREATININE-BSD FRML MDRD: 80 ML/MIN/1.73SQ M
GLUCOSE SERPL-MCNC: 95 MG/DL (ref 65–140)
HCT VFR BLD AUTO: 41.5 % (ref 34.8–46.1)
HGB BLD-MCNC: 13.8 G/DL (ref 11.5–15.4)
IMM GRANULOCYTES # BLD AUTO: 0.03 THOUSAND/UL (ref 0–0.2)
IMM GRANULOCYTES NFR BLD AUTO: 0 % (ref 0–2)
LYMPHOCYTES # BLD AUTO: 2.1 THOUSANDS/ΜL (ref 0.6–4.47)
LYMPHOCYTES NFR BLD AUTO: 22 % (ref 14–44)
MCH RBC QN AUTO: 28.9 PG (ref 26.8–34.3)
MCHC RBC AUTO-ENTMCNC: 33.3 G/DL (ref 31.4–37.4)
MCV RBC AUTO: 87 FL (ref 82–98)
MONOCYTES # BLD AUTO: 0.78 THOUSAND/ΜL (ref 0.17–1.22)
MONOCYTES NFR BLD AUTO: 8 % (ref 4–12)
NEUTROPHILS # BLD AUTO: 6.31 THOUSANDS/ΜL (ref 1.85–7.62)
NEUTS SEG NFR BLD AUTO: 68 % (ref 43–75)
NRBC BLD AUTO-RTO: 0 /100 WBCS
PLATELET # BLD AUTO: 280 THOUSANDS/UL (ref 149–390)
PMV BLD AUTO: 10.3 FL (ref 8.9–12.7)
POTASSIUM SERPL-SCNC: 3.9 MMOL/L (ref 3.5–5.3)
PROT SERPL-MCNC: 7.1 G/DL (ref 6.4–8.4)
RBC # BLD AUTO: 4.77 MILLION/UL (ref 3.81–5.12)
SODIUM SERPL-SCNC: 135 MMOL/L (ref 135–147)
WBC # BLD AUTO: 9.39 THOUSAND/UL (ref 4.31–10.16)

## 2024-09-27 PROCEDURE — 80053 COMPREHEN METABOLIC PANEL: CPT | Performed by: EMERGENCY MEDICINE

## 2024-09-27 PROCEDURE — 71045 X-RAY EXAM CHEST 1 VIEW: CPT

## 2024-09-27 PROCEDURE — 36415 COLL VENOUS BLD VENIPUNCTURE: CPT | Performed by: EMERGENCY MEDICINE

## 2024-09-27 PROCEDURE — 99285 EMERGENCY DEPT VISIT HI MDM: CPT

## 2024-09-27 PROCEDURE — 93005 ELECTROCARDIOGRAM TRACING: CPT

## 2024-09-27 PROCEDURE — 85025 COMPLETE CBC W/AUTO DIFF WBC: CPT | Performed by: EMERGENCY MEDICINE

## 2024-09-27 PROCEDURE — 96374 THER/PROPH/DIAG INJ IV PUSH: CPT

## 2024-09-27 PROCEDURE — 84484 ASSAY OF TROPONIN QUANT: CPT | Performed by: EMERGENCY MEDICINE

## 2024-09-27 RX ORDER — ONDANSETRON 2 MG/ML
4 INJECTION INTRAMUSCULAR; INTRAVENOUS ONCE
Status: COMPLETED | OUTPATIENT
Start: 2024-09-27 | End: 2024-09-27

## 2024-09-27 RX ORDER — PREDNISONE 20 MG/1
40 TABLET ORAL DAILY
Qty: 10 TABLET | Refills: 0 | Status: SHIPPED | OUTPATIENT
Start: 2024-09-27 | End: 2024-10-02

## 2024-09-27 RX ORDER — METOCLOPRAMIDE 10 MG/1
10 TABLET ORAL EVERY 6 HOURS
Qty: 30 TABLET | Refills: 0 | Status: SHIPPED | OUTPATIENT
Start: 2024-09-27

## 2024-09-27 RX ORDER — ASPIRIN 325 MG
325 TABLET ORAL ONCE
Status: COMPLETED | OUTPATIENT
Start: 2024-09-27 | End: 2024-09-27

## 2024-09-27 RX ADMIN — ONDANSETRON 4 MG: 2 INJECTION INTRAMUSCULAR; INTRAVENOUS at 13:36

## 2024-09-27 RX ADMIN — ASPIRIN 325 MG ORAL TABLET 325 MG: 325 PILL ORAL at 13:36

## 2024-09-27 NOTE — Clinical Note
Geno Anderson was seen and treated in our emergency department on 9/27/2024.    No restrictions            Diagnosis:     Geno  may return to work on return date, is off the rest of the shift today.    She may return on this date: 09/30/2024         If you have any questions or concerns, please don't hesitate to call.      Alejandro Shields PA-C    ______________________________           _______________          _______________  Hospital Representative                              Date                                Time

## 2024-09-28 NOTE — ED PROVIDER NOTES
"Final diagnoses:   Chest pain   Headache     ED Disposition       ED Disposition   Discharge    Condition   Stable    Date/Time   Fri Sep 27, 2024  2:50 PM    Comment   Geno Anderson discharge to home/self care.                   Assessment & Plan       Medical Decision Making  48 year old female presenting today with concerns of chest pain and headache, some mild nausea that started earlier today.  States she is never had anything like this before.  States that she was just walking around when this began occurring.  She states some mild chest pain, left-sided at this point.  No dizziness.  No visual changes.  No back pain or abdominal pain.  ACS rule out.  EKG and chest x-ray reviewed by me, please see above documentation.  Trop reassuring.  Other lab work overall very reassuring.  Heart score 3.  Patient feeling better after medications.  Discharge and follow-up with family doctor.  Strict return cautions discussed with patient.  ------------------------------------------------------------  Strict return precautions discussed. Patient at time of discharge well-appearing in no acute distress, all questions answered. Patient agreeable to plan.  Patient's vitals, lab/imaging results, diagnosis, and treatment plan were discussed with the patient. All new/changed medications were discussed with patient, specifically, route of administration, how often and when to take, and where they can be picked up. Strict return precautions as well as close follow up with PCP was discussed with the patient and the patient was agreeable to my recommendations.  Patient verbally acknowledged understanding of the above communications. All labs reviewed and utilized in the medical decision making process (if labs were ordered). Portions of the record may have been created with voice recognition software.  Occasional wrong word or \"sound a like\" substitutions may have occurred due to the inherent limitations of voice recognition " software.  Read the chart carefully and recognize, using context, where substitutions have occurred.      Amount and/or Complexity of Data Reviewed  Labs: ordered.  Radiology: ordered and independent interpretation performed.    Risk  OTC drugs.  Prescription drug management.             Medications   aspirin tablet 325 mg (325 mg Oral Given 9/27/24 1336)   ondansetron (ZOFRAN) injection 4 mg (4 mg Intravenous Given 9/27/24 1336)       ED Risk Strat Scores   HEART Risk Score      Flowsheet Row Most Recent Value   Heart Score Risk Calculator    History 1 Filed at: 09/27/2024 1450   ECG 0 Filed at: 09/27/2024 1450   Age 1 Filed at: 09/27/2024 1450   Risk Factors 1 Filed at: 09/27/2024 1450   Troponin 0 Filed at: 09/27/2024 1450   HEART Score 3 Filed at: 09/27/2024 1450                               SBIRT 20yo+      Flowsheet Row Most Recent Value   Initial Alcohol Screen: US AUDIT-C     1. How often do you have a drink containing alcohol? 0 Filed at: 09/27/2024 1313   2. How many drinks containing alcohol do you have on a typical day you are drinking?  0 Filed at: 09/27/2024 1313   3a. Male UNDER 65: How often do you have five or more drinks on one occasion? 0 Filed at: 09/27/2024 1313   3b. FEMALE Any Age, or MALE 65+: How often do you have 4 or more drinks on one occassion? 0 Filed at: 09/27/2024 1313   Audit-C Score 0 Filed at: 09/27/2024 1313   TEE: How many times in the past year have you...    Used an illegal drug or used a prescription medication for non-medical reasons? Never Filed at: 09/27/2024 1313                            History of Present Illness       Chief Complaint   Patient presents with    Chest Pain     Has been having CP that started this morning around 1030. Feels like pressure, mid-sternal, non-radiating. Reports headache and nausea. Had high BP this AM, no hx of same. Feels like it is difficult to take a full breath.       Past Medical History:   Diagnosis Date    Abdominal hernia      Abnormal Pap smear of cervix     Anemia     Iron    Chronic kidney disease     COVID 12/2020 01/2022 recovered @ home    GERD (gastroesophageal reflux disease)     Hernia of abdominal wall     Kidney stone     Renal calculi     Renal disorder     Status post nephrectomy 01/28/2019    Toe fracture, left     Wears glasses       Past Surgical History:   Procedure Laterality Date    CHOLECYSTECTOMY LAPAROSCOPIC N/A 12/10/2018    Procedure: CHOLECYSTECTOMY LAPAROSCOPIC; INCISIONAL HERNIA REPAIR;  Surgeon: Kavon Ho MD;  [facility];  Service: General    KIDNEY SURGERY  2013    removed L kidney from damage from kidney stone -PERFORMED IN [place]    NEPHRECTOMY Left     [place] CONIZATION CERVIX W/WO D&C RPR ELTRD EXC N/A 3/16/2023    Procedure: BIOPSY LEEP CERVIX;  Surgeon: Ruth Michelle MD;  [facility];  Service: Gynecology    MO REPAIR FIRST ABDOMINAL WALL HERNIA N/A 07/11/2022    Procedure: REPAIR HERNIA INCISIONAL;  Surgeon: Stephen Diaz DO;  [facility];  Service: General    TUBAL LIGATION      AGE 32      Family History   Problem Relation Age of Onset    Diabetes Mother     Hyperlipidemia Mother     Hypertension Mother     Other Mother         low back pain    Heart disease Mother     Diabetes Father     No Known Problems Sister     No Known Problems Brother     No Known Problems Daughter     No Known Problems Son     Throat cancer Paternal Aunt     Lung cancer Paternal Aunt     Skin cancer Paternal Aunt     Kidney failure Maternal Grandmother         on dialysis    Kidney disease Maternal Grandmother     Heart disease Maternal Grandmother     Colon cancer Paternal Grandfather     Breast cancer Neg Hx     Ovarian cancer Neg Hx     Thyroid disease Neg Hx     Stroke Neg Hx       Social History     Tobacco Use    Smoking status: Never    Smokeless tobacco: Never   Vaping Use    Vaping status: Never Used   Substance Use Topics    Alcohol use: No    Drug use: Never       E-Cigarette/Vaping    E-Cigarette Use Never User       E-Cigarette/Vaping Substances    Nicotine No     THC No     CBD No     Flavoring No     Other No     Unknown No       I have reviewed and agree with the history as documented.     48-year-old female presents today with concerns of chest pain that started around 10:30 AM today.  States that it feels like more of a pressure and was more midsternal, rating to the left side.  Did not go down her arm and got up to her neck into her back.  No abdominal pain.  Some nausea without vomiting.  Mild headache, more bandlike in the front, does not rating to the back.  No photophobia or vision changes.  Unsure if it is because of her blood pressure, it was high this morning and she is not sure what it is now.        Review of Systems   Constitutional:  Negative for chills and fever.   HENT:  Negative for ear pain and sore throat.    Eyes:  Negative for pain and visual disturbance.   Respiratory:  Negative for apnea, cough, choking, chest tightness, shortness of breath, wheezing and stridor.    Cardiovascular:  Positive for chest pain. Negative for palpitations.   Gastrointestinal:  Positive for nausea. Negative for abdominal pain, constipation, diarrhea and vomiting.   Genitourinary:  Negative for dysuria and hematuria.   Musculoskeletal:  Negative for arthralgias and back pain.   Skin:  Negative for color change and rash.   Neurological:  Negative for seizures and syncope.   All other systems reviewed and are negative.          Objective       ED Triage Vitals   Temperature Pulse Blood Pressure Respirations SpO2 Patient Position - Orthostatic VS   09/27/24 1310 09/27/24 1310 09/27/24 1310 09/27/24 1310 09/27/24 1310 09/27/24 1310   98.1 °F (36.7 °C) 92 135/71 18 100 % Sitting      Temp Source Heart Rate Source BP Location FiO2 (%) Pain Score    09/27/24 1310 09/27/24 1310 09/27/24 1310 -- 09/27/24 1406    Oral Monitor Right arm  No Pain      Vitals      Date and Time Temp  Pulse SpO2 Resp BP Pain Score FACES Pain Rating User   09/27/24 1406 -- -- -- -- -- No Pain -- SG   09/27/24 1310 98.1 °F (36.7 °C) 92 100 % 18 135/71 -- -- TP            Physical Exam  Vitals and nursing note reviewed.   Constitutional:       General: She is not in acute distress.     Appearance: She is well-developed.   HENT:      Head: Normocephalic and atraumatic.   Eyes:      Conjunctiva/sclera: Conjunctivae normal.   Cardiovascular:      Rate and Rhythm: Normal rate and regular rhythm.      Heart sounds: No murmur heard.  Pulmonary:      Effort: Pulmonary effort is normal. No respiratory distress.      Breath sounds: Normal breath sounds.   Abdominal:      Palpations: Abdomen is soft.      Tenderness: There is no abdominal tenderness.   Musculoskeletal:         General: No swelling.      Cervical back: Neck supple.   Skin:     General: Skin is warm and dry.      Capillary Refill: Capillary refill takes less than 2 seconds.   Neurological:      Mental Status: She is alert.   Psychiatric:         Mood and Affect: Mood normal.         Results Reviewed       Procedure Component Value Units Date/Time    HS Troponin 0hr (reflex protocol) [[id number]]  (Normal) Collected: 09/27/24 1330    Lab Status: Final result Specimen: Blood from Arm, Left Updated: 09/27/24 1417     hs TnI 0hr <2 ng/L     Comprehensive metabolic panel [[id number]] Collected: 09/27/24 1330    Lab Status: Final result Specimen: Blood from Arm, Left Updated: 09/27/24 1354     Sodium 135 mmol/L      Potassium 3.9 mmol/L      Chloride 101 mmol/L      CO2 25 mmol/L      ANION GAP 9 mmol/L      BUN 13 mg/dL      Creatinine 0.86 mg/dL      Glucose 95 mg/dL      Calcium 10.0 mg/dL      AST 25 U/L      ALT 35 U/L      Alkaline Phosphatase 66 U/L      Total Protein 7.1 g/dL      Albumin 4.1 g/dL      Total Bilirubin 0.31 mg/dL      eGFR 80 ml/min/1.73sq m     Narrative:      National Kidney Disease Foundation guidelines for Chronic Kidney Disease (CKD):      Stage 1 with normal or high GFR (GFR > 90 mL/min/1.73 square meters)    Stage 2 Mild CKD (GFR = 60-89 mL/min/1.73 square meters)    Stage 3A Moderate CKD (GFR = 45-59 mL/min/1.73 square meters)    Stage 3B Moderate CKD (GFR = 30-44 mL/min/1.73 square meters)    Stage 4 Severe CKD (GFR = 15-29 mL/min/1.73 square meters)    Stage 5 End Stage CKD (GFR <15 mL/min/1.73 square meters)  Note: GFR calculation is accurate only with a steady state creatinine    CBC and differential [[id number]] Collected: 09/27/24 1330    Lab Status: Final result Specimen: Blood from Arm, Left Updated: 09/27/24 1338     WBC 9.39 Thousand/uL      RBC 4.77 Million/uL      Hemoglobin 13.8 g/dL      Hematocrit 41.5 %      MCV 87 fL      MCH 28.9 pg      MCHC 33.3 g/dL      RDW 12.5 %      MPV 10.3 fL      Platelets 280 Thousands/uL      nRBC 0 /100 WBCs      Segmented % 68 %      Immature Grans % 0 %      Lymphocytes % 22 %      Monocytes % 8 %      Eosinophils Relative 1 %      Basophils Relative 1 %      Absolute Neutrophils 6.31 Thousands/µL      Absolute Immature Grans 0.03 Thousand/uL      Absolute Lymphocytes 2.10 Thousands/µL      Absolute Monocytes 0.78 Thousand/µL      Eosinophils Absolute 0.08 Thousand/µL      Basophils Absolute 0.09 Thousands/µL             XR chest 1 view portable   ED Interpretation by Alejandro Shields PA-C (09/27 9888)   No acute cardoipulmonary process identified      Final Interpretation by Jerome Higginbotham MD (09/27 2044)      No acute cardiopulmonary disease.      This report is in agreement with the preliminary interpretation.         Workstation performed: [id number]             ECG 12 Lead Documentation Only    Date/Time: 9/27/2024 11:03 AM    Performed by: Alejandro Shields PA-C  Authorized by: Alejandro Shields PA-C    Indications / Diagnosis:  CP  ECG reviewed by me, the ED Provider: yes    Patient location:  ED  Previous ECG:     Previous ECG:  Compared to current    Similarity:  No  change  Interpretation:     Interpretation: normal    Rate:     ECG rate:  80    ECG rate assessment: normal    Rhythm:     Rhythm: sinus rhythm    Ectopy:     Ectopy: none    QRS:     QRS axis:  Normal    QRS intervals:  Normal  Conduction:     Conduction: normal    ST segments:     ST segments:  Normal  T waves:     T waves: normal        ED Medication and Procedure Management   Prior to Admission Medications   Prescriptions Last Dose Informant Patient Reported? Taking?   Tranexamic Acid 650 MG TABS   No No   Sig: Take 2 tablets (1,300 mg total) by mouth 3 (three) times a day   acetaminophen (TYLENOL) 650 mg CR tablet  Self No No   Sig: Take 1 tablet (650 mg total) by mouth every 8 (eight) hours as needed for mild pain   famotidine (PEPCID) 20 mg tablet  Self No No   Sig: Take 1 tablet (20 mg total) by mouth daily as needed for heartburn   ferrous sulfate 324 (65 Fe) mg  Self No No   Sig: Take 1 tablet (324 mg total) by mouth 2 (two) times a day before meals      Facility-Administered Medications: None     Discharge Medication List as of 9/27/2024  2:52 PM        CONTINUE these medications which have NOT CHANGED    Details   acetaminophen (TYLENOL) 650 mg CR tablet Take 1 tablet (650 mg total) by mouth every 8 (eight) hours as needed for mild pain, Starting Wed 7/19/2023, Normal      famotidine (PEPCID) 20 mg tablet Take 1 tablet (20 mg total) by mouth daily as needed for heartburn, Starting Thu 11/3/2022, Normal      ferrous sulfate 324 (65 Fe) mg Take 1 tablet (324 mg total) by mouth 2 (two) times a day before meals, Starting Fri 7/28/2023, Normal      Tranexamic Acid 650 MG TABS Take 2 tablets (1,300 mg total) by mouth 3 (three) times a day, Starting Tue 6/25/2024, Normal           No discharge procedures on file.  ED SEPSIS DOCUMENTATION   Time reflects when diagnosis was documented in both MDM as applicable and the Disposition within this note       Time User Action Codes Description Comment    9/27/2024   2:50 PM Alejandro Shields [R07.9] Chest pain     9/27/2024  2:56 PM Alejandro Shields [R51.9] Headache                  Alejandro Shields PA-C  09/28/24 1104

## 2024-09-29 LAB
ATRIAL RATE: 82 BPM
P AXIS: 52 DEGREES
PR INTERVAL: 154 MS
QRS AXIS: 73 DEGREES
QRSD INTERVAL: 78 MS
QT INTERVAL: 366 MS
QTC INTERVAL: 427 MS
T WAVE AXIS: 0 DEGREES
VENTRICULAR RATE: 82 BPM

## 2024-09-29 PROCEDURE — 93010 ELECTROCARDIOGRAM REPORT: CPT | Performed by: INTERNAL MEDICINE

## 2024-09-30 ENCOUNTER — OFFICE VISIT (OUTPATIENT)
Dept: INTERNAL MEDICINE CLINIC | Facility: CLINIC | Age: 49
End: 2024-09-30
Payer: MEDICARE

## 2024-09-30 ENCOUNTER — TELEPHONE (OUTPATIENT)
Age: 49
End: 2024-09-30

## 2024-09-30 VITALS
TEMPERATURE: 97.5 F | BODY MASS INDEX: 38.45 KG/M2 | WEIGHT: 217 LBS | SYSTOLIC BLOOD PRESSURE: 112 MMHG | HEART RATE: 88 BPM | HEIGHT: 63 IN | DIASTOLIC BLOOD PRESSURE: 76 MMHG | OXYGEN SATURATION: 98 %

## 2024-09-30 DIAGNOSIS — F41.8 SITUATIONAL ANXIETY: ICD-10-CM

## 2024-09-30 DIAGNOSIS — R03.0 ELEVATED BLOOD PRESSURE READING WITHOUT DIAGNOSIS OF HYPERTENSION: Primary | ICD-10-CM

## 2024-09-30 PROCEDURE — 99214 OFFICE O/P EST MOD 30 MIN: CPT | Performed by: NURSE PRACTITIONER

## 2024-09-30 NOTE — PROGRESS NOTES
"Ambulatory Visit  Name: Geno Anderson      : 1975      MRN: [id number]  Encounter Provider: PAYAM Higuera  Encounter Date: 2024   Encounter department: [facility] INTERNAL MEDICINE    Assessment & Plan  Elevated blood pressure reading without diagnosis of hypertension  Blood pressure normal in office today.  Recommend home blood pressure monitoring and call the office in 1-2 weeks with BP log.        Situational anxiety  Suspect symptoms related to increased anxiety.   Discussed medications but she'd like to hold off  Recommend healthy diet, regular exercise, deep breathing exercises, and adequate sleep. She was advised to let me know if symptoms persist or worsen.           History of Present Illness     Geno is here today for ER follow up  She was seen in the ER on  with headache and chest pain.   EKG, labs, and chest xray were unremarkable.  Her blood pressure has been elevated at times at home, 140-150/90's. Feels its elevated when she's stressed.   Feels her symptoms are related to anxiety. She is more stressed with work and overwhelmed at home.               Review of Systems   Constitutional:  Negative for activity change, appetite change and fatigue.   Respiratory:  Negative for shortness of breath.    Cardiovascular:  Negative for chest pain, palpitations and leg swelling.   Neurological:  Positive for headaches. Negative for dizziness and light-headedness.   Psychiatric/Behavioral:  Negative for dysphoric mood and sleep disturbance. The patient is nervous/anxious.            Objective     /76 (BP Location: Right arm, Patient Position: Sitting, Cuff Size: Standard)   Pulse 88   Temp 97.5 °F (36.4 °C)   Ht 5' 3\" (1.6 m)   Wt 98.4 kg (217 lb)   LMP 2024 (Approximate)   SpO2 98%   Breastfeeding No   BMI 38.44 kg/m²     Physical Exam  Vitals reviewed.   Constitutional:       Appearance: Normal appearance.   HENT:      Head: Normocephalic and " atraumatic.   Eyes:      Conjunctiva/sclera: Conjunctivae normal.   Cardiovascular:      Rate and Rhythm: Normal rate and regular rhythm.      Heart sounds: Normal heart sounds.   Pulmonary:      Effort: Pulmonary effort is normal.      Breath sounds: Normal breath sounds.   Neurological:      General: No focal deficit present.      Mental Status: She is alert and oriented to person, place, and time.   Psychiatric:         Mood and Affect: Mood is anxious.         Behavior: Behavior normal.

## 2024-09-30 NOTE — TELEPHONE ENCOUNTER
Patient called in to report she had chest pain starting Friday 9/27 with sweats. 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 Took 30 minute break and second /80. Sent patient home to fu with PCP or go to ER.    Patient went to ER; with EKG and chest xray completed. Reports [id number] in ER  Patient discharged with instructions to fu with PCP.    Patient reports she had headaches over the weekend;  felt strange. Reports mother and brother have history of HTN. BP over the weekend were Sat 9/28 151/90;  Sun 9/29 145/78. OV scheduled with PCP for today at 4:15 PM. 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

## 2024-10-29 ENCOUNTER — APPOINTMENT (EMERGENCY)
Dept: RADIOLOGY | Facility: HOSPITAL | Age: 49
End: 2024-10-29
Payer: COMMERCIAL

## 2024-10-29 ENCOUNTER — HOSPITAL ENCOUNTER (EMERGENCY)
Facility: HOSPITAL | Age: 49
Discharge: HOME | End: 2024-10-29
Attending: EMERGENCY MEDICINE
Payer: COMMERCIAL

## 2024-10-29 VITALS
SYSTOLIC BLOOD PRESSURE: 141 MMHG | OXYGEN SATURATION: 97 % | TEMPERATURE: 98.3 F | DIASTOLIC BLOOD PRESSURE: 63 MMHG | RESPIRATION RATE: 22 BRPM | HEART RATE: 98 BPM

## 2024-10-29 DIAGNOSIS — J18.9 PNEUMONIA OF RIGHT LOWER LOBE DUE TO INFECTIOUS ORGANISM: Primary | ICD-10-CM

## 2024-10-29 PROCEDURE — 71046 X-RAY EXAM CHEST 2 VIEWS: CPT

## 2024-10-29 PROCEDURE — 99283 EMERGENCY DEPT VISIT LOW MDM: CPT

## 2024-10-29 PROCEDURE — 99284 EMERGENCY DEPT VISIT MOD MDM: CPT | Performed by: EMERGENCY MEDICINE

## 2024-10-29 RX ORDER — BENZONATATE 100 MG/1
200 CAPSULE ORAL ONCE
Status: COMPLETED | OUTPATIENT
Start: 2024-10-29 | End: 2024-10-29

## 2024-10-29 RX ORDER — AMOXICILLIN 500 MG/1
1000 CAPSULE ORAL 3 TIMES DAILY
Qty: 40 CAPSULE | Refills: 0 | Status: SHIPPED | OUTPATIENT
Start: 2024-10-29 | End: 2024-11-05

## 2024-10-29 RX ORDER — AZITHROMYCIN 250 MG/1
TABLET, FILM COATED ORAL
Qty: 6 TABLET | Refills: 0 | Status: SHIPPED | OUTPATIENT
Start: 2024-10-29 | End: 2024-11-02

## 2024-10-29 RX ADMIN — BENZONATATE 200 MG: 100 CAPSULE ORAL at 17:27

## 2024-10-29 NOTE — ED PROVIDER NOTES
"Time reflects when diagnosis was documented in both MDM as applicable and the Disposition within this note       Time User Action Codes Description Comment    10/29/2024  6:43 PM Hortensia Lopez Add [R05.8] Post-viral cough syndrome     10/29/2024  6:45 PM Hortensia Lopez Add [J18.9] Pneumonia of right lower lobe due to infectious organism     10/29/2024  6:45 PM Hortensia Lopez Modify [J18.9] Pneumonia of right lower lobe due to infectious organism     10/29/2024  6:45 PM Hortensia Lopez Remove [R05.8] Post-viral cough syndrome           ED Disposition       ED Disposition   Discharge    Condition   Stable    Date/Time   Tue Oct 29, 2024  6:43 PM    Comment   Geno Anderson discharge to home/self care.                   Assessment & Plan       Medical Decision Making  Amount and/or Complexity of Data Reviewed  Radiology: ordered.    Risk  Prescription drug management.    \"   Chief Complaint   Patient presents with    Cough     Cough for two weeks. Worsening Friday with SOB secondary to cough. 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        Initial ED Assessment: 49-year-old female with prior medical history of GERD, anemia who presents due to 4 days of dry cough, chills and rhinorrhea.  Initial assessment concerning for post viral cough.  Other differentials include but is not limited to pneumonia, bronchitis, seasonal allergies.    Initial ED Plan and results: Chest x-ray    On my wet read, right lower lobe is concerning for an infiltrate.  Amoxicillin 1000 mg 3 times daily was given for 7 days.  Z-Hugo was also given.  Counseling was given as far as steam showers, teaspoon of honey and adequate fluid intake.     \"Patient appears well, is nontoxic appearing, Geno expresses understanding and agrees with plan of care at this time.  In light of this patient would benefit from outpatient management.\"         Medications   benzonatate (TESSALON PERLES) capsule 200 mg (200 mg Oral Given 10/29/24 1727)       ED Risk Strat Scores    "                                            History of Present Illness       Chief Complaint   Patient presents with    Cough     Cough for two weeks. Worsening Friday with SOB secondary to cough. 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        Past Medical History:   Diagnosis Date    Abdominal hernia     Abnormal Pap smear of cervix     Anemia     Iron    Chronic kidney disease     COVID 12/2020 01/2022 recovered @ home    GERD (gastroesophageal reflux disease)     Hernia of abdominal wall     Kidney stone     Renal calculi     Renal disorder     Status post nephrectomy 01/28/2019    Toe fracture, left     Wears glasses       Past Surgical History:   Procedure Laterality Date    CHOLECYSTECTOMY LAPAROSCOPIC N/A 12/10/2018    Procedure: CHOLECYSTECTOMY LAPAROSCOPIC; INCISIONAL HERNIA REPAIR;  Surgeon: Kavon Ho MD;  Location: [facility];  Service: General    KIDNEY SURGERY  2013    removed L kidney from damage from kidney stone -PERFORMED IN [place]    NEPHRECTOMY Left     UT CONIZATION CERVIX W/WO D&C RPR ELTRD EXC N/A 3/16/2023    Procedure: BIOPSY LEEP CERVIX;  Surgeon: Ruth Michelle MD;  Location: [facility];  Service: Gynecology    UT REPAIR FIRST ABDOMINAL WALL HERNIA N/A 07/11/2022    Procedure: REPAIR HERNIA INCISIONAL;  Surgeon: Stephen Diaz DO;  Location: [facility];  Service: General    TUBAL LIGATION      AGE 32      Family History   Problem Relation Age of Onset    Diabetes Mother     Hyperlipidemia Mother     Hypertension Mother     Other Mother         low back pain    Heart disease Mother     Diabetes Father     No Known Problems Sister     No Known Problems Brother     No Known Problems Daughter     No Known Problems Son     Throat cancer Paternal Aunt     Lung cancer Paternal Aunt     Skin cancer Paternal Aunt     Kidney failure Maternal Grandmother         on dialysis    Kidney disease Maternal Grandmother     Heart disease Maternal Grandmother     Colon cancer Paternal  Grandfather     Breast cancer Neg Hx     Ovarian cancer Neg Hx     Thyroid disease Neg Hx     Stroke Neg Hx       Social History     Tobacco Use    Smoking status: Never    Smokeless tobacco: Never   Vaping Use    Vaping status: Never Used   Substance Use Topics    Alcohol use: No    Drug use: Never      E-Cigarette/Vaping    E-Cigarette Use Never User       E-Cigarette/Vaping Substances    Nicotine No     THC No     CBD No     Flavoring No     Other No     Unknown No       I have reviewed and agree with the history as documented.     HPI  49-year-old female with prior medical history of GERD, anemia who presents due to 4 days of dry cough, chills and rhinorrhea. The patient reports that she was sick 2 weeks with cough, rhinorrhea and mild chills, but this improved 1.5 weeks ago.  She states that she works as a  and is constantly exposed to illnesses.    Review of Systems   Constitutional:  Positive for appetite change, chills and fatigue. Negative for fever.   HENT:  Positive for postnasal drip and rhinorrhea. Negative for ear pain and sore throat.    Eyes:  Negative for pain and visual disturbance.   Respiratory:  Positive for cough. Negative for shortness of breath.    Cardiovascular:  Negative for chest pain and palpitations.   Gastrointestinal:  Negative for abdominal pain, blood in stool, constipation, diarrhea, nausea and vomiting.   Genitourinary:  Negative for dysuria and hematuria.   Musculoskeletal:  Negative for arthralgias and back pain.   Skin:  Negative for color change and rash.   Neurological:  Negative for seizures and syncope.   All other systems reviewed and are negative.          Objective       ED Triage Vitals [10/29/24 1708]   Temperature Pulse Blood Pressure Respirations SpO2 Patient Position - Orthostatic VS   98.3 °F (36.8 °C) 98 141/63 22 97 % Sitting      Temp Source Heart Rate Source BP Location FiO2 (%) Pain Score    Oral Monitor Right arm -- --      Vitals      Date and  Time Temp Pulse SpO2 Resp BP Pain Score FACES Pain Rating User   10/29/24 1708 98.3 °F (36.8 °C) 98 97 % 22 141/63 -- -- EM            Physical Exam  GENERAL APPEARANCE:  AxOx4, generally well-appearing female, no acute distress.  HEENT:  NC, AT. MMM. EOMI, clear conjunctiva, oropharynx clear.  NECK:  Supple without lymphadenopathy.  No stiffness or restricted ROM.  HEART:  Normal rate and regular rhythm, normal S1/S2, no m/r/g  LUNGS:  Moving air well. No crackles are heard.  Mild wheezes upper fields.  No rales or rhonchi.  ABDOMEN:  Soft, nontender, nondistended.  BACK: No obvious deformity.  EXTREMITIES:  Without cyanosis, clubbing or edema.  NEUROLOGICAL:  Grossly nonfocal. Alert and oriented, moving all 4 extremities. CN not formally tested but appear grossly intact. Observed to ambulate with normal gait.  Skin:  Warm and dry without any rash.    Results Reviewed       None            XR chest 2 views   Final Interpretation by Mansoor Simon MD (10/29 2032)      No acute cardiopulmonary disease.            Workstation performed: [id number]             Procedures    ED Medication and Procedure Management   Prior to Admission Medications   Prescriptions Last Dose Informant Patient Reported? Taking?   Tranexamic Acid 650 MG TABS   No No   Sig: Take 2 tablets (1,300 mg total) by mouth 3 (three) times a day   acetaminophen (TYLENOL) 650 mg CR tablet  Self No No   Sig: Take 1 tablet (650 mg total) by mouth every 8 (eight) hours as needed for mild pain   famotidine (PEPCID) 20 mg tablet  Self No No   Sig: Take 1 tablet (20 mg total) by mouth daily as needed for heartburn   ferrous sulfate 324 (65 Fe) mg  Self No No   Sig: Take 1 tablet (324 mg total) by mouth 2 (two) times a day before meals   metoclopramide (Reglan) 10 mg tablet   No No   Sig: Take 1 tablet (10 mg total) by mouth every 6 (six) hours      Facility-Administered Medications: None     Discharge Medication List as of 10/29/2024  6:49 PM         START taking these medications    Details   amoxicillin (AMOXIL) 500 mg capsule Take 2 capsules (1,000 mg total) by mouth 3 (three) times a day for 7 days, Starting Tue 10/29/2024, Until Tue 11/5/2024, Normal      azithromycin (ZITHROMAX) 250 mg tablet Take 2 tablets today then 1 tablet daily x 4 days, Normal           CONTINUE these medications which have NOT CHANGED    Details   acetaminophen (TYLENOL) 650 mg CR tablet Take 1 tablet (650 mg total) by mouth every 8 (eight) hours as needed for mild pain, Starting Wed 7/19/2023, Normal      famotidine (PEPCID) 20 mg tablet Take 1 tablet (20 mg total) by mouth daily as needed for heartburn, Starting Thu 11/3/2022, Normal      ferrous sulfate 324 (65 Fe) mg Take 1 tablet (324 mg total) by mouth 2 (two) times a day before meals, Starting Fri 7/28/2023, Normal      metoclopramide (Reglan) 10 mg tablet Take 1 tablet (10 mg total) by mouth every 6 (six) hours, Starting Fri 9/27/2024, Normal      Tranexamic Acid 650 MG TABS Take 2 tablets (1,300 mg total) by mouth 3 (three) times a day, Starting Tue 6/25/2024, Normal           No discharge procedures on file.  ED SEPSIS DOCUMENTATION   Time reflects when diagnosis was documented in both MDM as applicable and the Disposition within this note       Time User Action Codes Description Comment    10/29/2024  6:43 PM Lopez Bazan [R05.8] Post-viral cough syndrome     10/29/2024  6:45 PM Lopez Bazan Add [J18.9] Pneumonia of right lower lobe due to infectious organism     10/29/2024  6:45 PM Lopez Bazan Modify [J18.9] Pneumonia of right lower lobe due to infectious organism     10/29/2024  6:45 PM Lopez Bazan Remove [R05.8] Post-viral cough syndrome                  Lopez Bazan, DO  10/30/24 7082       Lopez Bazan,   11/02/24 8041

## 2024-10-29 NOTE — DISCHARGE INSTRUCTIONS
Please continue utilizing honey and other abdomen treatments for your cough.    Consider using steam showers and Robitussin to help with your cough.    Take the antibiotics as prescribed.

## 2024-10-30 ENCOUNTER — TELEPHONE (OUTPATIENT)
Age: 49
End: 2024-10-30

## 2024-10-30 DIAGNOSIS — R05.1 ACUTE COUGH: Primary | ICD-10-CM

## 2024-10-30 RX ORDER — BENZONATATE 100 MG/1
100 CAPSULE ORAL 3 TIMES DAILY PRN
Qty: 30 CAPSULE | Refills: 0 | Status: SHIPPED | OUTPATIENT
Start: 2024-10-30

## 2024-10-30 NOTE — TELEPHONE ENCOUNTER
Please advise  Patient seen in ED yesterday , Dx: with Pneumonia , symptoms x 3 weeks ,prescribed antibiotics but nothing for her cough. Patient is coughing constantly , tried Dayquil, robitussin, and natural cough syrup, asking for a prescription. [facility]ses [facility], states ED told her she can go back to work but she can not even talk without coughing. Is to have an ED follow up .

## 2024-10-30 NOTE — TELEPHONE ENCOUNTER
Patient requesting letter for work due to pneumonia to include today through Friday 10/30-31, 11/1 with return to work on Monday 11/4. Please upload letter to My Chart and advise patient letter is complete.

## 2024-10-30 NOTE — TELEPHONE ENCOUNTER
I sent in cough medication for her to take 3 times daily as needed.   Recommend staying out of work the rest of the week to recover. (Ok to start a note if she needs it)  Please schedule follow up in about 1 week.

## 2024-11-01 NOTE — ED ATTENDING ATTESTATION
10/29/2024  I, Salima Tolbert MD, saw and evaluated the patient. I have discussed the patient with the resident/non-physician practitioner and agree with the resident's/non-physician practitioner's findings, Plan of Care, and MDM as documented in the resident's/non-physician practitioner's note, except where noted. All available labs and Radiology studies were reviewed.  I was present for key portions of any procedure(s) performed by the resident/non-physician practitioner and I was immediately available to provide assistance.       At this point I agree with the current assessment done in the Emergency Department.  I have conducted an independent evaluation of this patient a history and physical is as follows:    49-year-old presenting to the ER with progressively worsening cough.  Rhinorrhea.  Chills.  Has been present for over a week.  Works as a .  No chest pain.  No shortness of breath.  No vomiting.  No abdominal pain.  No back pain.  No lightness or dizziness.    Rales on exam.  Regular rate rhythm, abdomen soft nontender, no edema lower extremities.    Bronchitis versus pneumonia.  Will get chest x-ray.    Concern for pneumonia on exam and chest x-ray.  Will treat.      ED Course         Critical Care Time  Procedures

## 2024-11-05 ENCOUNTER — OFFICE VISIT (OUTPATIENT)
Dept: INTERNAL MEDICINE CLINIC | Facility: CLINIC | Age: 49
End: 2024-11-05
Payer: COMMERCIAL

## 2024-11-05 VITALS
OXYGEN SATURATION: 98 % | TEMPERATURE: 96.4 F | SYSTOLIC BLOOD PRESSURE: 118 MMHG | BODY MASS INDEX: 38.38 KG/M2 | HEART RATE: 80 BPM | WEIGHT: 216.6 LBS | HEIGHT: 63 IN | DIASTOLIC BLOOD PRESSURE: 78 MMHG

## 2024-11-05 DIAGNOSIS — J18.9 COMMUNITY ACQUIRED PNEUMONIA OF RIGHT LOWER LOBE OF LUNG: Primary | ICD-10-CM

## 2024-11-05 PROCEDURE — 99213 OFFICE O/P EST LOW 20 MIN: CPT | Performed by: NURSE PRACTITIONER

## 2024-11-05 RX ORDER — MOMETASONE FUROATE 50 UG/1
2 AEROSOL RESPIRATORY (INHALATION) 2 TIMES DAILY
Qty: 13 G | Refills: 0 | Status: SHIPPED | OUTPATIENT
Start: 2024-11-05

## 2024-11-05 NOTE — ASSESSMENT & PLAN NOTE
Lab Results   Component Value Date    EGFR 80 09/27/2024    EGFR 73 09/23/2024    EGFR 58 07/14/2024    CREATININE 0.86 09/27/2024    CREATININE 0.93 09/23/2024    CREATININE 1.12 07/14/2024   Stable.   Sees nephrology.

## 2024-11-05 NOTE — PROGRESS NOTES
"Ambulatory Visit  Name: Geno Anderson      : 1975      MRN: [id number]  Encounter Provider: PAYAM Higuera  Encounter Date: 2024   Encounter department: [facility] INTERNAL MEDICINE    Assessment & Plan  Community acquired pneumonia of right lower lobe of lung  Finish course of antibiotics.   Continue tessalon perles as needed for cough.  Start asmanex twice daily- rinse mouth after use.   Check chest xray in 4 weeks.   Orders:    Mometasone Furoate (Asmanex HFA) 50 MCG/ACT AERO; Inhale 2 puffs 2 (two) times a day Rinse mouth after use.    XR chest pa and lateral; Future       History of Present Illness     Geno is here today for ER follow up  She was seen in the ER on 10/29 with complaints of cough and chills.   Diagnosed with right lower lobe pneumonia   She was given 2 antibiotics     Symptoms are somewhat improving but she continues with a dry cough and shortness of breath.   Her appetite is poor.   No GI symptoms.   No fevers.   She returned to work yesterday.             Review of Systems   Constitutional:  Positive for appetite change. Negative for activity change and fatigue.   Respiratory:  Positive for cough, chest tightness and shortness of breath. Negative for wheezing.    Cardiovascular:  Negative for chest pain, palpitations and leg swelling.   Gastrointestinal:  Negative for abdominal pain.   Neurological:  Negative for dizziness, light-headedness and headaches.           Objective     /78   Pulse 80   Temp (!) 96.4 °F (35.8 °C)   Ht 5' 3\" (1.6 m)   Wt 98.2 kg (216 lb 9.6 oz)   SpO2 98%   BMI 38.37 kg/m²     Physical Exam  Vitals reviewed.   Constitutional:       Appearance: Normal appearance. She is well-developed.   HENT:      Head: Normocephalic and atraumatic.   Eyes:      Conjunctiva/sclera: Conjunctivae normal.   Neck:      Thyroid: No thyromegaly.   Cardiovascular:      Rate and Rhythm: Normal rate and regular rhythm.      Heart sounds: Normal " heart sounds.   Pulmonary:      Effort: Pulmonary effort is normal. No respiratory distress.      Breath sounds: Normal breath sounds. No wheezing.   Skin:     General: Skin is warm and dry.   Neurological:      Mental Status: She is alert and oriented to person, place, and time.   Psychiatric:         Mood and Affect: Mood normal.         Behavior: Behavior normal.

## 2024-11-20 ENCOUNTER — TELEPHONE (OUTPATIENT)
Dept: NEPHROLOGY | Facility: CLINIC | Age: 49
End: 2024-11-20

## 2024-11-23 ENCOUNTER — APPOINTMENT (OUTPATIENT)
Dept: LAB | Facility: CLINIC | Age: 49
End: 2024-11-23
Payer: COMMERCIAL

## 2024-11-23 DIAGNOSIS — N18.2 CKD (CHRONIC KIDNEY DISEASE), STAGE II: ICD-10-CM

## 2024-11-23 DIAGNOSIS — N20.0 NEPHROLITHIASIS: ICD-10-CM

## 2024-11-23 LAB
ANION GAP SERPL CALCULATED.3IONS-SCNC: 7 MMOL/L (ref 4–13)
BUN SERPL-MCNC: 13 MG/DL (ref 5–25)
CALCIUM SERPL-MCNC: 9.3 MG/DL (ref 8.4–10.2)
CHLORIDE SERPL-SCNC: 103 MMOL/L (ref 96–108)
CO2 SERPL-SCNC: 28 MMOL/L (ref 21–32)
CREAT SERPL-MCNC: 0.87 MG/DL (ref 0.6–1.3)
CREAT UR-MCNC: 150.2 MG/DL
GFR SERPL CREATININE-BSD FRML MDRD: 79 ML/MIN/1.73SQ M
GLUCOSE P FAST SERPL-MCNC: 92 MG/DL (ref 65–99)
MICROALBUMIN UR-MCNC: 448.7 MG/L
MICROALBUMIN/CREAT 24H UR: 299 MG/G CREATININE (ref 0–30)
POTASSIUM SERPL-SCNC: 4.1 MMOL/L (ref 3.5–5.3)
SODIUM SERPL-SCNC: 138 MMOL/L (ref 135–147)

## 2024-11-23 PROCEDURE — 82570 ASSAY OF URINE CREATININE: CPT

## 2024-11-23 PROCEDURE — 82043 UR ALBUMIN QUANTITATIVE: CPT

## 2024-11-23 PROCEDURE — 36415 COLL VENOUS BLD VENIPUNCTURE: CPT

## 2024-11-23 PROCEDURE — 80048 BASIC METABOLIC PNL TOTAL CA: CPT

## 2024-11-26 ENCOUNTER — OFFICE VISIT (OUTPATIENT)
Dept: NEPHROLOGY | Facility: CLINIC | Age: 49
End: 2024-11-26
Payer: MEDICARE

## 2024-11-26 VITALS
OXYGEN SATURATION: 97 % | DIASTOLIC BLOOD PRESSURE: 70 MMHG | SYSTOLIC BLOOD PRESSURE: 138 MMHG | BODY MASS INDEX: 38.27 KG/M2 | HEIGHT: 63 IN | HEART RATE: 79 BPM | WEIGHT: 216 LBS

## 2024-11-26 DIAGNOSIS — N20.0 NEPHROLITHIASIS: ICD-10-CM

## 2024-11-26 DIAGNOSIS — Z90.5 H/O LEFT NEPHRECTOMY: ICD-10-CM

## 2024-11-26 DIAGNOSIS — N18.2 CKD (CHRONIC KIDNEY DISEASE), STAGE II: Primary | ICD-10-CM

## 2024-11-26 DIAGNOSIS — D50.9 IRON DEFICIENCY ANEMIA, UNSPECIFIED IRON DEFICIENCY ANEMIA TYPE: ICD-10-CM

## 2024-11-26 DIAGNOSIS — R80.1 PERSISTENT PROTEINURIA: ICD-10-CM

## 2024-11-26 PROCEDURE — 99214 OFFICE O/P EST MOD 30 MIN: CPT | Performed by: INTERNAL MEDICINE

## 2024-11-26 RX ORDER — FERROUS SULFATE 324(65)MG
324 TABLET, DELAYED RELEASE (ENTERIC COATED) ORAL
Qty: 30 TABLET | Refills: 2 | Status: SHIPPED | OUTPATIENT
Start: 2024-11-26

## 2024-11-26 NOTE — ASSESSMENT & PLAN NOTE
Iron-deficiency anemia  -iron saturation improved 24% in September 2024.  Status post IV Venofer required in the past  Hemoglobin remains stable 13.8.  -Currently on p.o. iron supplement daily.  Repeat iron studies before next visit

## 2024-11-26 NOTE — ASSESSMENT & PLAN NOTE
Iron-deficiency anemia  -iron saturation improved 24% in September 2024.  Status post IV Venofer required in the past  Hemoglobin remains stable 13.8.

## 2024-11-26 NOTE — ASSESSMENT & PLAN NOTE
UACR increased up to 299 mg in November 2024  -Noted was recently diagnosed with pneumonia requiring ER visit on 10/29/2024 and was given azithromycin, amoxicillin.  -Proteinuria could be related to hyperfiltration in the setting of solitary kidney, obesity,  -Weight loss recommended.  Diet regimen, regular exercise.  -Check repeat UACR in 1 to 2 months, if this remains persistent, may consider lisinopril.

## 2024-11-26 NOTE — ASSESSMENT & PLAN NOTE
Status post left nephrectomy 2012 in the setting of recurrent nephrolithiasis, recurrent UTI as per patient in [place]  -since nephrectomy, she has not had any stone or UTI flare-up issues.  -CT scan in July 2024 shows normal right kidney, no stones.   -renal ultrasound in 2019 shows right kidney 13.5 cm.

## 2024-11-26 NOTE — ASSESSMENT & PLAN NOTE
History of nephrolithiasis requiring nephrectomy in the past.  Patient has not had any kidney stone issues since then. CT scan as above.  -advised to drink plenty of free water to stay hydrated and goal urine output greater than 2 L per day

## 2024-11-26 NOTE — PROGRESS NOTES
NEPHROLOGY OUTPATIENT PROGRESS NOTE   Geno Anderson 49 y.o. female MRN: [id number]  DATE: 11/26/2024  Reason for visit:   Chief Complaint   Patient presents with    Follow-up    Chronic Kidney Disease     ASSESSMENT and PLAN:  Assessment & Plan  CKD (chronic kidney disease), stage II  Lab Results   Component Value Date    EGFR 79 11/23/2024    EGFR 80 09/27/2024    EGFR 73 09/23/2024    CREATININE 0.87 11/23/2024    CREATININE 0.86 09/27/2024    CREATININE 0.93 09/23/2024   Mild CKD stage 2, baseline creatinine 0.8 to 1.0  -last creatinine overall stable 0.8 in November 2024.  -avoid nephrotoxins or NSAIDs.  -advised to drink adequate liquid to stay hydrated.  -UA in September 2024 shows 1+ proteinuria, no hematuria.   -Mild CKD in the setting of solitary kidney, hyper filtration/obesity related?  Secondary FSGS    She does not have any history of hypertension, currently not on any antihypertensive medications.  BP slightly higher in the office today.  Recommend to monitor BP at home with upper arm BP machine.  Salt restricted diet recommended.   Persistent proteinuria  UACR increased up to 299 mg in November 2024  -Noted was recently diagnosed with pneumonia requiring ER visit on 10/29/2024 and was given azithromycin, amoxicillin.  -Proteinuria could be related to hyperfiltration in the setting of solitary kidney, obesity,  -Weight loss recommended.  Diet regimen, regular exercise.  -Check repeat UACR in 1 to 2 months, if this remains persistent, may consider lisinopril.  H/O left nephrectomy  Status post left nephrectomy 2012 in the setting of recurrent nephrolithiasis, recurrent UTI as per patient in [place]  -since nephrectomy, she has not had any stone or UTI flare-up issues.  -CT scan in July 2024 shows normal right kidney, no stones.   -renal ultrasound in 2019 shows right kidney 13.5 cm.  Nephrolithiasis  History of nephrolithiasis requiring nephrectomy in the past.  Patient has not had any  kidney stone issues since then. CT scan as above.  -advised to drink plenty of free water to stay hydrated and goal urine output greater than 2 L per day  Iron deficiency anemia, unspecified iron deficiency anemia type  Iron-deficiency anemia  -iron saturation improved 24% in September 2024.  Status post IV Venofer required in the past  Hemoglobin remains stable 13.8.  -Currently on p.o. iron supplement daily.  Repeat iron studies before next visit        Diagnoses and all orders for this visit:    CKD (chronic kidney disease), stage II  -     Albumin / creatinine urine ratio; Future  -     Albumin / creatinine urine ratio; Future  -     Basic metabolic panel; Future  -     CBC; Future  -     Ferritin; Future  -     TIBC Panel (incl. Iron, TIBC, % Iron Saturation); Future    Persistent proteinuria  -     Albumin / creatinine urine ratio; Future  -     Albumin / creatinine urine ratio; Future    H/O left nephrectomy  -     Albumin / creatinine urine ratio; Future    Nephrolithiasis    Iron deficiency anemia, unspecified iron deficiency anemia type  -     ferrous sulfate 324 (65 Fe) mg; Take 1 tablet (324 mg total) by mouth daily before breakfast  -     CBC; Future  -     Ferritin; Future  -     TIBC Panel (incl. Iron, TIBC, % Iron Saturation); Future          SUBJECTIVE / HPI:  Geno Cardona is a 49 y.o. year old female with medical issues of hyperlipidemia, migraine, status post left nephrectomy due to recurrent nephrolithiasis/UTI in 2012, arthritis who presents for regular follow-up of CKD, solitary kidney management. Patient had nephrectomy on left side in 2012 which she believes secondary to recurrent nephrolithiasis and recurrent UTI issues.  This was done in [place].  Since then she denies having any UTI or nephrolithiasis episodes on her right kidney.    Denies any current urinary complaint.  Blood pressure slightly higher in the office today but no prior history of hypertension.  Not on any  "antihypertensive medications.  She recently had pneumonia and was given antibiotic for total 5 days. She denies any current urinary complaint.  Denies any chest pain, shortness of breath, nausea vomiting.     No recent NSAID exposure.     Family history includes parents having diabetes, grandmother was on dialysis, patient does not know more details.    REVIEW OF SYSTEMS:  More than 10 point review of systems were obtained and discussed in length with the patient. Complete review of systems were negative / unremarkable except mentioned above.     PHYSICAL EXAM:  Vitals:    11/26/24 1555 11/26/24 1609   BP: 120/72 138/70   BP Location: Left arm    Patient Position: Sitting    Cuff Size: Adult    Pulse: 79    SpO2: 97%    Weight: 98 kg (216 lb)    Height: 5' 3\" (1.6 m)      Body mass index is 38.26 kg/m².    Physical Exam  Vitals reviewed.   Constitutional:       Appearance: She is well-developed.   HENT:      Head: Normocephalic and atraumatic.      Right Ear: External ear normal.      Left Ear: External ear normal.   Eyes:      Conjunctiva/sclera: Conjunctivae normal.   Cardiovascular:      Comments: No significant edema in legs  Pulmonary:      Effort: Pulmonary effort is normal.      Breath sounds: Normal breath sounds. No wheezing or rales.   Abdominal:      General: Bowel sounds are normal. There is no distension.      Palpations: Abdomen is soft.      Tenderness: There is no abdominal tenderness.   Musculoskeletal:         General: No deformity.   Lymphadenopathy:      Cervical: No cervical adenopathy.   Skin:     Findings: No rash.   Neurological:      Mental Status: She is alert and oriented to person, place, and time.   Psychiatric:         Behavior: Behavior normal.         PAST MEDICAL HISTORY:  Past Medical History:   Diagnosis Date    Abdominal hernia     Abnormal Pap smear of cervix     Anemia     Iron    Chronic kidney disease     COVID 12/2020 01/2022 recovered @ home    GERD (gastroesophageal " reflux disease)     Hernia of abdominal wall     Kidney stone     Renal calculi     Renal disorder     Status post nephrectomy 01/28/2019    Toe fracture, left     Wears glasses        PAST SURGICAL HISTORY:  Past Surgical History:   Procedure Laterality Date    CHOLECYSTECTOMY LAPAROSCOPIC N/A 12/10/2018    Procedure: CHOLECYSTECTOMY LAPAROSCOPIC; INCISIONAL HERNIA REPAIR;  Surgeon: Kavon Ho MD;  [facility];  Service: General    KIDNEY SURGERY  2013    removed L kidney from damage from kidney stone -PERFORMED IN [place]    NEPHRECTOMY Left     FL CONIZATION CERVIX W/WO D&C RPR ELTRD EXC N/A 3/16/2023    Procedure: BIOPSY LEEP CERVIX;  Surgeon: Ruth Michelle MD;  [facility];  Service: Gynecology    FL REPAIR FIRST ABDOMINAL WALL HERNIA N/A 07/11/2022    Procedure: REPAIR HERNIA INCISIONAL;  Surgeon: Stephen Diaz DO;  [facility];  Service: General    TUBAL LIGATION      AGE 32       SOCIAL HISTORY:  Social History     Substance and Sexual Activity   Alcohol Use No     Social History     Substance and Sexual Activity   Drug Use Never     Social History     Tobacco Use   Smoking Status Never   Smokeless Tobacco Never       FAMILY HISTORY:  Family History   Problem Relation Age of Onset    Diabetes Mother     Hyperlipidemia Mother     Hypertension Mother     Other Mother         low back pain    Heart disease Mother     Diabetes Father     No Known Problems Sister     No Known Problems Brother     No Known Problems Daughter     No Known Problems Son     Throat cancer Paternal Aunt     Lung cancer Paternal Aunt     Skin cancer Paternal Aunt     Kidney failure Maternal Grandmother         on dialysis    Kidney disease Maternal Grandmother     Heart disease Maternal Grandmother     Colon cancer Paternal Grandfather     Breast cancer Neg Hx     Ovarian cancer Neg Hx     Thyroid disease Neg Hx     Stroke Neg Hx        MEDICATIONS:    Current Outpatient Medications:      acetaminophen (TYLENOL) 650 mg CR tablet, Take 1 tablet (650 mg total) by mouth every 8 (eight) hours as needed for mild pain, Disp: 30 tablet, Rfl: 0    famotidine (PEPCID) 20 mg tablet, Take 1 tablet (20 mg total) by mouth daily as needed for heartburn, Disp: 90 tablet, Rfl: 2    ferrous sulfate 324 (65 Fe) mg, Take 1 tablet (324 mg total) by mouth daily before breakfast, Disp: 30 tablet, Rfl: 2    metoclopramide (Reglan) 10 mg tablet, Take 1 tablet (10 mg total) by mouth every 6 (six) hours, Disp: 30 tablet, Rfl: 0    Mometasone Furoate (Asmanex HFA) 50 MCG/ACT AERO, Inhale 2 puffs 2 (two) times a day Rinse mouth after use., Disp: 13 g, Rfl: 0    Tranexamic Acid 650 MG TABS, Take 2 tablets (1,300 mg total) by mouth 3 (three) times a day, Disp: 540 tablet, Rfl: 0    benzonatate (TESSALON PERLES) 100 mg capsule, Take 1 capsule (100 mg total) by mouth 3 (three) times a day as needed for cough (Patient not taking: Reported on 11/26/2024), Disp: 30 capsule, Rfl: 0    Lab Results:   Results for orders placed or performed in visit on 11/23/24   Basic metabolic panel    Collection Time: 11/23/24  9:20 AM   Result Value Ref Range    Sodium 138 135 - 147 mmol/L    Potassium 4.1 3.5 - 5.3 mmol/L    Chloride 103 96 - 108 mmol/L    CO2 28 21 - 32 mmol/L    ANION GAP 7 4 - 13 mmol/L    BUN 13 5 - 25 mg/dL    Creatinine 0.87 0.60 - 1.30 mg/dL    Glucose, Fasting 92 65 - 99 mg/dL    Calcium 9.3 8.4 - 10.2 mg/dL    eGFR 79 ml/min/1.73sq m   Albumin / creatinine urine ratio    Collection Time: 11/23/24  9:20 AM   Result Value Ref Range    Creatinine, Ur 150.2 Reference range not established. mg/dL    Albumin,U,Random 448.7 (H) <20.0 mg/L    Albumin Creat Ratio 299 (H) 0 - 30 mg/g creatinine

## 2024-11-26 NOTE — ASSESSMENT & PLAN NOTE
Lab Results   Component Value Date    EGFR 79 11/23/2024    EGFR 80 09/27/2024    EGFR 73 09/23/2024    CREATININE 0.87 11/23/2024    CREATININE 0.86 09/27/2024    CREATININE 0.93 09/23/2024   Mild CKD stage 2, baseline creatinine 0.8 to 1.0  -last creatinine overall stable 0.8 in November 2024.  -avoid nephrotoxins or NSAIDs.  -advised to drink adequate liquid to stay hydrated.  -UA in September 2024 shows 1+ proteinuria, no hematuria.   -Mild CKD in the setting of solitary kidney, hyper filtration/obesity related?  Secondary FSGS    She does not have any history of hypertension, currently not on any antihypertensive medications.  BP slightly higher in the office today.  Recommend to monitor BP at home with upper arm BP machine.  Salt restricted diet recommended.

## 2024-11-30 ENCOUNTER — RESULTS FOLLOW-UP (OUTPATIENT)
Dept: INTERNAL MEDICINE CLINIC | Facility: CLINIC | Age: 49
End: 2024-11-30

## 2024-12-05 DIAGNOSIS — E78.2 MIXED HYPERLIPIDEMIA: Primary | ICD-10-CM

## 2024-12-05 RX ORDER — ATORVASTATIN CALCIUM 20 MG/1
20 TABLET, FILM COATED ORAL DAILY
Qty: 90 TABLET | Refills: 1 | Status: SHIPPED | OUTPATIENT
Start: 2024-12-05

## 2024-12-05 NOTE — TELEPHONE ENCOUNTER
Pt returned call, message in chart relayed.  Patient is ok with starting medication, please send to pharmacy.  She will repeat labs in March, please place order.     D & C w/ karyotyping

## 2025-01-13 DIAGNOSIS — N93.9 ABNORMAL UTERINE BLEEDING: ICD-10-CM

## 2025-01-15 RX ORDER — TRANEXAMIC ACID 650 MG/1
TABLET ORAL
Qty: 540 TABLET | Refills: 0 | Status: SHIPPED | OUTPATIENT
Start: 2025-01-15

## 2025-02-04 DIAGNOSIS — M22.2X2 PATELLOFEMORAL ARTHRALGIA OF BOTH KNEES: ICD-10-CM

## 2025-02-04 DIAGNOSIS — E78.2 MIXED HYPERLIPIDEMIA: ICD-10-CM

## 2025-02-04 DIAGNOSIS — M22.2X1 PATELLOFEMORAL ARTHRALGIA OF BOTH KNEES: ICD-10-CM

## 2025-02-04 RX ORDER — SENNOSIDES 8.6 MG
650 CAPSULE ORAL EVERY 8 HOURS PRN
Qty: 30 TABLET | Refills: 0 | Status: SHIPPED | OUTPATIENT
Start: 2025-02-04

## 2025-02-05 RX ORDER — ATORVASTATIN CALCIUM 20 MG/1
20 TABLET, FILM COATED ORAL DAILY
Qty: 90 TABLET | Refills: 0 | OUTPATIENT
Start: 2025-02-05

## 2025-03-11 ENCOUNTER — TELEPHONE (OUTPATIENT)
Age: 50
End: 2025-03-11

## 2025-03-11 NOTE — TELEPHONE ENCOUNTER
She should be seen. You can offer my same day/new patient appointment Thursday or if that time doesn't work for her or she wants to be seen sooner she can go to any [facility] urgent [facility] (online appointments available too)

## 2025-03-11 NOTE — TELEPHONE ENCOUNTER
Pt called to request an appt with Tasha HARE.  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  One of the kids was holding pt's hand and jumping, and then she fell and jerked pt's arm.  Pt has had neck pain whenever she turns her head to the right since the child pulled her arm.  She has been taking Tylenol, but it isn't helping.  She only has one kidney, so she has to be careful about the medications she takes.  Attempted to schedule an appt for her, but the first available appt was not until Monday 3/17/25.  Pt did not want to wait that long.  She wondered if she should go to the ED or urgent care, or if Tasha HARE would want to prescribe something for her.  Please advise.

## 2025-03-24 ENCOUNTER — OFFICE VISIT (OUTPATIENT)
Dept: INTERNAL MEDICINE CLINIC | Facility: CLINIC | Age: 50
End: 2025-03-24
Payer: COMMERCIAL

## 2025-03-24 VITALS
SYSTOLIC BLOOD PRESSURE: 122 MMHG | BODY MASS INDEX: 38.09 KG/M2 | HEIGHT: 63 IN | TEMPERATURE: 96.9 F | WEIGHT: 215 LBS | DIASTOLIC BLOOD PRESSURE: 84 MMHG | HEART RATE: 94 BPM | OXYGEN SATURATION: 98 %

## 2025-03-24 DIAGNOSIS — G89.29 CHRONIC PAIN OF LEFT KNEE: ICD-10-CM

## 2025-03-24 DIAGNOSIS — M25.562 CHRONIC PAIN OF LEFT KNEE: ICD-10-CM

## 2025-03-24 DIAGNOSIS — Z12.31 ENCOUNTER FOR SCREENING MAMMOGRAM FOR BREAST CANCER: ICD-10-CM

## 2025-03-24 DIAGNOSIS — N93.9 ABNORMAL UTERINE BLEEDING: ICD-10-CM

## 2025-03-24 DIAGNOSIS — M54.2 NECK PAIN ON RIGHT SIDE: Primary | ICD-10-CM

## 2025-03-24 PROCEDURE — 99214 OFFICE O/P EST MOD 30 MIN: CPT | Performed by: NURSE PRACTITIONER

## 2025-03-24 RX ORDER — CYCLOBENZAPRINE HCL 5 MG
5 TABLET ORAL
Qty: 30 TABLET | Refills: 0 | Status: SHIPPED | OUTPATIENT
Start: 2025-03-24

## 2025-03-24 NOTE — ASSESSMENT & PLAN NOTE
Scheduled with GYN next month.   On tranexamic acid.   Ultrasound last year showed a left ovarian cyst.

## 2025-03-24 NOTE — PROGRESS NOTES
Name: Geno Anderson      : 1975      MRN: [id number]  Encounter Provider: PAYAM Higuera  Encounter Date: 3/24/2025   Encounter department: [facility] INTERNAL MEDICINE  :  Assessment & Plan  Neck pain on right side  Pain is improving.   Prescribed flexeril to take at HS as needed.   Continue stretching exercises and warm compresses.   Orders:    cyclobenzaprine (FLEXERIL) 5 mg tablet; Take 1 tablet (5 mg total) by mouth daily at bedtime as needed for muscle spasms    Chronic pain of left knee  Check xray.  Discussed starting physical therapy but she'd like to see orthopedics first.   Orders:    XR knee 3 vw left non injury; Future    Ambulatory Referral to Orthopedic Surgery; Future    Abnormal uterine bleeding  Scheduled with GYN next month.   On tranexamic acid.   Ultrasound last year showed a left ovarian cyst.        Encounter for screening mammogram for breast cancer    Orders:    Mammo screening bilateral w 3d and cad; Future           History of Present Illness   Geno is here today with complaints of neck pain.  Symptoms started about 2 weeks ago.  She had special olympics at school. One of the kids was holding her hand and jumping and then fell and jerked her arm.   She developed neck pain shortly after. The pain is worse when she turns her head to the right. The pain does not radiate.   She has been taking tylenol and ice/heat with some relief. Pain has been improving.    She's more worried about her left knee today.   A few years ago she fell down onto her knee. She did physical therapy at the time.   The pain comes and goes depending on her activity level.  She uses a brace which helps a little.   The pain is worse when her leg is straight. She pain is on the inner aspect of her knee. The pain doesn't radiate.   No numbness or tingling.     The last two months her periods are lasting 10-14 days. Heavy for 4-5 days.  She is scheduled with GYN next month.   She had an  "ultrasound which showed a cyst.                 Review of Systems   Constitutional:  Positive for activity change. Negative for appetite change and fatigue.   Respiratory:  Negative for shortness of breath.    Cardiovascular:  Negative for chest pain and leg swelling.   Gastrointestinal:  Negative for abdominal pain.   Musculoskeletal:  Positive for arthralgias.   Neurological:  Negative for dizziness, weakness, light-headedness and headaches.       Objective   /84   Pulse 94   Temp (!) 96.9 °F (36.1 °C)   Ht 5' 3\" (1.6 m)   Wt 97.5 kg (215 lb)   SpO2 98%   BMI 38.09 kg/m²      Physical Exam  Vitals reviewed.   Constitutional:       Appearance: Normal appearance.   HENT:      Head: Normocephalic and atraumatic.   Eyes:      Conjunctiva/sclera: Conjunctivae normal.   Pulmonary:      Effort: Pulmonary effort is normal.   Musculoskeletal:      Cervical back: Spasms present. No tenderness. Pain with movement present. Normal range of motion.      Left knee: No swelling. Normal range of motion. Tenderness present over the medial joint line.      Right lower leg: No edema.      Left lower leg: No edema.   Neurological:      Mental Status: She is alert and oriented to person, place, and time.   Psychiatric:         Mood and Affect: Mood normal.         Behavior: Behavior normal.         "

## 2025-03-25 DIAGNOSIS — M22.2X1 PATELLOFEMORAL ARTHRALGIA OF BOTH KNEES: ICD-10-CM

## 2025-03-25 DIAGNOSIS — M22.2X2 PATELLOFEMORAL ARTHRALGIA OF BOTH KNEES: ICD-10-CM

## 2025-03-26 RX ORDER — SENNOSIDES 8.6 MG
650 CAPSULE ORAL EVERY 8 HOURS PRN
Qty: 30 TABLET | Refills: 0 | Status: SHIPPED | OUTPATIENT
Start: 2025-03-26

## 2025-03-29 ENCOUNTER — HOSPITAL ENCOUNTER (OUTPATIENT)
Dept: RADIOLOGY | Facility: HOSPITAL | Age: 50
Discharge: HOME | End: 2025-03-29
Payer: COMMERCIAL

## 2025-03-29 DIAGNOSIS — M25.562 CHRONIC PAIN OF LEFT KNEE: ICD-10-CM

## 2025-03-29 DIAGNOSIS — G89.29 CHRONIC PAIN OF LEFT KNEE: ICD-10-CM

## 2025-03-29 PROCEDURE — 73562 X-RAY EXAM OF KNEE 3: CPT

## 2025-04-01 ENCOUNTER — RESULTS FOLLOW-UP (OUTPATIENT)
Dept: INTERNAL MEDICINE CLINIC | Facility: CLINIC | Age: 50
End: 2025-04-01

## 2025-04-01 ENCOUNTER — OFFICE VISIT (OUTPATIENT)
Dept: OBGYN CLINIC | Facility: CLINIC | Age: 50
End: 2025-04-01
Payer: COMMERCIAL

## 2025-04-01 VITALS — HEIGHT: 63 IN | WEIGHT: 216 LBS | BODY MASS INDEX: 38.27 KG/M2

## 2025-04-01 DIAGNOSIS — M23.92 INTERNAL DERANGEMENT OF LEFT KNEE: Primary | ICD-10-CM

## 2025-04-01 DIAGNOSIS — M25.562 CHRONIC PAIN OF LEFT KNEE: ICD-10-CM

## 2025-04-01 DIAGNOSIS — G89.29 CHRONIC PAIN OF LEFT KNEE: ICD-10-CM

## 2025-04-01 PROCEDURE — 99204 OFFICE O/P NEW MOD 45 MIN: CPT | Performed by: ORTHOPAEDIC SURGERY

## 2025-04-01 NOTE — PROGRESS NOTES
Sports Medicine and Shoulder Surgery    Geno Anderson, 49 y.o. female   MRN# [id number]   : 1975        Assessment & Plan  Chronic pain of left knee    Orders:    Ambulatory Referral to Orthopedic Surgery    Internal derangement of left knee    Orders:    MRI knee left  wo contrast; Future         Differentials for the patient's knee include: Osteoarthritis, Medial Meniscus Tear  Plan to order MRI of the knee to evaluate for internal derangement. Depending on what is found, patient may possibly be a candidate for surgical intervention   Recommend acetaminophen 500 mg every 6 hours as needed for breakthrough pain  Advise activity modification by avoiding heavy pivoting, cutting, twisting, or activities that exacerbate pain   Encourage light activities within pain tolerance to avoid stiffness  Follow up: after the MRI is complete and read by radiology  If any issues, questions, or concerns arise between now and the next appointment, we have encouraged the patient contact our team.                Chief Complaint:    Left Knee Pain and Dysfunction    Subjective:   Patient comes in for evaluation of the knee. Patient said she has been dealing with waxing and waning left medial sided knee pain for the past couple years which started after she felt onto the left knee. She denies any obvious recent injury/trauma, clicking, and locking.    Physical Examination:    Musculoskeletal: left Knee Examination:  General: The patient is alert, oriented, and pleasant to interact with.  Patient ambulates with Antalgic gait pattern  Assistive Device: No  Alignment: normal  Skin is warm and dry to touch with no signs of erythema, ecchymosis, or infection   Effusion: minimal  ROM: 0° - 135°    MMT: deferred  TTP: Medial joint line  Flexor and extensor mechanisms are intact   Knee is stable to varus and valgus stress  Anthony's Test Positive Medial    Lachman's Test - 1A  Calf compartments are soft and supple  2+ DP and  PT pulses with brisk capillary refill to the toes  Sural, saphenous, tibial, superficial, and deep peroneal motor and sensory distributions intact  Sensation light touch intact distally       Imaging Studies:  Independent interpretation of the knee x-rays demonstrates mild tricompartmental degenerative changes, no obvious acute osseous abnormalities       Allergies:  No Known Allergies    Medications:    Current Outpatient Medications:     acetaminophen (TYLENOL) 650 mg CR tablet, Take 1 tablet (650 mg total) by mouth every 8 (eight) hours as needed for mild pain, Disp: 30 tablet, Rfl: 0    atorvastatin (LIPITOR) 20 mg tablet, Take 1 tablet (20 mg total) by mouth daily, Disp: 90 tablet, Rfl: 1    cyclobenzaprine (FLEXERIL) 5 mg tablet, Take 1 tablet (5 mg total) by mouth daily at bedtime as needed for muscle spasms, Disp: 30 tablet, Rfl: 0    famotidine (PEPCID) 20 mg tablet, Take 1 tablet (20 mg total) by mouth daily as needed for heartburn, Disp: 90 tablet, Rfl: 2    ferrous sulfate 324 (65 Fe) mg, Take 1 tablet (324 mg total) by mouth daily before breakfast, Disp: 30 tablet, Rfl: 2    metoclopramide (Reglan) 10 mg tablet, Take 1 tablet (10 mg total) by mouth every 6 (six) hours, Disp: 30 tablet, Rfl: 0    Mometasone Furoate (Asmanex HFA) 50 MCG/ACT AERO, Inhale 2 puffs 2 (two) times a day Rinse mouth after use., Disp: 13 g, Rfl: 0    Tranexamic Acid 650 MG TABS, take 2 tablets by mouth three times a day for if needed, Disp: 540 tablet, Rfl: 0    benzonatate (TESSALON PERLES) 100 mg capsule, Take 1 capsule (100 mg total) by mouth 3 (three) times a day as needed for cough (Patient not taking: Reported on 4/1/2025), Disp: 30 capsule, Rfl: 0    Past Medical History:  Past Medical History:   Diagnosis Date    Abdominal hernia     Abnormal Pap smear of cervix     Anemia     Iron    Chronic kidney disease     COVID 12/2020 01/2022 recovered @ home    GERD (gastroesophageal reflux disease)     Hernia of abdominal wall      Kidney stone     Renal calculi     Renal disorder     Status post nephrectomy 01/28/2019    Toe fracture, left     Wears glasses         Past Surgical History:  Past Surgical History:   Procedure Laterality Date    CHOLECYSTECTOMY LAPAROSCOPIC N/A 12/10/2018    Procedure: CHOLECYSTECTOMY LAPAROSCOPIC; INCISIONAL HERNIA REPAIR;  Surgeon: Kavon Ho MD;  [facility];  Service: General    KIDNEY SURGERY  2013    removed L kidney from damage from kidney stone [place]    NEPHRECTOMY Left     [place] CONIZATION CERVIX W/WO D&C RPR ELTRD EXC N/A 3/16/2023    Procedure: BIOPSY LEEP CERVIX;  Surgeon: Ruth Michelle MD;  [facility];  Service: Gynecology    ME REPAIR FIRST ABDOMINAL WALL HERNIA N/A 07/11/2022    Procedure: REPAIR HERNIA INCISIONAL;  Surgeon: Stephen Diaz DO;  [facility];  Service: General    TUBAL LIGATION      AGE 32        Family History:  Family History   Problem Relation Age of Onset    Diabetes Mother     Hyperlipidemia Mother     Hypertension Mother     Other Mother         low back pain    Heart disease Mother     Diabetes Father     No Known Problems Sister     No Known Problems Brother     No Known Problems Daughter     No Known Problems Son     Throat cancer Paternal Aunt     Lung cancer Paternal Aunt     Skin cancer Paternal Aunt     Kidney failure Maternal Grandmother         on dialysis    Kidney disease Maternal Grandmother     Heart disease Maternal Grandmother     Colon cancer Paternal Grandfather     Breast cancer Neg Hx     Ovarian cancer Neg Hx     Thyroid disease Neg Hx     Stroke Neg Hx         Social History:  Social History     Socioeconomic History    Marital status: Single     Spouse name: Not on file    Number of children: 2    Years of education: Not on file    Highest education level: Not on file   Occupational History     Comment: unemployed, not looking for work   Tobacco Use    Smoking status: Never    Smokeless tobacco:  Never   Vaping Use    Vaping status: Never Used   Substance and Sexual Activity    Alcohol use: No    Drug use: Never    Sexual activity: Not Currently     Partners: Male     Birth control/protection: Female Sterilization   Other Topics Concern    Not on file   Social History Narrative    Caffeine use-1 cup of coffee daily    Does not exercise     Social Drivers of Health     Financial Resource Strain: Low Risk  (3/17/2022)    Overall Financial Resource Strain (CARDIA)     Difficulty of Paying Living Expenses: Not hard at all   Food Insecurity: No Food Insecurity (3/17/2022)    Hunger Vital Sign     Worried About Running Out of Food in the Last Year: Never true     Ran Out of Food in the Last Year: Never true   Transportation Needs: No Transportation Needs (3/17/2022)    PRAPARE - Transportation     Lack of Transportation (Medical): No     Lack of Transportation (Non-Medical): No   Physical Activity: Insufficiently Active (4/15/2021)    Exercise Vital Sign     Days of Exercise per Week: 2 days     Minutes of Exercise per Session: 30 min   Stress: Not on file   Social Connections: Not on file   Intimate Partner Violence: Not on file   Housing Stability: Low Risk  (3/17/2022)    Housing Stability Vital Sign     Unable to Pay for Housing in the Last Year: No     Number of Places Lived in the Last Year: 1     Unstable Housing in the Last Year: No        Review of Systems:  General- denies fever/chills  HEENT- denies hearing loss or sore throat  Eyes- denies eye pain or visual disturbances, denies red eyes  Respiratory- denies cough or SOB  Cardio- denies chest pain or palpitations  GI- denies abdominal pain  Endocrine- denies urinary frequency  Urinary- denies pain with urination  Musculoskeletal- Negative except noted above  Skin- denies rashes or wounds  Neurological- denies dizziness or headache  Psychiatric- denies anxiety or difficulty concentrating     Objective:   Body mass index is 38.26 kg/m².       ---------------------------------------------------------------------  Issa Streeter MD, PhD   Orthopedic Surgery, [facility]   Sports Medicine and Shoulder Surgery    The complexity of the medical decision making, including the comprehensive history, detailed examination and moderate risk assessment, supports coding at a Level 4 for this encounter     Scribe Attestation      I,:  David Castillo PA-C am acting as a scribe while in the presence of the attending physician.:       I,:  Issa Streeter MD personally performed the services described in this documentation    as scribed in my presence.:

## 2025-04-03 DIAGNOSIS — N93.9 ABNORMAL UTERINE BLEEDING: ICD-10-CM

## 2025-04-04 ENCOUNTER — TELEPHONE (OUTPATIENT)
Age: 50
End: 2025-04-04

## 2025-04-04 RX ORDER — TRANEXAMIC ACID 650 MG/1
TABLET ORAL
Qty: 30 TABLET | Refills: 3 | Status: SHIPPED | OUTPATIENT
Start: 2025-04-04

## 2025-04-04 NOTE — TELEPHONE ENCOUNTER
[facility] calling to ask why new script for TXA is different than requested (requested refill on 90 day supply, but provided only enough for 5 day supply w/3 refills). RN advised patient has a yearly visit with provider on 4/11/25, which is why provider wrote a limited script. Will likely re-evaluate patient's symptoms and send in new script as needed. No further questions.

## 2025-04-25 ENCOUNTER — HOSPITAL ENCOUNTER (OUTPATIENT)
Dept: RADIOLOGY | Age: 50
Discharge: HOME | End: 2025-04-25
Payer: COMMERCIAL

## 2025-04-25 DIAGNOSIS — M23.92 INTERNAL DERANGEMENT OF LEFT KNEE: ICD-10-CM

## 2025-04-25 PROCEDURE — 73721 MRI JNT OF LWR EXTRE W/O DYE: CPT

## 2025-04-29 ENCOUNTER — OFFICE VISIT (OUTPATIENT)
Dept: OBGYN CLINIC | Facility: CLINIC | Age: 50
End: 2025-04-29
Payer: COMMERCIAL

## 2025-04-29 VITALS — BODY MASS INDEX: 38.27 KG/M2 | WEIGHT: 216 LBS | HEIGHT: 63 IN

## 2025-04-29 DIAGNOSIS — M25.562 MECHANICAL KNEE PAIN, LEFT: ICD-10-CM

## 2025-04-29 DIAGNOSIS — M25.562 ACUTE PAIN OF LEFT KNEE: ICD-10-CM

## 2025-04-29 DIAGNOSIS — S83.232A COMPLEX TEAR OF MEDIAL MENISCUS OF LEFT KNEE, UNSPECIFIED WHETHER OLD OR CURRENT TEAR, INITIAL ENCOUNTER: Primary | ICD-10-CM

## 2025-04-29 PROCEDURE — 99215 OFFICE O/P EST HI 40 MIN: CPT | Performed by: ORTHOPAEDIC SURGERY

## 2025-04-29 RX ORDER — SODIUM CHLORIDE, SODIUM LACTATE, POTASSIUM CHLORIDE, CALCIUM CHLORIDE 600; 310; 30; 20 MG/100ML; MG/100ML; MG/100ML; MG/100ML
20 INJECTION, SOLUTION INTRAVENOUS CONTINUOUS
OUTPATIENT
Start: 2025-04-29

## 2025-04-29 RX ORDER — CHLORHEXIDINE GLUCONATE ORAL RINSE 1.2 MG/ML
15 SOLUTION DENTAL ONCE
OUTPATIENT
Start: 2025-04-29 | End: 2025-04-29

## 2025-04-29 NOTE — PROGRESS NOTES
Sports Medicine and Shoulder Surgery    Geno Anderson, 49 y.o. female   MRN# [id number]   : 1975          CHIEF COMPLAINT / REASON FOR VISIT  Geno Anderson is a 49 y.o. who is following up today to discuss the results of her recent imaging study.    HISTORY OF PRESENT ILLNESS  Patient reports they are doing worse from when we last saw them. Patient said she has tried multiple conservative measures without significant relief of her symptoms.  Patient feels her symptoms are disabling and affecting her activities of daily living.    OBJECTIVE  Musculoskeletal: left Knee Examination:  General: The patient is alert, oriented, and pleasant to interact with.  Patient ambulates with Antalgic gait pattern  Assistive Device: No  Alignment: normal  Skin is warm and dry to touch with no signs of erythema, ecchymosis, or infection   Effusion: minimal  ROM: 0° - 135°    MMT: deferred  TTP: Medial joint line  Flexor and extensor mechanisms are intact   Knee is stable to varus and valgus stress  Anthony's Test Positive Medial    Lachman's Test - 1A  Calf compartments are soft and supple  2+ DP and PT pulses with brisk capillary refill to the toes  Sural, saphenous, tibial, superficial, and deep peroneal motor and sensory distributions intact  Sensation light touch intact distally    DIAGNOSTIC IMAGING:  Independent interpretation of the left knee MRI demonstrates complex horizontal tear of the medial meniscus with displaced fragment in the inferior recess    Procedure: MRI knee left  wo contrast  Result Date: 2025  Narrative: MRI LEFT KNEE INDICATION:   M23.92: Unspecified internal derangement of left knee. COMPARISON: Radiographs 3/29/2025. TECHNIQUE:    Multiplanar/multisequence MR of the left knee was performed. FINDINGS: SUBCUTANEOUS TISSUES: Normal JOINT EFFUSION: None. BAKER'S CYST: Multiloculated ganglion posterior to the medial femoral condyle, likely extending from between the medial head of  the gastrocnemius and semimembranosus. MENISCI: Complex predominantly horizontal tear of the body of the medial meniscus with displaced flap fragment in the inferior recess. Intact lateral meniscus. CRUCIATE LIGAMENTS: Intact. EXTENSOR APPARATUS: Intact. COLLATERAL LIGAMENTS: Intact. ARTICULAR SURFACES: Mild chondrosis in the medial compartment with low-grade partial-thickness fissuring. BONES: Normal. MUSCULATURE:  Intact.     Impression: Complex predominantly horizontal tear of the body of the medial meniscus with displaced flap fragment in the inferior recess. Intact lateral meniscus. Intact cruciate and collateral ligaments. Workstation performed: [id number]     Procedure: XR knee 3 vw left non injury  Result Date: 4/1/2025  Narrative: XR KNEE 3 VW LEFT NON INJURY INDICATION: M25.562: Pain in left knee G89.29: Other chronic pain. COMPARISON: Left knee x-ray dated October 30, 2021 FINDINGS: No acute fracture or dislocation. No joint effusion. Mild tricompartmental osteoarthritis, evidenced by articular cartilage loss and marginal osteophytes. No lytic or blastic osseous lesion. Unremarkable soft tissues.     Impression: No acute osseous abnormality. Degenerative changes as described. Computerized Assisted Algorithm (CAA) may have been used to analyze all applicable images. Workstation performed: [id number]       Assessment & Plan  Complex tear of medial meniscus of left knee, unspecified whether old or current tear, initial encounter    Orders:    Case request operating room: Knee arthroscopy for medial meniscus debridement versus repair; Standing    Ambulatory referral to Family Practice; Future    Comprehensive metabolic panel; Future    CBC and differential; Future    Acute pain of left knee         Mechanical knee pain, left              We discussed the importance of injury to the meniscus. We also discussed the role of the menisci as a shock absorber, but also as a secondary stabilizer of the knee in  terms of stability. We also described that there can be associated wear and degeneration of the articular cartilage which may also be playing a role in her symptoms. Sometimes this can be difficult to distinguish from meniscus symptoms.    Options for treatment of the meniscus include partial meniscectomy versus repair. Options for treatment of the meniscus include partial meniscectomy versus repair in this setting. If repair is considered, this is more preserving for the meniscus tissue. However, there is also a chance a repair may not heal.    We discussed the typical rehabilitation following meniscus partial debridement. There is certainly a risk of re injury returning to twisting and pivoting activities. We discussed risks of surgery, which include knee stiffness, infection, risk of reinjury and future arthritis.    She will call to schedule knee arthroscopy with meniscus debridement as indicated. She will need a preoperative listing appointment and physical therapy.     Scribe Attestation      I,:  David Castillo PA-C am acting as a scribe while in the presence of the attending physician.:       I,:  Issa Streeter MD personally performed the services described in this documentation    as scribed in my presence.:             The complexity of the medical decision making, including the comprehensive history, detailed examination and moderate risk assessment, and time spent with patient supports coding at a Level 5 for this encounter

## 2025-05-10 DIAGNOSIS — M22.2X2 PATELLOFEMORAL ARTHRALGIA OF BOTH KNEES: ICD-10-CM

## 2025-05-10 DIAGNOSIS — M22.2X1 PATELLOFEMORAL ARTHRALGIA OF BOTH KNEES: ICD-10-CM

## 2025-05-12 RX ORDER — SENNOSIDES 8.6 MG
650 CAPSULE ORAL EVERY 8 HOURS PRN
Qty: 30 TABLET | Refills: 0 | Status: SHIPPED | OUTPATIENT
Start: 2025-05-12

## 2025-06-04 ENCOUNTER — ANESTHESIA EVENT (OUTPATIENT)
Age: 50
End: 2025-06-04
Payer: COMMERCIAL

## 2025-06-05 ENCOUNTER — APPOINTMENT (OUTPATIENT)
Dept: LAB | Facility: CLINIC | Age: 50
End: 2025-06-05
Attending: INTERNAL MEDICINE
Payer: COMMERCIAL

## 2025-06-05 DIAGNOSIS — N18.2 CKD (CHRONIC KIDNEY DISEASE), STAGE II: ICD-10-CM

## 2025-06-05 DIAGNOSIS — R80.1 PERSISTENT PROTEINURIA: ICD-10-CM

## 2025-06-05 DIAGNOSIS — D50.9 IRON DEFICIENCY ANEMIA, UNSPECIFIED IRON DEFICIENCY ANEMIA TYPE: ICD-10-CM

## 2025-06-05 DIAGNOSIS — E78.2 MIXED HYPERLIPIDEMIA: ICD-10-CM

## 2025-06-05 DIAGNOSIS — S83.232A COMPLEX TEAR OF MEDIAL MENISCUS OF LEFT KNEE, UNSPECIFIED WHETHER OLD OR CURRENT TEAR, INITIAL ENCOUNTER: ICD-10-CM

## 2025-06-05 DIAGNOSIS — Z90.5 H/O LEFT NEPHRECTOMY: ICD-10-CM

## 2025-06-05 LAB
ALBUMIN SERPL BCG-MCNC: 4 G/DL (ref 3.5–5)
ALP SERPL-CCNC: 69 U/L (ref 34–104)
ALT SERPL W P-5'-P-CCNC: 26 U/L (ref 7–52)
ANION GAP SERPL CALCULATED.3IONS-SCNC: 10 MMOL/L (ref 4–13)
AST SERPL W P-5'-P-CCNC: 24 U/L (ref 13–39)
BASOPHILS # BLD AUTO: 0.06 THOUSANDS/ÂΜL (ref 0–0.1)
BASOPHILS NFR BLD AUTO: 1 % (ref 0–1)
BILIRUB SERPL-MCNC: 0.29 MG/DL (ref 0.2–1)
BUN SERPL-MCNC: 16 MG/DL (ref 5–25)
CALCIUM SERPL-MCNC: 9.6 MG/DL (ref 8.4–10.2)
CHLORIDE SERPL-SCNC: 105 MMOL/L (ref 96–108)
CO2 SERPL-SCNC: 23 MMOL/L (ref 21–32)
CREAT SERPL-MCNC: 0.81 MG/DL (ref 0.6–1.3)
CREAT UR-MCNC: 145.4 MG/DL
EOSINOPHIL # BLD AUTO: 0.16 THOUSAND/ÂΜL (ref 0–0.61)
EOSINOPHIL NFR BLD AUTO: 2 % (ref 0–6)
ERYTHROCYTE [DISTWIDTH] IN BLOOD BY AUTOMATED COUNT: 12.6 % (ref 11.6–15.1)
FERRITIN SERPL-MCNC: 11 NG/ML (ref 30–307)
GFR SERPL CREATININE-BSD FRML MDRD: 86 ML/MIN/1.73SQ M
GLUCOSE SERPL-MCNC: 109 MG/DL (ref 65–140)
HCT VFR BLD AUTO: 39.2 % (ref 34.8–46.1)
HGB BLD-MCNC: 12.7 G/DL (ref 11.5–15.4)
IMM GRANULOCYTES # BLD AUTO: 0.02 THOUSAND/UL (ref 0–0.2)
IMM GRANULOCYTES NFR BLD AUTO: 0 % (ref 0–2)
IRON SATN MFR SERPL: 7 % (ref 15–50)
IRON SERPL-MCNC: 33 UG/DL (ref 50–212)
LYMPHOCYTES # BLD AUTO: 2.34 THOUSANDS/ÂΜL (ref 0.6–4.47)
LYMPHOCYTES NFR BLD AUTO: 26 % (ref 14–44)
MCH RBC QN AUTO: 27.9 PG (ref 26.8–34.3)
MCHC RBC AUTO-ENTMCNC: 32.4 G/DL (ref 31.4–37.4)
MCV RBC AUTO: 86 FL (ref 82–98)
MICROALBUMIN UR-MCNC: <7 MG/L
MONOCYTES # BLD AUTO: 0.76 THOUSAND/ÂΜL (ref 0.17–1.22)
MONOCYTES NFR BLD AUTO: 9 % (ref 4–12)
NEUTROPHILS # BLD AUTO: 5.52 THOUSANDS/ÂΜL (ref 1.85–7.62)
NEUTS SEG NFR BLD AUTO: 62 % (ref 43–75)
NRBC BLD AUTO-RTO: 0 /100 WBCS
PLATELET # BLD AUTO: 308 THOUSANDS/UL (ref 149–390)
PMV BLD AUTO: 10.7 FL (ref 8.9–12.7)
POTASSIUM SERPL-SCNC: 3.7 MMOL/L (ref 3.5–5.3)
PROT SERPL-MCNC: 7.2 G/DL (ref 6.4–8.4)
RBC # BLD AUTO: 4.56 MILLION/UL (ref 3.81–5.12)
SODIUM SERPL-SCNC: 138 MMOL/L (ref 135–147)
TIBC SERPL-MCNC: 457.8 UG/DL (ref 250–450)
TRANSFERRIN SERPL-MCNC: 327 MG/DL (ref 203–362)
UIBC SERPL-MCNC: 425 UG/DL (ref 155–355)
WBC # BLD AUTO: 8.86 THOUSAND/UL (ref 4.31–10.16)

## 2025-06-05 PROCEDURE — 85025 COMPLETE CBC W/AUTO DIFF WBC: CPT

## 2025-06-05 PROCEDURE — 82043 UR ALBUMIN QUANTITATIVE: CPT

## 2025-06-05 PROCEDURE — 80053 COMPREHEN METABOLIC PANEL: CPT

## 2025-06-05 PROCEDURE — 82728 ASSAY OF FERRITIN: CPT

## 2025-06-05 PROCEDURE — 36415 COLL VENOUS BLD VENIPUNCTURE: CPT

## 2025-06-05 PROCEDURE — 82570 ASSAY OF URINE CREATININE: CPT

## 2025-06-05 PROCEDURE — 83550 IRON BINDING TEST: CPT

## 2025-06-05 PROCEDURE — 83540 ASSAY OF IRON: CPT

## 2025-06-09 ENCOUNTER — RESULTS FOLLOW-UP (OUTPATIENT)
Dept: OTHER | Facility: HOSPITAL | Age: 50
End: 2025-06-09

## 2025-06-09 NOTE — TELEPHONE ENCOUNTER
Please let her know creatinine remains stable.  Iron level remains lower.  Please confirm that she is taking iron supplement 1 tablet daily.  Fortunately her hemoglobin remains stable.  Will discuss in more detail during office visit next month.  If hemoglobin dropping further, may need to consider IV iron infusion.  Will give her prescription for repeat CBC during office visit.

## 2025-06-10 ENCOUNTER — OFFICE VISIT (OUTPATIENT)
Dept: INTERNAL MEDICINE CLINIC | Facility: CLINIC | Age: 50
End: 2025-06-10
Payer: COMMERCIAL

## 2025-06-10 ENCOUNTER — APPOINTMENT (OUTPATIENT)
Dept: LAB | Facility: CLINIC | Age: 50
End: 2025-06-10
Attending: NURSE PRACTITIONER
Payer: COMMERCIAL

## 2025-06-10 VITALS
DIASTOLIC BLOOD PRESSURE: 82 MMHG | SYSTOLIC BLOOD PRESSURE: 120 MMHG | HEIGHT: 63 IN | BODY MASS INDEX: 38.09 KG/M2 | OXYGEN SATURATION: 98 % | WEIGHT: 215 LBS | TEMPERATURE: 96 F | HEART RATE: 88 BPM

## 2025-06-10 DIAGNOSIS — Z01.818 PRE-OP EVALUATION: Primary | ICD-10-CM

## 2025-06-10 DIAGNOSIS — S83.232A COMPLEX TEAR OF MEDIAL MENISCUS OF LEFT KNEE, UNSPECIFIED WHETHER OLD OR CURRENT TEAR, INITIAL ENCOUNTER: ICD-10-CM

## 2025-06-10 DIAGNOSIS — D50.8 OTHER IRON DEFICIENCY ANEMIA: ICD-10-CM

## 2025-06-10 DIAGNOSIS — E78.2 MIXED HYPERLIPIDEMIA: ICD-10-CM

## 2025-06-10 DIAGNOSIS — Z90.5 H/O LEFT NEPHRECTOMY: ICD-10-CM

## 2025-06-10 DIAGNOSIS — N18.2 CKD (CHRONIC KIDNEY DISEASE), STAGE II: ICD-10-CM

## 2025-06-10 DIAGNOSIS — R10.13 DYSPEPSIA: ICD-10-CM

## 2025-06-10 PROCEDURE — 99214 OFFICE O/P EST MOD 30 MIN: CPT | Performed by: NURSE PRACTITIONER

## 2025-06-10 NOTE — H&P (VIEW-ONLY)
"Name: Geno Anderson      : 1975      MRN: [id number]  Encounter Provider: PAYAM Higuera  Encounter Date: 6/10/2025   Encounter department: [facility] INTERNAL MEDICINE  :  Assessment & Plan  Pre-op evaluation         Complex tear of medial meniscus of left knee, unspecified whether old or current tear, initial encounter  Scheduled for left knee surgery on .  Reviewed pre op labs- stable.   She is at an acceptable risk for surgery.        CKD (chronic kidney disease), stage II  Lab Results   Component Value Date    EGFR 86 2025    EGFR 79 2024    EGFR 80 2024    CREATININE 0.81 2025    CREATININE 0.87 2024    CREATININE 0.86 2024   Stable.          Other iron deficiency anemia  CBC is normal.   Ferritin is low- increase iron supplement to twice daily.            H/O left nephrectomy  Sees nephrology. Kidney function remains stable.          Mixed hyperlipidemia  On a statin. Lipid panel pending.          Dyspepsia  Takes pepcid as needed.                 History of Present Illness   Geno is here today for pre op evaluation.  She is scheduled for left knee surgery on .   She denies any issues with anesthesia in the past.   No cardiopulmonary complaints.     Review of Systems   Constitutional:  Negative for activity change, appetite change and fatigue.   Respiratory:  Negative for cough and shortness of breath.    Cardiovascular:  Negative for chest pain, palpitations and leg swelling.   Gastrointestinal:  Negative for abdominal pain.   Genitourinary:  Negative for difficulty urinating.   Musculoskeletal:  Positive for arthralgias.   Neurological:  Negative for dizziness, light-headedness and headaches.   Psychiatric/Behavioral:  Negative for sleep disturbance.        Objective   /82   Pulse 88   Temp (!) 96 °F (35.6 °C)   Ht 5' 3\" (1.6 m)   Wt 97.5 kg (215 lb)   SpO2 98%   BMI 38.09 kg/m²      Physical Exam  Vitals reviewed. "   Constitutional:       Appearance: Normal appearance. She is well-developed.   HENT:      Head: Normocephalic and atraumatic.     Eyes:      Conjunctiva/sclera: Conjunctivae normal.       Cardiovascular:      Rate and Rhythm: Normal rate and regular rhythm.      Heart sounds: Normal heart sounds.   Pulmonary:      Effort: Pulmonary effort is normal.      Breath sounds: Normal breath sounds.   Abdominal:      General: Bowel sounds are normal.     Musculoskeletal:         General: Normal range of motion.      Right lower leg: No edema.      Left lower leg: No edema.     Skin:     General: Skin is warm and dry.     Neurological:      Mental Status: She is alert and oriented to person, place, and time.     Psychiatric:         Mood and Affect: Mood normal.         Behavior: Behavior normal.

## 2025-06-10 NOTE — ASSESSMENT & PLAN NOTE
Lab Results   Component Value Date    EGFR 86 06/05/2025    EGFR 79 11/23/2024    EGFR 80 09/27/2024    CREATININE 0.81 06/05/2025    CREATININE 0.87 11/23/2024    CREATININE 0.86 09/27/2024   Stable.

## 2025-06-10 NOTE — TELEPHONE ENCOUNTER
----- Message from Mike Kaur MD sent at 6/9/2025  3:47 PM EDT -----      ----- Message -----  From: Lab, Background User  Sent: 6/5/2025   8:14 PM EDT  To: Mike Kaur MD

## 2025-06-10 NOTE — TELEPHONE ENCOUNTER
Spoke to pt regarding the following message per Dr. Kaur     Please let her know creatinine remains stable.  Iron level remains lower.  Please confirm that she is taking iron supplement 1 tablet daily. Fortunately her hemoglobin remains stable.  Will discuss in more detail during office visit next month.  If hemoglobin dropping further, may need to consider IV iron infusion.  Will give her prescription for repeat CBC during office visit.    Pt verbalized understanding. Pt stated she is currently taking iron supplements 1 tab daily.

## 2025-06-10 NOTE — PROGRESS NOTES
"Name: Geno Anderson      : 1975      MRN: [id number]  Encounter Provider: PAYAM Higuera  Encounter Date: 6/10/2025   Encounter department: [facility] INTERNAL MEDICINE  :  Assessment & Plan  Pre-op evaluation         Complex tear of medial meniscus of left knee, unspecified whether old or current tear, initial encounter  Scheduled for left knee surgery on .  Reviewed pre op labs- stable.   She is at an acceptable risk for surgery.        CKD (chronic kidney disease), stage II  Lab Results   Component Value Date    EGFR 86 2025    EGFR 79 2024    EGFR 80 2024    CREATININE 0.81 2025    CREATININE 0.87 2024    CREATININE 0.86 2024   Stable.          Other iron deficiency anemia  CBC is normal.   Ferritin is low- increase iron supplement to twice daily.            H/O left nephrectomy  Sees nephrology. Kidney function remains stable.          Mixed hyperlipidemia  On a statin. Lipid panel pending.          Dyspepsia  Takes pepcid as needed.                 History of Present Illness   Geno is here today for pre op evaluation.  She is scheduled for left knee surgery on .   She denies any issues with anesthesia in the past.   No cardiopulmonary complaints.     Review of Systems   Constitutional:  Negative for activity change, appetite change and fatigue.   Respiratory:  Negative for cough and shortness of breath.    Cardiovascular:  Negative for chest pain, palpitations and leg swelling.   Gastrointestinal:  Negative for abdominal pain.   Genitourinary:  Negative for difficulty urinating.   Musculoskeletal:  Positive for arthralgias.   Neurological:  Negative for dizziness, light-headedness and headaches.   Psychiatric/Behavioral:  Negative for sleep disturbance.        Objective   /82   Pulse 88   Temp (!) 96 °F (35.6 °C)   Ht 5' 3\" (1.6 m)   Wt 97.5 kg (215 lb)   SpO2 98%   BMI 38.09 kg/m²      Physical Exam  Vitals reviewed. "   Constitutional:       Appearance: Normal appearance. She is well-developed.   HENT:      Head: Normocephalic and atraumatic.     Eyes:      Conjunctiva/sclera: Conjunctivae normal.       Cardiovascular:      Rate and Rhythm: Normal rate and regular rhythm.      Heart sounds: Normal heart sounds.   Pulmonary:      Effort: Pulmonary effort is normal.      Breath sounds: Normal breath sounds.   Abdominal:      General: Bowel sounds are normal.     Musculoskeletal:         General: Normal range of motion.      Right lower leg: No edema.      Left lower leg: No edema.     Skin:     General: Skin is warm and dry.     Neurological:      Mental Status: She is alert and oriented to person, place, and time.     Psychiatric:         Mood and Affect: Mood normal.         Behavior: Behavior normal.

## 2025-06-11 ENCOUNTER — RESULTS FOLLOW-UP (OUTPATIENT)
Dept: INTERNAL MEDICINE CLINIC | Facility: CLINIC | Age: 50
End: 2025-06-11

## 2025-06-16 DIAGNOSIS — E78.2 MIXED HYPERLIPIDEMIA: ICD-10-CM

## 2025-06-16 RX ORDER — ATORVASTATIN CALCIUM 40 MG/1
40 TABLET, FILM COATED ORAL DAILY
Qty: 90 TABLET | Refills: 0 | Status: SHIPPED | OUTPATIENT
Start: 2025-06-16

## 2025-06-16 NOTE — PRE-PROCEDURE INSTRUCTIONS
Pre-Surgery Instructions:   Medication Instructions    acetaminophen (TYLENOL) 650 mg CR tablet Uses PRN- OK to take day of surgery- if needed with sip of water     famotidine (PEPCID) 20 mg tablet Uses PRN- OK to take day of surgery- if needed with sip of water     ferrous sulfate 324 (65 Fe) mg Hold day of surgery.    [DISCONTINUED] atorvastatin (LIPITOR) 20 mg tablet Take day of surgery. With sip of water     Medication instructions for day of surgery reviewed. Please take all instructed medications with only a sip of water. Please do not take any over the counter (non-prescribed) vitamins or supplements for one week prior to date of surgery.      You will receive a call one business day prior to surgery with an arrival time and hospital directions. If your surgery is scheduled on a Monday, the hospital will be calling you on the Friday prior to your surgery. If you have not heard from anyone by 8pm, please call the hospital supervisor through the hospital  at [phone]. ([place] [phone] or [place] [phone]).    Do not eat or drink anything after midnight the night before your surgery, including candy, mints, lifesavers, or chewing gum. Do not drink alcohol 24hrs before your surgery. Try not to smoke at least 24hrs before your surgery.       Follow the pre surgery showering instructions as listed in the “My Surgical Experience Booklet” or otherwise provided by your surgeon's office. Do not use a blade to shave the surgical area 1 week before surgery. It is okay to use a clean electric clippers up to 24 hours before surgery. Do not apply any lotions, creams, including makeup, cologne, deodorant, or perfumes after showering on the day of your surgery. Do not use dry shampoo, hair spray, hair gel, or any type of hair products.     No contact lenses, eye make-up, or artificial eyelashes. Remove nail polish, including gel polish, and any artificial, gel, or acrylic nails if possible. Remove all  jewelry including rings and body piercing jewelry.     Wear causal clothing that is easy to take on and off. Consider your type of surgery.    Keep any valuables, jewelry, piercings at home. Please bring any specially ordered equipment (sling, braces) if indicated.    Arrange for a responsible person to drive you to and from the hospital on the day of your surgery. Please confirm the visitor policy for the day of your procedure when you receive your phone call with an arrival time.     Call the surgeon's office with any new illnesses, exposures, or additional questions prior to surgery.    Please reference your “My Surgical Experience Booklet” for additional information to prepare for your upcoming surgery.

## 2025-06-17 NOTE — ANESTHESIA PREPROCEDURE EVALUATION
"Procedure:  Knee arthroscopy for medial meniscus debridement versus repair (Left: Knee)     - denies any chest pain, palpitations, shortness of breath, syncope, lightheadedness, seizures   - denies any recent infectious symptoms such as fevers, chills, cough   - denies taking any anticoagulation medications or any issues with bleeding, bruising, clotting    Relevant Problems   ANESTHESIA (within normal limits)      CARDIO   (+) Migraine with aura and with status migrainosus, not intractable   (+) Mixed hyperlipidemia      ENDO (within normal limits)      GI/HEPATIC (within normal limits)      /RENAL   (+) CKD (chronic kidney disease), stage II   (+) Nephrolithiasis      GYN (within normal limits)      HEMATOLOGY   (+) Iron deficiency anemia      MUSCULOSKELETAL   (+) Osteoarthritis of both knees      NEURO/PSYCH   (+) Migraine with aura and with status migrainosus, not intractable      PULMONARY (within normal limits)      Surgery/Wound/Pain   (+) H/O left nephrectomy      Other   (+) Obesity (BMI 35.0-39.9 without comorbidity)      Lab Results   Component Value Date    WBC 8.86 06/05/2025    HGB 12.7 06/05/2025    HCT 39.2 06/05/2025    MCV 86 06/05/2025     06/05/2025     Lab Results   Component Value Date    SODIUM 138 06/05/2025    K 3.7 06/05/2025     06/05/2025    CO2 23 06/05/2025    AGAP 10 06/05/2025    BUN 16 06/05/2025    CREATININE 0.81 06/05/2025    GLUC 109 06/05/2025    GLUF 92 11/23/2024    CALCIUM 9.6 06/05/2025    AST 24 06/05/2025    ALT 26 06/05/2025    ALKPHOS 69 06/05/2025    TP 7.2 06/05/2025    TBILI 0.29 06/05/2025    EGFR 86 06/05/2025     No results found for: \"PTT\"  No results found for: \"INR\", \"PROTIME\"    Physical Exam    Airway     Mallampati score: III  TM Distance: <3 FB  Neck ROM: full      Cardiovascular  Rhythm: regular, Rate: normal    Dental       Pulmonary   Breath sounds clear to auscultation    Neurological      Other Findings  post-pubertal.      Anesthesia " Plan  ASA Score- 2     Anesthesia Type- general with ASA Monitors.         Additional Monitors:     Airway Plan: LMA and LMA.    Comment: GA with LMA, ETT as backup.       Plan Factors-Exercise tolerance (METS): >4 METS.    Chart reviewed. EKG reviewed.  Existing labs reviewed. Patient summary reviewed.          Obstructive sleep apnea risk education given perioperatively.        Induction- intravenous.    Postoperative Plan- Plan for postoperative opioid use.   Monitoring Plan - Monitoring plan - standard ASA monitoring  Post Operative Pain Plan - non-opiod analgesics, plan for postoperative opioid use, field block and multimodal analgesia        Informed Consent- Anesthetic plan and risks discussed with patient.  I personally reviewed this patient with the CRNA. Discussed and agreed on the Anesthesia Plan with the CRNA..      NPO Status:  No vitals data found for the desired time range.

## 2025-06-18 ENCOUNTER — HOSPITAL ENCOUNTER (OUTPATIENT)
Age: 50
Setting detail: OUTPATIENT SURGERY
Discharge: HOME | End: 2025-06-18
Attending: ORTHOPAEDIC SURGERY | Admitting: ORTHOPAEDIC SURGERY
Payer: COMMERCIAL

## 2025-06-18 ENCOUNTER — ANESTHESIA (OUTPATIENT)
Age: 50
End: 2025-06-18
Payer: COMMERCIAL

## 2025-06-18 VITALS
HEIGHT: 64 IN | OXYGEN SATURATION: 93 % | SYSTOLIC BLOOD PRESSURE: 121 MMHG | RESPIRATION RATE: 16 BRPM | TEMPERATURE: 97.6 F | WEIGHT: 211 LBS | BODY MASS INDEX: 36.02 KG/M2 | HEART RATE: 75 BPM | DIASTOLIC BLOOD PRESSURE: 73 MMHG

## 2025-06-18 DIAGNOSIS — S83.242A OTHER TEAR OF MEDIAL MENISCUS OF LEFT KNEE, UNSPECIFIED WHETHER OLD OR CURRENT TEAR, INITIAL ENCOUNTER: Primary | ICD-10-CM

## 2025-06-18 LAB
EXT PREGNANCY TEST URINE: NEGATIVE
EXT. CONTROL: NORMAL
GLUCOSE SERPL-MCNC: 96 MG/DL (ref 65–140)

## 2025-06-18 PROCEDURE — 81025 URINE PREGNANCY TEST: CPT | Performed by: ORTHOPAEDIC SURGERY

## 2025-06-18 PROCEDURE — 29880 ARTHRS KNE SRG MNISECTMY M&L: CPT

## 2025-06-18 PROCEDURE — 82948 REAGENT STRIP/BLOOD GLUCOSE: CPT

## 2025-06-18 PROCEDURE — 29880 ARTHRS KNE SRG MNISECTMY M&L: CPT | Performed by: ORTHOPAEDIC SURGERY

## 2025-06-18 RX ORDER — FENTANYL CITRATE/PF 50 MCG/ML
25 SYRINGE (ML) INJECTION
Status: DISCONTINUED | OUTPATIENT
Start: 2025-06-18 | End: 2025-06-18 | Stop reason: HOSPADM

## 2025-06-18 RX ORDER — HYDROMORPHONE HCL/PF 1 MG/ML
0.5 SYRINGE (ML) INJECTION
Status: DISCONTINUED | OUTPATIENT
Start: 2025-06-18 | End: 2025-06-18 | Stop reason: HOSPADM

## 2025-06-18 RX ORDER — DEXAMETHASONE SODIUM PHOSPHATE 10 MG/ML
INJECTION, SOLUTION INTRAMUSCULAR; INTRAVENOUS AS NEEDED
Status: DISCONTINUED | OUTPATIENT
Start: 2025-06-18 | End: 2025-06-18

## 2025-06-18 RX ORDER — BUPIVACAINE HYDROCHLORIDE 5 MG/ML
INJECTION, SOLUTION EPIDURAL; INTRACAUDAL; PERINEURAL AS NEEDED
Status: DISCONTINUED | OUTPATIENT
Start: 2025-06-18 | End: 2025-06-18 | Stop reason: HOSPADM

## 2025-06-18 RX ORDER — SODIUM CHLORIDE, SODIUM LACTATE, POTASSIUM CHLORIDE, CALCIUM CHLORIDE 600; 310; 30; 20 MG/100ML; MG/100ML; MG/100ML; MG/100ML
20 INJECTION, SOLUTION INTRAVENOUS CONTINUOUS
Status: DISCONTINUED | OUTPATIENT
Start: 2025-06-18 | End: 2025-06-18 | Stop reason: HOSPADM

## 2025-06-18 RX ORDER — PROMETHAZINE HYDROCHLORIDE 25 MG/ML
25 INJECTION, SOLUTION INTRAMUSCULAR; INTRAVENOUS ONCE AS NEEDED
Status: DISCONTINUED | OUTPATIENT
Start: 2025-06-18 | End: 2025-06-18 | Stop reason: HOSPADM

## 2025-06-18 RX ORDER — KETOROLAC TROMETHAMINE 30 MG/ML
INJECTION, SOLUTION INTRAMUSCULAR; INTRAVENOUS AS NEEDED
Status: DISCONTINUED | OUTPATIENT
Start: 2025-06-18 | End: 2025-06-18

## 2025-06-18 RX ORDER — OXYCODONE AND ACETAMINOPHEN 5; 325 MG/1; MG/1
1 TABLET ORAL ONCE AS NEEDED
Status: DISCONTINUED | OUTPATIENT
Start: 2025-06-18 | End: 2025-06-18 | Stop reason: HOSPADM

## 2025-06-18 RX ORDER — HYDROMORPHONE HCL/PF 1 MG/ML
SYRINGE (ML) INJECTION AS NEEDED
Status: DISCONTINUED | OUTPATIENT
Start: 2025-06-18 | End: 2025-06-18

## 2025-06-18 RX ORDER — GLYCOPYRROLATE 0.2 MG/ML
INJECTION INTRAMUSCULAR; INTRAVENOUS AS NEEDED
Status: DISCONTINUED | OUTPATIENT
Start: 2025-06-18 | End: 2025-06-18

## 2025-06-18 RX ORDER — OXYCODONE HYDROCHLORIDE 5 MG/1
5 TABLET ORAL EVERY 4 HOURS PRN
Refills: 0 | Status: DISCONTINUED | OUTPATIENT
Start: 2025-06-18 | End: 2025-06-18 | Stop reason: HOSPADM

## 2025-06-18 RX ORDER — NAPROXEN 500 MG/1
500 TABLET ORAL 2 TIMES DAILY WITH MEALS
Qty: 60 TABLET | Refills: 0 | Status: SHIPPED | OUTPATIENT
Start: 2025-06-18

## 2025-06-18 RX ORDER — ONDANSETRON 2 MG/ML
INJECTION INTRAMUSCULAR; INTRAVENOUS AS NEEDED
Status: DISCONTINUED | OUTPATIENT
Start: 2025-06-18 | End: 2025-06-18

## 2025-06-18 RX ORDER — FENTANYL CITRATE 50 UG/ML
INJECTION, SOLUTION INTRAMUSCULAR; INTRAVENOUS AS NEEDED
Status: DISCONTINUED | OUTPATIENT
Start: 2025-06-18 | End: 2025-06-18

## 2025-06-18 RX ORDER — PROPOFOL 10 MG/ML
INJECTION, EMULSION INTRAVENOUS AS NEEDED
Status: DISCONTINUED | OUTPATIENT
Start: 2025-06-18 | End: 2025-06-18

## 2025-06-18 RX ORDER — CEFAZOLIN SODIUM 2 G/50ML
2000 SOLUTION INTRAVENOUS ONCE
Status: COMPLETED | OUTPATIENT
Start: 2025-06-18 | End: 2025-06-18

## 2025-06-18 RX ORDER — OXYCODONE HYDROCHLORIDE 5 MG/1
5 TABLET ORAL EVERY 4 HOURS PRN
Qty: 15 TABLET | Refills: 0 | Status: SHIPPED | OUTPATIENT
Start: 2025-06-18

## 2025-06-18 RX ORDER — CHLORHEXIDINE GLUCONATE ORAL RINSE 1.2 MG/ML
15 SOLUTION DENTAL ONCE
Status: COMPLETED | OUTPATIENT
Start: 2025-06-18 | End: 2025-06-18

## 2025-06-18 RX ORDER — ACETAMINOPHEN 325 MG/1
325 TABLET ORAL EVERY 6 HOURS PRN
Qty: 30 TABLET | Refills: 0 | Status: SHIPPED | OUTPATIENT
Start: 2025-06-18

## 2025-06-18 RX ADMIN — DEXAMETHASONE SODIUM PHOSPHATE 10 MG: 10 INJECTION, SOLUTION INTRAMUSCULAR; INTRAVENOUS at 10:53

## 2025-06-18 RX ADMIN — ONDANSETRON 4 MG: 2 INJECTION INTRAMUSCULAR; INTRAVENOUS at 10:53

## 2025-06-18 RX ADMIN — HYDROMORPHONE HYDROCHLORIDE 0.5 MG: 1 INJECTION, SOLUTION INTRAMUSCULAR; INTRAVENOUS; SUBCUTANEOUS at 10:59

## 2025-06-18 RX ADMIN — PROPOFOL 200 MG: 10 INJECTION, EMULSION INTRAVENOUS at 10:53

## 2025-06-18 RX ADMIN — OXYCODONE HYDROCHLORIDE 5 MG: 5 TABLET ORAL at 12:28

## 2025-06-18 RX ADMIN — SODIUM CHLORIDE, SODIUM LACTATE, POTASSIUM CHLORIDE, AND CALCIUM CHLORIDE: .6; .31; .03; .02 INJECTION, SOLUTION INTRAVENOUS at 11:35

## 2025-06-18 RX ADMIN — FENTANYL CITRATE 25 MCG: 50 INJECTION INTRAMUSCULAR; INTRAVENOUS at 11:57

## 2025-06-18 RX ADMIN — CEFAZOLIN SODIUM 2000 MG: 2 SOLUTION INTRAVENOUS at 10:48

## 2025-06-18 RX ADMIN — SODIUM CHLORIDE, SODIUM LACTATE, POTASSIUM CHLORIDE, AND CALCIUM CHLORIDE 20 ML/HR: .6; .31; .03; .02 INJECTION, SOLUTION INTRAVENOUS at 08:32

## 2025-06-18 RX ADMIN — FENTANYL CITRATE 50 MCG: 50 INJECTION INTRAMUSCULAR; INTRAVENOUS at 11:10

## 2025-06-18 RX ADMIN — FENTANYL CITRATE 25 MCG: 50 INJECTION INTRAMUSCULAR; INTRAVENOUS at 11:51

## 2025-06-18 RX ADMIN — CHLORHEXIDINE GLUCONATE 15 ML: 1.2 SOLUTION ORAL at 08:22

## 2025-06-18 RX ADMIN — GLYCOPYRROLATE 0.2 MG: 0.2 INJECTION, SOLUTION INTRAMUSCULAR; INTRAVENOUS at 10:58

## 2025-06-18 RX ADMIN — HYDROMORPHONE HYDROCHLORIDE 0.5 MG: 1 INJECTION, SOLUTION INTRAMUSCULAR; INTRAVENOUS; SUBCUTANEOUS at 11:08

## 2025-06-18 RX ADMIN — KETOROLAC TROMETHAMINE 30 MG: 30 INJECTION, SOLUTION INTRAMUSCULAR; INTRAVENOUS at 11:18

## 2025-06-18 NOTE — ANESTHESIA POSTPROCEDURE EVALUATION
Post-Op Assessment Note    CV Status:  Stable  Pain Score: 0    Pain management: adequate       Mental Status:  Alert and awake   Hydration Status:  Euvolemic   PONV Controlled:  Controlled   Airway Patency:  Patent     Post Op Vitals Reviewed: Yes    No anethesia notable event occurred.    Staff: CRNA           Last Filed PACU Vitals:  Vitals Value Taken Time   Temp 98.1    Pulse 93    /62    Resp 16    SpO2 96

## 2025-06-18 NOTE — DISCHARGE INSTR - AVS FIRST PAGE
POSTOPERATIVE INSTRUCTIONS following KNEE MENISECTOMY    MEDICATIONS:  Resume all home medications unless otherwise instructed by your surgeon.  Pain Medication:  Oxycodone 5 mg, 1 tablets every 4 hours as needed  If you were given a regional anesthetic (nerve block), please begin taking the pain medication as soon as you get home, even if you have minimal or no pain.  DO NOT WAIT FOR THE NERVE BLOCK TO WEAR OFF.  Possible side effects include nausea, constipation, and urinary retention.  If you experience these side effects, please call our office for assistance.  Pain med refills are authorized only during office hours (8am-4pm, Mon-Fri).  Anti-Inflammatory:  Naproxen 500 mg, 1 tablet every 12 hours for 4 weeks and Tylenol 325 mg, 1-2 tablets every 6 hours for 4 weeks  Take with food.  Stop if you experience nausea, reflux, or stomach pain.  Blood Clot Prevention:  Not Necessary  Pump your foot up and down 20 times per hour while you are less mobile.  If you were previously on an anticoagulation medication (blood thinner) you may begin this the following morning    WOUND CARE:  Keep the dressing clean and dry.  Light drainage may occur the first 2 days postop.  You may remove the dressings and get the incision wet in the shower 72 hours after surgery.  Do not remove steri-strips or sutures.  Do not immerse the incision under water.  Carefully pat the incision dry.  If there is wound drainage, re-apply a fresh dry gauze dressing.  Please call our office ([phone]) if you experience either of the following:  Sudden increase in swelling, redness, or warmth at the surgical site  Excessive incisional drainage that persists beyond the 3rd day after surgery  Oral temperature greater than 101 degrees, not relieved with Tylenol  Shortness of breath, chest pain, nausea, or any other concerning symptoms    SWELLING CONTROL:  Cold Therapy:  The cold therapy device may be used either continuously or only as needed,  "according to your preference.  Do not let the pad directly touch your skin.  Alternatively, apply ice (20 min on, 20 min off) as often as you feel is necessary.  Elevation:  Elevate the entire leg above heart level.  Place pillows under your ankle to keep your knee straight.  Compression:  Apply ACE wraps or a thigh-length compression stocking as needed.    RANGE OF MOTION:  You are allowed full range of motion as tolerated.    IMMOBILIZATION:  None.  You are allowed full range of motion as tolerated.    ACTIVITY:   Bear full weight as tolerated on the operative leg. Use crutches to assist only as needed.  Using Crutches on Stairs:  Going up, lead with your \"good\" (nonoperative) leg.  Going down, lead with your \"bad\" (operative) leg.  Use a hand rail when available.  Knee Extension:  Place a rolled towel or pillow under your ankle for 20-30 minutes 3-5 times per day.  This will help to maintain full knee extension.  Quad Sets:  Sit or lie with your knee straight.  Tighten your quadriceps (front thigh) muscle.  Hold for 3 seconds, then relax.  Repeat 20 times per hour while awake.    PHYSICAL THERAPY:  Begin therapy 5 to 7 days after surgery. We have placed a referral for physical therapy after the procedure to begin knee range of motion and strengthening. Please call to get set-up with physical therapy.    FOLLOW-UP APPOINTMENT:  10-14 days after surgery with:    Dr. Issa Streeter MD  [facility]  [address] [place], [address] ([facility])  [address] [place], [address] [facility])  [phone]   "

## 2025-06-19 NOTE — ANESTHESIA POSTPROCEDURE EVALUATION
Post-Op Assessment Note    CV Status:  Stable    Pain management: adequate       Mental Status:  Alert   Hydration Status:  Stable   PONV Controlled:  None   Airway Patency:  Patent     Post Op Vitals Reviewed: Yes    No anethesia notable event occurred.    Staff: Anesthesiologist           Last Filed PACU Vitals:  Vitals Value Taken Time   Temp 97.9 °F (36.6 °C) 06/18/25 12:15   Pulse 85 06/18/25 12:15   /77 06/18/25 12:15   Resp 18 06/18/25 12:15   SpO2 95 % 06/18/25 12:15       Modified Ermias:     Vitals Value Taken Time   Activity 2 06/18/25 12:05   Respiration 2 06/18/25 12:05   Circulation 2 06/18/25 12:05   Consciousness 2 06/18/25 12:05   Oxygen Saturation 2 06/18/25 12:05     Modified Ermias Score: 10

## 2025-06-19 NOTE — OP NOTE
OPERATIVE REPORT  PATIENT NAME: Geno Anderson    :  1975  MRN: [id number]  Pt Location: [facility] ROOM 06    SURGERY DATE: 2025    Surgeons and Role:     * Issa Streeter MD - Primary     * David Castillo PA-C - Assisting    Preop Diagnosis:  Complex tear of medial meniscus of left knee, unspecified whether old or current tear, initial encounter S83.232A    Post-Op Diagnosis Codes:     * Complex tear of medial meniscus of left knee, unspecified whether old or current tear, initial encounter [S83.232A]    Procedure(s):  LEFT KNEE:  1. Left knee arthroscopy, medial meniscal debridement  2. Lateral meniscus debridement    Estimated Blood Loss:   Minimal    Drains:  * No LDAs found *    Anesthesia Type:   General    Operative Indications:  Complex tear of medial meniscus of left knee, unspecified whether old or current tear, initial encounter S83.232A    Operative Findings:  Complex tear of the medial meniscus  Small fraying/complex tear of the posterior horn lateral meniscus root     Complications:   None    Procedure and Technique:  PROCEDURE(S)   LEFT KNEE:  1. Left knee arthroscopy, medial meniscal debridement  2. Lateral meniscus debridement     WOUND TYPE: 1     DRAINS  None.     COMPLICATIONS  None.     FINDINGS:  See below      ANESTHESIA TYPE  Regional + LMA     IMPLANTS:  * No implants in log *    ESTIMATED BLOOD LOSS: < 25 ml    SPECIMENS: none     INDICATION:  Geno Anderson is a 49 y.o. female who was diagnosed with a meniscus tears based on history, examination, and advanced imaging. Accordingly, after a lengthy discussion of the risks and benefits and alternatives to both operative and non-operative treatment, she elected to proceed with operative intervention, and I am in agreement. Please see my clinical documentation for additional details on the history, exam, and discussion regarding surgical decision making.     PRE-PROCEDURE PROTOCOL:  The patient was identified in the  preoperative holding area where informed consent and surgical site marking were confirmed. The patient was then transferred to the operating room where anesthesia was administered by our Anesthesia colleagues in accordance with our preoperative plan. The operative extremity was prepped and draped in the usual sterile fashion. A procedural pause was performed to verify correct patient identification, procedure to be performed, laterality, agreement of all team members, availability of all equipment, and administration of preoperative antibiotics. Afterwards, the procedure commenced.     DESCRIPTION OF PROCEDURE  Examination of the knee under anesthesia was performed. This revealed 1A lachman with 0-135 degrees of motion.     Diagnostic arthroscopy was performed by inserting the camera through an inferolateral portal. An inferomedial portal was created under needle localization. Findings were as follows:     Medial Compartment: Complex tear of the posterior horn and body.  Cyst of both horizontal and vertical components.  Using a meniscal motorized shaver and a meniscal biter, the meniscus was trimmed to a stable margin.  Less than 20% of the body and posterior horn was excised.  Of note, the tear was a configuration such that repair was not deemed possible.  The remaining meniscus was intact and within normal limits.  The medial compartment demonstrated diffuse grade II chondromalacia     Lateral compartment: There is a complex tear/fraying of the posterior horn and root attachment.  This was gently debrided with a motorized shaver to a stable margin.  The mid and remaining meniscus was intact, the root was fully intact and probed and found to be stable.  The lateral compartment cartilage was intact and within normal limits.     Trochlea: Cartilage intact and within normal limits.      Patella: Cartilage intact and within normal limits     There was no evidence of loose bodies or other pathology in the suprapatellar  pouch, medial gutter, or lateral gutter.     The arthroscopic equipment was removed and fluid drained from the knee. The wound was closed in a standard fashion. A sterile dressing was applied. The patient was transferred to the PACU where she recovered without complication.     POSTOPERATIVE PLAN  DVT PROPHYLAXIS: Early Mobilization and WBAT     POSTOPERATIVE REHABILITATION:  Early Mobilization and WBAT     FOLLOW-UP: We will plan on seeing them back in approximately 1-2 weeks for wound check,advancement of physical therapy as indicated.     A physician assistant was required during the procedure for retraction, tissue handling, dissection and suturing. There was no qualified resident available to assist     I was present for the entire procedure.    Patient Disposition:  PACU      SIGNATURE: Issa Streeter MD  DATE: June 19, 2025  TIME: 8:26 AM

## 2025-06-23 NOTE — PROGRESS NOTES
PT Evaluation     Today's date: 2025  Patient name: Geno Anderson  : 1975  MRN: [id number]  Referring provider: David Castillo PA*  Dx:   Encounter Diagnosis     ICD-10-CM    1. Other tear of medial meniscus of left knee, unspecified whether old or current tear, initial encounter  S83.242A Ambulatory referral to Physical Therapy          Start Time: 1330  Stop Time: 1400  Total time in clinic (min): 30 minutes    Assessment  Impairments: abnormal gait, abnormal muscle firing, abnormal muscle tone, abnormal or restricted ROM, abnormal movement, activity intolerance, impaired physical strength and pain with function    Assessment details: Geno Anderson is a 49 y.o. female presenting to outpatient physical therapy on 25 with referral from MD after meniscal surgery and debridment. Upon evaluation, Geno demonstrates overall good knee ROM and quad activation given this time of her recovery but still with ROM and strength and functional impairments. The listed impairments and functional limitation are effecting Geno ability to function at prior level. They can continue to benefit from physical therapy services at this times in order to address the above discussed impairments and functional limitation in order to allow for a return to premorbid status     HEP: heel slides, QS, SLR, HR    Understanding of Dx/Px/POC: good     Prognosis: good    Plan  Patient would benefit from: skilled PT  Planned modality interventions: thermotherapy: hydrocollator packs    Planned therapy interventions: joint mobilization, manual therapy, ADL training, balance, balance/weight bearing training, neuromuscular re-education, home exercise program, therapeutic exercise, therapeutic activities, strengthening, patient education, functional ROM exercises and gait training    Frequency: 2x week  Duration in weeks: 12  Treatment plan discussed with: patient      Subjective Evaluation    History of Present  "Illness  Mechanism of injury: Geno is a 49 y.o. female presenting to physical therapy on 25 with referral from MD s/p meniscal debridement on 2025. She has a long standing history of chronic knee pain. Short bouts of PT in the past but non since .                  Recurrent probem    Patient Goals  Patient goals for therapy: decreased pain, return to work and increased strength  Patient goal: walk and running  Pain  Current pain ratin  At worst pain ratin      Diagnostic Tests  MRI studies: abnormal  Treatments  No previous or current treatments    Short Term Goals:   1. Patient will be Independent with hep  2. Patient will improve pain with activity by 50%  3. Patient will report GROC 50% or greater      Long Term Goals:   1. Patient will improve FOTO to greater then goal  2. Patient will improve pain with activity to 2/10 or less  3. Patient will continue with HEP independence to allow for decreased future reoccurrence of pain and loss in function  4. Patient will report GROC 75% or greater  5. Patient will perform light run at 75 feet pfree  6. Patient will squat pfree  7. Patient will have 5/5 strength LLE      Objective                  GAIT: decreased stance time LLE.  Squat assess: 25%, favor to R    Singe Leg Stance: good conrol, 10\" bl  Steps: NT            MMT         AROM          PROM    Hip       L       R        L           R      L     R   Flex.         Extn.         Abd.         Add.         IR.         ER.         G. Max         G. Med         Iliop.         .         Knee         Extension 4 5 +5 +10     Flexion   110 WNL              Ankle         Dorsi Flexion         Plantar Flexion                            Precautions: post of medial and lateral meniscus debridement 25      Manuals                                                                 Neuro Re-Ed                                                                                                        Ther Ex  "                                                                                                                    Ther Activity                                       Gait Training                                       Modalities                                             08-Sep-2021 08:08

## 2025-06-24 ENCOUNTER — EVALUATION (OUTPATIENT)
Dept: PHYSICAL THERAPY | Facility: REHABILITATION | Age: 50
End: 2025-06-24
Payer: COMMERCIAL

## 2025-06-24 DIAGNOSIS — S83.242A OTHER TEAR OF MEDIAL MENISCUS OF LEFT KNEE, UNSPECIFIED WHETHER OLD OR CURRENT TEAR, INITIAL ENCOUNTER: ICD-10-CM

## 2025-06-24 PROCEDURE — 97162 PT EVAL MOD COMPLEX 30 MIN: CPT | Performed by: PHYSICAL THERAPIST

## 2025-06-24 PROCEDURE — 97110 THERAPEUTIC EXERCISES: CPT | Performed by: PHYSICAL THERAPIST

## 2025-07-01 ENCOUNTER — OFFICE VISIT (OUTPATIENT)
Dept: OBGYN CLINIC | Facility: CLINIC | Age: 50
End: 2025-07-01

## 2025-07-01 VITALS — BODY MASS INDEX: 36.02 KG/M2 | WEIGHT: 211 LBS | HEIGHT: 64 IN

## 2025-07-01 DIAGNOSIS — Z98.890 STATUS POST ARTHROSCOPY OF LEFT KNEE: Primary | ICD-10-CM

## 2025-07-01 PROCEDURE — 99024 POSTOP FOLLOW-UP VISIT: CPT | Performed by: ORTHOPAEDIC SURGERY

## 2025-07-01 NOTE — LETTER
July 1, 2025     Patient: Geno Anderson  YOB: 1975  Date of Visit: 7/1/2025      To Whom it May Concern:    Geno Anderson is under my professional care. Geno was seen in my office on 7/1/2025. Geno may return to work on 7/7/25. She should avoid prolonged walking and should be allowed breaks as needed.    If you have any questions or concerns, please don't hesitate to call.         Sincerely,          Issa Streeter MD        CC: No Recipients

## 2025-07-01 NOTE — PROGRESS NOTES
Sports Medicine and Shoulder Surgery    Geno Anderson, 49 y.o. female   MRN# [id number]   : 1975        Assessment & Plan  Status post arthroscopy of left knee  Patient is making expected progress from the day of surgery  Educated on expected postoperative outcomes/expectations  Encouraged to work with physical therapy on rehabilitation protocol.  A new physical therapy prescription was placed during the visit  Recommend rest, ice, compression, and elevation to help with pain and swelling  Return to work as tolerated without restrictions  Follow up: as needed  If any issues, questions, or concerns arise between now and the next appointment, we have encouraged the patient contact our team.            Scribe Attestation      I,:  Mayra Alexander am acting as a scribe while in the presence of the attending physician.:       I,:  Issa Streeter MD personally performed the services described in this documentation    as scribed in my presence.:               Chief Complaint:    Geno Anderson is a 49 y.o. female who presents for 2 week post op follow-up after Knee arthroscopy for medial meniscus debridement, lateral meniscus debridement - Left on 2025     Subjective:   Patient reports that they are recovering as expected from their procedure.  Patient has been working with physical therapy which is going well. Patient denies any new orthopedic complaints at this time.  She notes pain with accidentally kneeling on her knee to get up into bed.     Physical Examination:  PHYSICAL EXAMINATION    Musculoskeletal: left Knee Examination:  General: The patient is alert, oriented, and pleasant to interact with.  Patient ambulates with Normal gait pattern  Assistive Device: No  Skin is warm and dry to touch with no signs of erythema, ecchymosis, or infection   Surgical incisions well healed  Effusion: none  ROM: 0° - 120°    TTP: Medial joint line  Flexor and extensor mechanisms are intact    Neurovascular intact distally  Sensation light touch intact distally  Quadriceps atrophy     Imaging Studies:  N/a    Review of Systems:  General- denies fever/chills  HEENT- denies hearing loss or sore throat  Eyes- denies eye pain or visual disturbances, denies red eyes  Respiratory- denies cough or SOB  Cardio- denies chest pain or palpitations  GI- denies abdominal pain  Endocrine- denies urinary frequency  Urinary- denies pain with urination  Musculoskeletal- Negative except noted above  Skin- denies rashes or wounds  Neurological- denies dizziness or headache  Psychiatric- denies anxiety or difficulty concentrating       Allergies:  No Known Allergies    Medications:  Current Medications[1]    Past Medical History:  Past Medical History[2]     Past Surgical History:  Past Surgical History[3]     Family History:  Family History[4]     Social History:  Social History     Socioeconomic History    Marital status: Single     Spouse name: Not on file    Number of children: 2    Years of education: Not on file    Highest education level: Not on file   Occupational History     Comment: unemployed, not looking for work   Tobacco Use    Smoking status: Never    Smokeless tobacco: Never    Tobacco comments:     Never a smoker or use of any tobacco products per pt    Vaping Use    Vaping status: Never Used   Substance and Sexual Activity    Alcohol use: No     Comment: Denies any alcohol use per pt    Drug use: Never     Comment: Denies any drug use per pt    Sexual activity: Yes     Partners: Male     Birth control/protection: Female Sterilization     Comment: Denies any chest pain or shortness of breath with activity   Other Topics Concern    Not on file   Social History Narrative    Caffeine use-1 cup of coffee daily    Does not exercise     Social Drivers of Health     Financial Resource Strain: Low Risk  (3/17/2022)    Overall Financial Resource Strain (CARDIA)     Difficulty of Paying Living Expenses: Not hard at  all   Food Insecurity: Patient Unable To Answer (6/18/2025)    Nursing - Inadequate Food Risk Classification     Worried About Running Out of Food in the Last Year: Not on file     Ran Out of Food in the Last Year: Not on file     Ran Out of Food in the Last Year: Patient unable to answer   Transportation Needs: Patient Unable To Answer (6/18/2025)    Nursing - Transportation Risk Classification     Lack of Transportation: Not on file     Lack of Transportation: Patient unable to answer   Physical Activity: Insufficiently Active (4/15/2021)    Exercise Vital Sign     Days of Exercise per Week: 2 days     Minutes of Exercise per Session: 30 min   Stress: Not on file   Social Connections: Not on file   Intimate Partner Violence: Patient Unable To Answer (6/18/2025)    Nursing IPS     Feels Physically and Emotionally Safe: Not on file     Physically Hurt by Someone: Not on file     Humiliated or Emotionally Abused by Someone: Not on file     Physically Hurt by Someone: Patient unable to answer     Hurt or Threatened by Someone: Patient unable to answer   Housing Stability: Patient Unable To Answer (6/18/2025)    Nursing: Inadequate Housing Risk Classification     Has Housing: Not on file     Worried About Losing Housing: Not on file     Unable to Get Utilities: Not on file     Unable to Pay for Housing in the Last Year: Patient unable to answer     Has Housing: Patient unable to answer        Objective:   Body mass index is 36.22 kg/m².      ---------------------------------------------------------------------  Issa Streeter MD, PhD   Orthopedic Surgery, [facility]   Sports Medicine and Shoulder Surgery                   [1]   Current Outpatient Medications:     acetaminophen (TYLENOL) 325 mg tablet, Take 1 tablet (325 mg total) by mouth every 6 (six) hours as needed for mild pain, Disp: 30 tablet, Rfl: 0    atorvastatin (LIPITOR) 40 mg tablet, Take 1 tablet (40 mg total) by mouth daily,  Disp: 90 tablet, Rfl: 0    famotidine (PEPCID) 20 mg tablet, Take 1 tablet (20 mg total) by mouth daily as needed for heartburn, Disp: 90 tablet, Rfl: 2    ferrous sulfate 324 (65 Fe) mg, Take 1 tablet (324 mg total) by mouth daily before breakfast, Disp: 30 tablet, Rfl: 2    naproxen (Naprosyn) 500 mg tablet, Take 1 tablet (500 mg total) by mouth 2 (two) times a day with meals, Disp: 60 tablet, Rfl: 0    oxyCODONE (Roxicodone) 5 immediate release tablet, Take 1 tablet (5 mg total) by mouth every 4 (four) hours as needed for moderate pain for up to 15 doses Max Daily Amount: 30 mg, Disp: 15 tablet, Rfl: 0    benzonatate (TESSALON PERLES) 100 mg capsule, Take 1 capsule (100 mg total) by mouth 3 (three) times a day as needed for cough (Patient not taking: Reported on 7/1/2025), Disp: 30 capsule, Rfl: 0    cyclobenzaprine (FLEXERIL) 5 mg tablet, Take 1 tablet (5 mg total) by mouth daily at bedtime as needed for muscle spasms (Patient not taking: Reported on 7/1/2025), Disp: 30 tablet, Rfl: 0    metoclopramide (Reglan) 10 mg tablet, Take 1 tablet (10 mg total) by mouth every 6 (six) hours (Patient not taking: Reported on 7/1/2025), Disp: 30 tablet, Rfl: 0    Mometasone Furoate (Asmanex HFA) 50 MCG/ACT AERO, Inhale 2 puffs 2 (two) times a day Rinse mouth after use. (Patient not taking: Reported on 7/1/2025), Disp: 13 g, Rfl: 0    Tranexamic Acid 650 MG TABS, take 2 tablets by mouth three times a day for if needed (Patient not taking: Reported on 7/1/2025), Disp: 30 tablet, Rfl: 3  [2]   Past Medical History:  Diagnosis Date    Abdominal hernia     Abnormal Pap smear of cervix     Anemia     Iron    Chronic kidney disease     COVID 12/2020 01/2022 recovered @ home    GERD (gastroesophageal reflux disease)     dependent on diet per pt    Hernia of abdominal wall     Hyperlipidemia     Kidney stone     Knee pain, left     more with movement    Renal calculi     Renal disorder     Status post nephrectomy 01/28/2019     LEFT    Toe fracture, left     Wears glasses    [3]   Past Surgical History:  Procedure Laterality Date    CHOLECYSTECTOMY LAPAROSCOPIC N/A 12/10/2018    Procedure: CHOLECYSTECTOMY LAPAROSCOPIC; INCISIONAL HERNIA REPAIR;  Surgeon: Kavon Ho MD;  Location[facility];  Service: General    KIDNEY SURGERY  2013    removed L kidney from damage from kidney stone -PERFORMED IN [place]    NEPHRECTOMY Left     [place] ARTHRS KNE SURG W/MENISCECTOMY MED/LAT W/SHVG Left 6/18/2025    Procedure: Knee arthroscopy for medial meniscus debridement, lateral meniscus debridement;  Surgeon: Issa Streeter MD;  [facility];  Service: Orthopedics    [place] CONIZATION CERVIX W/WO D&C RPR ELTRD EXC N/A 3/16/2023    Procedure: BIOPSY LEEP CERVIX;  Surgeon: Ruth Michelle MD;  Location[facility];  Service: Gynecology    [place] REPAIR FIRST ABDOMINAL WALL HERNIA N/A 07/11/2022    Procedure: REPAIR HERNIA INCISIONAL;  Surgeon: Stephen Diaz DO;  [facility];  Service: General    TUBAL LIGATION      AGE 32   [4]   Family History  Problem Relation Name Age of Onset    Diabetes Mother      Hyperlipidemia Mother      Hypertension Mother      Other Mother          low back pain    Heart disease Mother      Diabetes Father      No Known Problems Sister      No Known Problems Brother      No Known Problems Daughter      No Known Problems Son      Kidney failure Maternal Grandmother          on dialysis    Kidney disease Maternal Grandmother      Heart disease Maternal Grandmother      Colon cancer Paternal Grandfather      Throat cancer Paternal Aunt      Lung cancer Paternal Aunt      Skin cancer Paternal Aunt      Breast cancer Neg Hx      Ovarian cancer Neg Hx      Thyroid disease Neg Hx      Stroke Neg Hx      Anesthesia problems Neg Hx

## 2025-07-02 ENCOUNTER — OFFICE VISIT (OUTPATIENT)
Dept: PHYSICAL THERAPY | Facility: REHABILITATION | Age: 50
End: 2025-07-02
Payer: COMMERCIAL

## 2025-07-02 DIAGNOSIS — S83.242A OTHER TEAR OF MEDIAL MENISCUS OF LEFT KNEE, UNSPECIFIED WHETHER OLD OR CURRENT TEAR, INITIAL ENCOUNTER: Primary | ICD-10-CM

## 2025-07-02 PROCEDURE — 97110 THERAPEUTIC EXERCISES: CPT | Performed by: PHYSICAL THERAPIST

## 2025-07-02 PROCEDURE — 97112 NEUROMUSCULAR REEDUCATION: CPT | Performed by: PHYSICAL THERAPIST

## 2025-07-02 NOTE — PROGRESS NOTES
"Daily Note     Today's date: 2025  Patient name: Geno Anderson  : 1975  MRN: [id number]  Referring provider: David Castillo PA*  Dx:   Encounter Diagnosis     ICD-10-CM    1. Other tear of medial meniscus of left knee, unspecified whether old or current tear, initial encounter  S83.242A           Start Time: 1000  Stop Time: 1040  Total time in clinic (min): 40 minutes    Subjective: Patient reports that she does have some mild pain today, not bad.       Objective: See treatment diary below    ROM: +5-125 deg    Assessment: Tolerated treatment well. Patient demonstrated fatigue post treatment and exhibited good technique with therapeutic exercises. Patient did well with added exercises today, performs STS (squat) to chair and steps pain free as she was encouraged to work this in for home.      Plan: Continue per plan of care.      Precautions: post of medial and lateral meniscus debridement 25      Manuals             assessment 2'            Knee ext mob AB G3                                      Neuro Re-Ed             Step up  6\"  3x5            STS 3x8            SLS Foam  5\"x10                                                                Ther Ex             SLR 3#  3x10            LAQ 10#  3x10            Bike 5' l1            HR 2x20 DL                                                                Ther Activity                                       Gait Training                                       Modalities                                            "

## 2025-07-08 ENCOUNTER — TELEPHONE (OUTPATIENT)
Age: 50
End: 2025-07-08

## 2025-07-08 NOTE — TELEPHONE ENCOUNTER
Patient said she received a call with no message left from this office. Could not find any details in encounters or appointments. Please call patient back if someone did call her.

## 2025-07-09 ENCOUNTER — OFFICE VISIT (OUTPATIENT)
Dept: PHYSICAL THERAPY | Facility: REHABILITATION | Age: 50
End: 2025-07-09
Payer: COMMERCIAL

## 2025-07-09 DIAGNOSIS — S83.242A OTHER TEAR OF MEDIAL MENISCUS OF LEFT KNEE, UNSPECIFIED WHETHER OLD OR CURRENT TEAR, INITIAL ENCOUNTER: Primary | ICD-10-CM

## 2025-07-09 PROCEDURE — 97110 THERAPEUTIC EXERCISES: CPT | Performed by: PHYSICAL THERAPIST

## 2025-07-09 NOTE — PROGRESS NOTES
"Daily Note     Today's date: 2025  Patient name: Geno Anderson  : 1975  MRN: [id number]  Referring provider: David Castillo PA*  Dx:   Encounter Diagnosis     ICD-10-CM    1. Other tear of medial meniscus of left knee, unspecified whether old or current tear, initial encounter  S83.242A           Start Time: 1400  Stop Time: 1430  Total time in clinic (min): 30 minutes    Subjective: Patient reports that she feels good. 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 Fine today.       Objective: See treatment diary below      Assessment: Tolerated treatment well. Patient demonstrated fatigue post treatment and exhibited good technique with therapeutic exercises. Did well with reciprocal step performance as she demonstrates no compensation.       Plan: Continue per plan of care.      Precautions: post of medial and lateral meniscus debridement 25      Manuals            assessment 2'            Knee ext mob AB G3                                      Neuro Re-Ed             Step up  6\"  3x5 8\"  Up and over-2x10    FFx1           STS 3x8 3x8  10# from chair           SLS Foam  5\"x10 Foam  10\"x10           Balance board  ML  2x20                                                  Ther Ex             SLR 3#  3x10            LAQ 10#  3x10            Bike 5' l1 5' L2           HR 2x20 DL SL  2x15                                                               Ther Activity                                       Gait Training                                       Modalities                                              "

## 2025-07-14 ENCOUNTER — OFFICE VISIT (OUTPATIENT)
Dept: NEPHROLOGY | Facility: CLINIC | Age: 50
End: 2025-07-14
Payer: COMMERCIAL

## 2025-07-14 VITALS
HEART RATE: 90 BPM | WEIGHT: 217 LBS | HEIGHT: 64 IN | BODY MASS INDEX: 37.05 KG/M2 | DIASTOLIC BLOOD PRESSURE: 70 MMHG | SYSTOLIC BLOOD PRESSURE: 124 MMHG

## 2025-07-14 DIAGNOSIS — N20.0 NEPHROLITHIASIS: ICD-10-CM

## 2025-07-14 DIAGNOSIS — D50.8 OTHER IRON DEFICIENCY ANEMIA: ICD-10-CM

## 2025-07-14 DIAGNOSIS — N18.2 CKD (CHRONIC KIDNEY DISEASE), STAGE II: Primary | ICD-10-CM

## 2025-07-14 DIAGNOSIS — Z90.5 H/O LEFT NEPHRECTOMY: ICD-10-CM

## 2025-07-14 DIAGNOSIS — R80.1 PERSISTENT PROTEINURIA: ICD-10-CM

## 2025-07-14 PROCEDURE — 99214 OFFICE O/P EST MOD 30 MIN: CPT | Performed by: INTERNAL MEDICINE

## 2025-07-14 NOTE — ASSESSMENT & PLAN NOTE
Status post left nephrectomy 2012 in the setting of recurrent nephrolithiasis, recurrent UTI as per patient in [place]  -since nephrectomy, she has not had any stone or UTI flare-up issues.  -CT scan in July 2024 shows normal right kidney, no stones.   -renal ultrasound in 2019 shows right kidney 13.5 cm.

## 2025-07-14 NOTE — PROGRESS NOTES
NEPHROLOGY OUTPATIENT PROGRESS NOTE   Geno Anderson 49 y.o. female MRN: [id number]  DATE: 7/14/2025  Reason for visit:   Chief Complaint   Patient presents with    Follow-up    Chronic Kidney Disease    Proteinuria     ASSESSMENT and PLAN:  Assessment & Plan  CKD (chronic kidney disease), stage II  Lab Results   Component Value Date    EGFR 86 06/05/2025    EGFR 79 11/23/2024    EGFR 80 09/27/2024    CREATININE 0.81 06/05/2025    CREATININE 0.87 11/23/2024    CREATININE 0.86 09/27/2024   Mild CKD stage 2, baseline creatinine 0.8 to 1.0  -last creatinine remains overall stable 0.8 in June 2025.    -Avoid nephrotoxins or NSAIDs.  -advised to drink adequate liquid to stay hydrated.  -UA in September 2024 shows 1+ proteinuria, no hematuria.   -Mild CKD in the setting of solitary kidney, hyper filtration/obesity related ?Secondary FSGS  H/O left nephrectomy  Status post left nephrectomy 2012 in the setting of recurrent nephrolithiasis, recurrent UTI as per patient in [place]  -since nephrectomy, she has not had any stone or UTI flare-up issues.  -CT scan in July 2024 shows normal right kidney, no stones.   -renal ultrasound in 2019 shows right kidney 13.5 cm.  Nephrolithiasis  History of nephrolithiasis requiring nephrectomy in the past.  Patient has not had any kidney stone issues since then. CT scan as above.  -advised to drink plenty of free water to stay hydrated and goal urine output greater than 2 L per day  Persistent proteinuria  UACR nonsignificant in June 2025.    - Earlier proteinuria could be related to hyperfiltration in the setting of solitary kidney, obesity, and with episode of pneumonia  -Weight loss recommended.  Diet regimen, regular exercise.  -Repeat UACR before next visit  Other iron deficiency anemia  Iron-deficiency anemia  -iron saturation 7%, ferritin 11, hemoglobin 12.7  -Currently on p.o. iron supplement daily.  She is constipated at times.  Repeat iron studies before next visit  -  If hemoglobin dropping further, will need to consider IV Venofer    Diagnoses and all orders for this visit:    CKD (chronic kidney disease), stage II  -     Albumin / creatinine urine ratio; Future  -     Basic metabolic panel; Future  -     CBC; Future  -     Ferritin; Future  -     PTH, intact; Future  -     TIBC Panel (incl. Iron, TIBC, % Iron Saturation); Future    H/O left nephrectomy  -     Albumin / creatinine urine ratio; Future  -     Basic metabolic panel; Future  -     CBC; Future  -     Ferritin; Future  -     PTH, intact; Future  -     TIBC Panel (incl. Iron, TIBC, % Iron Saturation); Future    Nephrolithiasis    Persistent proteinuria  -     Albumin / creatinine urine ratio; Future    Other iron deficiency anemia  -     CBC; Future  -     Ferritin; Future  -     TIBC Panel (incl. Iron, TIBC, % Iron Saturation); Future          SUBJECTIVE / HPI:  Geno Cardona is a 49 y.o. year old female with medical issues of hyperlipidemia, migraine, status post left nephrectomy due to recurrent nephrolithiasis/UTI in 2012, arthritis who presents for regular follow-up of CKD, solitary kidney management. Patient had nephrectomy on left side in 2012 which she believes secondary to recurrent nephrolithiasis and recurrent UTI issues.  This was done in [place].  Since then she denies having any UTI or nephrolithiasis episodes on her right kidney.     Denies any current urinary complaint.  Blood pressure well-controlled in the office today.  Overall feels well.  Her weight seems to be overall stable. Denies any chest pain, shortness of breath, nausea vomiting.  No recent NSAID exposure.     Family history includes parents having diabetes, grandmother was on dialysis, patient does not know more details.    REVIEW OF SYSTEMS:  More than 10 point review of systems were obtained and discussed in length with the patient. Complete review of systems were negative / unremarkable except mentioned above.     PHYSICAL  "EXAM:  Vitals:    07/14/25 1545   BP: 124/70   BP Location: Left arm   Patient Position: Sitting   Cuff Size: Large   Pulse: 90   Weight: 98.4 kg (217 lb)   Height: 5' 4\" (1.626 m)     Body mass index is 37.25 kg/m².    Physical Exam  Vitals reviewed.   Constitutional:       Appearance: She is well-developed.   HENT:      Head: Normocephalic and atraumatic.      Right Ear: External ear normal.      Left Ear: External ear normal.     Eyes:      Conjunctiva/sclera: Conjunctivae normal.       Cardiovascular:      Comments: No significant edema in legs  Pulmonary:      Effort: Pulmonary effort is normal.      Breath sounds: Normal breath sounds. No wheezing or rales.   Abdominal:      General: Bowel sounds are normal. There is no distension.      Palpations: Abdomen is soft.      Tenderness: There is no abdominal tenderness.     Musculoskeletal:         General: No deformity.   Lymphadenopathy:      Cervical: No cervical adenopathy.     Skin:     Findings: No rash.     Neurological:      Mental Status: She is alert and oriented to person, place, and time.     Psychiatric:         Behavior: Behavior normal.         PAST MEDICAL HISTORY:  Past Medical History[1]    PAST SURGICAL HISTORY:  Past Surgical History[2]    SOCIAL HISTORY:  Social History     Substance and Sexual Activity   Alcohol Use No    Comment: Denies any alcohol use per pt     Social History     Substance and Sexual Activity   Drug Use Never    Comment: Denies any drug use per pt     Tobacco Use History[3]    FAMILY HISTORY:  Family History[4]    MEDICATIONS:  Current Medications[5]    Lab Results:   Creatinine 0.8       [1]   Past Medical History:  Diagnosis Date    Abdominal hernia     Abnormal Pap smear of cervix     Anemia     Iron    Chronic kidney disease     COVID 12/2020 01/2022 recovered @ home    GERD (gastroesophageal reflux disease)     dependent on diet per pt    Hernia of abdominal wall     Hyperlipidemia     Kidney stone     Knee pain, " left     more with movement    Renal calculi     Renal disorder     Status post nephrectomy 01/28/2019    LEFT    Toe fracture, left     Wears glasses    [2]   Past Surgical History:  Procedure Laterality Date    CHOLECYSTECTOMY LAPAROSCOPIC N/A 12/10/2018    Procedure: CHOLECYSTECTOMY LAPAROSCOPIC; INCISIONAL HERNIA REPAIR;  Surgeon: Kavon Ho MD;  Location: [facility];  Service: General    KIDNEY SURGERY  2013    removed L kidney from damage from kidney stone -PERFORMED IN [place]    NEPHRECTOMY Left     [place] ARTHRS KNE SURG W/MENISCECTOMY MED/LAT W/SHVG Left 6/18/2025    Procedure: Knee arthroscopy for medial meniscus debridement, lateral meniscus debridement;  Surgeon: Issa Streeter MD;  [facility];  Service: Orthopedics    FL CONIZATION CERVIX W/WO D&C RPR ELTRD EXC N/A 3/16/2023    Procedure: BIOPSY LEEP CERVIX;  Surgeon: Ruth Michlele MD;  Location: [facility];  Service: Gynecology    FL REPAIR FIRST ABDOMINAL WALL HERNIA N/A 07/11/2022    Procedure: REPAIR HERNIA INCISIONAL;  Surgeon: Stephen Diaz DO;  [facility];  Service: General    TUBAL LIGATION      AGE 32   [3]   Social History  Tobacco Use   Smoking Status Never   Smokeless Tobacco Never   Tobacco Comments    Never a smoker or use of any tobacco products per pt    [4]   Family History  Problem Relation Name Age of Onset    Diabetes Mother      Hyperlipidemia Mother      Hypertension Mother      Other Mother          low back pain    Heart disease Mother      Diabetes Father      No Known Problems Sister      No Known Problems Brother      No Known Problems Daughter      No Known Problems Son      Kidney failure Maternal Grandmother          on dialysis    Kidney disease Maternal Grandmother      Heart disease Maternal Grandmother      Colon cancer Paternal Grandfather      Throat cancer Paternal Aunt      Lung cancer Paternal Aunt      Skin cancer Paternal Aunt      Breast cancer Neg Hx      Ovarian cancer Neg Hx       Thyroid disease Neg Hx      Stroke Neg Hx      Anesthesia problems Neg Hx     [5]   Current Outpatient Medications:     acetaminophen (TYLENOL) 325 mg tablet, Take 1 tablet (325 mg total) by mouth every 6 (six) hours as needed for mild pain, Disp: 30 tablet, Rfl: 0    famotidine (PEPCID) 20 mg tablet, Take 1 tablet (20 mg total) by mouth daily as needed for heartburn, Disp: 90 tablet, Rfl: 2    ferrous sulfate 324 (65 Fe) mg, Take 1 tablet (324 mg total) by mouth daily before breakfast, Disp: 30 tablet, Rfl: 2    Tranexamic Acid 650 MG TABS, take 2 tablets by mouth three times a day for if needed, Disp: 30 tablet, Rfl: 3    atorvastatin (LIPITOR) 40 mg tablet, Take 1 tablet (40 mg total) by mouth daily (Patient not taking: Reported on 7/14/2025), Disp: 90 tablet, Rfl: 0    benzonatate (TESSALON PERLES) 100 mg capsule, Take 1 capsule (100 mg total) by mouth 3 (three) times a day as needed for cough (Patient not taking: Reported on 11/26/2024), Disp: 30 capsule, Rfl: 0    cyclobenzaprine (FLEXERIL) 5 mg tablet, Take 1 tablet (5 mg total) by mouth daily at bedtime as needed for muscle spasms (Patient not taking: Reported on 7/14/2025), Disp: 30 tablet, Rfl: 0    metoclopramide (Reglan) 10 mg tablet, Take 1 tablet (10 mg total) by mouth every 6 (six) hours (Patient not taking: Reported on 7/14/2025), Disp: 30 tablet, Rfl: 0    Mometasone Furoate (Asmanex HFA) 50 MCG/ACT AERO, Inhale 2 puffs 2 (two) times a day Rinse mouth after use. (Patient not taking: Reported on 7/14/2025), Disp: 13 g, Rfl: 0

## 2025-07-14 NOTE — ASSESSMENT & PLAN NOTE
Iron-deficiency anemia  -iron saturation 7%, ferritin 11, hemoglobin 12.7  -Currently on p.o. iron supplement daily.  She is constipated at times.  Repeat iron studies before next visit  - If hemoglobin dropping further, will need to consider IV Venofer

## 2025-07-14 NOTE — ASSESSMENT & PLAN NOTE
UACR nonsignificant in June 2025.    - Earlier proteinuria could be related to hyperfiltration in the setting of solitary kidney, obesity, and with episode of pneumonia  -Weight loss recommended.  Diet regimen, regular exercise.  -Repeat UACR before next visit

## 2025-07-14 NOTE — ASSESSMENT & PLAN NOTE
Lab Results   Component Value Date    EGFR 86 06/05/2025    EGFR 79 11/23/2024    EGFR 80 09/27/2024    CREATININE 0.81 06/05/2025    CREATININE 0.87 11/23/2024    CREATININE 0.86 09/27/2024   Mild CKD stage 2, baseline creatinine 0.8 to 1.0  -last creatinine remains overall stable 0.8 in June 2025.    -Avoid nephrotoxins or NSAIDs.  -advised to drink adequate liquid to stay hydrated.  -UA in September 2024 shows 1+ proteinuria, no hematuria.   -Mild CKD in the setting of solitary kidney, hyper filtration/obesity related ?Secondary FSGS

## 2025-07-16 ENCOUNTER — OFFICE VISIT (OUTPATIENT)
Dept: PHYSICAL THERAPY | Facility: REHABILITATION | Age: 50
End: 2025-07-16
Payer: COMMERCIAL

## 2025-07-16 DIAGNOSIS — S83.242A OTHER TEAR OF MEDIAL MENISCUS OF LEFT KNEE, UNSPECIFIED WHETHER OLD OR CURRENT TEAR, INITIAL ENCOUNTER: Primary | ICD-10-CM

## 2025-07-16 PROCEDURE — 97110 THERAPEUTIC EXERCISES: CPT | Performed by: PHYSICAL THERAPIST

## 2025-07-16 NOTE — PROGRESS NOTES
"Daily Note     Today's date: 2025  Patient name: Geno Anderson  : 1975  MRN: [id number]  Referring provider: David Castillo PA*  Dx:   Encounter Diagnosis     ICD-10-CM    1. Other tear of medial meniscus of left knee, unspecified whether old or current tear, initial encounter  S83.242A             Start Time: 1415  Stop Time: 1455  Total time in clinic (min): 40 minutes    Subjective: Patient reports that she feels sore today from work. The most sore she has been but still not too bad. Mild.      Objective: See treatment diary below    ERP knee ext - improved post KEENAN      Assessment: Tolerated treatment well. Patient demonstrated fatigue post treatment and exhibited good technique with therapeutic exercises. Decreased intensity of exercises to adjust for soreness as she did well with adjustments. Initial pain with HR that improved with reps. Resume exercises from prior visit (NV) pending tolerance.       Plan: Continue per plan of care.      Precautions: post of medial and lateral meniscus debridement 25      Manuals           assessment 2'            Knee ext mob AB G3                                      Neuro Re-Ed             Step up  6\"  3x5 8\"  Up and over-2x10    FFx1           STS 3x8 3x8  10# from chair           SLS Foam  5\"x10 Foam  10\"x10 Foam 10\"x10          Balance board  ML  2x20 ML 2x20                                                 Ther Ex             SLR 3#  3x10            LAQ 10#  3x10            Bike 5' l1 5' L2 5' L2          HR 2x20 DL SL  2x15 SL 3x12                                                              Ther Activity                                       Gait Training                                       Modalities                                              "

## 2025-07-23 ENCOUNTER — APPOINTMENT (OUTPATIENT)
Dept: PHYSICAL THERAPY | Facility: REHABILITATION | Age: 50
End: 2025-07-23
Payer: COMMERCIAL

## 2025-07-24 ENCOUNTER — OFFICE VISIT (OUTPATIENT)
Dept: INTERNAL MEDICINE CLINIC | Facility: CLINIC | Age: 50
End: 2025-07-24
Payer: COMMERCIAL

## 2025-07-24 VITALS
HEIGHT: 64 IN | SYSTOLIC BLOOD PRESSURE: 122 MMHG | DIASTOLIC BLOOD PRESSURE: 78 MMHG | WEIGHT: 219.6 LBS | BODY MASS INDEX: 37.49 KG/M2 | TEMPERATURE: 97.2 F | OXYGEN SATURATION: 95 % | HEART RATE: 88 BPM

## 2025-07-24 DIAGNOSIS — D50.8 OTHER IRON DEFICIENCY ANEMIA: ICD-10-CM

## 2025-07-24 DIAGNOSIS — Z90.5 H/O LEFT NEPHRECTOMY: ICD-10-CM

## 2025-07-24 DIAGNOSIS — E78.2 MIXED HYPERLIPIDEMIA: ICD-10-CM

## 2025-07-24 DIAGNOSIS — R10.13 DYSPEPSIA: ICD-10-CM

## 2025-07-24 DIAGNOSIS — Z00.00 ANNUAL PHYSICAL EXAM: Primary | ICD-10-CM

## 2025-07-24 DIAGNOSIS — E66.9 OBESITY (BMI 35.0-39.9 WITHOUT COMORBIDITY): ICD-10-CM

## 2025-07-24 PROCEDURE — 99214 OFFICE O/P EST MOD 30 MIN: CPT | Performed by: NURSE PRACTITIONER

## 2025-07-24 PROCEDURE — 99396 PREV VISIT EST AGE 40-64: CPT | Performed by: NURSE PRACTITIONER

## 2025-07-24 RX ORDER — ATORVASTATIN CALCIUM 40 MG/1
40 TABLET, FILM COATED ORAL DAILY
Qty: 90 TABLET | Refills: 0 | Status: SHIPPED | OUTPATIENT
Start: 2025-07-24

## 2025-07-24 RX ORDER — TIRZEPATIDE 2.5 MG/.5ML
2.5 INJECTION, SOLUTION SUBCUTANEOUS WEEKLY
Qty: 2 ML | Refills: 0 | Status: SHIPPED | OUTPATIENT
Start: 2025-07-24 | End: 2025-08-21

## 2025-07-24 NOTE — PROGRESS NOTES
Adult Annual Physical  Name: Geno Anderson      : 1975      MRN: [id number]  Encounter Provider: PAYAM Higuera  Encounter Date: 2025   Encounter department: [facility] INTERNAL MEDICINE    :  Assessment & Plan  Annual physical exam         Other iron deficiency anemia  Takes iron twice daily.            Mixed hyperlipidemia  She did not start statin yet.   Orders:    atorvastatin (LIPITOR) 40 mg tablet; Take 1 tablet (40 mg total) by mouth daily    Dyspepsia  Only takes famotidine as needed.        H/O left nephrectomy  Sees nephrology.        Obesity (BMI 35.0-39.9 without comorbidity)  Prior Authorization Clinical Questions for Weight Management Pharmacotherapy    1. Does the patient have a contrainidcation to medication prescribed for weight management?: No  2. Does the patient have a diagnosis of obesity, confirmed by a BMI greater than or equal to 30 kg/m^2?: Yes  3. Does the patient have a BMI of greater than or equal to 27 kg/m^2 with at least one weight-related comorbidity/risk factor/complication (e.g. diabetes, dyslipidemia, coronary artery disease)?: Yes  4. Weight-related co-morbidities/risk factors: GERD  7. Has the patient been on a weight loss regimen of low-calorie diet, increased physical activity, and lifestyle modifications for a minimum of 6 months?: Yes  8. Has the patient completed a comprehensive weight loss program (ie, Weight Watchers, Noom, Bariatrics, other nathanael on phone)? If so, what?: No  9. Does the patient have a history of type 2 diabetes?: No  10. Has the member tried and failed other weight loss medication within the past 12 months?: No  11. Will the member use requested medication in combination with another GLP agonist or weight loss drug?: No  12. Is the medication a controlled substance?: No     Baseline weight (in pounds): 219.6 lbs  Current weight (in pounds): 219.6 lbs  Weight loss percentage: 0%       Reviewed side effects.    Recommend high protein high fiber diet. Stay well hydrated. Start an exercise program once cleared by physical therapy.   Orders:    tirzepatide (Zepbound) 2.5 mg/0.5 mL auto-injector; Inject 0.5 mL (2.5 mg total) under the skin once a week for 28 days        Preventive Screenings:  - Diabetes Screening: screening up-to-date  - Cholesterol Screening: screening not indicated and has hyperlipidemia   - Hepatitis C screening: screening up-to-date   - HIV screening: screening up-to-date   - Cervical cancer screening: screening up-to-date   - Colon cancer screening: screening up-to-date   - Lung cancer screening: screening not indicated          History of Present Illness     Adult Annual Physical:  Patient presents for annual physical. She recently had left knee surgery. Her pain has resolved. She is still in physical therapy.    She has tried to lose weight. She has been eating healthy, reducing portions- not able to lose weight. Requesting GLP injections. She plans to start an exercise routine once she is cleared from her knee.   .     Diet and Physical Activity:  - Diet/Nutrition: no special diet.  - Exercise: no formal exercise.    Depression Screening:  - PHQ-2 Score: 0    General Health:  - Sleep: 4-6 hours of sleep on average and sleeps well.  - Hearing: normal hearing bilateral ears.  - Vision: wears glasses and most recent eye exam > 1 year ago.  - Dental: regular dental visits, brushes teeth twice daily and does not floss.    /GYN Health:  - Follows with GYN: no.   - Menopause: perimenopausal.   - History of STDs: no    Advanced Care Planning:  - Has an advanced directive?: no    - Has a durable medical POA?: no      Review of Systems   Constitutional:  Negative for activity change, appetite change, fatigue and unexpected weight change.   Eyes:  Negative for visual disturbance.   Respiratory:  Negative for cough and shortness of breath.    Cardiovascular:  Negative for chest pain, palpitations and leg  "swelling.   Gastrointestinal:  Negative for abdominal pain, blood in stool, constipation and diarrhea.   Genitourinary:  Negative for difficulty urinating.   Musculoskeletal:  Negative for arthralgias.   Skin:  Negative for rash.   Neurological:  Negative for dizziness, light-headedness and headaches.   Psychiatric/Behavioral:  Negative for sleep disturbance.          Objective   /78 (BP Location: Left arm, Patient Position: Sitting, Cuff Size: Large)   Pulse 88   Temp (!) 97.2 °F (36.2 °C)   Ht 5' 4\" (1.626 m)   Wt 99.6 kg (219 lb 9.6 oz)   SpO2 95%   Breastfeeding No   BMI 37.69 kg/m²     Physical Exam  Vitals reviewed.   Constitutional:       Appearance: Normal appearance. She is well-developed.   HENT:      Head: Normocephalic and atraumatic.     Eyes:      Conjunctiva/sclera: Conjunctivae normal.       Cardiovascular:      Rate and Rhythm: Normal rate and regular rhythm.      Heart sounds: Normal heart sounds.   Pulmonary:      Effort: Pulmonary effort is normal.      Breath sounds: Normal breath sounds.   Abdominal:      General: Bowel sounds are normal.      Palpations: Abdomen is soft.     Musculoskeletal:         General: Normal range of motion.      Cervical back: Neck supple.      Right lower leg: No edema.      Left lower leg: No edema.     Skin:     General: Skin is warm and dry.     Neurological:      Mental Status: She is alert and oriented to person, place, and time.     Psychiatric:         Mood and Affect: Mood normal.         Behavior: Behavior normal.         "

## 2025-07-24 NOTE — ASSESSMENT & PLAN NOTE
She did not start statin yet.   Orders:    atorvastatin (LIPITOR) 40 mg tablet; Take 1 tablet (40 mg total) by mouth daily

## 2025-07-24 NOTE — PATIENT INSTRUCTIONS
"Patient Education     Routine physical for adults   The Basics   Written by the doctors and editors at [facility]   What is a physical? -- A physical is a routine visit, or \"check-up,\" with your doctor. You might also hear it called a \"wellness visit\" or \"preventive visit.\"  During each visit, the doctor will:   Ask about your physical and mental health   Ask about your habits, behaviors, and lifestyle   Do an exam   Give you vaccines if needed   Talk to you about any medicines you take   Give advice about your health   Answer your questions  Getting regular check-ups is an important part of taking care of your health. It can help your doctor find and treat any problems you have. But it's also important for preventing health problems.  A routine physical is different from a \"sick visit.\" A sick visit is when you see a doctor because of a health concern or problem. Since physicals are scheduled ahead of time, you can think about what you want to ask the doctor.  How often should I get a physical? -- It depends on your age and health. In general, for people age 21 years and older:   If you are younger than 50 years, you might be able to get a physical every 3 years.   If you are 50 years or older, your doctor might recommend a physical every year.  If you have an ongoing health condition, like diabetes or high blood pressure, your doctor will probably want to see you more often.  What happens during a physical? -- In general, each visit will include:   Physical exam - The doctor or nurse will check your height, weight, heart rate, and blood pressure. They will also look at your eyes and ears. They will ask about how you are feeling and whether you have any symptoms that bother you.   Medicines - It's a good idea to bring a list of all the medicines you take to each doctor visit. Your doctor will talk to you about your medicines and answer any questions. Tell them if you are having any side effects that bother you. You " "should also tell them if you are having trouble paying for any of your medicines.   Habits and behaviors - This includes:   Your diet   Your exercise habits   Whether you smoke, drink alcohol, or use drugs   Whether you are sexually active   Whether you feel safe at home  Your doctor will talk to you about things you can do to improve your health and lower your risk of health problems. They will also offer help and support. For example, if you want to quit smoking, they can give you advice and might prescribe medicines. If you want to improve your diet or get more physical activity, they can help you with this, too.   Lab tests, if needed - The tests you get will depend on your age and situation. For example, your doctor might want to check your:   Cholesterol   Blood sugar   Iron level   Vaccines - The recommended vaccines will depend on your age, health, and what vaccines you already had. Vaccines are very important because they can prevent certain serious or deadly infections.   Discussion of screening - \"Screening\" means checking for diseases or other health problems before they cause symptoms. Your doctor can recommend screening based on your age, risk, and preferences. This might include tests to check for:   Cancer, such as breast, prostate, cervical, ovarian, colorectal, prostate, lung, or skin cancer   Sexually transmitted infections, such as chlamydia and gonorrhea   Mental health conditions like depression and anxiety  Your doctor will talk to you about the different types of screening tests. They can help you decide which screenings to have. They can also explain what the results might mean.   Answering questions - The physical is a good time to ask the doctor or nurse questions about your health. If needed, they can refer you to other doctors or specialists, too.  Adults older than 65 years often need other care, too. As you get older, your doctor will talk to you about:   How to prevent falling at " home   Hearing or vision tests   Memory testing   How to take your medicines safely   Making sure that you have the help and support you need at home  All topics are updated as new evidence becomes available and our peer review process is complete.  This topic retrieved from [employer] on: May 02, 2024.  Topic 261012 Version 1.0  Release: 32.4.3 - C32.122  © 2024 UpToDate, Inc. and/or its affiliates. All rights reserved.  Consumer Information Use and Disclaimer   Disclaimer: This generalized information is a limited summary of diagnosis, treatment, and/or medication information. It is not meant to be comprehensive and should be used as a tool to help the user understand and/or assess potential diagnostic and treatment options. It does NOT include all information about conditions, treatments, medications, side effects, or risks that may apply to a specific patient. It is not intended to be medical advice or a substitute for the medical advice, diagnosis, or treatment of a health care provider based on the health care provider's examination and assessment of a patient's specific and unique circumstances. Patients must speak with a health care provider for complete information about their health, medical questions, and treatment options, including any risks or benefits regarding use of medications. This information does not endorse any treatments or medications as safe, effective, or approved for treating a specific patient. UpToDate, Inc. and its affiliates disclaim any warranty or liability relating to this information or the use thereof.The use of this information is governed by the Terms of Use, available at https://www.woltersRidemakerzuwer.com/en/know/clinical-effectiveness-terms. 2024© UpToDate, Inc. and its affiliates and/or licensors. All rights reserved.  Copyright   © 2024 UpToDate, Inc. and/or its affiliates. All rights reserved.

## 2025-07-24 NOTE — ASSESSMENT & PLAN NOTE
Prior Authorization Clinical Questions for Weight Management Pharmacotherapy    1. Does the patient have a contrainidcation to medication prescribed for weight management?: No  2. Does the patient have a diagnosis of obesity, confirmed by a BMI greater than or equal to 30 kg/m^2?: Yes  3. Does the patient have a BMI of greater than or equal to 27 kg/m^2 with at least one weight-related comorbidity/risk factor/complication (e.g. diabetes, dyslipidemia, coronary artery disease)?: Yes  4. Weight-related co-morbidities/risk factors: GERD  7. Has the patient been on a weight loss regimen of low-calorie diet, increased physical activity, and lifestyle modifications for a minimum of 6 months?: Yes  8. Has the patient completed a comprehensive weight loss program (ie, Weight Watchers, Noom, Bariatrics, other nathanael on phone)? If so, what?: No  9. Does the patient have a history of type 2 diabetes?: No  10. Has the member tried and failed other weight loss medication within the past 12 months?: No  11. Will the member use requested medication in combination with another GLP agonist or weight loss drug?: No  12. Is the medication a controlled substance?: No     Baseline weight (in pounds): 219.6 lbs  Current weight (in pounds): 219.6 lbs  Weight loss percentage: 0%       Reviewed side effects.   Recommend high protein high fiber diet. Stay well hydrated. Start an exercise program once cleared by physical therapy.   Orders:    tirzepatide (Zepbound) 2.5 mg/0.5 mL auto-injector; Inject 0.5 mL (2.5 mg total) under the skin once a week for 28 days

## 2025-07-25 ENCOUNTER — TELEPHONE (OUTPATIENT)
Dept: INTERNAL MEDICINE CLINIC | Facility: CLINIC | Age: 50
End: 2025-07-25

## 2025-07-25 NOTE — TELEPHONE ENCOUNTER
PA for (Zepbound) 2.5 mg/0.5 mLSUBMITTED to [employer]     via      [x][employer]-Case ID # [id number]     [x]PA sent as URGENT    All office notes, labs and other pertaining documents and studies sent. Clinical questions answered. Awaiting determination from insurance company.     Turnaround time for your insurance to make a decision on your Prior Authorization can take 7-21 business days.

## 2025-07-30 ENCOUNTER — APPOINTMENT (OUTPATIENT)
Dept: PHYSICAL THERAPY | Facility: REHABILITATION | Age: 50
End: 2025-07-30
Payer: COMMERCIAL

## 2025-07-31 ENCOUNTER — OFFICE VISIT (OUTPATIENT)
Dept: PHYSICAL THERAPY | Facility: REHABILITATION | Age: 50
End: 2025-07-31
Payer: COMMERCIAL

## 2025-07-31 DIAGNOSIS — S83.242A OTHER TEAR OF MEDIAL MENISCUS OF LEFT KNEE, UNSPECIFIED WHETHER OLD OR CURRENT TEAR, INITIAL ENCOUNTER: Primary | ICD-10-CM

## 2025-07-31 PROCEDURE — 97112 NEUROMUSCULAR REEDUCATION: CPT

## 2025-07-31 PROCEDURE — 97110 THERAPEUTIC EXERCISES: CPT

## 2025-08-18 DIAGNOSIS — E66.9 OBESITY (BMI 35.0-39.9 WITHOUT COMORBIDITY): ICD-10-CM

## 2025-08-18 DIAGNOSIS — S83.242A OTHER TEAR OF MEDIAL MENISCUS OF LEFT KNEE, UNSPECIFIED WHETHER OLD OR CURRENT TEAR, INITIAL ENCOUNTER: ICD-10-CM

## 2025-08-18 RX ORDER — ACETAMINOPHEN 325 MG/1
325 TABLET ORAL EVERY 6 HOURS PRN
Qty: 30 TABLET | Refills: 0 | Status: CANCELLED | OUTPATIENT
Start: 2025-08-18

## 2025-08-18 RX ORDER — TIRZEPATIDE 2.5 MG/.5ML
2.5 INJECTION, SOLUTION SUBCUTANEOUS WEEKLY
Qty: 2 ML | Refills: 0 | Status: CANCELLED | OUTPATIENT
Start: 2025-08-18 | End: 2025-09-15

## 2025-08-19 DIAGNOSIS — S83.242A OTHER TEAR OF MEDIAL MENISCUS OF LEFT KNEE, UNSPECIFIED WHETHER OLD OR CURRENT TEAR, INITIAL ENCOUNTER: ICD-10-CM

## 2025-08-19 DIAGNOSIS — E66.9 OBESITY (BMI 35.0-39.9 WITHOUT COMORBIDITY): ICD-10-CM

## 2025-08-20 RX ORDER — TIRZEPATIDE 2.5 MG/.5ML
2.5 INJECTION, SOLUTION SUBCUTANEOUS WEEKLY
Qty: 2 ML | Refills: 0 | Status: SHIPPED | OUTPATIENT
Start: 2025-08-20 | End: 2025-09-17

## 2025-08-20 RX ORDER — ACETAMINOPHEN 325 MG/1
325 TABLET ORAL EVERY 6 HOURS PRN
Qty: 30 TABLET | Refills: 0 | Status: SHIPPED | OUTPATIENT
Start: 2025-08-20

## (undated) DEVICE — SCD SEQUENTIAL COMPRESSION COMFORT SLEEVE MEDIUM KNEE LENGTH: Brand: KENDALL SCD

## (undated) DEVICE — SYRINGE 10ML LL CONTROL TOP

## (undated) DEVICE — PVC URETHRAL CATHETER: Brand: DOVER

## (undated) DEVICE — VIAL DECANTER

## (undated) DEVICE — DECANTER: Brand: UNBRANDED

## (undated) DEVICE — MAT ABSORBANT ARTHROSCOPY FLOOR 46 X 40 IN

## (undated) DEVICE — STERILE SURGICAL LUBRICANT,  TUBE: Brand: SURGILUBE

## (undated) DEVICE — CHLORAPREP HI-LITE 26ML ORANGE

## (undated) DEVICE — STERILE 8 INCH PROCTO SWAB: Brand: CARDINAL HEALTH

## (undated) DEVICE — PLUMEPEN PRO 10FT

## (undated) DEVICE — PREP SURGICAL PURPREP 26ML

## (undated) DEVICE — NEEDLE SPINAL 22G X 3.5IN  QUINCKE

## (undated) DEVICE — SUT MONOCRYL 4-0 PS-2 27 IN Y426H

## (undated) DEVICE — DRAPE EQUIPMENT RF WAND

## (undated) DEVICE — TROCAR APPPLE 5MM EXTENDED LENGTH

## (undated) DEVICE — LIGHT HANDLE COVER SLEEVE DISP BLUE STELLAR

## (undated) DEVICE — PAD GROUNDING ADULT

## (undated) DEVICE — TUBING ARTHROSCOPIC WAVE  MAIN PUMP

## (undated) DEVICE — LIGAMAX 5 MM ENDOSCOPIC MULTIPLE CLIP APPLIER: Brand: LIGAMAX

## (undated) DEVICE — ELECTRODE LAP J HOOK SPLIT STEM E-Z CLEAN 33CM -0021S

## (undated) DEVICE — INTENDED FOR TISSUE SEPARATION, AND OTHER PROCEDURES THAT REQUIRE A SHARP SURGICAL BLADE TO PUNCTURE OR CUT.: Brand: BARD-PARKER SAFETY BLADES SIZE 15, STERILE

## (undated) DEVICE — TOWEL SURG XR DETECT GREEN STRL RFD

## (undated) DEVICE — ENDOPATH XCEL BLADELESS TROCARS WITH STABILITY SLEEVES: Brand: ENDOPATH XCEL

## (undated) DEVICE — SPONGE SCRUB 4 PCT CHLORHEXIDINE

## (undated) DEVICE — GLOVE SRG BIOGEL ECLIPSE 7

## (undated) DEVICE — ADHESIVE SKN CLSR HISTOACRYL FLEX 0.5ML LF

## (undated) DEVICE — NEEDLE 22 G X 1 1/2 SAFETY

## (undated) DEVICE — UNDYED BRAIDED (POLYGLACTIN 910), SYNTHETIC ABSORBABLE SUTURE: Brand: COATED VICRYL

## (undated) DEVICE — BETHLEHEM UNIVERSAL  ARTHRO PK: Brand: CARDINAL HEALTH

## (undated) DEVICE — BETHLEHEM UNIVERSAL MINOR VAG: Brand: CARDINAL HEALTH

## (undated) DEVICE — GLOVE PI ULTRA TOUCH SZ.6.5

## (undated) DEVICE — SMOKE EVACUATION TUBING WITH 7/8 IN TO 1/4 IN REDUCER: Brand: BUFFALO FILTER

## (undated) DEVICE — PENCIL ELECTROSURG E-Z CLEAN -0035H

## (undated) DEVICE — GLOVE SRG BIOGEL 7.5

## (undated) DEVICE — BLADE SHAVER TORPEDO 4MM 13CM  COOLCUT

## (undated) DEVICE — ALLENTOWN LAP CHOLE APP PACK: Brand: CARDINAL HEALTH

## (undated) DEVICE — PREMIUM DRY TRAY LF: Brand: MEDLINE INDUSTRIES, INC.

## (undated) DEVICE — IMPERVIOUS STOCKINETTE: Brand: DEROYAL

## (undated) DEVICE — GLOVE INDICATOR PI UNDERGLOVE SZ 6.5 BLUE

## (undated) DEVICE — INTENDED FOR TISSUE SEPARATION, AND OTHER PROCEDURES THAT REQUIRE A SHARP SURGICAL BLADE TO PUNCTURE OR CUT.: Brand: BARD-PARKER SAFETY BLADES SIZE 11, STERILE

## (undated) DEVICE — GLOVE SRG BIOGEL ORTHOPEDIC 8

## (undated) DEVICE — STIRRUP STRAP ADULT DISP

## (undated) DEVICE — EXTREMITY DRAPE WITH ARMBOARD COVERS: Brand: CONVERTORS

## (undated) DEVICE — IRRIG ENDO FLO TUBING

## (undated) DEVICE — ADHESIVE SKIN HIGH VISCOSITY EXOFIN 1ML

## (undated) DEVICE — ACE WRAP 6 IN UNSTERILE

## (undated) DEVICE — SUT MONOCRYL 3-0 PS-2 27 IN Y427H

## (undated) DEVICE — ENDOPOUCH RETRIEVER SPECIMEN RETRIEVAL BAGS: Brand: ENDOPOUCH RETRIEVER

## (undated) DEVICE — GLOVE INDICATOR PI UNDERGLOVE SZ 8 BLUE

## (undated) DEVICE — VIOLET BRAIDED (POLYGLACTIN 910), SYNTHETIC ABSORBABLE SUTURE: Brand: COATED VICRYL

## (undated) DEVICE — PUDDLE VAC

## (undated) DEVICE — SUT SILK 2-0 SH 30 IN K833H

## (undated) DEVICE — DRAPE SHEET THREE QUARTER

## (undated) DEVICE — SUT VICRYL 3-0 SH 27 IN J416H

## (undated) DEVICE — LLETZ LOOP 20 X 15MM MEGADYNE

## (undated) DEVICE — BETHLEHEM UNIVERSAL MINOR GEN: Brand: CARDINAL HEALTH

## (undated) DEVICE — 3M™ STERI-DRAPE™ U-DRAPE 1015: Brand: STERI-DRAPE™

## (undated) DEVICE — ELECTRODE BALL E-Z CLEAN 5 IN -0009

## (undated) DEVICE — BAG POLY CLEAR 12 X 8 X 30

## (undated) DEVICE — INTENDED FOR TISSUE SEPARATION, AND OTHER PROCEDURES THAT REQUIRE A SHARP SURGICAL BLADE TO PUNCTURE OR CUT.: Brand: BARD-PARKER ® CARBON RIB-BACK BLADES